# Patient Record
Sex: MALE | Race: WHITE | Employment: FULL TIME | URBAN - METROPOLITAN AREA
[De-identification: names, ages, dates, MRNs, and addresses within clinical notes are randomized per-mention and may not be internally consistent; named-entity substitution may affect disease eponyms.]

---

## 2017-01-13 ENCOUNTER — OFFICE VISIT (OUTPATIENT)
Dept: NEUROLOGY | Age: 57
End: 2017-01-13

## 2017-01-13 VITALS
WEIGHT: 261 LBS | SYSTOLIC BLOOD PRESSURE: 170 MMHG | RESPIRATION RATE: 14 BRPM | OXYGEN SATURATION: 97 % | HEART RATE: 71 BPM | BODY MASS INDEX: 37.37 KG/M2 | HEIGHT: 70 IN | DIASTOLIC BLOOD PRESSURE: 94 MMHG

## 2017-01-13 DIAGNOSIS — G56.03 BILATERAL CARPAL TUNNEL SYNDROME: ICD-10-CM

## 2017-01-13 DIAGNOSIS — R20.0 NUMBNESS: Primary | ICD-10-CM

## 2017-01-13 DIAGNOSIS — M54.2 NECK PAIN: ICD-10-CM

## 2017-01-13 RX ORDER — ESOMEPRAZOLE MAGNESIUM 40 MG/1
40 CAPSULE, DELAYED RELEASE ORAL EVERY OTHER DAY
COMMUNITY

## 2017-01-13 NOTE — PROGRESS NOTES
Chief Complaint   Patient presents with    Neuropathy         HISTORY OF PRESENT ILLNESS  Sulma Miner is a 60 y.o. male who was referred by Dr. Steffanie Nur. 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 Within a matter of a few hours he noticed the numbness in his feet as well. This has persisted ever since and he has been noticing some difficulty with balance, twinging electrical type sensation along his spine and achy feeling in his muscles. Prior to the symptoms he had an upper respiratory infection that lasted for about 2 weeks. Denies any visual symptoms, dysphagia, dysarthria or facial numbness. No chest pain, shortness of breath or palpitations. He did experience some spasms in his chest the first day that resolved with resting. Symptoms are worse when he is laying down and gets better after he has been walking around for some time. No headache. He did experience some nausea in the beginning. Past Medical History   Diagnosis Date    enlarged prostate     GERD (gastroesophageal reflux disease)     High cholesterol      Current Outpatient Prescriptions   Medication Sig    esomeprazole (NEXIUM) 40 mg capsule Take  by mouth daily.  terazosin (HYTRIN) 2 mg capsule     guaiFENesin-codeine (GUAIATUSSIN AC) 100-10 mg/5 mL solution Take 5 mL by mouth three (3) times daily as needed for Cough. Max Daily Amount: 15 mL.  omeprazole (PRILOSEC) 40 mg capsule     DOCOSAHEXANOIC ACID/EPA (FISH OIL PO) Take  by mouth.  Omega-3-DHA-EPA-Fish Oil 1,000 mg (120 mg-180 mg) cap Take  by mouth.  albuterol (PROVENTIL HFA, VENTOLIN HFA, PROAIR HFA) 90 mcg/actuation inhaler Take 1 Puff by inhalation every six (6) hours as needed for Wheezing. No current facility-administered medications for this visit.       Allergies   Allergen Reactions    Bee Venom Protein (Honey Bee) Unknown (comments)    Penicillins Other (comments)     Pt states was told as a child he was allergic      Family History   Problem Relation Age of Onset    No Known Problems Father     No Known Problems Sister     No Known Problems Brother     No Known Problems Maternal Aunt     No Known Problems Maternal Uncle     No Known Problems Paternal Aunt     No Known Problems Paternal Uncle     No Known Problems Maternal Grandmother     No Known Problems Maternal Grandfather     No Known Problems Paternal Grandmother     No Known Problems Paternal Grandfather      Social History   Substance Use Topics    Smoking status: Never Smoker    Smokeless tobacco: Never Used    Alcohol use No     History reviewed. No pertinent past surgical history. REVIEW OF SYSTEMS  Review of Systems - History obtained from the patient  Psychological ROS: negative  ENT ROS: negative  Hematological and Lymphatic ROS: negative  Endocrine ROS: negative  Respiratory ROS: no cough, shortness of breath, or wheezing  Cardiovascular ROS: no chest pain or dyspnea on exertion  Gastrointestinal ROS: no abdominal pain, change in bowel habits, or black or bloody stools  Genito-Urinary ROS: no dysuria, trouble voiding, or hematuria  Musculoskeletal ROS: negative  Dermatological ROS: negative      PHYSICAL EXAMINATION:    Visit Vitals    BP (!) 170/94    Pulse 71    Resp 14    Ht 5' 10\" (1.778 m)    Wt 118.4 kg (261 lb)    SpO2 97%    BMI 37.45 kg/m2     General:  Well defined, nourished, and groomed individual in no acute distress. Neck: Supple, nontender, thyroid within normal limits, no JVD, no bruits, no pain with resistance to active range of motion. Heart: Regular rate and rhythm, no murmurs, rub, or gallop. Normal S1S2. Lungs:  Clear to auscultation bilaterally with equal chest expansion, no cough, no wheeze  Musculoskeletal:  Extremities revealed no edema and had full range of motion of joints.     Psych:  Good mood and bright affect    NEUROLOGICAL EXAMINATION:     Mental Status:   Alert and oriented to person, place, and time with recent and remote memory intact. Attention span and concentration are normal. Speech is fluent with a full fund of knowledge. Cranial Nerves:    II, III, IV, VI:  Visual acuity grossly intact. Visual fields are normal.    Pupils are equal, round, and reactive to light and accommodation. Extra-ocular movements are full and fluid. Fundoscopic exam was benign, no ptosis or nystagmus. V-XII: Hearing is grossly intact. Facial features are symmetric, with normal sensation and strength. The palate rises symmetrically and the tongue protrudes midline. Sternocleidomastoids 5/5. Motor Examination: Normal tone, bulk, and strength. 5/5 muscle strength throughout. No cogwheel rigidity or clonus present. Sensory exam:  Normal throughout to pinprick, temperature, and vibration sense. Normal proprioception. Coordination:  Heel-to-shin was smooth and symmetrical bilaterally. Finger to nose and rapid arm movement testing was normal.   No resting or intention tremor    Gait and Station:  Steady while walking on toes, heels, and with tandem walking. Normal arm swing. No Rhomberg or pronator drift. No muscle wasting or fasiculations noted. Reflexes:  DTRs 1+ in the biceps and brachioradialis, 2+ at the knees, ankle jerk difficult to elicit . Toes downgoing. ASSESSMENT    ICD-10-CM ICD-9-CM    1. Numbness R20.0 782. 0 EMG NCV MOTOR WO F/WAVE PER NERVE      XR SPINAL PUNC LUMB DX      CELL COUNT, CSF      GLUCOSE, CSF      PROTEIN, CSF      MRI CERV SPINE W WO CONT   2. Neck pain M54.2 723.1 XR SPINAL PUNC LUMB DX      CELL COUNT, CSF      GLUCOSE, CSF      PROTEIN, CSF      MRI CERV SPINE W WO CONT   3.  Bilateral carpal tunnel syndrome G56.03 354.0        DISCUSSION  Mr. Celeste Heredia has history and symptoms suggestive of acute inflammatory demyelinating polyneuropathy i.e Guillain-Barré syndrome. However he still has preserved reflexes at the knees and reports some neck pain which is atypical.  His EMG/NCV does show prolonged F-wave latency of the lower extremity motor nerves which could represent early AIDP i.e Guillain [place] syndrome. There was also evidence of mild carpal tunnel syndrome on both sides, an incidental finding  We will proceed with lumbar puncture and MRI of the cervical spine. Thank you for allowing me to participate in the care of Mr. Richar Stanton. Please feel free to contact me if you have any questions. I will be happy to follow to follow him along with you.       Malia Mendez MD  Diplomate, American Board of Psychiatry & Neurology (Neurology)  Emily Driscoll Board of Psychiatry & Neurology (Clinical Neurophysiology)  Diplomate, American Board of Electrodiagnostic Medicine

## 2017-01-13 NOTE — LETTER
1/13/2017 4:43 PM 
 
Patient:  Vance Corbin YOB: 1960 Date of Visit: 1/13/2017 Dear Chad Ortiz MD 
[address] [facility] 
[place] [address] VIA Facsimile: [phone] 
 : Thank you for referring Mr. Bobo Plata to me for evaluation/treatment. Below are the relevant portions of my assessment and plan of care. If you have questions, please do not hesitate to call me. I look forward to following Mr. Carpenter along with you. Sincerely, Paris Giles MD

## 2017-01-13 NOTE — PROGRESS NOTES
Patient here for numbness in both hands and all toes  Started last week  Shooting pains from shoulder to mid-thigh

## 2017-01-13 NOTE — PROCEDURES
[facility] Neurology [address] [place] [address], [facility].  Phone [phone]   Fax: [phone]  Test Date:  2017    Patient: Michaela Guillaume : 1940 Physician: Lara Mitchell MD   Sex: Male Height: ' 0\" Ref Phys: Lara Mitchell md   ID#: [id number] Weight:  lbs. Technician: Venus Pelayo     Patient Complaints:  b/l arms     Patient History / Exam:   49-year-old male who is being evaluated for symptoms of numbness and glove and stocking distribution. This has been going on for the past 9-10 days. He had an upper respiratory infection that lasted 2 weeks prior to the symptoms. On exam: No focal motor or sensory deficits. DTRs 2/2 and symmetric except ankle jerks that were absent. NCV & EMG Findings:  Evaluation of the left median sensory nerve showed prolonged distal peak latency (4.6 ms) and decreased conduction velocity (Wrist-2nd Digit, 30 m/s). The right median sensory nerve showed prolonged distal peak latency (4.1 ms), reduced amplitude (9.6 µV), and decreased conduction velocity (Wrist-2nd Digit, 34 m/s). The left sural sensory nerve showed no response (Calf). The right ulnar sensory nerve showed reduced amplitude (12.4 µV). The left median/ulnar (palm) comparison nerve showed prolonged distal peak latency (Ulnar Palm, 3.2 ms) and abnormal peak latency difference (Median Palm-Ulnar Palm, 0.8 ms). The right median/ulnar (palm) comparison nerve showed abnormal peak latency difference (Median Palm-Ulnar Palm, 0.4 ms). All remaining nerves  were within normal limits. F Wave studies indicate that the right peroneal F wave has prolonged latency (61.59 ms). The right tibial F wave has prolonged latency (65.42 ms) and prolonged latency (62.26 ms). All remaining F Wave latencies were within normal limits. Left vs. Right comparison data for the median F wave indicates abnormal L-R latency difference (10.30 ms). All remaining F Wave left vs. right side latency differences were within normal limits. All examined muscles (as indicated in the following table) showed no evidence of electrical instability. Impression:  The electrodiagnostic testing is suggestive of bilateral entrapment median mononeuropathy i.e. carpal tunnel syndrome. This is mild on the right and mild to moderate on the left the nerve conductions were otherwise unremarkable except for prolonged F waves of peroneal, tibial nerves. In the appropriate clinical setting this may represent acute inflammatory demyelinating polyneuropathy i.e Guillain-Barré syndrome. Recommendations:  Further work up as indicated including spinal tap.   Consider repeat study in 2-3 weeks as needed  __________________________  Shamir Velazco MD        Nerve Conduction Studies  Anti Sensory Summary Table     Site NR Peak (ms) Norm Peak (ms) P-T Amp (µV) Norm P-T Amp Site1 Site2 Delta-P (ms) Dist (cm) Frankie (m/s) Norm Frankie (m/s)   Left Median Anti Sensory (2nd Digit)  32.7°C   Wrist    4.6 <3.6 21.2 >10 Wrist 2nd Digit 4.6 14.0 30 >39   Right Median Anti Sensory (2nd Digit)  26.3°C   Wrist    4.1 <3.6 9.6 >10 Wrist 2nd Digit 4.1 14.0 34 >39   Left Radial Anti Sensory (Base 1st Digit)  32.7°C   Wrist    2.3 <3.1 18.8  Wrist Base 1st Digit 2.3 0.0     Left Sup Peroneal Anti Sensory (Ant Lat Mall)  30°C   14 cm    2.9 <4.4 27.1 >5.0 14 cm Ant Lat Mall 2.9 14.0 48 >32   Site 2    2.9  24.6          Left Sural Anti Sensory (Lat Mall)  30.1°C   Calf NR  <4.0  >5.0 Calf Lat Mall  14.0  >35   Left Ulnar Anti Sensory (5th Digit)  32.7°C   Wrist    3.3 <3.7 18.4 >15.0 Wrist 5th Digit 3.3 14.0 42 >38   Right Ulnar Anti Sensory (5th Digit)  26.2°C   Wrist    3.3 <3.7 12.4 >15.0 Wrist 5th Digit 3.3 14.0 42 >38     Motor Summary Table     Site NR Onset (ms) Norm Onset (ms) O-P Amp (mV) Norm O-P Amp Site1 Site2 Delta-0 (ms) Dist (cm) Frankie (m/s) Norm Frankie (m/s)   Left Median Motor (Abd [employer] [place] Brev)  31.3°C   Wrist    4.1 <4.2 6.1 >5 Elbow Wrist 4.3 26.0 60 >50   Elbow    8.4  5.0          Right Median Motor (Abd Poll Brev)  25.5°C   Wrist    4.0 <4.2 8.8 >5 Elbow Wrist 4.2 26.0 62 >50   Elbow    8.2  7.6          Left Peroneal Motor (Ext Dig Brev)  29.9°C   Ankle    4.6 <6.1 3.0 >2.5 B Fib Ankle 7.8 36.0 46 >38   B Fib    12.4  2.8  Poplt B Fib 2.3 13.0 57 >40   Poplt    14.7  2.0          Left Tibial Motor (Abd House Brev)  29.9°C   Ankle    4.6 <6.1 5.4 >3.0 Knee Ankle 10.0 36.0 36 >35   Knee    14.6  3.3          Left Ulnar Motor (Abd Dig Minimi)  31.4°C   Wrist    3.1 <4.2 6.4 >3 B Elbow Wrist 3.5 23.0 66 >53   B Elbow    6.6  6.4  A Elbow B Elbow 2.0 11.0 55 >53   A Elbow    8.6  6.2          Right Ulnar Motor (Abd Dig Minimi)  25.7°C   Wrist    2.7 <4.2 9.6 >3 B Elbow Wrist 3.6 23.0 64 >53   B Elbow    6.3  7.8  A Elbow B Elbow 1.7 11.0 65 >53   A Elbow    8.0  6.7            Comparison Summary Table     Site NR Peak (ms) Norm Peak (ms) P-T Amp (µV) Site1 Site2 Delta-P (ms) Norm Delta (ms)   Left Median/Ulnar Palm Comparison (Wrist - 8cm)  27.7°C   Median Palm    2.4 <2.5 13.8 Median Palm Ulnar Palm 0.8 <0.3   Ulnar Palm    3.2 <2.5 8.4       Right Median/Ulnar Palm Comparison (Wrist - 8cm)  26.8°C   Median Palm    2.1 <2.5 18.1 Median Palm Ulnar Palm 0.4 <0.3   Ulnar Palm    1.7 <2.5 6.2         F Wave Studies     NR F-Lat (ms) Lat Norm (ms) L-R F-Lat (ms) L-R Lat Norm   Left Median (Mrkrs) (Abd Poll Brev)  30.1°C      30.30 <33 10.30 <2.2   Right Median (Mrkrs) (Abd Poll Brev)  30.4°C      20.00 <33 10.30 <2.2   Right Peroneal (Mrkrs) (EDB)  30.2°C      61.59 <60  <5.1   Right Tibial (Mrkrs) (Abd Hallucis)  29.9°C      65.42 <61  <5.7   Right Tibial (Mrkrs) Run #2 (Abd Hallucis)  30.4°C      62.26 <61  <5.7   Left Ulnar (Mrkrs) (Abd Dig Min)  30.2°C      30.96 <36 1.91 <2.5   Right Ulnar (Mrkrs) (Abd Dig Min)  30°C      29.05 <36 1.91 <2.5     EMG     Side Muscle Nerve Root Ins Act Fibs Psw Amp Dur Poly Recrt Int Della Miguel Comment   Left 1stDorInt Ulnar C8-T1 Nml Nml Nml Nml Nml 0 Nml Nml    Left BrachioRad Radial C5-6 Nml Nml Nml Nml Nml 0 Nml Nml    Left PronatorTeres Median C6-7 Nml Nml Nml Nml Nml 0 Nml Nml    Left Biceps Musculocut C5-6 Nml Nml Nml Nml Nml 0 Nml Nml    Left Triceps Radial C6-7-8 Nml Nml Nml Nml Nml 0 Nml Nml    Left Deltoid Axillary C5-6 Nml Nml Nml Nml Nml 0 Nml Nml    Left Ext Dig Brev Dp Br Peronel L5, S1 Nml Nml Nml Nml Nml 0 Nml Nml    Left AntTibialis Dp Br Peronel L4-5 Nml Nml Nml Nml Nml 0 Nml Nml    Left Peroneus Long Sup Br Peronel L5-S1 Nml Nml Nml Nml Nml 0 Nml Nml    Left Gastroc Tibial S1-2 Nml Nml Nml Nml Nml 0 Nml Nml    Left Flex Dig Long Tibial L5-S2 Nml Nml Nml Nml Nml 0 Nml Nml    Left VastusLat Femoral L2-4 Nml Nml Nml Nml Nml 0 Nml Nml          Waveforms:

## 2017-01-13 NOTE — MR AVS SNAPSHOT
Visit Information Date & Time Provider Department Dept. Phone Encounter #  
 1/13/2017 10:00 AM Trice Montemayor MD [facility] at [address] [id number] Your Appointments 1/17/2017  8:00 AM  
PROCEDURE with Trice Montemayor MD  
[facility] at [address] [facility]) Appt Note: EMG b/l arms per Dr. Nba Markham [address] [place] [id number]  
[phone]  
  
   
 [address] [address] Upcoming Health Maintenance Date Due Hepatitis C Screening 1960 DTaP/Tdap/Td series (1 - Tdap) 7/7/1979 FOBT Q 1 YEAR AGE 50-75 7/7/2008 INFLUENZA AGE 9 TO ADULT 8/1/2016 Allergies as of 1/13/2017  Review Complete On: 1/13/2017 By: Trice Montemayor MD  
  
 Severity Noted Reaction Type Reactions Bee Venom Protein (Honey Bee)  01/13/2017    Unknown (comments) Penicillins  04/14/2016    Other (comments) Pt states was told as a child he was allergic Current Immunizations  Never Reviewed No immunizations on file. Not reviewed this visit You Were Diagnosed With   
  
 Codes Comments Numbness    -  Primary ICD-10-CM: R20.0 ICD-9-CM: 782.0 Neck pain     ICD-10-CM: M54.2 ICD-9-CM: 723.1 Vitals BP Pulse Resp Height(growth percentile) Weight(growth percentile) SpO2  
 (!) 170/94 71 14 5' 10\" (1.778 m) 261 lb (118.4 kg) 97% BMI Smoking Status 37.45 kg/m2 Never Smoker Vitals History BMI and BSA Data Body Mass Index Body Surface Area  
 37.45 kg/m 2 2.42 m 2 Preferred Pharmacy Pharmacy Name Phone [facility] [address] [facility], [address] AT [address] [place] [address] Renita [facility] [phone] Your Updated Medication List  
  
   
This list is accurate as of: 1/13/17  3:04 PM.  Always use your most recent med list.  
  
  
  
  
 albuterol 90 mcg/actuation inhaler Commonly known as:  PROVENTIL HFA, VENTOLIN HFA, PROAIR HFA Take 1 Puff by inhalation every six (6) hours as needed for Wheezing. FISH OIL PO Take  by mouth.  
  
 guaiFENesin-codeine 100-10 mg/5 mL solution Commonly known as:  Bonnee Pinna Take 5 mL by mouth three (3) times daily as needed for Cough. Max Daily Amount: 15 mL. NexIUM 40 mg capsule Generic drug:  esomeprazole Take  by mouth daily. Omega-3-DHA-EPA-Fish Oil 1,000 mg (120 mg-180 mg) Cap Take  by mouth. omeprazole 40 mg capsule Commonly known as:  PRILOSEC  
  
 terazosin 2 mg capsule Commonly known as:  HYTRIN We Performed the Following GLUCOSE, CSF [id number] CPT(R)] To-Do List   
 01/16/2017 Lab:  CELL COUNT, CSF   
  
 01/16/2017 Neurology:  EMG NCV MOTOR WO F/WAVE PER NERVE   
  
 01/16/2017 Imaging:  MRI CERV SPINE W WO CONT   
  
 01/16/2017 Lab:  PROTEIN, CSF   
  
 01/16/2017 Imaging:  XR SPINAL PUNC LUMB DX Introducing [facility]! Vicente Rosenbaum introduces [employer] patient portal. Now you can access parts of your medical record, email your doctor's office, and request medication refills online. 1. In your internet browser, go to [email or web address] 2. Click on the First Time User? Click Here link in the Sign In box. You will see the New Member Sign Up page. 3. Enter your [employer] Access Code exactly as it appears below. You will not need to use this code after youve completed the sign-up process. If you do not sign up before the expiration date, you must request a new code. · [employer] Access Code: [email or web address] Expires: 3/9/2017  9:31 AM 
 
4. Enter the last four digits of your Social Security Number (xxxx) and Date of Birth (mm/dd/yyyy) as indicated and click Submit. You will be taken to the next sign-up page. 5. Create a [employer] ID.  This will be your [employer] login ID and cannot be changed, so think of one that is secure and easy to remember. 6. Create a [employer] password. You can change your password at any time. 7. Enter your Password Reset Question and Answer. This can be used at a later time if you forget your password. 8. Enter your e-mail address. You will receive e-mail notification when new information is available in [address] 9. Click Sign Up. You can now view and download portions of your medical record. 10. Click the Download Summary menu link to download a portable copy of your medical information. If you have questions, please visit the Frequently Asked Questions section of the [employer] website. Remember, [employer] is NOT to be used for urgent needs. For medical emergencies, dial 911. Now available from your iPhone and Android! Please provide this summary of care documentation to your next provider. Your primary care clinician is listed as Mary Walsh. If you have any questions after today's visit, please call [phone].

## 2017-01-16 DIAGNOSIS — M54.2 NECK PAIN: ICD-10-CM

## 2017-01-16 DIAGNOSIS — R20.0 NUMBNESS: ICD-10-CM

## 2017-01-16 RX ORDER — BISMUTH SUBSALICYLATE 262 MG
1 TABLET,CHEWABLE ORAL DAILY
COMMUNITY

## 2017-01-17 ENCOUNTER — HOSPITAL ENCOUNTER (OUTPATIENT)
Dept: GENERAL RADIOLOGY | Age: 57
Discharge: HOME | End: 2017-01-17
Attending: PSYCHIATRY & NEUROLOGY
Payer: COMMERCIAL

## 2017-01-17 VITALS
OXYGEN SATURATION: 95 % | HEIGHT: 70 IN | SYSTOLIC BLOOD PRESSURE: 142 MMHG | BODY MASS INDEX: 37.22 KG/M2 | WEIGHT: 260 LBS | RESPIRATION RATE: 16 BRPM | TEMPERATURE: 98.1 F | HEART RATE: 64 BPM | DIASTOLIC BLOOD PRESSURE: 79 MMHG

## 2017-01-17 DIAGNOSIS — R20.0 NUMBNESS: ICD-10-CM

## 2017-01-17 DIAGNOSIS — M54.2 NECK PAIN: ICD-10-CM

## 2017-01-17 LAB
CHARACTER SMN: CLEAR
COLOR SPUN CSF: COLORLESS
GLUCOSE CSF-MCNC: 87 MG/DL (ref 40–70)
PROT CSF-MCNC: 96 MG/DL (ref 15–45)
RBC # CSF: 1 /CU MM
TUBE # CSF: 1
WBC # CSF: 3 /CU MM (ref 0–5)

## 2017-01-17 PROCEDURE — 84157 ASSAY OF PROTEIN OTHER: CPT | Performed by: PSYCHIATRY & NEUROLOGY

## 2017-01-17 PROCEDURE — 89050 BODY FLUID CELL COUNT: CPT | Performed by: PSYCHIATRY & NEUROLOGY

## 2017-01-17 PROCEDURE — 82945 GLUCOSE OTHER FLUID: CPT | Performed by: PSYCHIATRY & NEUROLOGY

## 2017-01-17 PROCEDURE — 62270 DX LMBR SPI PNXR: CPT

## 2017-01-17 NOTE — DISCHARGE INSTRUCTIONS
[address] [facility]  Department of Interventional Radiology  [phone]    POST LUMBAR PUNCTURE DISCHARGE INSTRUCTIONS  General Information:  Lumbar Puncture: A LP is done to help diagnose several disorders, like pseudo tumor, migraines, meningitis, and multiple sclerosis. It involves a puncture (usually in the lower spine) into the sac that protects the spinal column. A sample of the fluid in that space is removed and tested in the lab. After any of procedures, you will be asked to lie flat on your back for 4-6 hours to prevent complications. You should also rest for 24 hours after you go home, and force fluids. If you have a headache, you should take Tylenol or acetaminophen. Call If:     You should call your Physician and/or the Radiology Nurse if you develop a headache that is not relieved by Tylenol, and worsens when you stand and eases when you lie down, you need to call. You may have developed what is referred to as a spinal headache. Our physicians will probably advise you to be on strict bed rest for 24 hours, to drink lots of fluids and caffeine. If this does not help the head pain, call again the next day. You should call if you have bleeding other than a small spot on your bandage. You should call if you have any numbness, tingling, weakness, fever, chills, urinary retention, severe itching, rash, welts, swelling, or confusion. Follow-Up Instructions: See the doctor who ordered your procedure as he/she has instructed. If you had a Lumbar Puncture or Myelogram, your results should be available to your ordering doctor in 3-5 business days. You can remove your dressing in 24 hours and shower regularly. Do not bathe or swim for 72 hours. To Reach Us:   Side effects of sedation medications and other medications used today have been reviewed. Notify us of nausea, itching, hives, dizziness, or anything else out of the ordinary.        Should you experience any of these significant changes, please call [phone] between the hours of 7:30 am and 10 pm or [phone] after hours.  After hours, ask the  to page the [address] Technologist, and describe the problem to the technologist.        Patient Signature:  Date: 1/17/2017  Discharging Nurse: Fani Johnson RN

## 2017-01-17 NOTE — IP AVS SNAPSHOT
Current Discharge Medication List  
  
ASK your doctor about these medications Dose & Instructions Dispensing Information Comments Morning Noon Evening Bedtime FISH OIL PO Your next dose is: Today, Tomorrow Other:  ____________ Take  by mouth. Refills:  0 GRAPE SEED PO Your next dose is: Today, Tomorrow Other:  ____________ Dose:  100 mg Take 100 mg by mouth daily. Refills:  0  
     
   
   
   
  
 multivitamin tablet Commonly known as:  ONE A DAY Your next dose is: Today, Tomorrow Other:  ____________ Dose:  1 Tab Take 1 Tab by mouth daily. Refills:  0 NexIUM 40 mg capsule Generic drug:  esomeprazole Your next dose is: Today, Tomorrow Other:  ____________ Take  by mouth daily. Refills:  0 Omega-3-DHA-EPA-Fish Oil 1,000 mg (120 mg-180 mg) Cap Your next dose is: Today, Tomorrow Other:  ____________ Take  by mouth. Refills:  0  
     
   
   
   
  
 omeprazole 40 mg capsule Commonly known as:  PRILOSEC Your next dose is: Today, Tomorrow Other:  ____________ Refills:  4  
     
   
   
   
  
 terazosin 2 mg capsule Commonly known as:  HYTRIN Your next dose is: Today, Tomorrow Other:  ____________ Refills:  5

## 2017-01-17 NOTE — IP AVS SNAPSHOT
[address] [address] 
[phone] Patient: Elizabeth Hanna MRN: [id number] IWV:[birth year] You are allergic to the following Allergen Reactions Bee Venom Protein (Honey Bee) Unknown (comments) Penicillins Other (comments) Pt states was told as a child he was allergic Recent Documentation Height Weight BMI Smoking Status 1.778 m 117.9 kg 37.31 kg/m2 Former Smoker Unresulted Labs Order Current Status CELL COUNT, CSF In process GLUCOSE, CSF In process PROTEIN, CSF In process Emergency Contacts Name Discharge Info Relation Home Work Mobile [address] CAREGIVER [3] Spouse [3] [phone] About your hospitalization You were admitted on:  January 17, 2017 You last received care in the:  [address] You were discharged on:  January 17, 2017 Unit phone number:  [phone] Why you were hospitalized Your primary diagnosis was:  Not on File Providers Seen During Your Hospitalizations Provider Role Specialty Primary office phone Israel Michelle MD Attending Provider Neurology [phone] Your Primary Care Physician (PCP) Primary Care Physician Office Phone Office Fax Sol Cole, [address] [phone] Follow-up Information None Your Appointments Thursday January 19, 2017  4:30 PM EST  
[facility] MRI [address]) [address] [facility]  
[phone] 1. Please bring a list or a bag of your current medications to your appointment 2. Please be sure to remove ALL hair clips, pins, extensions, etc., prior to arriving for your MRI procedure.  3. Bring any non [facility] films or CDs pertaining to the area being imaged with you on the day of appointment. 4. A written order with a valid diagnosis and Physicians  signature is required for all scheduled tests. 5. Check in at registration 30min before your appointment time unless you were instructed to do otherwise. Patient should report to outpatient registration ([facility]) 30 minutes prior to the appointment time unless instructed otherwise. Current Discharge Medication List  
  
ASK your doctor about these medications Dose & Instructions Dispensing Information Comments Morning Noon Evening Bedtime FISH OIL PO Your next dose is: Today, Tomorrow Other:  _________ Take  by mouth. Refills:  0 GRAPE SEED PO Your next dose is: Today, Tomorrow Other:  _________ Dose:  100 mg Take 100 mg by mouth daily. Refills:  0  
     
   
   
   
  
 multivitamin tablet Commonly known as:  ONE A DAY Your next dose is: Today, Tomorrow Other:  _________ Dose:  1 Tab Take 1 Tab by mouth daily. Refills:  0 NexIUM 40 mg capsule Generic drug:  esomeprazole Your next dose is: Today, Tomorrow Other:  _________ Take  by mouth daily. Refills:  0 Omega-3-DHA-EPA-Fish Oil 1,000 mg (120 mg-180 mg) Cap Your next dose is: Today, Tomorrow Other:  _________ Take  by mouth. Refills:  0  
     
   
   
   
  
 omeprazole 40 mg capsule Commonly known as:  PRILOSEC Your next dose is: Today, Tomorrow Other:  _________ Refills:  4  
     
   
   
   
  
 terazosin 2 mg capsule Commonly known as:  HYTRIN Your next dose is: Today, Tomorrow Other:  _________ Refills:  5 Discharge Instructions [address]  
[address] 
 Department of Interventional Radiology 
[phone] POST LUMBAR PUNCTURE DISCHARGE INSTRUCTIONS General Information: 
Lumbar Puncture: A LP is done to help diagnose several disorders, like pseudo tumor, migraines, meningitis, and multiple sclerosis. It involves a puncture (usually in the lower spine) into the sac that protects the spinal column. A sample of the fluid in that space is removed and tested in the lab. After any of procedures, you will be asked to lie flat on your back for 4-6 hours to prevent complications. You should also rest for 24 hours after you go home, and force fluids. If you have a headache, you should take Tylenol or acetaminophen. Call If: 
   You should call your Physician and/or the Radiology Nurse if you develop a headache that is not relieved by Tylenol, and worsens when you stand and eases when you lie down, you need to call. You may have developed what is referred to as a spinal headache. Our physicians will probably advise you to be on strict bed rest for 24 hours, to drink lots of fluids and caffeine. If this does not help the head pain, call again the next day. You should call if you have bleeding other than a small spot on your bandage. You should call if you have any numbness, tingling, weakness, fever, chills, urinary retention, severe itching, rash, welts, swelling, or confusion. Follow-Up Instructions: See the doctor who ordered your procedure as he/she has instructed. If you had a Lumbar Puncture or Myelogram, your results should be available to your ordering doctor in 3-5 business days. You can remove your dressing in 24 hours and shower regularly. Do not bathe or swim for 72 hours. To Reach Us:  
Side effects of sedation medications and other medications used today have been reviewed. Notify us of nausea, itching, hives, dizziness, or anything else out of the ordinary.    
 
 Should you experience any of these significant changes, please call [phone] between the hours of 7:30 am and 10 pm or [phone] after hours. After hours, ask the  to page the [address] Technologist, and describe the problem to the technologist.  
 
  
Patient Signature: 
Date: 1/17/2017 Discharging Nurse: Gladys Sabillon RN Discharge Orders None Introducing [facility]! Dear Marky Alan: Thank you for requesting a [employer] account. Our records indicate that you already have an active [employer] account. You can access your account anytime at [email or web address] Did you know that you can access your hospital and ER discharge instructions at any time in [employer]? You can also review all of your test results from your hospital stay or ER visit. Additional Information If you have questions, please visit the Frequently Asked Questions section of the [employer] website at [email or web address] Remember, [employer] is NOT to be used for urgent needs. For medical emergencies, dial 911. Now available from your iPhone and Android! General Information Please provide this summary of care documentation to your next provider. Patient Signature:  ____________________________________________________________ Date:  ____________________________________________________________  
  
[facility] Provider Signature:  ____________________________________________________________ Date:  ____________________________________________________________

## 2017-01-18 ENCOUNTER — TELEPHONE (OUTPATIENT)
Dept: NEUROLOGY | Age: 57
End: 2017-01-18

## 2017-01-19 ENCOUNTER — HOSPITAL ENCOUNTER (OUTPATIENT)
Dept: MRI IMAGING | Age: 57
Discharge: HOME | End: 2017-01-19
Attending: PSYCHIATRY & NEUROLOGY
Payer: COMMERCIAL

## 2017-01-19 DIAGNOSIS — M54.2 NECK PAIN: ICD-10-CM

## 2017-01-19 DIAGNOSIS — R20.0 NUMBNESS: ICD-10-CM

## 2017-01-19 PROCEDURE — 72141 MRI NECK SPINE W/O DYE: CPT

## 2017-01-19 NOTE — TELEPHONE ENCOUNTER
Pt calling to see if dr has results from spinal tap and what the next course of action is. If wife answers you may speak with her.

## 2017-01-20 DIAGNOSIS — M47.12 OSTEOARTHRITIS OF CERVICAL SPINE WITH MYELOPATHY: Primary | ICD-10-CM

## 2017-01-20 NOTE — TELEPHONE ENCOUNTER
Pt just spoke with Dr. Kelly Phelan, said he forgot to ask Dr. Kelly Phelan a few questions and would like a returned phone call back from  if possible.

## 2017-02-10 ENCOUNTER — HOSPITAL ENCOUNTER (OUTPATIENT)
Dept: PREADMISSION TESTING | Age: 57
Discharge: HOME | End: 2017-02-10
Payer: COMMERCIAL

## 2017-02-10 VITALS
HEIGHT: 70 IN | WEIGHT: 252 LBS | BODY MASS INDEX: 36.08 KG/M2 | HEART RATE: 54 BPM | TEMPERATURE: 98.1 F | DIASTOLIC BLOOD PRESSURE: 88 MMHG | SYSTOLIC BLOOD PRESSURE: 146 MMHG

## 2017-02-10 LAB
ANION GAP BLD CALC-SCNC: 7 MMOL/L (ref 5–15)
BASOPHILS # BLD AUTO: 0.1 K/UL (ref 0–0.1)
BASOPHILS # BLD: 1 % (ref 0–1)
BUN SERPL-MCNC: 12 MG/DL (ref 6–20)
BUN/CREAT SERPL: 14 (ref 12–20)
CALCIUM SERPL-MCNC: 9.1 MG/DL (ref 8.5–10.1)
CHLORIDE SERPL-SCNC: 101 MMOL/L (ref 97–108)
CO2 SERPL-SCNC: 30 MMOL/L (ref 21–32)
CREAT SERPL-MCNC: 0.86 MG/DL (ref 0.7–1.3)
EOSINOPHIL # BLD: 0.2 K/UL (ref 0–0.4)
EOSINOPHIL NFR BLD: 3 % (ref 0–7)
ERYTHROCYTE [DISTWIDTH] IN BLOOD BY AUTOMATED COUNT: 11.7 % (ref 11.5–14.5)
EST. AVERAGE GLUCOSE BLD GHB EST-MCNC: 160 MG/DL
GLUCOSE SERPL-MCNC: 127 MG/DL (ref 65–100)
HBA1C MFR BLD: 7.2 % (ref 4.2–6.3)
HCT VFR BLD AUTO: 45.8 % (ref 36.6–50.3)
HGB BLD-MCNC: 16.4 G/DL (ref 12.1–17)
LYMPHOCYTES # BLD AUTO: 40 % (ref 12–49)
LYMPHOCYTES # BLD: 2.8 K/UL (ref 0.8–3.5)
MCH RBC QN AUTO: 31.5 PG (ref 26–34)
MCHC RBC AUTO-ENTMCNC: 35.8 G/DL (ref 30–36.5)
MCV RBC AUTO: 87.9 FL (ref 80–99)
MONOCYTES # BLD: 0.6 K/UL (ref 0–1)
MONOCYTES NFR BLD AUTO: 9 % (ref 5–13)
NEUTS SEG # BLD: 3.2 K/UL (ref 1.8–8)
NEUTS SEG NFR BLD AUTO: 47 % (ref 32–75)
PLATELET # BLD AUTO: 193 K/UL (ref 150–400)
POTASSIUM SERPL-SCNC: 4.2 MMOL/L (ref 3.5–5.1)
RBC # BLD AUTO: 5.21 M/UL (ref 4.1–5.7)
SODIUM SERPL-SCNC: 138 MMOL/L (ref 136–145)
WBC # BLD AUTO: 6.9 K/UL (ref 4.1–11.1)

## 2017-02-10 PROCEDURE — 93005 ELECTROCARDIOGRAM TRACING: CPT

## 2017-02-10 PROCEDURE — 80048 BASIC METABOLIC PNL TOTAL CA: CPT | Performed by: NEUROLOGICAL SURGERY

## 2017-02-10 PROCEDURE — 36415 COLL VENOUS BLD VENIPUNCTURE: CPT | Performed by: NEUROLOGICAL SURGERY

## 2017-02-10 PROCEDURE — 85025 COMPLETE CBC W/AUTO DIFF WBC: CPT | Performed by: NEUROLOGICAL SURGERY

## 2017-02-10 PROCEDURE — 83036 HEMOGLOBIN GLYCOSYLATED A1C: CPT | Performed by: NEUROLOGICAL SURGERY

## 2017-02-11 ENCOUNTER — ANESTHESIA EVENT (OUTPATIENT)
Dept: SURGERY | Age: 57
End: 2017-02-11
Payer: COMMERCIAL

## 2017-02-11 LAB
ATRIAL RATE: 54 BPM
BACTERIA SPEC CULT: ABNORMAL
BACTERIA SPEC CULT: ABNORMAL
CALCULATED P AXIS, ECG09: 20 DEGREES
CALCULATED R AXIS, ECG10: 25 DEGREES
CALCULATED T AXIS, ECG11: 30 DEGREES
DIAGNOSIS, 93000: NORMAL
P-R INTERVAL, ECG05: 192 MS
Q-T INTERVAL, ECG07: 420 MS
QRS DURATION, ECG06: 90 MS
QTC CALCULATION (BEZET), ECG08: 398 MS
SERVICE CMNT-IMP: ABNORMAL
VENTRICULAR RATE, ECG03: 54 BPM

## 2017-02-13 ENCOUNTER — HOSPITAL ENCOUNTER (OUTPATIENT)
Age: 57
Setting detail: OBSERVATION
Discharge: HOME | End: 2017-02-14
Attending: NEUROLOGICAL SURGERY | Admitting: NEUROLOGICAL SURGERY
Payer: COMMERCIAL

## 2017-02-13 ENCOUNTER — ANESTHESIA (OUTPATIENT)
Dept: SURGERY | Age: 57
End: 2017-02-13
Payer: COMMERCIAL

## 2017-02-13 ENCOUNTER — APPOINTMENT (OUTPATIENT)
Dept: GENERAL RADIOLOGY | Age: 57
End: 2017-02-13
Attending: NEUROLOGICAL SURGERY
Payer: COMMERCIAL

## 2017-02-13 LAB
GLUCOSE BLD STRIP.AUTO-MCNC: 216 MG/DL (ref 65–100)
SERVICE CMNT-IMP: ABNORMAL

## 2017-02-13 PROCEDURE — 76010000171 HC OR TIME 2 TO 2.5 HR INTENSV-TIER 1: Performed by: NEUROLOGICAL SURGERY

## 2017-02-13 PROCEDURE — 99218 HC RM OBSERVATION: CPT

## 2017-02-13 PROCEDURE — 82962 GLUCOSE BLOOD TEST: CPT

## 2017-02-13 PROCEDURE — 74011250636 HC RX REV CODE- 250/636

## 2017-02-13 PROCEDURE — 74011250636 HC RX REV CODE- 250/636: Performed by: ANESTHESIOLOGY

## 2017-02-13 PROCEDURE — C1713 ANCHOR/SCREW BN/BN,TIS/BN: HCPCS | Performed by: NEUROLOGICAL SURGERY

## 2017-02-13 PROCEDURE — 77030030028 HC BIT DRL SPN FLAT CHK DSP J&J -B: Performed by: NEUROLOGICAL SURGERY

## 2017-02-13 PROCEDURE — 77030003029 HC SUT VCRL J&J -B: Performed by: NEUROLOGICAL SURGERY

## 2017-02-13 PROCEDURE — 77030032490 HC SLV COMPR SCD KNE COVD -B: Performed by: NEUROLOGICAL SURGERY

## 2017-02-13 PROCEDURE — 77030011640 HC PAD GRND REM COVD -A: Performed by: NEUROLOGICAL SURGERY

## 2017-02-13 PROCEDURE — 77030014647 HC SEAL FBRN TISSL BAXT -D: Performed by: NEUROLOGICAL SURGERY

## 2017-02-13 PROCEDURE — 77030011267 HC ELECTRD BLD COVD -A: Performed by: NEUROLOGICAL SURGERY

## 2017-02-13 PROCEDURE — 77030012464: Performed by: NEUROLOGICAL SURGERY

## 2017-02-13 PROCEDURE — 74011000250 HC RX REV CODE- 250: Performed by: NEUROLOGICAL SURGERY

## 2017-02-13 PROCEDURE — 77030019908 HC STETH ESOPH SIMS -A: Performed by: ANESTHESIOLOGY

## 2017-02-13 PROCEDURE — 74011000272 HC RX REV CODE- 272: Performed by: NEUROLOGICAL SURGERY

## 2017-02-13 PROCEDURE — 77030008684 HC TU ET CUF COVD -B: Performed by: ANESTHESIOLOGY

## 2017-02-13 PROCEDURE — 77030010507 HC ADH SKN DERMBND J&J -B: Performed by: NEUROLOGICAL SURGERY

## 2017-02-13 PROCEDURE — 76060000035 HC ANESTHESIA 2 TO 2.5 HR: Performed by: NEUROLOGICAL SURGERY

## 2017-02-13 PROCEDURE — 76210000000 HC OR PH I REC 2 TO 2.5 HR: Performed by: NEUROLOGICAL SURGERY

## 2017-02-13 PROCEDURE — 74011250636 HC RX REV CODE- 250/636: Performed by: NEUROLOGICAL SURGERY

## 2017-02-13 PROCEDURE — 77030004391 HC BUR FLUT MEDT -C: Performed by: NEUROLOGICAL SURGERY

## 2017-02-13 PROCEDURE — 77030013079 HC BLNKT BAIR HGGR 3M -A: Performed by: ANESTHESIOLOGY

## 2017-02-13 PROCEDURE — L0120 CERV FLEX N/ADJ FOAM PRE OTS: HCPCS | Performed by: NEUROLOGICAL SURGERY

## 2017-02-13 PROCEDURE — 76000 FLUOROSCOPY <1 HR PHYS/QHP: CPT

## 2017-02-13 PROCEDURE — 77030003666 HC NDL SPINAL BD -A: Performed by: NEUROLOGICAL SURGERY

## 2017-02-13 PROCEDURE — 74011000250 HC RX REV CODE- 250

## 2017-02-13 PROCEDURE — 74011250637 HC RX REV CODE- 250/637: Performed by: NEUROLOGICAL SURGERY

## 2017-02-13 PROCEDURE — 77030002551 HC PLT ANT CERV TI J&J -G: Performed by: NEUROLOGICAL SURGERY

## 2017-02-13 PROCEDURE — 77030012406 HC DRN WND PENRS BARD -A: Performed by: NEUROLOGICAL SURGERY

## 2017-02-13 PROCEDURE — 77030029099 HC BN WAX SSPC -A: Performed by: NEUROLOGICAL SURGERY

## 2017-02-13 PROCEDURE — 77030018859 HC TBNG IRR BPLR MALS J&J -B: Performed by: NEUROLOGICAL SURGERY

## 2017-02-13 PROCEDURE — 77030002933 HC SUT MCRYL J&J -A: Performed by: NEUROLOGICAL SURGERY

## 2017-02-13 PROCEDURE — 77030018836 HC SOL IRR NACL ICUM -A: Performed by: NEUROLOGICAL SURGERY

## 2017-02-13 DEVICE — PLATE SPNL L14MM ANT CERV TI 1 LEV SKYLINE: Type: IMPLANTABLE DEVICE | Site: SPINE CERVICAL | Status: FUNCTIONAL

## 2017-02-13 DEVICE — SCREW SPNL L16MM DIA4MM ANT CERV TI SELF DRL VAR ANG FULL: Type: IMPLANTABLE DEVICE | Site: SPINE CERVICAL | Status: FUNCTIONAL

## 2017-02-13 DEVICE — SCREW SPNL L16MM DIA4MM ANT CERV TI SELF DRL FULL THRD: Type: IMPLANTABLE DEVICE | Site: SPINE CERVICAL | Status: FUNCTIONAL

## 2017-02-13 DEVICE — BONE SPCR CERV LORDC 7X9MM --: Type: IMPLANTABLE DEVICE | Site: SPINE CERVICAL | Status: FUNCTIONAL

## 2017-02-13 RX ORDER — SODIUM CHLORIDE 0.9 % (FLUSH) 0.9 %
5-10 SYRINGE (ML) INJECTION EVERY 8 HOURS
Status: DISCONTINUED | OUTPATIENT
Start: 2017-02-13 | End: 2017-02-13 | Stop reason: HOSPADM

## 2017-02-13 RX ORDER — SODIUM CHLORIDE 0.9 % (FLUSH) 0.9 %
5-10 SYRINGE (ML) INJECTION AS NEEDED
Status: DISCONTINUED | OUTPATIENT
Start: 2017-02-13 | End: 2017-02-13 | Stop reason: HOSPADM

## 2017-02-13 RX ORDER — SODIUM CHLORIDE, SODIUM LACTATE, POTASSIUM CHLORIDE, CALCIUM CHLORIDE 600; 310; 30; 20 MG/100ML; MG/100ML; MG/100ML; MG/100ML
75 INJECTION, SOLUTION INTRAVENOUS CONTINUOUS
Status: DISCONTINUED | OUTPATIENT
Start: 2017-02-13 | End: 2017-02-13 | Stop reason: HOSPADM

## 2017-02-13 RX ORDER — HYDROMORPHONE HYDROCHLORIDE 2 MG/ML
INJECTION, SOLUTION INTRAMUSCULAR; INTRAVENOUS; SUBCUTANEOUS AS NEEDED
Status: DISCONTINUED | OUTPATIENT
Start: 2017-02-13 | End: 2017-02-13 | Stop reason: HOSPADM

## 2017-02-13 RX ORDER — DEXAMETHASONE SODIUM PHOSPHATE 4 MG/ML
INJECTION, SOLUTION INTRA-ARTICULAR; INTRALESIONAL; INTRAMUSCULAR; INTRAVENOUS; SOFT TISSUE AS NEEDED
Status: DISCONTINUED | OUTPATIENT
Start: 2017-02-13 | End: 2017-02-13 | Stop reason: HOSPADM

## 2017-02-13 RX ORDER — ONDANSETRON 2 MG/ML
4 INJECTION INTRAMUSCULAR; INTRAVENOUS AS NEEDED
Status: DISCONTINUED | OUTPATIENT
Start: 2017-02-13 | End: 2017-02-13 | Stop reason: HOSPADM

## 2017-02-13 RX ORDER — HYDROMORPHONE HYDROCHLORIDE 1 MG/ML
0.2 INJECTION, SOLUTION INTRAMUSCULAR; INTRAVENOUS; SUBCUTANEOUS
Status: DISCONTINUED | OUTPATIENT
Start: 2017-02-13 | End: 2017-02-13 | Stop reason: HOSPADM

## 2017-02-13 RX ORDER — FENTANYL CITRATE 50 UG/ML
INJECTION, SOLUTION INTRAMUSCULAR; INTRAVENOUS AS NEEDED
Status: DISCONTINUED | OUTPATIENT
Start: 2017-02-13 | End: 2017-02-13 | Stop reason: HOSPADM

## 2017-02-13 RX ORDER — MIDAZOLAM HYDROCHLORIDE 1 MG/ML
INJECTION, SOLUTION INTRAMUSCULAR; INTRAVENOUS AS NEEDED
Status: DISCONTINUED | OUTPATIENT
Start: 2017-02-13 | End: 2017-02-13 | Stop reason: HOSPADM

## 2017-02-13 RX ORDER — ROCURONIUM BROMIDE 10 MG/ML
INJECTION, SOLUTION INTRAVENOUS AS NEEDED
Status: DISCONTINUED | OUTPATIENT
Start: 2017-02-13 | End: 2017-02-13 | Stop reason: HOSPADM

## 2017-02-13 RX ORDER — FENTANYL CITRATE 50 UG/ML
25 INJECTION, SOLUTION INTRAMUSCULAR; INTRAVENOUS
Status: DISCONTINUED | OUTPATIENT
Start: 2017-02-13 | End: 2017-02-13 | Stop reason: HOSPADM

## 2017-02-13 RX ORDER — DIAZEPAM 5 MG/1
5 TABLET ORAL
Status: DISCONTINUED | OUTPATIENT
Start: 2017-02-13 | End: 2017-02-14 | Stop reason: HOSPADM

## 2017-02-13 RX ORDER — LIDOCAINE HYDROCHLORIDE 20 MG/ML
INJECTION, SOLUTION EPIDURAL; INFILTRATION; INTRACAUDAL; PERINEURAL AS NEEDED
Status: DISCONTINUED | OUTPATIENT
Start: 2017-02-13 | End: 2017-02-13 | Stop reason: HOSPADM

## 2017-02-13 RX ORDER — ACETAMINOPHEN 325 MG/1
650 TABLET ORAL
Status: DISCONTINUED | OUTPATIENT
Start: 2017-02-13 | End: 2017-02-14 | Stop reason: HOSPADM

## 2017-02-13 RX ORDER — SODIUM CHLORIDE 0.9 % (FLUSH) 0.9 %
5-10 SYRINGE (ML) INJECTION EVERY 8 HOURS
Status: DISCONTINUED | OUTPATIENT
Start: 2017-02-13 | End: 2017-02-14 | Stop reason: HOSPADM

## 2017-02-13 RX ORDER — ONDANSETRON 2 MG/ML
4 INJECTION INTRAMUSCULAR; INTRAVENOUS
Status: DISCONTINUED | OUTPATIENT
Start: 2017-02-13 | End: 2017-02-14 | Stop reason: HOSPADM

## 2017-02-13 RX ORDER — SUCCINYLCHOLINE CHLORIDE 20 MG/ML
INJECTION INTRAMUSCULAR; INTRAVENOUS AS NEEDED
Status: DISCONTINUED | OUTPATIENT
Start: 2017-02-13 | End: 2017-02-13 | Stop reason: HOSPADM

## 2017-02-13 RX ORDER — DOXAZOSIN 2 MG/1
4 TABLET ORAL
Status: DISCONTINUED | OUTPATIENT
Start: 2017-02-13 | End: 2017-02-14 | Stop reason: HOSPADM

## 2017-02-13 RX ORDER — CEFAZOLIN SODIUM IN 0.9 % NACL 2 G/50 ML
2 INTRAVENOUS SOLUTION, PIGGYBACK (ML) INTRAVENOUS EVERY 8 HOURS
Status: COMPLETED | OUTPATIENT
Start: 2017-02-13 | End: 2017-02-14

## 2017-02-13 RX ORDER — CEFAZOLIN SODIUM IN 0.9 % NACL 2 G/50 ML
2 INTRAVENOUS SOLUTION, PIGGYBACK (ML) INTRAVENOUS ONCE
Status: COMPLETED | OUTPATIENT
Start: 2017-02-13 | End: 2017-02-13

## 2017-02-13 RX ORDER — SODIUM CHLORIDE AND POTASSIUM CHLORIDE .9; .15 G/100ML; G/100ML
SOLUTION INTRAVENOUS CONTINUOUS
Status: DISCONTINUED | OUTPATIENT
Start: 2017-02-13 | End: 2017-02-13 | Stop reason: HOSPADM

## 2017-02-13 RX ORDER — SODIUM CHLORIDE 0.9 % (FLUSH) 0.9 %
5-10 SYRINGE (ML) INJECTION AS NEEDED
Status: DISCONTINUED | OUTPATIENT
Start: 2017-02-13 | End: 2017-02-14 | Stop reason: HOSPADM

## 2017-02-13 RX ORDER — NEOSTIGMINE METHYLSULFATE 1 MG/ML
INJECTION INTRAVENOUS AS NEEDED
Status: DISCONTINUED | OUTPATIENT
Start: 2017-02-13 | End: 2017-02-13 | Stop reason: HOSPADM

## 2017-02-13 RX ORDER — DIPHENHYDRAMINE HCL 25 MG
25 CAPSULE ORAL
Status: DISCONTINUED | OUTPATIENT
Start: 2017-02-13 | End: 2017-02-14 | Stop reason: HOSPADM

## 2017-02-13 RX ORDER — DOCUSATE SODIUM 100 MG/1
100 CAPSULE, LIQUID FILLED ORAL 2 TIMES DAILY
Status: DISCONTINUED | OUTPATIENT
Start: 2017-02-13 | End: 2017-02-14 | Stop reason: HOSPADM

## 2017-02-13 RX ORDER — GLYCOPYRROLATE 0.2 MG/ML
INJECTION INTRAMUSCULAR; INTRAVENOUS AS NEEDED
Status: DISCONTINUED | OUTPATIENT
Start: 2017-02-13 | End: 2017-02-13 | Stop reason: HOSPADM

## 2017-02-13 RX ORDER — PROPOFOL 10 MG/ML
INJECTION, EMULSION INTRAVENOUS AS NEEDED
Status: DISCONTINUED | OUTPATIENT
Start: 2017-02-13 | End: 2017-02-13 | Stop reason: HOSPADM

## 2017-02-13 RX ORDER — OXYCODONE AND ACETAMINOPHEN 5; 325 MG/1; MG/1
1-2 TABLET ORAL
Status: DISCONTINUED | OUTPATIENT
Start: 2017-02-13 | End: 2017-02-14 | Stop reason: HOSPADM

## 2017-02-13 RX ORDER — SODIUM CHLORIDE, SODIUM LACTATE, POTASSIUM CHLORIDE, CALCIUM CHLORIDE 600; 310; 30; 20 MG/100ML; MG/100ML; MG/100ML; MG/100ML
INJECTION, SOLUTION INTRAVENOUS
Status: DISCONTINUED | OUTPATIENT
Start: 2017-02-13 | End: 2017-02-13 | Stop reason: HOSPADM

## 2017-02-13 RX ORDER — ONDANSETRON 2 MG/ML
INJECTION INTRAMUSCULAR; INTRAVENOUS AS NEEDED
Status: DISCONTINUED | OUTPATIENT
Start: 2017-02-13 | End: 2017-02-13 | Stop reason: HOSPADM

## 2017-02-13 RX ORDER — HYDROMORPHONE HYDROCHLORIDE 1 MG/ML
1 INJECTION, SOLUTION INTRAMUSCULAR; INTRAVENOUS; SUBCUTANEOUS
Status: DISCONTINUED | OUTPATIENT
Start: 2017-02-13 | End: 2017-02-14 | Stop reason: HOSPADM

## 2017-02-13 RX ORDER — PANTOPRAZOLE SODIUM 40 MG/1
40 TABLET, DELAYED RELEASE ORAL EVERY OTHER DAY
Status: DISCONTINUED | OUTPATIENT
Start: 2017-02-14 | End: 2017-02-14 | Stop reason: HOSPADM

## 2017-02-13 RX ADMIN — ROCURONIUM BROMIDE 25 MG: 10 INJECTION, SOLUTION INTRAVENOUS at 09:44

## 2017-02-13 RX ADMIN — FENTANYL CITRATE 50 MCG: 50 INJECTION, SOLUTION INTRAMUSCULAR; INTRAVENOUS at 09:37

## 2017-02-13 RX ADMIN — MIDAZOLAM HYDROCHLORIDE 2 MG: 1 INJECTION, SOLUTION INTRAMUSCULAR; INTRAVENOUS at 09:31

## 2017-02-13 RX ADMIN — SUCCINYLCHOLINE CHLORIDE 120 MG: 20 INJECTION INTRAMUSCULAR; INTRAVENOUS at 09:37

## 2017-02-13 RX ADMIN — LIDOCAINE HYDROCHLORIDE 100 MG: 20 INJECTION, SOLUTION EPIDURAL; INFILTRATION; INTRACAUDAL; PERINEURAL at 09:37

## 2017-02-13 RX ADMIN — ROCURONIUM BROMIDE 20 MG: 10 INJECTION, SOLUTION INTRAVENOUS at 10:21

## 2017-02-13 RX ADMIN — ONDANSETRON 4 MG: 2 INJECTION INTRAMUSCULAR; INTRAVENOUS at 12:29

## 2017-02-13 RX ADMIN — HYDROMORPHONE HYDROCHLORIDE 0.5 MG: 2 INJECTION, SOLUTION INTRAMUSCULAR; INTRAVENOUS; SUBCUTANEOUS at 11:07

## 2017-02-13 RX ADMIN — FENTANYL CITRATE 50 MCG: 50 INJECTION, SOLUTION INTRAMUSCULAR; INTRAVENOUS at 09:44

## 2017-02-13 RX ADMIN — SODIUM CHLORIDE AND POTASSIUM CHLORIDE: 9; 1.49 INJECTION, SOLUTION INTRAVENOUS at 12:29

## 2017-02-13 RX ADMIN — SODIUM CHLORIDE, SODIUM LACTATE, POTASSIUM CHLORIDE, CALCIUM CHLORIDE: 600; 310; 30; 20 INJECTION, SOLUTION INTRAVENOUS at 09:31

## 2017-02-13 RX ADMIN — ROCURONIUM BROMIDE 5 MG: 10 INJECTION, SOLUTION INTRAVENOUS at 09:37

## 2017-02-13 RX ADMIN — CEFAZOLIN 2 G: 1 INJECTION, POWDER, FOR SOLUTION INTRAMUSCULAR; INTRAVENOUS; PARENTERAL at 09:39

## 2017-02-13 RX ADMIN — HYDROMORPHONE HYDROCHLORIDE 0.5 MG: 2 INJECTION, SOLUTION INTRAMUSCULAR; INTRAVENOUS; SUBCUTANEOUS at 11:16

## 2017-02-13 RX ADMIN — HYDROMORPHONE HYDROCHLORIDE 0.5 MG: 2 INJECTION, SOLUTION INTRAMUSCULAR; INTRAVENOUS; SUBCUTANEOUS at 11:30

## 2017-02-13 RX ADMIN — PROPOFOL 50 MG: 10 INJECTION, EMULSION INTRAVENOUS at 09:51

## 2017-02-13 RX ADMIN — FENTANYL CITRATE 50 MCG: 50 INJECTION, SOLUTION INTRAMUSCULAR; INTRAVENOUS at 10:13

## 2017-02-13 RX ADMIN — PROPOFOL 200 MG: 10 INJECTION, EMULSION INTRAVENOUS at 09:37

## 2017-02-13 RX ADMIN — ACETAMINOPHEN 650 MG: 325 TABLET, FILM COATED ORAL at 21:58

## 2017-02-13 RX ADMIN — SODIUM CHLORIDE 5 MG: 9 INJECTION INTRAMUSCULAR; INTRAVENOUS; SUBCUTANEOUS at 14:34

## 2017-02-13 RX ADMIN — NEOSTIGMINE METHYLSULFATE 4 MG: 1 INJECTION INTRAVENOUS at 11:12

## 2017-02-13 RX ADMIN — FENTANYL CITRATE 50 MCG: 50 INJECTION, SOLUTION INTRAMUSCULAR; INTRAVENOUS at 10:05

## 2017-02-13 RX ADMIN — ONDANSETRON 4 MG: 2 INJECTION INTRAMUSCULAR; INTRAVENOUS at 10:22

## 2017-02-13 RX ADMIN — CEFAZOLIN 2 G: 1 INJECTION, POWDER, FOR SOLUTION INTRAMUSCULAR; INTRAVENOUS; PARENTERAL at 17:18

## 2017-02-13 RX ADMIN — ACETAMINOPHEN 650 MG: 325 TABLET, FILM COATED ORAL at 17:25

## 2017-02-13 RX ADMIN — DEXAMETHASONE SODIUM PHOSPHATE 10 MG: 4 INJECTION, SOLUTION INTRA-ARTICULAR; INTRALESIONAL; INTRAMUSCULAR; INTRAVENOUS; SOFT TISSUE at 10:22

## 2017-02-13 RX ADMIN — ROCURONIUM BROMIDE 20 MG: 10 INJECTION, SOLUTION INTRAVENOUS at 10:05

## 2017-02-13 RX ADMIN — FENTANYL CITRATE 50 MCG: 50 INJECTION, SOLUTION INTRAMUSCULAR; INTRAVENOUS at 09:59

## 2017-02-13 RX ADMIN — GLYCOPYRROLATE 0.4 MG: 0.2 INJECTION INTRAMUSCULAR; INTRAVENOUS at 11:12

## 2017-02-13 NOTE — BRIEF OP NOTE
BRIEF OPERATIVE NOTE    Date of Procedure: 2/13/2017   Preoperative Diagnosis: CERVICAL STENOSIS  Postoperative Diagnosis: CERVICAL STENOSIS    Procedure(s):  C3-4 ANTERIOR CERVICAL DISCECTOMY/FUSION WITH ALLOGRAFT AND PLATE  Surgeon(s) and Role:     * Tiffanie Posada MD - Primary            Surgical Staff:  Circ-1: Meli Ma RN  Circ-Relief: Dori De La O RN  Circ-Intern: Misa Vencesub RN-1: Adrian Simmons RN  Scrub RN-Relief: Dori De La O RN  Surg Asst-1: Ector Galindo  Event Time In   Incision Start 1001   Incision Close      Anesthesia: General   Estimated Blood Loss: 25  Specimens: * No specimens in log *   Findings: stenosis   Complications: no  Implants:   Implant Name Type Inv.  Item Serial No.  Lot No. LRB No. Used Action   BONE SPCR CERV LORDC [id number] --  - [id number]   BONE SPCR CERV LORDC [id number] --  [id number] [facility] TISSUE BANK   N/A 1 Implanted

## 2017-02-13 NOTE — PERIOP NOTES
CALLED / [facility] AND LEFT A VOICEMAIL FOR [facility] ABOUT ABNORMAL LABS (MRSA NASAL SWAB: APPARENT STAPHYLOCOCCUS AUREUS NOTED (NOT MRSA) HGBA1C 7.2). ALL LABS HAVE BEEN FAXED OVER TO OFFICE.

## 2017-02-13 NOTE — PROGRESS NOTES
TRANSFER - IN REPORT:    Verbal report received from [place] Christine, RN(name) on Elizabeth Hanna  being received from [facility]) for routine post - op      Report consisted of patients Situation, Background, Assessment and   Recommendations(SBAR). Information from the following report(s) SBAR was reviewed with the receiving nurse. Opportunity for questions and clarification was provided. Assessment completed upon patients arrival to unit and care assumed.

## 2017-02-13 NOTE — IP AVS SNAPSHOT
[address] [facility] [address] [place] [address] 
[phone] Patient: Aashish Hanson MRN: [id number] BFD:[birth year] You are allergic to the following Allergen Reactions Bee Venom Protein (Honey Bee) Unknown (comments) Penicillins Other (comments) Pt states was told as a child he was allergic Recent Documentation Height Weight BMI Smoking Status 1.778 m 114.3 kg 36.16 kg/m2 Former Smoker Emergency Contacts Name Discharge Info Relation Home Work Mobile [address] [facility] CAREGIVER [3] Spouse [3] [phone] About your hospitalization You were admitted on:  February 13, 2017 You last received care in the:  [address] [place] [address] You were discharged on:  February 14, 2017 Unit phone number:  [phone] Why you were hospitalized Your primary diagnosis was:  Not on File Your diagnoses also included:  Cervical Myelopathy Providers Seen During Your Hospitalizations Provider Role Specialty Primary office phone Barry Berry MD Attending Provider Neurosurgery [phone] Your Primary Care Physician (PCP) Primary Care Physician Office Phone Office Fax Romayne Dresser, [address] [phone] Follow-up Information Follow up With Details Comments Contact Info Lionel Joyner MD   [address] [place] [address] [address] [phone] Your Appointments Tuesday February 21, 2017  5:30 PM EST DIABETES CLASS 3HR with DIABETES CLASS #1 [facility]  
[facility] DI[facility] ([address]) [facility]. [facility]  
[phone] Current Discharge Medication List  
  
START taking these medications Dose & Instructions Dispensing Information Comments Morning Noon Evening Bedtime  
 oxyCODONE-acetaminophen 5-325 mg per tablet Commonly known as:  PERCOCET Your next dose is: Today, Tomorrow Other:  _________ Dose:  1-2 Tab Take 1-2 Tabs by mouth every four (4) hours as needed. Max Daily Amount: 12 Tabs. Quantity:  60 Tab Refills:  0 CONTINUE these medications which have NOT CHANGED Dose & Instructions Dispensing Information Comments Morning Noon Evening Bedtime GRAPE SEED PO Your next dose is: Today, Tomorrow Other:  _________ Dose:  100 mg Take 100 mg by mouth daily. Refills:  0  
     
   
   
   
  
 multivitamin tablet Commonly known as:  ONE A DAY Your next dose is: Today, Tomorrow Other:  _________ Dose:  1 Tab Take 1 Tab by mouth daily. Refills:  0 NexIUM 40 mg capsule Generic drug:  esomeprazole Your next dose is: Today, Tomorrow Other:  _________ Dose:  40 mg Take 40 mg by mouth every other day. Refills:  0 Omega-3-DHA-EPA-Fish Oil 1,000 mg (120 mg-180 mg) Cap Your next dose is: Today, Tomorrow Other:  _________ Take  by mouth. Refills:  0  
     
   
   
   
  
 terazosin 2 mg capsule Commonly known as:  HYTRIN Your next dose is: Today, Tomorrow Other:  _________ Dose:  2 mg Take 2 mg by mouth nightly. Indications: SYMPTOMATIC BENIGN PROSTATIC HYPERPLASIA Refills:  5 Where to Get Your Medications Information on where to get these meds will be given to you by the nurse or doctor. ! Ask your nurse or doctor about these medications  
  oxyCODONE-acetaminophen 5-325 mg per tablet Discharge Instructions After Hospital Care Plan:  Discharge Instructions Cervical (Neck) Spine Surgery Dr. Natacha Bell Patient Name: Maricel Orantes Date of procedure: 2/13/2017 Date of discharge: 2/14/2017 Procedure: Procedure(s): 
C3-4 ANTERIOR CERVICAL DISCECTOMY/FUSION WITH ALLOGRAFT AND PLATE   
 
PCP: Tracie Torres MD 
 
 
Follow up appointments 
-follow up with Dr. Azeb Mederos in 2 weeks. [phone] to make an appointment as soon as you get home from the hospital. 
 
When to call your Spine Surgeon: 
-Difficulty swallowing that is worse than when you left the hospital. 
-Signs of infection-if your incision is red; continues to have drainage; drainage has a foul odor or if you have a persistent fever over 101 degrees for 24 hours 
-nausea or vomiting, severe headache 
-changes in sensation in your arms or legs (numbness, tingling, loss of color) 
-increased weakness-greater than before your surgery 
-severe pain or pain not relieved by medications 
-Signs of a blood clot in your leg-calf pain, tenderness, redness, swelling of lower leg When to call your Primary Care Physician: 
-Concerns about medical conditions such as diabetes, high blood pressure, asthma, congestive heart failure 
-Call if blood sugars are elevated, persistent headache or dizziness, coughing or congestion, constipation or diarrhea, burning with urination, abnormal heart rate When to call 911 and go to the nearest emergency room: 
-acute onset of chest pain, shortness of breath, difficulty breathing Activity - You are going home a well person, be as active as possible. Your only exercise should be walking. Start with short frequent walks and increase your walking distance each day. 
-Limit the amount of time you sit to 20-30 minute intervals. Sitting for prolonged periods of time will be uncomfortable for you following surgery. - Do NOT lift anything over 5 pounds 
-From now on, even when lifting light weight, bend with your knees and not your back. 
-Do NOT do any neck exercises until you have been instructed by your doctor 
-When you are in bed, you may lay on your back or on either side. Do NOT lie on your stomach Cervical Collar (Aspen Collar) -You are required to wear your cervical collar at all times; except when showering. You may remove the collar long enough to change the pads when needed and to change your dressing each day. -Do not bend or twist when your collar is off. It is best to have someone assist you when changing the pads and your dressing to prevent you from bending your neck. - Clean the pads on your neck collar every day by hand washing with a mild soap and water. Pat them dry with a towel and lay out to air dry. Do not use heat to dry the pads. Diet 
-resume usual diet; drink plenty of fluids; eat foods high in fiber 
-It is important to have regular bowel movements. Pain medications may cause constipation. You may want to take a stool softener (such as Senokot-S or Colace) to prevent constipation. 
-If constipation occurs, take a laxative (such as Dulcolax tablets, Milk of Magnesia, or a suppository). Laxatives should only be used if the above preventable measures have failed and you still have not had a bowel movement after three days Driving 
-You may not drive or return to work until instructed by your physician. However, you may ride in the car for short periods of time. Incision Care 
-You may take brief showers but do not run the water run directly onto the wound. After showering or bathing gently blot the wound dry with a soft towel. 
-Do not rub or apply any lotions or ointments to your incision site.  
-Do not soak or scrub your wound; do not swim until the adhesive film has fallen off naturally 
-Do not scratch, rub, or pick at the wound or skin glue, doing so may loosen the film before the wound is fully healed 
-Stay out of direct sunlight and do not use tanning lamps Showering 
-You may shower in approximately 4 days after your surgery.   
-Reminder- Make sure you put clean pads on your collar after your shower.   
-Do not take a tub bath. Preventing blood clots -You have been given T.E.D. stockings to wear. Continue to wear these for 7 days after your discharge. Put them on in the morning and take them off at night.   
-They are used to increase your circulation and prevent blood clots from forming in your legs 
-T. E.D. stockings can be machine washed, temperature not to exceed 160° F (71°C) and machine dried for 15 to 20 minutes, temperature not to exceed 250° F (121°C). Pain management 
-Take pain medication as prescribed; decrease the amount you use as your pain lessens. 
-Do not wait until you are in extreme pain to take your medication. 
-Avoid alcoholic beverages while taking pain medication. Pain Medication Safety DO: 
-Read the Medication Guide  
-Take your medicine exactly as prescribed  
-Store your medicine away from children and in a safe place  
-Flush unused medicine down the toilet  
-Call your healthcare provider for medical advice about side effects. You may report side effects to FDA at 6-213-FDA-0276.  
-Please be aware that many medications contain Tylenol. We do not want you to over medicate so please read the information below as a guide. Do not take more than 4 Grams of Tylenol in a 24 hour period. (There are 1000 milligrams in one Gram) Percocet contains 325 mg of Tylenol per tablet (do not take more than 12 tablets in 24 hours) Lortab contains 500 mg of Tylenol per tablet (do not take more than 8 tablets in 24 hours) Norco contains 325 mg of Tylenol per tablet (do not take more than 12 tablets in 24 hours). DO NOT: 
-Do not give your medicine to others -Do not take medicine unless it was prescribed for you  
-Do not stop taking your medicine without talking to your healthcare provider  
-Do not break, chew, crush, dissolve, or inject your medicine. If you cannot  swallow your medicine whole, talk to your healthcare provider. 
-Do not drink alcohol while taking this medicine 
-Do not take anti-inflammatory medications or aspirin unless instructed by your   physician. Discharge Orders None Introducing [address]! Dear Aziza Gandara: Thank you for requesting a [employer] account. Our records indicate that you already have an active [employer] account. You can access your account anytime at [email or web address] Did you know that you can access your hospital and ER discharge instructions at any time in [employer]? You can also review all of your test results from your hospital stay or ER visit. Additional Information If you have questions, please visit the Frequently Asked Questions section of the [employer] website at [email or web address] Remember, [employer] is NOT to be used for urgent needs. For medical emergencies, dial 911. Now available from your iPhone and Android! General Information Please provide this summary of care documentation to your next provider. Patient Signature:  ____________________________________________________________ Date:  ____________________________________________________________  
  
Robin Loza Provider Signature:  ____________________________________________________________ Date:  ____________________________________________________________

## 2017-02-13 NOTE — ANESTHESIA POSTPROCEDURE EVALUATION
Post-Anesthesia Evaluation and Assessment    Patient: Hermelinda Leon MRN: [id number]  SSN: [id number]    YOB: 1960  Age: 60 y.o. Sex: male       Cardiovascular Function/Vital Signs  Visit Vitals    /83    Pulse (!) 59    Temp 37.1 °C (98.7 °F)    Resp 13    Ht 5' 10\" (1.778 m)    Wt 114.3 kg (252 lb)    SpO2 92%    BMI 36.16 kg/m2       Patient is status post general anesthesia for Procedure(s):  C3-4 ANTERIOR CERVICAL DISCECTOMY/FUSION WITH ALLOGRAFT AND PLATE. Nausea/Vomiting: None    Postoperative hydration reviewed and adequate. Pain:  Pain Intensity 1: 8 (02/13/17 0757)   Managed    Neurological Status:   Neuro (WDL): Within Defined Limits (02/13/17 0807)   At baseline    Mental Status and Level of Consciousness: Arousable    Pulmonary Status:   O2 Device: Nasal cannula (02/13/17 1138)   Adequate oxygenation and airway patent    Complications related to anesthesia: None    Post-anesthesia assessment completed.  No concerns    Signed By: Mary Nelson MD     February 13, 2017

## 2017-02-13 NOTE — PERIOP NOTES
Patient: Darin Patrick MRN: [id number]  SSN: [id number]   YOB: 1960  Age: 60 y.o. Sex: male     Patient is status post Procedure(s):  C3-4 ANTERIOR CERVICAL DISCECTOMY/FUSION WITH ALLOGRAFT AND PLATE. Surgeon(s) and Role:     * En Sierra MD - Primary    Local/Dose/Irrigation:  No local given. Peripheral IV 02/13/17 Right Hand (Active)   Site Assessment Clean, dry, & intact 2/13/2017  8:00 AM            Airway - Endotracheal Tube 02/13/17 Oral (Active)               Dressing/Packing:  Wound Neck Anterior-DRESSING TYPE: Topical skin adhesive/glue; Other (Comment) (Island dressing) (02/13/17 1100)  Splint/Cast: Wound Neck Anterior-SPLINT TYPE/MATERIAL: Cervical Collar (Soft)]    Other: O2 at 4L via nasal cannula for transport to PACU.

## 2017-02-13 NOTE — ANESTHESIA PREPROCEDURE EVALUATION
Anesthetic History        Pertinent negatives: No increased risk of difficult airway, PONV, pseudocholinesterase deficiency, malignant hyperthermia and history of awareness of surgery under anesthesia       Review of Systems / Medical History  Patient summary reviewed, nursing notes reviewed and pertinent labs reviewed    Pulmonary        Sleep apnea: CPAP      Pertinent negatives: No pneumonia, COPD, asthma, shortness of breath, recent URI and smoker     Neuro/Psych           Pertinent negatives: No seizures, neuromuscular disease, TIA, CVA, headaches, psychiatric history and dementia   Cardiovascular                Pertinent negatives: No pacemaker, hypertension, valvular problems/murmurs, complex congenital heart defect, past MI, CAD, PAD, dysrhythmias, angina, CHF, CABG, cardiac stents and hyperlipidemia  Exercise tolerance: >4 METS     GI/Hepatic/Renal                Endo/Other             Other Findings            Physical Exam    Airway  Mallampati: III  TM Distance: 4 - 6 cm  Neck ROM: decreased range of motion   Mouth opening: Normal     Cardiovascular    Rhythm: regular  Rate: normal      Pertinent negatives: No murmur, friction rub, systolic click, carotid bruit, JVD, peripheral edema and weak pulses   Dental         Pulmonary              Pertinent negatives: No rhonchi, decreased breath sounds, wheezes, rales, prolonged expiration and stridor   Abdominal      Pertinent negatives:No ascites, distention, hepatomegaly, splenomegaly and ostomy present   Other Findings            Anesthetic Plan    ASA: 3  Anesthesia type: general          Induction: Intravenous  Anesthetic plan and risks discussed with: Patient

## 2017-02-13 NOTE — PERIOP NOTES
TRANSFER - OUT REPORT:    Verbal report given to NURSE CHANDRIKA(name) on Usha Garcia  being transferred to [facility](unit) for routine post - op       Report consisted of patients Situation, Background, Assessment and   Recommendations(SBAR). Time Pre op antibiotic given:ANCEF 2 [email or web address]  Anesthesia Stop time: 2435    Information from the following report(s) SBAR, OR Summary, Intake/Output and MAR was reviewed with the receiving nurse. Opportunity for questions and clarification was provided. Is the patient on 02? YES       L/Min 2      Is the patient on a monitor? NO    Is the nurse transporting with the patient? NO    Surgical Waiting Area notified of patient's transfer from PACU?  YES      The following personal items collected during your admission accompanied patient upon transfer:   Dental Appliance: Dental Appliances: None  Vision:    Hearing Aid:    Jewelry:    Clothing:    Other Valuables:    Valuables sent to safe:

## 2017-02-13 NOTE — OP NOTES
[address] [facility], [address]   OP NOTE       Name:  Elsy Ornelas   MR#:  [id number]   :  1960   Account #:  [de-identified]    Surgery Date:  2017   Date of Adm:  2017       PREOPERATIVE DIAGNOSIS: C3-C4 stenosis with spinal cord   compression and myelopathy. POSTOPERATIVE DIAGNOSIS: C3-C4 stenosis with spinal cord   compression and myelopathy. PROCEDURES PERFORMED: C3-C4 anterior cervical diskectomy   with instrumented fusion C3-C4 using DePuy VG2 and [place] plate   with the use of operating microscope. SURGEON: Higinio Valenzuela. Morenita Sahu MD.    ASSISTANTAllrosa m Rivera    ANESTHESIA: General endotracheal anesthesia. ESTIMATED BLOOD LOSS: 25 mL. COMPLICATIONS: None. SPECIMENS REMOVED: None. OPERATIVE INDICATIONS: This is a 61-year-old gentleman with a   cord contusion and high-grade stenosis at C3-C4 and myelopathy,   confirmed by MRI and clinical findings. After discussing treatment   options and risks of surgery with the patient and his family, informed   consent was obtained. DESCRIPTION OF PROCEDURE: The patient was taken to the   operating room, placed under general endotracheal anesthesia. All   necessary lines and monitors were placed. He was given an   appropriate dose of IV antibiotics. SCDs were placed. He was   placed supine on the operating table. Arms tucked by the side. All   pressure points were padded. The anterior cervical region was   prepped and draped in standard sterile fashion. Incision was made   from the midline to the left sternocleidomastoid with a skin knife,   carried down with Bovie electrocautery through the platysma and   subcutaneous tissue. Platysma was undermined rostrally and caudally    sternocleidomastoid was carried down to the prevertebral space. Trachea and esophagus retracted medially, carotid sheath laterally. A   localizing x-ray was obtained with fluoroscopy.  The anterior vertebral bodies of C3 and C4 were cleaned off of soft tissue and the longus colli   undermined bilaterally. Self-retaining retractors were placed. Distraction pins were placed in C3-C4, distraction placed on the disk   space. A 15 blade was used to perform an annulotomy. Pituitaries,   curettes, and Kerrisons were used to remove the disk. The operating   microscope was brought into the field. High-speed drill was used to drill   down the cartilaginous endplates. The underlying problem was some   inferior disk protrusion but most of it was retrolisthesis at 3 on 4 with   posterior osteophyte. This was drilled off. Then, using careful   dissection with Kerrison punches, the PLL was widely opened. I   aggressively removed the posterior inferior aspect of C3 and disk   underneath rostral C4. The spinal sac was widely decompressed. The   PLL was opened and the foramen were opened bilaterally, and plates   were decorticated. Hemostasis was achieved. A 9 mm cortical   cancellous spacer was then packed in the interspace and countersunk   scope was taken down to the field. Distraction pins were removed. A   DePuy 23 mm plate was then placed with four 16 mm screws, 2 fixed   screws in C4, and 2 variable angle screws in C3. There was good   purchase of all the screws. Locking mechanisms were engaged. Local   x-ray showed good position of the graft and the screws. The wound   was copiously irrigated with antibiotic solution. Hemostasis was   achieved. The retractors were removed. The wound was then closed   using 2-0 Vicryl to close the platysma, running 4-0 Monocryl in a   subcuticular fashion. Wounds were cleaned, dried, dressed with sterile   dressing. The patient was then placed in a soft collar, extubated, and   taken to recovery in stable condition. MD GLENIS Sullivan / Jaylen Estrada   D:  02/13/2017   11:50   T:  02/13/2017   12:26   Job #:  [id number]

## 2017-02-14 VITALS
OXYGEN SATURATION: 97 % | RESPIRATION RATE: 16 BRPM | WEIGHT: 252 LBS | SYSTOLIC BLOOD PRESSURE: 173 MMHG | DIASTOLIC BLOOD PRESSURE: 90 MMHG | TEMPERATURE: 97.8 F | BODY MASS INDEX: 36.08 KG/M2 | HEART RATE: 75 BPM | HEIGHT: 70 IN

## 2017-02-14 PROCEDURE — 74011250637 HC RX REV CODE- 250/637: Performed by: NURSE PRACTITIONER

## 2017-02-14 PROCEDURE — 74011250637 HC RX REV CODE- 250/637: Performed by: NEUROLOGICAL SURGERY

## 2017-02-14 PROCEDURE — 77010033678 HC OXYGEN DAILY

## 2017-02-14 PROCEDURE — 74011250636 HC RX REV CODE- 250/636: Performed by: NEUROLOGICAL SURGERY

## 2017-02-14 PROCEDURE — 99218 HC RM OBSERVATION: CPT

## 2017-02-14 RX ORDER — METHYLPREDNISOLONE 4 MG/1
TABLET ORAL
Qty: 1 DOSE PACK | Refills: 0 | Status: SHIPPED | OUTPATIENT
Start: 2017-02-14 | End: 2018-03-18

## 2017-02-14 RX ORDER — OXYCODONE AND ACETAMINOPHEN 5; 325 MG/1; MG/1
1-2 TABLET ORAL
Qty: 60 TAB | Refills: 0 | Status: SHIPPED | OUTPATIENT
Start: 2017-02-14 | End: 2018-03-18

## 2017-02-14 RX ORDER — AMLODIPINE BESYLATE 5 MG/1
5 TABLET ORAL DAILY
Status: DISCONTINUED | OUTPATIENT
Start: 2017-02-14 | End: 2017-02-14 | Stop reason: HOSPADM

## 2017-02-14 RX ADMIN — ACETAMINOPHEN 650 MG: 325 TABLET, FILM COATED ORAL at 11:16

## 2017-02-14 RX ADMIN — AMLODIPINE BESYLATE 5 MG: 5 TABLET ORAL at 10:58

## 2017-02-14 RX ADMIN — ACETAMINOPHEN 650 MG: 325 TABLET, FILM COATED ORAL at 02:14

## 2017-02-14 RX ADMIN — DOCUSATE SODIUM 100 MG: 100 CAPSULE, LIQUID FILLED ORAL at 09:23

## 2017-02-14 RX ADMIN — Medication 10 ML: at 07:39

## 2017-02-14 RX ADMIN — CEFAZOLIN 2 G: 1 INJECTION, POWDER, FOR SOLUTION INTRAMUSCULAR; INTRAVENOUS; PARENTERAL at 02:13

## 2017-02-14 RX ADMIN — ACETAMINOPHEN 650 MG: 325 TABLET, FILM COATED ORAL at 07:39

## 2017-02-14 RX ADMIN — Medication 10 ML: at 02:15

## 2017-02-14 NOTE — DISCHARGE SUMMARY
[address] [place], [place] [address] [address]  [phone]     Discharge Summary       PATIENT ID: Hemalatha Clayton  MRN: [id number]   YOB: 1960    DATE OF ADMISSION: 2/13/2017  6:40 AM    DATE OF DISCHARGE: 2/14/2017   PRIMARY CARE PROVIDER: Nacho Castillo MD     CONSULTATIONS: None    PROCEDURES/SURGERIES: Procedure(s):  C3-4 ANTERIOR CERVICAL DISCECTOMY/FUSION WITH ALLOGRAFT AND PLATE    History of Present Illness:  Hemalatha Clayton is a 60 y.o. male with a prior medical history significant for hyperlipidemia, GERD, BPH, sleep apnea, and cervical myelopathy. 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 He reports his symptoms first appeared January 4th when he was performing shooting exercises while lying down. His right arm went numb followed by his left arm. He then developed numbness in his toes. He reports having intermittent shock-like sensations from his neck down his back. His work-up revealed cord contusion, retrolisthesis, and high-grade stenosis at C3-C4. After failing conservative therapy and a discussion of the risks, benefits, alternatives, perioperative course, and potential complications of surgery, he consented to undergo a Procedure(s):  C3-4 ANTERIOR CERVICAL DISCECTOMY/FUSION WITH ALLOGRAFT AND PLATE. Hospital Course:  Hemalatha Clayton tolerated the procedure well. He was transferred  to the recovery room in stable condition. After a brief stay the patient was then transferred to the Spinal Surgery Unit at [address] [place] [address].  On postoperative day #1, the dressing was clean and dry, he was neurovascularly intact. The patient was afebrile and vital signs were stable. Calves were soft and non-tender bilaterally. He reported mild difficulty swallowing on postoperative day #1 and was prescribed a steroid dose pack for discharge in case his swallowing worsened.  He had residual numbness and tingling in his fingertips but overall reported significant improvement in his paresthesias from pre-operatively. His pain was well controlled with tylenol. Marguerite Ortiz was discharged to Home in stable condition on postoperative day 1. He was provided with routine postoperative instructions and advised to follow up with  Zelalem Raines MD in 2 weeks following discharge from the hospital.        DISCHARGE ASSESSMENT / PLAN:      1.  1 Day Post-Op Procedure(s):  C3-4 ANTERIOR CERVICAL DISCECTOMY/FUSION WITH ALLOGRAFT AND PLATE   - Tolerating soft foods with some dysphagia. No vocal hoarseness or stridor noted. Steroid dose pack rx on discharge. - VTE prophylaxis: TEDs & SCDs. Instructed patient to continue TEDs for 1 week following discharge from hospital. Encouraged mobility   -Soft cervical collar in place and fitted properly   -Encouraged Incentive spirometer    2. BPH   -Continue home Cardura   - Voiding without troubles    3. Hypertension   - Patient reports he has borderline htn. SBP in 170's here with adequate pain control. He was given 1 dose Norvasc. Instructed patient to check his BP at home and if it remains elevated to make an appointment with his PCP to discuss starting antihypertensive therapy.             FOLLOW UP APPOINTMENTS:    Follow-up Information     Follow up With Details Comments Contact Info    Josue Church MD   [address] [place] [address] [address] [phone]             ADDITIONAL CARE RECOMMENDATIONS:     When to call your Primary Care Physician:  -Concerns about medical conditions such as diabetes, high blood pressure, asthma, congestive heart failure  -Call if blood sugars are elevated, persistent headache or dizziness, coughing or congestion, constipation or diarrhea, burning with urination, abnormal heart rate    When to call 911 and go to the nearest emergency room:  -acute onset of chest pain, shortness of breath, difficulty breathing    Activity  - You are going home a well person, be as active as possible. Your only exercise should be walking. Start with short frequent walks and increase your walking distance each day.  -Limit the amount of time you sit to 20-30 minute intervals. Sitting for prolonged periods of time will be uncomfortable for you following surgery. - Do NOT lift anything over 5 pounds  -From now on, even when lifting light weight, bend with your knees and not your back.  -Do NOT do any neck exercises until you have been instructed by your doctor  -When you are in bed, you may lay on your back or on either side. Do NOT lie on your stomach    Cervical Collar (Aspen Collar)  -You are required to wear your cervical collar at all times; except when showering. You may remove the collar long enough to change the pads when needed and to change your dressing each day. -Do not bend or twist when your collar is off. It is best to have someone assist you when changing the pads and your dressing to prevent you from bending your neck. - Clean the pads on your neck collar every day by hand washing with a mild soap and water. Pat them dry with a towel and lay out to air dry. Do not use heat to dry the pads. Diet  -resume usual diet; drink plenty of fluids; eat foods high in fiber  -It is important to have regular bowel movements. Pain medications may cause constipation. You may want to take a stool softener (such as Senokot-S or Colace) to prevent constipation.  -If constipation occurs, take a laxative (such as Dulcolax tablets, Milk of Magnesia, or a suppository). Laxatives should only be used if the above preventable measures have failed and you still have not had a bowel movement after three days    Driving  -You may not drive or return to work until instructed by your physician. However, you may ride in the car for short periods of time. Incision Care  -You may take brief showers but do not run the water run directly onto the wound.  After showering or bathing gently blot the wound dry with a soft towel.  -Do not rub or apply any lotions or ointments to your incision site.   -Do not soak or scrub your wound; do not swim until the adhesive film has fallen off naturally  -Do not scratch, rub, or pick at the wound or skin glue, doing so may loosen the film before the wound is fully healed  -Stay out of direct sunlight and do not use tanning lamps     Showering  -You may shower in approximately 4 days after your surgery.    -Reminder- Make sure you put clean pads on your collar after your shower.    -Do not take a tub bath. Preventing blood clots  -You have been given T.E.D. stockings to wear. Continue to wear these for 7 days after your discharge. Put them on in the morning and take them off at night.    -They are used to increase your circulation and prevent blood clots from forming in your legs  -T. E.D. stockings can be machine washed, temperature not to exceed 160° F (71°C) and machine dried for 15 to 20 minutes, temperature not to exceed 250° F (121°C). Pain management  -Take pain medication as prescribed; decrease the amount you use as your pain lessens.  -Do not wait until you are in extreme pain to take your medication.  -Avoid alcoholic beverages while taking pain medication. Pain Medication Safety  DO:  -Read the Medication Guide   -Take your medicine exactly as prescribed   -Store your medicine away from children and in a safe place   -Flush unused medicine down the toilet   -Call your healthcare provider for medical advice about side effects. You may report side effects to FDA at 9-827-FDA-4030.   -Please be aware that many medications contain Tylenol. We do not want you to over medicate so please read the information below as a guide. Do not take more than 4 Grams of Tylenol in a 24 hour period.   (There are 1000 milligrams in one Gram) Percocet contains 325 mg of Tylenol per tablet (do not take more than 12 tablets in 24 hours)  Lortab contains 500 mg of Tylenol per tablet (do not take more than 8 tablets in 24 hours)  Norco contains 325 mg of Tylenol per tablet (do not take more than 12 tablets in 24 hours). DO NOT:  -Do not give your medicine to others   -Do not take medicine unless it was prescribed for you   -Do not stop taking your medicine without talking to your healthcare provider   -Do not break, chew, crush, dissolve, or inject your medicine. If you cannot  swallow your medicine whole, talk to your healthcare provider.  -Do not drink alcohol while taking this medicine  -Do not take anti-inflammatory medications or aspirin unless instructed by your   physician. DISCHARGE MEDICATIONS:  Discharge Medication List as of 2/14/2017 10:08 AM      START taking these medications    Details   oxyCODONE-acetaminophen (PERCOCET) 5-325 mg per tablet Take 1-2 Tabs by mouth every four (4) hours as needed. Max Daily Amount: 12 Tabs., Print, Disp-60 Tab, R-0         CONTINUE these medications which have NOT CHANGED    Details   multivitamin (ONE A DAY) tablet Take 1 Tab by mouth daily. , Historical Med      GRAPE SEED EXTRACT (GRAPE SEED PO) Take 100 mg by mouth daily. , Historical Med      esomeprazole (NEXIUM) 40 mg capsule Take 40 mg by mouth every other day., Historical Med      terazosin (HYTRIN) 2 mg capsule Take 2 mg by mouth nightly. Indications: SYMPTOMATIC BENIGN PROSTATIC HYPERPLASIA, Historical Med, R-5      Omega-3-DHA-EPA-Fish Oil 1,000 mg (120 mg-180 mg) cap Take  by mouth., Historical Med             PHYSICAL EXAMINATION AT DISCHARGE:  General: Pleasant, alert, cooperative, no distress.    EENT: EOMI. Anicteric sclerae. Oral mucous moist, oropharynx benign. Resp: CTA bilaterally. No wheezing/rhonchi/rales. No accessory muscle use. CV: Regular rhythm, normal rate, no murmurs, gallops, rubs. No cyanosis or clubbing. No edema appreciated in the extremities. Gastrointestinal:  Soft, non-tender, non-distended. normoactive bowel sounds, no hepatosplenomegaly  Neurological: Follows commands. CALDERÓN. Speech clear. Sensation intact to light touch. Motor: unchanged C5-T1 and L2-S1. Musculoskeletal:  Calves soft, supple, non-tender upon palpation or with passive stretch. Psych: Good insight. Not anxious nor agitated. Skin: Good turgor. No rashes or lesions. Incision - clean, dry and intact. No significant erythema or swelling.       CHRONIC MEDICAL DIAGNOSES:  Problem List as of 2/14/2017  Date Reviewed: 2/14/2017          Codes Class Noted - Resolved    Cervical myelopathy ICD-10-CM: G95.9  ICD-9-CM: 721.1  Unknown - Present              Signed:   Ross Almanza NP  2/14/2017  1:48 PM

## 2017-02-14 NOTE — ROUTINE PROCESS
Bedside and Verbal shift change report given to Yumiko (oncoming nurse) by Ania Enamorado (offgoing nurse). Report included the following information SBAR, Kardex, Intake/Output, MAR and Accordion.

## 2017-02-14 NOTE — DISCHARGE INSTRUCTIONS
After Hospital Care Plan:  Discharge Instructions Cervical (Neck) Spine Surgery Dr. Morgan Herr    Patient Name: Bhavani Hdz    Date of procedure: 2/13/2017      Date of discharge: 2/14/2017    Procedure: Procedure(s):  C3-4 ANTERIOR CERVICAL DISCECTOMY/FUSION WITH ALLOGRAFT AND PLATE      PCP: Cira Bernard MD      Follow up appointments  -follow up with Dr. Morgan Herr in 2 weeks. [phone] to make an appointment as soon as you get home from the hospital.    When to call your Spine Surgeon:  -Difficulty swallowing that is worse than when you left the hospital.  -Signs of infection-if your incision is red; continues to have drainage; drainage has a foul odor or if you have a persistent fever over 101 degrees for 24 hours  -nausea or vomiting, severe headache  -changes in sensation in your arms or legs (numbness, tingling, loss of color)  -increased weakness-greater than before your surgery  -severe pain or pain not relieved by medications  -Signs of a blood clot in your leg-calf pain, tenderness, redness, swelling of lower leg    When to call your Primary Care Physician:  -Concerns about medical conditions such as diabetes, high blood pressure, asthma, congestive heart failure  -Call if blood sugars are elevated, persistent headache or dizziness, coughing or congestion, constipation or diarrhea, burning with urination, abnormal heart rate    When to call 911 and go to the nearest emergency room:  -acute onset of chest pain, shortness of breath, difficulty breathing    Activity  - You are going home a well person, be as active as possible. Your only exercise should be walking. Start with short frequent walks and increase your walking distance each day.  -Limit the amount of time you sit to 20-30 minute intervals. Sitting for prolonged periods of time will be uncomfortable for you following surgery.   - Do NOT lift anything over 5 pounds  -From now on, even when lifting light weight, bend with your knees and not your back.  -Do NOT do any neck exercises until you have been instructed by your doctor  -When you are in bed, you may lay on your back or on either side. Do NOT lie on your stomach    Cervical Collar (Aspen Collar)  -You are required to wear your cervical collar at all times; except when showering. You may remove the collar long enough to change the pads when needed and to change your dressing each day. -Do not bend or twist when your collar is off. It is best to have someone assist you when changing the pads and your dressing to prevent you from bending your neck. - Clean the pads on your neck collar every day by hand washing with a mild soap and water. Pat them dry with a towel and lay out to air dry. Do not use heat to dry the pads. Diet  -resume usual diet; drink plenty of fluids; eat foods high in fiber  -It is important to have regular bowel movements. Pain medications may cause constipation. You may want to take a stool softener (such as Senokot-S or Colace) to prevent constipation.  -If constipation occurs, take a laxative (such as Dulcolax tablets, Milk of Magnesia, or a suppository). Laxatives should only be used if the above preventable measures have failed and you still have not had a bowel movement after three days    Driving  -You may not drive or return to work until instructed by your physician. However, you may ride in the car for short periods of time. Incision Care  -You may take brief showers but do not run the water run directly onto the wound.  After showering or bathing gently blot the wound dry with a soft towel.  -Do not rub or apply any lotions or ointments to your incision site.   -Do not soak or scrub your wound; do not swim until the adhesive film has fallen off naturally  -Do not scratch, rub, or pick at the wound or skin glue, doing so may loosen the film before the wound is fully healed  -Stay out of direct sunlight and do not use tanning lamps Showering  -You may shower in approximately 4 days after your surgery.    -Reminder- Make sure you put clean pads on your collar after your shower.    -Do not take a tub bath. Preventing blood clots  -You have been given T.E.D. stockings to wear. Continue to wear these for 7 days after your discharge. Put them on in the morning and take them off at night.    -They are used to increase your circulation and prevent blood clots from forming in your legs  -T. E.D. stockings can be machine washed, temperature not to exceed 160° F (71°C) and machine dried for 15 to 20 minutes, temperature not to exceed 250° F (121°C). Pain management  -Take pain medication as prescribed; decrease the amount you use as your pain lessens.  -Do not wait until you are in extreme pain to take your medication.  -Avoid alcoholic beverages while taking pain medication. Pain Medication Safety  DO:  -Read the Medication Guide   -Take your medicine exactly as prescribed   -Store your medicine away from children and in a safe place   -Flush unused medicine down the toilet   -Call your healthcare provider for medical advice about side effects. You may report side effects to FDA at 8-269-FDA-7013.   -Please be aware that many medications contain Tylenol. We do not want you to over medicate so please read the information below as a guide. Do not take more than 4 Grams of Tylenol in a 24 hour period.   (There are 1000 milligrams in one Gram)                                                                                                                                                                                                                                                                                                                                                                  Percocet contains 325 mg of Tylenol per tablet (do not take more than 12 tablets in 24 hours)  Lortab contains 500 mg of Tylenol per tablet (do not take more than 8 tablets in 24 hours)  Norco contains 325 mg of Tylenol per tablet (do not take more than 12 tablets in 24 hours). DO NOT:  -Do not give your medicine to others   -Do not take medicine unless it was prescribed for you   -Do not stop taking your medicine without talking to your healthcare provider   -Do not break, chew, crush, dissolve, or inject your medicine. If you cannot  swallow your medicine whole, talk to your healthcare provider.  -Do not drink alcohol while taking this medicine  -Do not take anti-inflammatory medications or aspirin unless instructed by your   physician.

## 2017-02-14 NOTE — PROGRESS NOTES
D/c instructions reviewed with patient and wife. A time for questions allowed. IV d/c. Dressings changed and supplies sent with wife.

## 2017-03-09 ENCOUNTER — HOSPITAL ENCOUNTER (OUTPATIENT)
Dept: DIABETES SERVICES | Age: 57
Discharge: HOME | End: 2017-03-09
Payer: COMMERCIAL

## 2017-03-09 ENCOUNTER — HOSPITAL ENCOUNTER (OUTPATIENT)
Dept: ULTRASOUND IMAGING | Age: 57
Discharge: HOME | End: 2017-03-09
Attending: FAMILY MEDICINE
Payer: COMMERCIAL

## 2017-03-09 DIAGNOSIS — E11.9 TYPE 2 DIABETES MELLITUS WITHOUT COMPLICATION, UNSPECIFIED LONG TERM INSULIN USE STATUS: ICD-10-CM

## 2017-03-09 DIAGNOSIS — R10.31 RLQ ABDOMINAL PAIN: ICD-10-CM

## 2017-03-09 PROCEDURE — G0109 DIAB MANAGE TRN IND/GROUP: HCPCS | Performed by: DIETITIAN, REGISTERED

## 2017-03-28 ENCOUNTER — HOSPITAL ENCOUNTER (OUTPATIENT)
Dept: DIABETES SERVICES | Age: 57
Discharge: HOME | End: 2017-03-28
Payer: COMMERCIAL

## 2017-03-28 DIAGNOSIS — E11.9 TYPE 2 DIABETES MELLITUS WITHOUT COMPLICATION, UNSPECIFIED LONG TERM INSULIN USE STATUS: ICD-10-CM

## 2017-03-28 PROCEDURE — G0109 DIAB MANAGE TRN IND/GROUP: HCPCS

## 2017-05-04 ENCOUNTER — HOSPITAL ENCOUNTER (OUTPATIENT)
Dept: DIABETES SERVICES | Age: 57
Discharge: HOME | End: 2017-05-04
Payer: COMMERCIAL

## 2017-05-04 DIAGNOSIS — E11.9 TYPE 2 DIABETES MELLITUS WITHOUT COMPLICATION, UNSPECIFIED LONG TERM INSULIN USE STATUS: ICD-10-CM

## 2017-05-04 PROCEDURE — G0109 DIAB MANAGE TRN IND/GROUP: HCPCS | Performed by: DIETITIAN, REGISTERED

## 2018-03-18 ENCOUNTER — OFFICE VISIT (OUTPATIENT)
Dept: URGENT CARE | Age: 58
End: 2018-03-18

## 2018-03-18 VITALS
HEART RATE: 66 BPM | WEIGHT: 261 LBS | TEMPERATURE: 98.5 F | DIASTOLIC BLOOD PRESSURE: 81 MMHG | BODY MASS INDEX: 37.37 KG/M2 | OXYGEN SATURATION: 96 % | SYSTOLIC BLOOD PRESSURE: 154 MMHG | HEIGHT: 70 IN | RESPIRATION RATE: 18 BRPM

## 2018-03-18 DIAGNOSIS — J06.9 ACUTE URI: Primary | ICD-10-CM

## 2018-03-18 NOTE — MR AVS SNAPSHOT
[place] [facility] [address] 
[phone] Patient: Brannon Millan MRN: [id number] FNR:[id number] Visit Information Date & Time Provider Department Dept. Phone Encounter #  
 3/18/2018  1:30 PM [facility] [address] [phone] [id number] Upcoming Health Maintenance Date Due Hepatitis C Screening 1960 DTaP/Tdap/Td series (1 - Tdap) 7/7/1979 FOBT Q 1 YEAR AGE 50-75 7/7/2008 Influenza Age 5 to Adult 8/1/2017 Allergies as of 3/18/2018  Review Complete On: 3/18/2018 By: Brenna Sharpe RN Severity Noted Reaction Type Reactions Bee Venom Protein (Honey Bee)  01/13/2017    Unknown (comments) Penicillins  04/14/2016    Other (comments) Pt states was told as a child he was allergic Current Immunizations  Never Reviewed No immunizations on file. Not reviewed this visit You Were Diagnosed With   
  
 Codes Comments Acute URI    -  Primary ICD-10-CM: J06.9 ICD-9-CM: 465.9 Vitals BP Pulse Temp Resp Height(growth percentile) Weight(growth percentile) 154/81 66 98.5 °F (36.9 °C) 18 5' 10\" (1.778 m) 261 lb (118.4 kg) SpO2 BMI Smoking Status 96% 37.45 kg/m2 Former Smoker Vitals History BMI and BSA Data Body Mass Index Body Surface Area  
 37.45 kg/m 2 2.42 m 2 Preferred Pharmacy Pharmacy Name Phone [facility] [address] [facility], [address] [place] [address] AT [address] [place] [address] [phone] Your Updated Medication List  
  
   
This list is accurate as of 3/18/18  2:17 PM.  Always use your most recent med list.  
  
  
  
  
 GRAPE SEED PO Take 100 mg by mouth daily. multivitamin tablet Commonly known as:  ONE A DAY Take 1 Tab by mouth daily. NexIUM 40 mg capsule Generic drug:  esomeprazole Take 40 mg by mouth every other day. omega 3-DHA-EPA-fish oil 1,000 mg (120 mg-180 mg) capsule Take  by mouth. terazosin 2 mg capsule Commonly known as:  HYTRIN Take 2 mg by mouth nightly. Indications: SYMPTOMATIC BENIGN PROSTATIC HYPERPLASIA Patient Instructions Upper Respiratory Infection (Cold): Care Instructions Your Care Instructions An upper respiratory infection, or URI, is an infection of the nose, sinuses, or throat. URIs are spread by coughs, sneezes, and direct contact. The common cold is the most frequent kind of URI. The flu and sinus infections are other kinds of URIs. Almost all URIs are caused by viruses. Antibiotics won't cure them. But you can treat most infections with home care. This may include drinking lots of fluids and taking over-the-counter pain medicine. You will probably feel better in 4 to 10 days. The doctor has checked you carefully, but problems can develop later. If you notice any problems or new symptoms, get medical treatment right away. Follow-up care is a key part of your treatment and safety. Be sure to make and go to all appointments, and call your doctor if you are having problems. It's also a good idea to know your test results and keep a list of the medicines you take. How can you care for yourself at home? · To prevent dehydration, drink plenty of fluids, enough so that your urine is light yellow or clear like water. Choose water and other caffeine-free clear liquids until you feel better. If you have kidney, heart, or liver disease and have to limit fluids, talk with your doctor before you increase the amount of fluids you drink. · Take an over-the-counter pain medicine, such as acetaminophen (Tylenol), ibuprofen (Advil, Motrin), or naproxen (Aleve). Read and follow all instructions on the label. · Before you use cough and cold medicines, check the label. These medicines may not be safe for young children or for people with certain health problems. · Be careful when taking over-the-counter cold or flu medicines and Tylenol at the same time. Many of these medicines have acetaminophen, which is Tylenol. Read the labels to make sure that you are not taking more than the recommended dose. Too much acetaminophen (Tylenol) can be harmful. · Get plenty of rest. 
· Do not smoke or allow others to smoke around you. If you need help quitting, talk to your doctor about stop-smoking programs and medicines. These can increase your chances of quitting for good. When should you call for help? Call 911 anytime you think you may need emergency care. For example, call if: 
? · You have severe trouble breathing. ?Call your doctor now or seek immediate medical care if: 
? · You seem to be getting much sicker. ? · You have new or worse trouble breathing. ? · You have a new or higher fever. ? · You have a new rash. ? Watch closely for changes in your health, and be sure to contact your doctor if: 
? · You have a new symptom, such as a sore throat, an earache, or sinus pain. ? · You cough more deeply or more often, especially if you notice more mucus or a change in the color of your mucus. ? · You do not get better as expected. Where can you learn more? Go to http://kaylyn-eda.info/. Enter B880 in the search box to learn more about \"Upper Respiratory Infection (Cold): Care Instructions. \" Current as of: May 12, 2017 Content Version: 11.4 © 5239-5599 [employer]. Care instructions adapted under license by [employer] (which disclaims liability or warranty for this information). If you have questions about a medical condition or this instruction, always ask your healthcare professional. [address] any warranty or liability for your use of this information. Introducing [facility]! Dear Frances Conrad: Thank you for requesting a [employer] account.   Our records indicate that you already have an active [facility] account. You can access your account anytime at [email or web address] Did you know that you can access your hospital and ER discharge instructions at any time in [facility] You can also review all of your test results from your hospital stay or ER visit. Additional Information If you have questions, please visit the Frequently Asked Questions section of the [facility] website at [email or web address] Remember, [facility] is NOT to be used for urgent needs. For medical emergencies, dial 911. Now available from your iPhone and Android! Please provide this summary of care documentation to your next provider. Your primary care clinician is listed as Sherrell Jones. If you have any questions after today's visit, please call [phone].

## 2018-03-18 NOTE — PROGRESS NOTES
HPI Comments: Over a week a productive cough with congestion. Reports his SBP runs in the 130s and he has been told he does not need a BP pill. Patient is a 59 y.o. male presenting with cold symptoms. The history is provided by the patient. Cold Symptoms   The cough is productive of sputum. There has been no fever. Associated symptoms include ear congestion and sore throat. Treatments tried: tylenom cold and brea seltzer cold.         Past Medical History:   Diagnosis Date    Arthritis     Cancer ([address]     SCCA ON ARM    Cervical myelopathy (HCC)     GERD (gastroesophageal reflux disease)     High cholesterol     PUD (peptic ulcer disease)     Sleep apnea     C-PAP    Viral meningitis 1976        Past Surgical History:   Procedure Laterality Date    HX ADENOIDECTOMY  1964    HX APPENDECTOMY  1974    HX GI  1958    PYLORIC STENOSIS    HX GI      COLONOSCOPY    HX HEENT  1989    deviated septum repair    HX HEENT  2006    Lasik surgery both eyes    HX HEENT  2013    PATCHES IN EAR DRUMS    HX KNEE ARTHROSCOPY Left 1994    meniscus repair; ACL repair    HX ORTHOPAEDIC      stenosis    HX ORTHOPAEDIC Left     CUT ON LAWN     HX OTHER SURGICAL  2008    REMOVAL SCCA ON LT. ARM    HX TONSILLECTOMY  1964         Family History   Problem Relation Age of Onset    Other Father      MVA    Diabetes Father     Hypertension Father     High Cholesterol Father     MS Sister     Diabetes Sister     Hypertension Brother     High Cholesterol Brother     No Known Problems Maternal Aunt     No Known Problems Maternal Uncle     No Known Problems Paternal Aunt     No Known Problems Paternal Uncle     No Known Problems Maternal Grandmother     No Known Problems Maternal Grandfather     No Known Problems Paternal Grandmother     No Known Problems Paternal Grandfather     Diabetes Mother     Hypertension Mother     High Cholesterol Mother     Anesth Problems Neg Hx         Social History Social History    Marital status:      Spouse name: N/A    Number of children: N/A    Years of education: N/A     Occupational History    Not on file. Social History Main Topics    Smoking status: Former Smoker     Packs/day: 0.50     Years: 6.00     Quit date: 2/10/2001    Smokeless tobacco: Never Used    Alcohol use 0.0 oz/week     0 Standard drinks or equivalent per week      Comment: RARELY    Drug use: No    Sexual activity: No     Other Topics Concern    Not on file     Social History Narrative                ALLERGIES: Bee venom protein (honey bee) and Penicillins    Review of Systems   Constitutional: Negative for fever. HENT: Positive for sore throat. Vitals:    03/18/18 1346   BP: 154/81   Pulse: 66   Resp: 18   Temp: 98.5 °F (36.9 °C)   SpO2: 96%   Weight: 261 lb (118.4 kg)   Height: 5' 10\" (1.778 m)       Physical Exam   Constitutional: He is oriented to person, place, and time. He appears well-developed and well-nourished. HENT:   Right Ear: External ear normal.   Left Ear: External ear normal.   Mouth/Throat: Oropharynx is clear and moist.   Eyes: Conjunctivae are normal.   Neck: Normal range of motion. Cardiovascular: Normal rate and regular rhythm. Pulmonary/Chest: Effort normal and breath sounds normal. No respiratory distress. He has no wheezes. He has no rales. Neurological: He is alert and oriented to person, place, and time. Skin: Skin is warm and dry. Psychiatric: He has a normal mood and affect. Nursing note and vitals reviewed. MDM     Differential Diagnosis; Clinical Impression; Plan:     Viral URI. Will take sudafed. Though BP elevated here likely white coat HTN.    Progress:   Patient progress:  Stable      Procedures

## 2018-03-18 NOTE — PATIENT INSTRUCTIONS
Upper Respiratory Infection (Cold): Care Instructions  Your Care Instructions    An upper respiratory infection, or URI, is an infection of the nose, sinuses, or throat. URIs are spread by coughs, sneezes, and direct contact. The common cold is the most frequent kind of URI. The flu and sinus infections are other kinds of URIs. Almost all URIs are caused by viruses. Antibiotics won't cure them. But you can treat most infections with home care. This may include drinking lots of fluids and taking over-the-counter pain medicine. You will probably feel better in 4 to 10 days. The doctor has checked you carefully, but problems can develop later. If you notice any problems or new symptoms, get medical treatment right away. Follow-up care is a key part of your treatment and safety. Be sure to make and go to all appointments, and call your doctor if you are having problems. It's also a good idea to know your test results and keep a list of the medicines you take. How can you care for yourself at home? · To prevent dehydration, drink plenty of fluids, enough so that your urine is light yellow or clear like water. Choose water and other caffeine-free clear liquids until you feel better. If you have kidney, heart, or liver disease and have to limit fluids, talk with your doctor before you increase the amount of fluids you drink. · Take an over-the-counter pain medicine, such as acetaminophen (Tylenol), ibuprofen (Advil, Motrin), or naproxen (Aleve). Read and follow all instructions on the label. · Before you use cough and cold medicines, check the label. These medicines may not be safe for young children or for people with certain health problems. · Be careful when taking over-the-counter cold or flu medicines and Tylenol at the same time. Many of these medicines have acetaminophen, which is Tylenol. Read the labels to make sure that you are not taking more than the recommended dose.  Too much acetaminophen (Tylenol) can be harmful. · Get plenty of rest.  · Do not smoke or allow others to smoke around you. If you need help quitting, talk to your doctor about stop-smoking programs and medicines. These can increase your chances of quitting for good. When should you call for help? Call 911 anytime you think you may need emergency care. For example, call if:  ? · You have severe trouble breathing. ?Call your doctor now or seek immediate medical care if:  ? · You seem to be getting much sicker. ? · You have new or worse trouble breathing. ? · You have a new or higher fever. ? · You have a new rash. ? Watch closely for changes in your health, and be sure to contact your doctor if:  ? · You have a new symptom, such as a sore throat, an earache, or sinus pain. ? · You cough more deeply or more often, especially if you notice more mucus or a change in the color of your mucus. ? · You do not get better as expected. Where can you learn more? Go to http://kaylyn-eda.info/. Enter T138 in the search box to learn more about \"Upper Respiratory Infection (Cold): Care Instructions. \"  Current as of: May 12, 2017  Content Version: 11.4  © 8163-1259 Healthwise[employer]. Care instructions adapted under license by [employer] (which disclaims liability or warranty for this information). If you have questions about a medical condition or this instruction, always ask your healthcare professional. [place] [address] any warranty or liability for your use of this information.

## 2018-12-16 ENCOUNTER — HOSPITAL ENCOUNTER (EMERGENCY)
Age: 58
Discharge: STILL ADMITTED OR ADMINISTRATIVE | End: 2018-12-16
Attending: EMERGENCY MEDICINE
Payer: COMMERCIAL

## 2018-12-16 PROCEDURE — 75810000275 HC EMERGENCY DEPT VISIT NO LEVEL OF CARE

## 2020-05-29 ENCOUNTER — HOSPITAL ENCOUNTER (OUTPATIENT)
Dept: RADIATION THERAPY | Age: 60
Discharge: HOME | End: 2020-05-29

## 2020-06-03 ENCOUNTER — HOSPITAL ENCOUNTER (OUTPATIENT)
Dept: NUCLEAR MEDICINE | Age: 60
Discharge: HOME | End: 2020-06-03
Attending: UROLOGY | Admitting: UROLOGY
Payer: COMMERCIAL

## 2020-06-03 ENCOUNTER — HOSPITAL ENCOUNTER (OUTPATIENT)
Dept: NUCLEAR MEDICINE | Age: 60
Discharge: HOME | End: 2020-06-03
Attending: UROLOGY
Payer: COMMERCIAL

## 2020-06-03 DIAGNOSIS — C61 PROSTATE CANCER (HCC): ICD-10-CM

## 2020-06-03 PROCEDURE — 78306 BONE IMAGING WHOLE BODY: CPT

## 2022-01-05 ENCOUNTER — OFFICE VISIT (OUTPATIENT)
Dept: PRIMARY CARE CLINIC | Age: 62
End: 2022-01-05

## 2022-01-05 VITALS
DIASTOLIC BLOOD PRESSURE: 95 MMHG | HEART RATE: 92 BPM | OXYGEN SATURATION: 96 % | BODY MASS INDEX: 36.59 KG/M2 | WEIGHT: 255 LBS | SYSTOLIC BLOOD PRESSURE: 184 MMHG | TEMPERATURE: 98 F

## 2022-01-05 DIAGNOSIS — I10 ESSENTIAL HYPERTENSION: ICD-10-CM

## 2022-01-05 DIAGNOSIS — J01.90 ACUTE NON-RECURRENT SINUSITIS, UNSPECIFIED LOCATION: Primary | ICD-10-CM

## 2022-01-05 DIAGNOSIS — R21 RASH: ICD-10-CM

## 2022-01-05 PROBLEM — C61 PROSTATE CANCER (HCC): Status: ACTIVE | Noted: 2022-01-05

## 2022-01-05 LAB
FLUAV+FLUBV AG NOSE QL IA.RAPID: NEGATIVE
FLUAV+FLUBV AG NOSE QL IA.RAPID: NEGATIVE
SARS-COV-2 POC: NEGATIVE
VALID INTERNAL CONTROL?: YES

## 2022-01-05 PROCEDURE — 99203 OFFICE O/P NEW LOW 30 MIN: CPT | Performed by: NURSE PRACTITIONER

## 2022-01-05 PROCEDURE — 87804 INFLUENZA ASSAY W/OPTIC: CPT | Performed by: NURSE PRACTITIONER

## 2022-01-05 PROCEDURE — 87426 SARSCOV CORONAVIRUS AG IA: CPT | Performed by: NURSE PRACTITIONER

## 2022-01-05 RX ORDER — DOXYCYCLINE 100 MG/1
100 CAPSULE ORAL 2 TIMES DAILY
Qty: 14 CAPSULE | Refills: 0 | Status: SHIPPED | OUTPATIENT
Start: 2022-01-05 | End: 2022-01-12

## 2022-01-05 RX ORDER — LOSARTAN POTASSIUM 50 MG/1
50 TABLET ORAL DAILY
COMMUNITY

## 2022-01-05 RX ORDER — GLIPIZIDE 5 MG/1
5 TABLET ORAL
COMMUNITY

## 2022-01-05 NOTE — PROGRESS NOTES
Subjective:   Naomy Rodriguez presents for evaluation of No chief complaint on file. Pt presents with c/o fever (Tmax 102.1), fatigue, myalgias, headache, sore throat, congestion, drainage and sneezing for 7 days. He also notes rash to left hand which started around same time. Reports tiny blisters which he popped and pus came out. Has had rash in past and believes PCP prescribed antibiotic. Had covid 1/2021. Has had covid vaccine and booster. He has also had flu vaccine. Pt took 2 home covid tests on 1/1 and 1/4, both of which were negative. Rapid PCR testing here today is negative. Family members are also sick, also have tested negative for covid and are feeling better. BP (!) 184/95 (BP 1 Location: Right upper arm, BP Patient Position: Sitting, BP Cuff Size: Large adult)   Pulse 92   Temp 98 °F (36.7 °C) (Temporal)   Wt 255 lb (115.7 kg)   SpO2 96%   BMI 36.59 kg/m²     Physical Exam  General: alert, cooperative, no distress, appears stated age  Eye exam: negative  Ear exam: normal TM's and external ear canals AU  Sinus exam: Normal paranasal sinuses without tenderness  Oropharynx exam: Lips, mucosa, and tongue normal. Teeth and gums normal and normal findings: oropharynx pink & moist without lesions or evidence of thrush  Neck exam: supple, symmetrical, trachea midline and no adenopathy  Heart exam: normal rate, regular rhythm, normal S1, S2, no murmurs, rubs, clicks or gallops  Lung exam: clear to auscultation bilaterally  Skin exam: 2 tiny vesicular lesions with erythematous base to dorsum of left hand overlying knuckle of index finger      Results for orders placed or performed in visit on 01/05/22   AMB POC SARS-COV-2   Result Value Ref Range    SARS-COV-2 POC Negative Negative   AMB POC MOSES INFLUENZA A/B TEST   Result Value Ref Range    VALID INTERNAL CONTROL POC Yes     Influenza A Ag POC Negative Negative    Influenza B Ag POC Negative Negative       Assessment/Plan:   1.  Acute non-recurrent sinusitis, unspecified location  2. Rash  -     AMB POC SARS-COV-2  -     AMB POC MOSES INFLUENZA A/B TEST  3. Essential hypertension    Orders Placed This Encounter    AMB POC SARS-COV-2 ANTIGEN    AMB POC MOSES INFLUENZA A/B TEST    losartan (COZAAR) 50 mg tablet    glipiZIDE (GLUCOTROL) 5 mg tablet    doxycycline (MONODOX) 100 mg capsule     The above diagnosis is a new problem. We discussed expected course, resolution, and complications of diagnosis in detail. No follow-ups on file. An electronic signature was used to authenticate this note.   -- Hannah Early, BÁRBARA

## 2022-01-05 NOTE — PATIENT INSTRUCTIONS
Sinusitis: Care Instructions  Your Care Instructions     Sinusitis is an infection of the lining of the sinus cavities in your head. Sinusitis often follows a cold. It causes pain and pressure in your head and face. In most cases, sinusitis gets better on its own in 1 to 2 weeks. But some mild symptoms may last for several weeks. Sometimes antibiotics are needed. Follow-up care is a key part of your treatment and safety. Be sure to make and go to all appointments, and call your doctor if you are having problems. It's also a good idea to know your test results and keep a list of the medicines you take. How can you care for yourself at home? · Take an over-the-counter pain medicine, such as acetaminophen (Tylenol), ibuprofen (Advil, Motrin), or naproxen (Aleve). Read and follow all instructions on the label. · If the doctor prescribed antibiotics, take them as directed. Do not stop taking them just because you feel better. You need to take the full course of antibiotics. · Be careful when taking over-the-counter cold or flu medicines and Tylenol at the same time. Many of these medicines have acetaminophen, which is Tylenol. Read the labels to make sure that you are not taking more than the recommended dose. Too much acetaminophen (Tylenol) can be harmful. · Breathe warm, moist air from a steamy shower, a hot bath, or a sink filled with hot water. Avoid cold, dry air. Using a humidifier in your home may help. Follow the directions for cleaning the machine. · Use saline (saltwater) nasal washes. This can help keep your nasal passages open and wash out mucus and bacteria. You can buy saline nose drops at a grocery store or drugstore. Or you can make your own at home by adding 1 teaspoon of salt and 1 teaspoon of baking soda to 2 cups of distilled water. If you make your own, fill a bulb syringe with the solution, insert the tip into your nostril, and squeeze gently. Rajat Fam your nose.   · Put a hot, wet towel or a warm gel pack on your face 3 or 4 times a day for 5 to 10 minutes each time. · Try a decongestant nasal spray like oxymetazoline (Afrin). Do not use it for more than 3 days in a row. Using it for more than 3 days can make your congestion worse. When should you call for help? Call your doctor now or seek immediate medical care if:    · You have new or worse swelling or redness in your face or around your eyes.     · You have a new or higher fever. Watch closely for changes in your health, and be sure to contact your doctor if:    · You have new or worse facial pain.     · The mucus from your nose becomes thicker (like pus) or has new blood in it.     · You are not getting better as expected. Where can you learn more? Go to http://www.gray.com/  Enter S418 in the search box to learn more about \"Sinusitis: Care Instructions. \"  Current as of: December 2, 2020               Content Version: 13.0  © 2006-2021 Healthwise, Incorporated. Care instructions adapted under license by [employer] (which disclaims liability or warranty for this information). If you have questions about a medical condition or this instruction, always ask your healthcare professional. [address] any warranty or liability for your use of this information.

## 2022-03-18 PROBLEM — C61 PROSTATE CANCER (HCC): Status: ACTIVE | Noted: 2022-01-05

## 2022-03-18 PROBLEM — I10 PRIMARY HYPERTENSION: Status: ACTIVE | Noted: 2022-01-05

## 2022-10-03 ENCOUNTER — TELEPHONE (OUTPATIENT)
Dept: NEUROLOGY | Age: 62
End: 2022-10-03

## 2022-12-05 ENCOUNTER — TRANSCRIBE ORDER (OUTPATIENT)
Dept: SCHEDULING | Age: 62
End: 2022-12-05

## 2022-12-05 DIAGNOSIS — R22.2 LUMP OF SKIN OF BACK: Primary | ICD-10-CM

## 2022-12-13 ENCOUNTER — HOSPITAL ENCOUNTER (OUTPATIENT)
Dept: ULTRASOUND IMAGING | Age: 62
Discharge: HOME | End: 2022-12-13
Payer: COMMERCIAL

## 2022-12-13 DIAGNOSIS — R22.2 LUMP OF SKIN OF BACK: ICD-10-CM

## 2022-12-13 PROCEDURE — 76882 US LMTD JT/FCL EVL NVASC XTR: CPT

## 2023-05-03 ENCOUNTER — TRANSCRIBE ORDER (OUTPATIENT)
Dept: SCHEDULING | Age: 63
End: 2023-05-03

## 2023-05-03 DIAGNOSIS — R13.10 DYSPHAGIA: Primary | ICD-10-CM

## 2023-05-12 ENCOUNTER — HOSPITAL ENCOUNTER (OUTPATIENT)
Facility: HOSPITAL | Age: 63
Discharge: HOME | End: 2023-05-12
Payer: COMMERCIAL

## 2023-05-12 DIAGNOSIS — R13.10 PROBLEMS WITH SWALLOWING AND MASTICATION: ICD-10-CM

## 2023-05-12 PROCEDURE — 74220 X-RAY XM ESOPHAGUS 1CNTRST: CPT

## 2023-05-16 ENCOUNTER — HOSPITAL ENCOUNTER (OUTPATIENT)
Facility: HOSPITAL | Age: 63
Discharge: HOME | End: 2023-05-19
Payer: COMMERCIAL

## 2023-05-16 DIAGNOSIS — R13.10 DYSPHAGIA, UNSPECIFIED TYPE: ICD-10-CM

## 2023-05-16 PROCEDURE — 92611 MOTION FLUOROSCOPY/SWALLOW: CPT

## 2023-05-16 PROCEDURE — 74230 X-RAY XM SWLNG FUNCJ C+: CPT

## 2023-05-16 NOTE — PROGRESS NOTES
functional oropharyngeal swallow. Oral phase assessed to be [facility]. Pharyngeal phase assessed to be [facility]. Swallow initiation observed consistently at patient's base of tongue. No penetration or aspiration observed with any consistency trialed. No vallecular or pyriform sinus residue observed with thin liquids, puree, solid, or barium tablet. PLAN/RECOMMENDATIONS :  -- Regular/Thin Liquid diet  -- Consider eating meat with sauce/gravy for softer consistency  -- Larger pills one at a time with thin liquid or puree (applesauce/yogurt/pudding/etc.)  -- Upright during and after meals for at least 30 minutes  -- Consider small, more frequent meals throughout the day  -- No further SLP needs  -- Would consider GI consult given patient's complaints, results of esophagram, and intact oral/pharyngeal swallow visualized on MBS     COMMUNICATION/EDUCATION:   The above findings and recommendations were discussed with:  Patient  who verbalized understanding and will be faxed to patient's referring provider.     Thank you for this referral.  MAYRA Franklin  Minutes: 30

## 2023-05-26 RX ORDER — LOSARTAN POTASSIUM 50 MG/1
50 TABLET ORAL DAILY
COMMUNITY

## 2023-05-26 RX ORDER — GLIPIZIDE 5 MG/1
5 TABLET ORAL
COMMUNITY

## 2023-05-26 RX ORDER — ESOMEPRAZOLE MAGNESIUM 40 MG/1
40 CAPSULE, DELAYED RELEASE ORAL EVERY OTHER DAY
COMMUNITY
End: 2023-07-21

## 2023-07-05 ENCOUNTER — HOSPITAL ENCOUNTER (OUTPATIENT)
Age: 63
Discharge: HOME | End: 2023-07-08
Payer: COMMERCIAL

## 2023-07-05 DIAGNOSIS — B37.81 CANDIDA ESOPHAGITIS (HCC): ICD-10-CM

## 2023-07-05 DIAGNOSIS — C15.9 ESOPHAGEAL ADENOCARCINOMA (HCC): ICD-10-CM

## 2023-07-05 PROCEDURE — 71260 CT THORAX DX C+: CPT

## 2023-07-05 PROCEDURE — 6360000004 HC RX CONTRAST MEDICATION: Performed by: RADIOLOGY

## 2023-07-05 PROCEDURE — 2500000003 HC RX 250 WO HCPCS: Performed by: RADIOLOGY

## 2023-07-05 PROCEDURE — 74177 CT ABD & PELVIS W/CONTRAST: CPT

## 2023-07-05 RX ADMIN — BARIUM SULFATE 450 ML: 20 SUSPENSION ORAL at 09:03

## 2023-07-05 RX ADMIN — IOPAMIDOL 100 ML: 755 INJECTION, SOLUTION INTRAVENOUS at 09:03

## 2023-07-06 ENCOUNTER — TRANSCRIBE ORDERS (OUTPATIENT)
Facility: HOSPITAL | Age: 63
End: 2023-07-06

## 2023-07-06 DIAGNOSIS — C16.0 MALIGNANT NEOPLASM OF CARDIA (HCC): Primary | ICD-10-CM

## 2023-07-10 ENCOUNTER — TELEPHONE (OUTPATIENT)
Age: 63
End: 2023-07-10

## 2023-07-10 NOTE — TELEPHONE ENCOUNTER
----- Message from Adonis Starr sent at 7/10/2023  3:17 PM EDT -----  Regarding: RE: Mat  He is on vacation In [facility] and wanted the pet scan to be done to so he said he can do Monday?  So I did Monday the 17th at 2pm  ----- Message -----  From: Joy Wilde RN  Sent: 7/10/2023   2:49 PM EDT  To: GAVINO Mosqueda NP, Adonis Starr  Subject: RE: New Onc Consult                              We can see him tomorrow (7/11/23) at 2:30pm  ----- Message -----  From: Adonis Starr  Sent: 7/10/2023   2:07 PM EDT  To: GAVINO Mosqueda NP, Joy Wilde, RN  Subject: New Onc Consult                                  Pt wants a second opinion/ Dr Rosa Elena Louis pt records scanned in system
None

## 2023-07-12 ENCOUNTER — TRANSCRIBE ORDERS (OUTPATIENT)
Facility: HOSPITAL | Age: 63
End: 2023-07-12

## 2023-07-12 DIAGNOSIS — C16.0 MALIGNANT NEOPLASM OF CARDIA (HCC): Primary | ICD-10-CM

## 2023-07-14 ENCOUNTER — HOSPITAL ENCOUNTER (OUTPATIENT)
Facility: HOSPITAL | Age: 63
End: 2023-07-14
Attending: INTERNAL MEDICINE
Payer: COMMERCIAL

## 2023-07-14 ENCOUNTER — HOSPITAL ENCOUNTER (OUTPATIENT)
Facility: HOSPITAL | Age: 63
Discharge: HOME | End: 2023-07-14
Attending: INTERNAL MEDICINE
Payer: COMMERCIAL

## 2023-07-14 VITALS — WEIGHT: 215 LBS

## 2023-07-14 DIAGNOSIS — C16.0 MALIGNANT NEOPLASM OF CARDIA (HCC): ICD-10-CM

## 2023-07-14 LAB
GLUCOSE BLD STRIP.AUTO-MCNC: 117 MG/DL (ref 65–117)
SERVICE CMNT-IMP: NORMAL

## 2023-07-14 PROCEDURE — 3430000000 HC RX DIAGNOSTIC RADIOPHARMACEUTICAL: Performed by: INTERNAL MEDICINE

## 2023-07-14 PROCEDURE — 78815 PET IMAGE W/CT SKULL-THIGH: CPT

## 2023-07-14 PROCEDURE — 82962 GLUCOSE BLOOD TEST: CPT

## 2023-07-14 PROCEDURE — A9552 F18 FDG: HCPCS | Performed by: INTERNAL MEDICINE

## 2023-07-14 RX ORDER — FLUDEOXYGLUCOSE F-18 500 MCI/ML
10 INJECTION INTRAVENOUS
Status: COMPLETED | OUTPATIENT
Start: 2023-07-14 | End: 2023-07-14

## 2023-07-14 RX ADMIN — FLUDEOXYGLUCOSE F-18 10 MILLICURIE: 500 INJECTION INTRAVENOUS at 09:17

## 2023-07-18 NOTE — PROGRESS NOTES
Khris Agudelo is a 70 y.o. male here for 2nd opinion. Has seen [facility]. Pt here with wife. Pt has lost about 40 lbs recently. Does have some trouble swallowing. Mostly liquids. Can eat some Eggs, pudding, applesauce. He has sleep apnea and uses CPAP every night. Port and biopsy to be done tomorrow. 1. Have you been to the ER, urgent care clinic since your last visit? Hospitalized since your last visit? New Pt    2. Have you seen or consulted any other health care providers outside of the [address] [place] [address] Avenue since your last visit? Include any pap smears or colon screening.   New Pt

## 2023-07-19 ENCOUNTER — CLINICAL DOCUMENTATION (OUTPATIENT)
Age: 63
End: 2023-07-19

## 2023-07-19 ENCOUNTER — OFFICE VISIT (OUTPATIENT)
Age: 63
End: 2023-07-19
Payer: COMMERCIAL

## 2023-07-19 VITALS
OXYGEN SATURATION: 97 % | DIASTOLIC BLOOD PRESSURE: 77 MMHG | TEMPERATURE: 97.7 F | WEIGHT: 219 LBS | BODY MASS INDEX: 30.66 KG/M2 | HEART RATE: 71 BPM | SYSTOLIC BLOOD PRESSURE: 118 MMHG | HEIGHT: 71 IN

## 2023-07-19 DIAGNOSIS — C77.2 MALIGNANT NEOPLASM METASTATIC TO INTRA-ABDOMINAL LYMPH NODE (HCC): ICD-10-CM

## 2023-07-19 DIAGNOSIS — C16.0 GE JUNCTION CARCINOMA (HCC): Primary | ICD-10-CM

## 2023-07-19 PROCEDURE — 99205 OFFICE O/P NEW HI 60 MIN: CPT | Performed by: INTERNAL MEDICINE

## 2023-07-19 PROCEDURE — 3074F SYST BP LT 130 MM HG: CPT | Performed by: INTERNAL MEDICINE

## 2023-07-19 PROCEDURE — 3078F DIAST BP <80 MM HG: CPT | Performed by: INTERNAL MEDICINE

## 2023-07-19 PROCEDURE — 1123F ACP DISCUSS/DSCN MKR DOCD: CPT | Performed by: INTERNAL MEDICINE

## 2023-07-19 RX ORDER — LIDOCAINE AND PRILOCAINE 25; 25 MG/G; MG/G
CREAM TOPICAL
Qty: 30 G | Refills: 2 | Status: CANCELLED | OUTPATIENT
Start: 2023-07-19

## 2023-07-19 RX ORDER — ONDANSETRON 4 MG/1
4 TABLET, ORALLY DISINTEGRATING ORAL 3 TIMES DAILY PRN
Qty: 40 TABLET | Refills: 2 | Status: CANCELLED | OUTPATIENT
Start: 2023-07-19

## 2023-07-19 RX ORDER — OMEPRAZOLE 40 MG/1
40 CAPSULE, DELAYED RELEASE ORAL DAILY
COMMUNITY
Start: 2016-04-04

## 2023-07-19 RX ORDER — PROCHLORPERAZINE MALEATE 10 MG
10 TABLET ORAL EVERY 6 HOURS PRN
Qty: 40 TABLET | Refills: 2 | Status: CANCELLED | OUTPATIENT
Start: 2023-07-19

## 2023-07-20 ENCOUNTER — HOSPITAL ENCOUNTER (OUTPATIENT)
Facility: HOSPITAL | Age: 63
Discharge: HOME | End: 2023-07-20
Attending: INTERNAL MEDICINE
Payer: COMMERCIAL

## 2023-07-20 ENCOUNTER — HOSPITAL ENCOUNTER (OUTPATIENT)
Facility: HOSPITAL | Age: 63
End: 2023-07-20
Attending: INTERNAL MEDICINE
Payer: COMMERCIAL

## 2023-07-20 VITALS
BODY MASS INDEX: 30.06 KG/M2 | DIASTOLIC BLOOD PRESSURE: 75 MMHG | TEMPERATURE: 98.9 F | WEIGHT: 210 LBS | HEART RATE: 76 BPM | HEIGHT: 70 IN | SYSTOLIC BLOOD PRESSURE: 130 MMHG | OXYGEN SATURATION: 97 % | RESPIRATION RATE: 18 BRPM

## 2023-07-20 VITALS
SYSTOLIC BLOOD PRESSURE: 128 MMHG | DIASTOLIC BLOOD PRESSURE: 64 MMHG | HEART RATE: 71 BPM | OXYGEN SATURATION: 98 % | RESPIRATION RATE: 14 BRPM

## 2023-07-20 DIAGNOSIS — C16.0 MALIGNANT NEOPLASM OF CARDIA (HCC): ICD-10-CM

## 2023-07-20 PROCEDURE — 76942 ECHO GUIDE FOR BIOPSY: CPT

## 2023-07-20 PROCEDURE — 88360 TUMOR IMMUNOHISTOCHEM/MANUAL: CPT

## 2023-07-20 PROCEDURE — 88333 PATH CONSLTJ SURG CYTO XM 1: CPT

## 2023-07-20 PROCEDURE — 6360000002 HC RX W HCPCS: Performed by: STUDENT IN AN ORGANIZED HEALTH CARE EDUCATION/TRAINING PROGRAM

## 2023-07-20 PROCEDURE — 38505 NEEDLE BIOPSY LYMPH NODES: CPT

## 2023-07-20 PROCEDURE — 88341 IMHCHEM/IMCYTCHM EA ADD ANTB: CPT

## 2023-07-20 PROCEDURE — 2500000003 HC RX 250 WO HCPCS: Performed by: STUDENT IN AN ORGANIZED HEALTH CARE EDUCATION/TRAINING PROGRAM

## 2023-07-20 PROCEDURE — 2580000003 HC RX 258: Performed by: STUDENT IN AN ORGANIZED HEALTH CARE EDUCATION/TRAINING PROGRAM

## 2023-07-20 PROCEDURE — 88305 TISSUE EXAM BY PATHOLOGIST: CPT

## 2023-07-20 PROCEDURE — 88342 IMHCHEM/IMCYTCHM 1ST ANTB: CPT

## 2023-07-20 RX ORDER — FENTANYL CITRATE 50 UG/ML
100 INJECTION, SOLUTION INTRAMUSCULAR; INTRAVENOUS PRN
Status: DISCONTINUED | OUTPATIENT
Start: 2023-07-20 | End: 2023-07-24 | Stop reason: HOSPADM

## 2023-07-20 RX ORDER — DIPHENHYDRAMINE HYDROCHLORIDE 50 MG/ML
25 INJECTION INTRAMUSCULAR; INTRAVENOUS ONCE
Status: DISCONTINUED | OUTPATIENT
Start: 2023-07-20 | End: 2023-07-24 | Stop reason: HOSPADM

## 2023-07-20 RX ORDER — HEPARIN SODIUM 200 [USP'U]/100ML
200 INJECTION, SOLUTION INTRAVENOUS ONCE
Status: COMPLETED | OUTPATIENT
Start: 2023-07-20 | End: 2023-07-20

## 2023-07-20 RX ORDER — HEPARIN SODIUM (PORCINE) LOCK FLUSH IV SOLN 100 UNIT/ML 100 UNIT/ML
100 SOLUTION INTRAVENOUS ONCE
Status: COMPLETED | OUTPATIENT
Start: 2023-07-20 | End: 2023-07-20

## 2023-07-20 RX ORDER — MIDAZOLAM HYDROCHLORIDE 1 MG/ML
5 INJECTION, SOLUTION INTRAMUSCULAR; INTRAVENOUS PRN
Status: DISCONTINUED | OUTPATIENT
Start: 2023-07-20 | End: 2023-07-24 | Stop reason: HOSPADM

## 2023-07-20 RX ORDER — LIDOCAINE HYDROCHLORIDE AND EPINEPHRINE 10; 10 MG/ML; UG/ML
20 INJECTION, SOLUTION INFILTRATION; PERINEURAL ONCE
Status: COMPLETED | OUTPATIENT
Start: 2023-07-20 | End: 2023-07-20

## 2023-07-20 RX ORDER — SODIUM CHLORIDE 9 MG/ML
INJECTION, SOLUTION INTRAVENOUS CONTINUOUS
Status: DISCONTINUED | OUTPATIENT
Start: 2023-07-20 | End: 2023-07-24 | Stop reason: HOSPADM

## 2023-07-20 RX ORDER — LIDOCAINE HYDROCHLORIDE 20 MG/ML
20 INJECTION, SOLUTION INFILTRATION; PERINEURAL ONCE
Status: COMPLETED | OUTPATIENT
Start: 2023-07-20 | End: 2023-07-20

## 2023-07-20 RX ADMIN — SODIUM CHLORIDE, PRESERVATIVE FREE 500 UNITS: 5 INJECTION INTRAVENOUS at 11:24

## 2023-07-20 RX ADMIN — MIDAZOLAM HYDROCHLORIDE 1 MG: 1 INJECTION, SOLUTION INTRAMUSCULAR; INTRAVENOUS at 10:44

## 2023-07-20 RX ADMIN — HEPARIN SODIUM IN SODIUM CHLORIDE 60 ML: 200 INJECTION INTRAVENOUS at 11:23

## 2023-07-20 RX ADMIN — FENTANYL CITRATE 25 MCG: 50 INJECTION, SOLUTION INTRAMUSCULAR; INTRAVENOUS at 10:42

## 2023-07-20 RX ADMIN — WATER 2000 MG: 1 INJECTION INTRAMUSCULAR; INTRAVENOUS; SUBCUTANEOUS at 09:47

## 2023-07-20 RX ADMIN — MIDAZOLAM HYDROCHLORIDE 1 MG: 1 INJECTION, SOLUTION INTRAMUSCULAR; INTRAVENOUS at 10:35

## 2023-07-20 RX ADMIN — FENTANYL CITRATE 25 MCG: 50 INJECTION, SOLUTION INTRAMUSCULAR; INTRAVENOUS at 10:50

## 2023-07-20 RX ADMIN — FENTANYL CITRATE 25 MCG: 50 INJECTION, SOLUTION INTRAMUSCULAR; INTRAVENOUS at 10:37

## 2023-07-20 RX ADMIN — MIDAZOLAM HYDROCHLORIDE 1 MG: 1 INJECTION, SOLUTION INTRAMUSCULAR; INTRAVENOUS at 10:48

## 2023-07-20 RX ADMIN — MIDAZOLAM HYDROCHLORIDE 2 MG: 1 INJECTION, SOLUTION INTRAMUSCULAR; INTRAVENOUS at 10:30

## 2023-07-20 RX ADMIN — LIDOCAINE HYDROCHLORIDE 10 ML: 20 INJECTION, SOLUTION INFILTRATION; PERINEURAL at 11:23

## 2023-07-20 RX ADMIN — MIDAZOLAM HYDROCHLORIDE 1 MG: 1 INJECTION, SOLUTION INTRAMUSCULAR; INTRAVENOUS at 10:40

## 2023-07-20 RX ADMIN — FENTANYL CITRATE 50 MCG: 50 INJECTION, SOLUTION INTRAMUSCULAR; INTRAVENOUS at 10:32

## 2023-07-20 RX ADMIN — LIDOCAINE HYDROCHLORIDE AND EPINEPHRINE 10 ML: 10; 10 INJECTION, SOLUTION INFILTRATION; PERINEURAL at 11:24

## 2023-07-20 NOTE — PROGRESS NOTES
Pharmacy Note- Chemotherapy / Immunotherapy Education    Goevany Gonzales is a  70 y. o.male  diagnosed with GEJ cancer here today for chemotherapy counseling and consent. Mr. Abhay Zhang is being treated with FOLFOX + Opdivo. Provided education on oxaliplatin, leucovorin, fluorouracil, nivolumab and premedications - palonosetron and dexamethasone. Side effects of chemotherapy reviewed included s/s infection, anemia, appetite changes, thrombocytopenia, fatigue, hair loss/alopecia, bone pain, skin and nail changes, diarrhea/constipation, peripheral neuropathy, and cold sensitivities. Mr. Abhay Zhang was also provided information regarding the risks of immune-mediated adverse reactions secondary to nivolumab that may require interruption/delay of treatment and possible use of corticosteroids. These reactions include, but are not limited to: colitis (diarrhea or severe abdominal pain); hepatitis (jaundice, severe nausea, or vomiting, easy bruising, and/or bleeding); hypophysitis (persistent or unusual headache, extreme weakness, dizziness/fainting, and/or vision changes); dermatitis (skin rash, mouth sores, skin blisters, and/or skin peeling); ocular toxicity (uveitis, iritis, and/or episcleritis); neuropathy (motor or sensory); hyper/hypothyroidism. Patient given ways to manage these side effects and when to contact office. This is a second opinion and will take home the information and decide. Port placement and biopsy planned for 7/20/23. [facility] Handout of medications provided to patient. Mr. Abhay Zhang verbalized understanding of the information presented, consent signed, and all of the patient's questions were answered.     Joe Vora, PharmD, [address] [place] [address]    Program: Medical Group  CPA in place:  Yes  Time Spent (min): 30

## 2023-07-20 NOTE — PROGRESS NOTES
Oncology Social Work  Psychosocial Assessment    Reason for Assessment:      [] Social Work Referral [x] Initial Assessment [x] New Diagnosis [] Other    Advance Care Planning:  Demographics 7/20/2023   Marital Status        Sources of Information:    [x]Patient  [x]Family  [x]Staff  [x]Medical Record    Mental Status:    [x]Alert  []Lethargic  []Unresponsive   [] Unable to assess   Oriented to:  [x]Person  [x]Place  [x]Time  [x]Situation      Relationship Status:  []Single  [x]  []Significant Other/Life Partner  []  []  []    Living Circumstances:  []Lives Alone  [x]Family/Significant Other in Household  []Roommates  []Children in the Home  []Paid Caregivers  []Assisted Living Facility/Group [address] [place] [address]  []Homeless  []Incarcerated  []Environmental/Care Concerns  []Other:    Employment Status:  [x]Employed Full-time []Employed Part-time []Homemaker  [] Retired [] Short-Term Disability [] Long-Term Disability  [] Unemployed   [] [id number] [facility]   [] SSDI  [] Self-Employment    Barriers to Learning:    []Language  []Developmental  []Cognitive  []Altered Mental Status  []Visual/Hearing Impairment  []Unable to Read/Write  []Motivational  [] Challenges Understanding Medical Jargon [x]No Barriers Identified      Financial/Legal Concerns:    []Uninsured  []Limited Income/Resources  []Non-Citizen  []Food Insecurity [x]No Concerns Identified   []Other:    Church/Spiritual/Existential:  Does patient have any spiritual or Moravian beliefs? [x] Yes [] No  Is the patient involved in a spiritual, jose francisco or Moravian community?  [] Yes [] No  Patient expressing spiritual/existential angst? [] Yes [x] No  Notes:    Support System:    Identified Support Person/Group:  []Strong  [x]Fair  []Limited    Coping with Illness:   [x]  Coping Well  [] Challenges Coping with Serious Illness [] Situational Depression [x] Situational Anxiety [] Anticipatory Grief  [] Recent Loss [] Caregiver

## 2023-07-20 NOTE — PROGRESS NOTES
Chuck  at [facility], [address], [facility], [address] [place] [address]  [phone]       Hematology Oncology Consultation      Patient: Saleem Ferrer MRN: [id number]  SSN: [id number]    YOB: 1960  Age: 70 y.o. Sex: male        Subjective:      Saleem Ferrer is a 70 y.o. male who I am seeing in consultation for a new diagnosis of metastatic GEJ carcinoma. He has been experiencing some difficulty in swallowing for a few months. He has lost over 40 lbs and he is fatigued. An EGD revealed GEJ mass which was biopsied and came back as GEJ carcinoma. CT and a subsequent PET shows enlarged left supraclavicular and RP efrem disease. He has seen Estuardo Brown at [facility]. He comes in to seek for a second opinion.           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    Cervical myelopathy (HCC)     GERD (gastroesophageal reflux disease)     High cholesterol     PUD (peptic ulcer disease)     Sleep apnea     C-PAP    Viral meningitis 1976     Past Surgical History:   Procedure Laterality Date    [place] STENOSIS    GI      COLONOSCOPY    HEENT  2013    PATCHES IN EAR DRUMS    HEENT  2006    Lasik surgery both eyes    HEENT  1989    deviated septum repair    IR BIOPSY LYMPH NODE SUPERVICIAL  7/20/2023    IR BIOPSY LYMPH NODE SUPERVICIAL 7/20/2023 Phyllis Nyhan, APRN - NP [facility]GIO IR    IR PORT PLACEMENT EQUAL OR GREATER THAN 5 YEARS  7/20/2023    IR PORT PLACEMENT EQUAL OR GREATER THAN 5 YEARS 7/20/2023

## 2023-07-20 NOTE — PROGRESS NOTES
Back in the xray recovery area post procedure, A/O x 3 w/o complaint. Both sites are clean and dry. Sitting up in bed drinking a diet pepsi.

## 2023-07-20 NOTE — DISCHARGE INSTRUCTIONS
[facility] (DP/SNF)  Special Procedures/Angiography Department    Radiologist:  Dr. Gerhardt Ing / Julianna Carrion NP     Date:   07/20/2023     Portacath Removal Discharge Instructions      Watch for signs of infection:  redness, fever, chills, increased pain, and/or drainage from the site. If this occurs, call your physician at once. Keep your dressing clean and dry. Leave the dressing in place for the next  three days    Resume your previous diet and follow medication reconciliation form. Do not lift anything heavier than 5 pounds with the affected arm for the next week. You may take Tylenol, as directed on the label, for pain. Avoid ibuprofen (Advil, Motrin) and aspirin as they may cause you to bleed. Because you received sedation, you are not to drive or sign any legal documents for the next 24 hours.       If you have any questions or concerns, please call [phone] and ask for the nurse on-call

## 2023-07-21 ENCOUNTER — TELEPHONE (OUTPATIENT)
Age: 63
End: 2023-07-21

## 2023-07-21 DIAGNOSIS — C16.0 GE JUNCTION CARCINOMA (HCC): ICD-10-CM

## 2023-07-21 RX ORDER — ONDANSETRON 4 MG/1
4 TABLET, ORALLY DISINTEGRATING ORAL EVERY 6 HOURS PRN
Qty: 40 TABLET | Refills: 3 | Status: SHIPPED | OUTPATIENT
Start: 2023-07-21

## 2023-07-21 RX ORDER — PROCHLORPERAZINE MALEATE 10 MG
10 TABLET ORAL EVERY 6 HOURS PRN
Qty: 40 TABLET | Refills: 3 | Status: SHIPPED | OUTPATIENT
Start: 2023-07-21

## 2023-07-21 RX ORDER — LIDOCAINE AND PRILOCAINE 25; 25 MG/G; MG/G
CREAM TOPICAL
Qty: 30 G | Refills: 1 | Status: SHIPPED | OUTPATIENT
Start: 2023-07-21

## 2023-07-21 NOTE — TELEPHONE ENCOUNTER
Spoke with . Charmaine Moose. Has decided to keep his care with our office. Does already have antinausea meds and emla from the other office at the pharmacy and asked if these were the same. They are however will send over scripts in our name so if refills are needed they can contact our office. Port placement went well yesterday and will plan tos tart the first two weeks of August. Sent chemo plans to .      For Pharmacy Admin Tracking Only    Program: Medical Group  CPA in place:  Yes  Time Spent (min): 20

## 2023-07-21 NOTE — TELEPHONE ENCOUNTER
Patient called and would like to keep his care with this office. Also asking for a call regarding some of the medications.

## 2023-07-25 ENCOUNTER — TELEPHONE (OUTPATIENT)
Age: 63
End: 2023-07-25

## 2023-07-26 NOTE — TELEPHONE ENCOUNTER
Chuck at [address] [place], [facility] Connor Herrera   W: [phone]  F: [phone]    Medical Nutrition Therapy  Nutrition Referral:    Referral received. Called and spoke with patient,  explained that RD is available to address nutrition throughout the spectrum of care. He reports he is unable to consume any solid foods, even has a hard time with foods such as chicken/tuna salad. He can handle soft scrambled eggs. He is eating things like: pudding, applesauce, chicken noodle soup, straining soups. Drinking fairlife protein drinks and using gnc protein powder to make shakes. He is diabetic and his BG levels have been good recently. Diagnosis: Metastatic GEJ carcinoma   Plan for mFOLFOX + nivolumab as palliative therapy   Ht Readings from Last 1 Encounters:   07/20/23 5' 10\" (1.778 m)       Wt Readings from Last 5 Encounters:   07/20/23 210 lb (95.3 kg)   07/19/23 219 lb (99.3 kg)   07/14/23 215 lb (97.5 kg)   01/05/22 255 lb (115.7 kg)       Estimated Nutrition Needs:   Calorie Range: 2400-2875kcal/day      Protein Range: 95-114g/day     Fluid Needs: 2000ml    Plan:  - Discussed increased calories   - Will send mychart chart to open communication and provide additional strategies for increasing calories/soft foods.          Signed By: Fan Jasso, [address] [facility]

## 2023-07-28 DIAGNOSIS — C16.0 GE JUNCTION CARCINOMA (HCC): Primary | ICD-10-CM

## 2023-07-28 RX ORDER — SODIUM CHLORIDE 0.9 % (FLUSH) 0.9 %
5-40 SYRINGE (ML) INJECTION PRN
OUTPATIENT
Start: 2023-08-02

## 2023-07-28 RX ORDER — SODIUM CHLORIDE 9 MG/ML
5-250 INJECTION, SOLUTION INTRAVENOUS PRN
OUTPATIENT
Start: 2023-08-04

## 2023-07-28 RX ORDER — FLUOROURACIL 50 MG/ML
400 INJECTION, SOLUTION INTRAVENOUS ONCE
OUTPATIENT
Start: 2023-08-02 | End: 2023-08-02

## 2023-07-28 RX ORDER — ALBUTEROL SULFATE 90 UG/1
4 AEROSOL, METERED RESPIRATORY (INHALATION) PRN
OUTPATIENT
Start: 2023-08-02

## 2023-07-28 RX ORDER — ACETAMINOPHEN 325 MG/1
650 TABLET ORAL
OUTPATIENT
Start: 2023-08-02

## 2023-07-28 RX ORDER — SODIUM CHLORIDE 9 MG/ML
INJECTION, SOLUTION INTRAVENOUS CONTINUOUS
OUTPATIENT
Start: 2023-08-02

## 2023-07-28 RX ORDER — DEXTROSE MONOHYDRATE 50 MG/ML
5-250 INJECTION, SOLUTION INTRAVENOUS PRN
OUTPATIENT
Start: 2023-08-02

## 2023-07-28 RX ORDER — SODIUM CHLORIDE 9 MG/ML
5-250 INJECTION, SOLUTION INTRAVENOUS PRN
OUTPATIENT
Start: 2023-08-02

## 2023-07-28 RX ORDER — SODIUM CHLORIDE 0.9 % (FLUSH) 0.9 %
5-40 SYRINGE (ML) INJECTION PRN
OUTPATIENT
Start: 2023-08-04

## 2023-07-28 RX ORDER — HEPARIN SODIUM (PORCINE) LOCK FLUSH IV SOLN 100 UNIT/ML 100 UNIT/ML
500 SOLUTION INTRAVENOUS PRN
OUTPATIENT
Start: 2023-08-04

## 2023-07-28 RX ORDER — FAMOTIDINE 10 MG/ML
20 INJECTION, SOLUTION INTRAVENOUS
OUTPATIENT
Start: 2023-08-02

## 2023-07-28 RX ORDER — DIPHENHYDRAMINE HYDROCHLORIDE 50 MG/ML
50 INJECTION INTRAMUSCULAR; INTRAVENOUS
OUTPATIENT
Start: 2023-08-02

## 2023-07-28 RX ORDER — EPINEPHRINE 1 MG/ML
0.3 INJECTION, SOLUTION, CONCENTRATE INTRAVENOUS PRN
OUTPATIENT
Start: 2023-08-02

## 2023-07-28 RX ORDER — PALONOSETRON 0.05 MG/ML
0.25 INJECTION, SOLUTION INTRAVENOUS ONCE
OUTPATIENT
Start: 2023-08-02 | End: 2023-08-02

## 2023-07-28 RX ORDER — MEPERIDINE HYDROCHLORIDE 50 MG/ML
12.5 INJECTION INTRAMUSCULAR; INTRAVENOUS; SUBCUTANEOUS PRN
OUTPATIENT
Start: 2023-08-02

## 2023-07-28 RX ORDER — HEPARIN SODIUM (PORCINE) LOCK FLUSH IV SOLN 100 UNIT/ML 100 UNIT/ML
500 SOLUTION INTRAVENOUS PRN
OUTPATIENT
Start: 2023-08-02

## 2023-07-28 RX ORDER — ONDANSETRON 2 MG/ML
8 INJECTION INTRAMUSCULAR; INTRAVENOUS
OUTPATIENT
Start: 2023-08-02

## 2023-07-31 ENCOUNTER — TELEPHONE (OUTPATIENT)
Age: 63
End: 2023-07-31

## 2023-07-31 NOTE — TELEPHONE ENCOUNTER
Pt called because he has a question about cleaning is port. Wanted to know if he can use the lidocaine, and then cover it. Stated he was told by someone who has been through the process that it was okay.

## 2023-07-31 NOTE — TELEPHONE ENCOUNTER
Called and spoke with Gregg. Answered all questions about emla cream and port site. Also reviewed PRN meds. Thanked me for the call and will see us on Wednesday.      For Pharmacy Admin Tracking Only    Program: Medical Group  CPA in place:  Yes  Time Spent (min): 10

## 2023-08-02 ENCOUNTER — OFFICE VISIT (OUTPATIENT)
Age: 63
End: 2023-08-02
Payer: COMMERCIAL

## 2023-08-02 ENCOUNTER — HOSPITAL ENCOUNTER (OUTPATIENT)
Facility: HOSPITAL | Age: 63
Setting detail: INFUSION SERIES
End: 2023-08-02
Payer: COMMERCIAL

## 2023-08-02 VITALS
BODY MASS INDEX: 30.11 KG/M2 | WEIGHT: 210.32 LBS | SYSTOLIC BLOOD PRESSURE: 126 MMHG | TEMPERATURE: 98 F | DIASTOLIC BLOOD PRESSURE: 74 MMHG | HEIGHT: 70 IN | OXYGEN SATURATION: 96 % | RESPIRATION RATE: 16 BRPM | HEART RATE: 70 BPM

## 2023-08-02 VITALS
DIASTOLIC BLOOD PRESSURE: 75 MMHG | HEART RATE: 64 BPM | TEMPERATURE: 98 F | HEIGHT: 70 IN | BODY MASS INDEX: 30.12 KG/M2 | SYSTOLIC BLOOD PRESSURE: 142 MMHG | WEIGHT: 210.4 LBS | RESPIRATION RATE: 16 BRPM | OXYGEN SATURATION: 96 %

## 2023-08-02 DIAGNOSIS — C16.0 GE JUNCTION CARCINOMA (HCC): Primary | ICD-10-CM

## 2023-08-02 DIAGNOSIS — Z51.11 ENCOUNTER FOR ANTINEOPLASTIC CHEMOTHERAPY AND IMMUNOTHERAPY: ICD-10-CM

## 2023-08-02 DIAGNOSIS — C77.2 MALIGNANT NEOPLASM METASTATIC TO INTRA-ABDOMINAL LYMPH NODE (HCC): ICD-10-CM

## 2023-08-02 DIAGNOSIS — Z51.12 ENCOUNTER FOR ANTINEOPLASTIC CHEMOTHERAPY AND IMMUNOTHERAPY: ICD-10-CM

## 2023-08-02 LAB
ALBUMIN SERPL-MCNC: 3.5 G/DL (ref 3.5–5)
ALBUMIN/GLOB SERPL: 0.9 (ref 1.1–2.2)
ALP SERPL-CCNC: 152 U/L (ref 45–117)
ALT SERPL-CCNC: 17 U/L (ref 12–78)
ANION GAP SERPL CALC-SCNC: 3 MMOL/L (ref 5–15)
AST SERPL-CCNC: 11 U/L (ref 15–37)
BASOPHILS # BLD: 0.1 K/UL (ref 0–0.1)
BASOPHILS NFR BLD: 1 % (ref 0–1)
BILIRUB SERPL-MCNC: 0.8 MG/DL (ref 0.2–1)
BUN SERPL-MCNC: 14 MG/DL (ref 6–20)
BUN/CREAT SERPL: 19 (ref 12–20)
CALCIUM SERPL-MCNC: 9.4 MG/DL (ref 8.5–10.1)
CHLORIDE SERPL-SCNC: 106 MMOL/L (ref 97–108)
CO2 SERPL-SCNC: 27 MMOL/L (ref 21–32)
CORTIS SERPL-MCNC: 20.9 UG/DL
CREAT SERPL-MCNC: 0.72 MG/DL (ref 0.7–1.3)
DIFFERENTIAL METHOD BLD: NORMAL
EOSINOPHIL # BLD: 0.2 K/UL (ref 0–0.4)
EOSINOPHIL NFR BLD: 3 % (ref 0–7)
ERYTHROCYTE [DISTWIDTH] IN BLOOD BY AUTOMATED COUNT: 12.6 % (ref 11.5–14.5)
GLOBULIN SER CALC-MCNC: 3.9 G/DL (ref 2–4)
GLUCOSE SERPL-MCNC: 131 MG/DL (ref 65–100)
HBV SURFACE AB SER QL: REACTIVE
HBV SURFACE AB SER-ACNC: 96.84 MIU/ML
HBV SURFACE AG SER QL: <0.1 INDEX
HBV SURFACE AG SER QL: NEGATIVE
HCT VFR BLD AUTO: 43.9 % (ref 36.6–50.3)
HGB BLD-MCNC: 15 G/DL (ref 12.1–17)
IMM GRANULOCYTES # BLD AUTO: 0 K/UL (ref 0–0.04)
IMM GRANULOCYTES NFR BLD AUTO: 0 % (ref 0–0.5)
LYMPHOCYTES # BLD: 1.3 K/UL (ref 0.8–3.5)
LYMPHOCYTES NFR BLD: 25 % (ref 12–49)
MCH RBC QN AUTO: 30.1 PG (ref 26–34)
MCHC RBC AUTO-ENTMCNC: 34.2 G/DL (ref 30–36.5)
MCV RBC AUTO: 88 FL (ref 80–99)
MONOCYTES # BLD: 0.6 K/UL (ref 0–1)
MONOCYTES NFR BLD: 12 % (ref 5–13)
NEUTS SEG # BLD: 2.9 K/UL (ref 1.8–8)
NEUTS SEG NFR BLD: 59 % (ref 32–75)
NRBC # BLD: 0 K/UL (ref 0–0.01)
NRBC BLD-RTO: 0 PER 100 WBC
PLATELET # BLD AUTO: 260 K/UL (ref 150–400)
PMV BLD AUTO: 9.1 FL (ref 8.9–12.9)
POTASSIUM SERPL-SCNC: 4 MMOL/L (ref 3.5–5.1)
PROT SERPL-MCNC: 7.4 G/DL (ref 6.4–8.2)
RBC # BLD AUTO: 4.99 M/UL (ref 4.1–5.7)
SODIUM SERPL-SCNC: 136 MMOL/L (ref 136–145)
TSH SERPL DL<=0.05 MIU/L-ACNC: 1.29 UIU/ML (ref 0.36–3.74)
WBC # BLD AUTO: 5 K/UL (ref 4.1–11.1)

## 2023-08-02 PROCEDURE — 96413 CHEMO IV INFUSION 1 HR: CPT

## 2023-08-02 PROCEDURE — 99215 OFFICE O/P EST HI 40 MIN: CPT | Performed by: INTERNAL MEDICINE

## 2023-08-02 PROCEDURE — 86706 HEP B SURFACE ANTIBODY: CPT

## 2023-08-02 PROCEDURE — 96375 TX/PRO/DX INJ NEW DRUG ADDON: CPT

## 2023-08-02 PROCEDURE — 1123F ACP DISCUSS/DSCN MKR DOCD: CPT | Performed by: INTERNAL MEDICINE

## 2023-08-02 PROCEDURE — 6360000002 HC RX W HCPCS: Performed by: INTERNAL MEDICINE

## 2023-08-02 PROCEDURE — 82533 TOTAL CORTISOL: CPT

## 2023-08-02 PROCEDURE — 86704 HEP B CORE ANTIBODY TOTAL: CPT

## 2023-08-02 PROCEDURE — 84443 ASSAY THYROID STIM HORMONE: CPT

## 2023-08-02 PROCEDURE — 85025 COMPLETE CBC W/AUTO DIFF WBC: CPT

## 2023-08-02 PROCEDURE — 87340 HEPATITIS B SURFACE AG IA: CPT

## 2023-08-02 PROCEDURE — 96415 CHEMO IV INFUSION ADDL HR: CPT

## 2023-08-02 PROCEDURE — 36415 COLL VENOUS BLD VENIPUNCTURE: CPT

## 2023-08-02 PROCEDURE — 3078F DIAST BP <80 MM HG: CPT | Performed by: INTERNAL MEDICINE

## 2023-08-02 PROCEDURE — 80053 COMPREHEN METABOLIC PANEL: CPT

## 2023-08-02 PROCEDURE — 96417 CHEMO IV INFUS EACH ADDL SEQ: CPT

## 2023-08-02 PROCEDURE — 96411 CHEMO IV PUSH ADDL DRUG: CPT

## 2023-08-02 PROCEDURE — 96416 CHEMO PROLONG INFUSE W/PUMP: CPT

## 2023-08-02 PROCEDURE — 3074F SYST BP LT 130 MM HG: CPT | Performed by: INTERNAL MEDICINE

## 2023-08-02 PROCEDURE — 2580000003 HC RX 258: Performed by: INTERNAL MEDICINE

## 2023-08-02 RX ORDER — DEXTROSE MONOHYDRATE 50 MG/ML
5-250 INJECTION, SOLUTION INTRAVENOUS PRN
Status: DISCONTINUED | OUTPATIENT
Start: 2023-08-02 | End: 2023-08-03 | Stop reason: HOSPADM

## 2023-08-02 RX ORDER — HEPARIN 100 UNIT/ML
500 SYRINGE INTRAVENOUS PRN
Status: DISCONTINUED | OUTPATIENT
Start: 2023-08-02 | End: 2023-08-03 | Stop reason: HOSPADM

## 2023-08-02 RX ORDER — SODIUM CHLORIDE 0.9 % (FLUSH) 0.9 %
5-40 SYRINGE (ML) INJECTION PRN
Status: DISCONTINUED | OUTPATIENT
Start: 2023-08-02 | End: 2023-08-03 | Stop reason: HOSPADM

## 2023-08-02 RX ORDER — PALONOSETRON 0.05 MG/ML
0.25 INJECTION, SOLUTION INTRAVENOUS ONCE
Status: COMPLETED | OUTPATIENT
Start: 2023-08-02 | End: 2023-08-02

## 2023-08-02 RX ORDER — SODIUM CHLORIDE 9 MG/ML
5-250 INJECTION, SOLUTION INTRAVENOUS PRN
Status: DISCONTINUED | OUTPATIENT
Start: 2023-08-02 | End: 2023-08-03 | Stop reason: HOSPADM

## 2023-08-02 RX ORDER — 0.9 % SODIUM CHLORIDE 0.9 %
1000 INTRAVENOUS SOLUTION INTRAVENOUS ONCE
Status: CANCELLED
Start: 2023-08-04

## 2023-08-02 RX ORDER — FLUOROURACIL 50 MG/ML
400 INJECTION, SOLUTION INTRAVENOUS ONCE
Status: COMPLETED | OUTPATIENT
Start: 2023-08-02 | End: 2023-08-02

## 2023-08-02 RX ADMIN — DEXTROSE MONOHYDRATE 25 ML/HR: 50 INJECTION, SOLUTION INTRAVENOUS at 13:10

## 2023-08-02 RX ADMIN — LEUCOVORIN CALCIUM 200 MG: 100 INJECTION, POWDER, LYOPHILIZED, FOR SOLUTION INTRAMUSCULAR; INTRAVENOUS at 13:13

## 2023-08-02 RX ADMIN — DEXAMETHASONE SODIUM PHOSPHATE 12 MG: 4 INJECTION, SOLUTION INTRAMUSCULAR; INTRAVENOUS at 11:00

## 2023-08-02 RX ADMIN — FLUOROURACIL 875 MG: 50 INJECTION, SOLUTION INTRAVENOUS at 15:28

## 2023-08-02 RX ADMIN — FLUOROURACIL 5000 MG: 50 INJECTION, SOLUTION INTRAVENOUS at 15:28

## 2023-08-02 RX ADMIN — OXALIPLATIN 185 MG: 5 INJECTION, SOLUTION INTRAVENOUS at 13:13

## 2023-08-02 RX ADMIN — SODIUM CHLORIDE 25 ML/HR: 9 INJECTION, SOLUTION INTRAVENOUS at 10:57

## 2023-08-02 RX ADMIN — SODIUM CHLORIDE 240 MG: 9 INJECTION, SOLUTION INTRAVENOUS at 12:25

## 2023-08-02 RX ADMIN — PALONOSETRON 0.25 MG: 0.05 INJECTION, SOLUTION INTRAVENOUS at 10:57

## 2023-08-03 ENCOUNTER — CLINICAL DOCUMENTATION (OUTPATIENT)
Age: 63
End: 2023-08-03

## 2023-08-03 NOTE — PROGRESS NOTES
[employer]  Oncology Social Work  Encounter     Patient Name:  Lon Quiroz     Medical History:     Advance Directives:    Narrative: SW was provided FMLA for pt and pt wife states Nori will use his FMLA for her also. SW complete, faxed and provided copy of FMLA and fax confirmation to pt and spouse. SW placed a copy in RN folder for her records. 3 day every 2 weeks. Pt has laptop and worked during treatment and he states they are working with him so he can work remotely especially for treatment days. Barriers to Care:     Plan:    Referral/Handouts:      Insurance/Entitlements referral

## 2023-08-04 ENCOUNTER — HOSPITAL ENCOUNTER (OUTPATIENT)
Facility: HOSPITAL | Age: 63
Setting detail: INFUSION SERIES
End: 2023-08-04
Payer: COMMERCIAL

## 2023-08-04 VITALS
BODY MASS INDEX: 30.15 KG/M2 | WEIGHT: 210.1 LBS | SYSTOLIC BLOOD PRESSURE: 135 MMHG | TEMPERATURE: 98.8 F | RESPIRATION RATE: 18 BRPM | HEART RATE: 75 BPM | OXYGEN SATURATION: 96 % | DIASTOLIC BLOOD PRESSURE: 71 MMHG

## 2023-08-04 DIAGNOSIS — C16.0 GE JUNCTION CARCINOMA (HCC): Primary | ICD-10-CM

## 2023-08-04 LAB — HBV CORE AB SERPL QL IA: NEGATIVE

## 2023-08-04 PROCEDURE — 2580000003 HC RX 258: Performed by: NURSE PRACTITIONER

## 2023-08-04 PROCEDURE — 2580000003 HC RX 258: Performed by: INTERNAL MEDICINE

## 2023-08-04 PROCEDURE — 96360 HYDRATION IV INFUSION INIT: CPT

## 2023-08-04 RX ORDER — GUAIFENESIN AND CODEINE PHOSPHATE 100; 10 MG/5ML; MG/5ML
SOLUTION ORAL
COMMUNITY
Start: 2016-04-15 | End: 2023-08-30

## 2023-08-04 RX ORDER — SILDENAFIL CITRATE 20 MG/1
TABLET ORAL
COMMUNITY
Start: 2023-05-15 | End: 2023-08-30

## 2023-08-04 RX ORDER — GLIPIZIDE 5 MG/1
5 TABLET ORAL
COMMUNITY
End: 2023-08-30

## 2023-08-04 RX ORDER — ALBUTEROL SULFATE 90 UG/1
AEROSOL, METERED RESPIRATORY (INHALATION)
COMMUNITY
Start: 2016-04-14 | End: 2023-08-30

## 2023-08-04 RX ORDER — 0.9 % SODIUM CHLORIDE 0.9 %
1000 INTRAVENOUS SOLUTION INTRAVENOUS ONCE
Status: COMPLETED | OUTPATIENT
Start: 2023-08-04 | End: 2023-08-04

## 2023-08-04 RX ORDER — CLINDAMYCIN HYDROCHLORIDE 300 MG/1
900 CAPSULE ORAL ONCE
COMMUNITY
Start: 2023-01-25

## 2023-08-04 RX ORDER — TERAZOSIN 2 MG/1
CAPSULE ORAL
COMMUNITY
Start: 2016-04-21

## 2023-08-04 RX ORDER — ONDANSETRON HYDROCHLORIDE 8 MG/1
TABLET, FILM COATED ORAL
COMMUNITY
Start: 2023-07-20

## 2023-08-04 RX ORDER — SODIUM CHLORIDE 0.9 % (FLUSH) 0.9 %
5-40 SYRINGE (ML) INJECTION PRN
Status: DISCONTINUED | OUTPATIENT
Start: 2023-08-04 | End: 2023-08-05 | Stop reason: HOSPADM

## 2023-08-04 RX ORDER — CHLORAL HYDRATE 500 MG
CAPSULE ORAL
COMMUNITY

## 2023-08-04 RX ORDER — LOSARTAN POTASSIUM 50 MG/1
50 TABLET ORAL DAILY
COMMUNITY
End: 2023-08-30

## 2023-08-04 RX ADMIN — SODIUM CHLORIDE, PRESERVATIVE FREE 20 ML: 5 INJECTION INTRAVENOUS at 17:10

## 2023-08-04 RX ADMIN — SODIUM CHLORIDE 1000 ML: 9 INJECTION, SOLUTION INTRAVENOUS at 16:00

## 2023-08-08 RX ORDER — ALBUTEROL SULFATE 90 UG/1
4 AEROSOL, METERED RESPIRATORY (INHALATION) PRN
Status: CANCELLED | OUTPATIENT
Start: 2023-08-16

## 2023-08-08 RX ORDER — DIPHENHYDRAMINE HYDROCHLORIDE 50 MG/ML
50 INJECTION INTRAMUSCULAR; INTRAVENOUS
Status: CANCELLED | OUTPATIENT
Start: 2023-08-16

## 2023-08-08 RX ORDER — SODIUM CHLORIDE 9 MG/ML
5-250 INJECTION, SOLUTION INTRAVENOUS PRN
Status: CANCELLED | OUTPATIENT
Start: 2023-08-16

## 2023-08-08 RX ORDER — ONDANSETRON 2 MG/ML
8 INJECTION INTRAMUSCULAR; INTRAVENOUS
Status: CANCELLED | OUTPATIENT
Start: 2023-08-16

## 2023-08-08 RX ORDER — ACETAMINOPHEN 325 MG/1
650 TABLET ORAL
Status: CANCELLED | OUTPATIENT
Start: 2023-08-16

## 2023-08-08 RX ORDER — 0.9 % SODIUM CHLORIDE 0.9 %
1000 INTRAVENOUS SOLUTION INTRAVENOUS ONCE
Status: CANCELLED
Start: 2023-08-18 | End: 2023-08-18

## 2023-08-08 RX ORDER — SODIUM CHLORIDE 9 MG/ML
5-250 INJECTION, SOLUTION INTRAVENOUS PRN
Status: CANCELLED | OUTPATIENT
Start: 2023-08-18

## 2023-08-08 RX ORDER — HEPARIN 100 UNIT/ML
500 SYRINGE INTRAVENOUS PRN
Status: CANCELLED | OUTPATIENT
Start: 2023-08-18

## 2023-08-08 RX ORDER — EPINEPHRINE 1 MG/ML
0.3 INJECTION, SOLUTION, CONCENTRATE INTRAVENOUS PRN
Status: CANCELLED | OUTPATIENT
Start: 2023-08-16

## 2023-08-08 RX ORDER — MEPERIDINE HYDROCHLORIDE 25 MG/ML
12.5 INJECTION INTRAMUSCULAR; INTRAVENOUS; SUBCUTANEOUS PRN
Status: CANCELLED | OUTPATIENT
Start: 2023-08-16

## 2023-08-08 RX ORDER — SODIUM CHLORIDE 9 MG/ML
INJECTION, SOLUTION INTRAVENOUS CONTINUOUS
Status: CANCELLED | OUTPATIENT
Start: 2023-08-16

## 2023-08-08 RX ORDER — SODIUM CHLORIDE 0.9 % (FLUSH) 0.9 %
5-40 SYRINGE (ML) INJECTION PRN
Status: CANCELLED | OUTPATIENT
Start: 2023-08-18

## 2023-08-08 RX ORDER — HEPARIN 100 UNIT/ML
500 SYRINGE INTRAVENOUS PRN
Status: CANCELLED | OUTPATIENT
Start: 2023-08-16

## 2023-08-09 NOTE — PROGRESS NOTES
Spoke with Iris Alex Og over the phone. Explained that due to the continued weight loss we are at a point of potentially needing a feeding tube. He was hoping that we would be able to shrink the tumor for him to not need a tube, but he understands the overall importance of nutrition and stopping weight loss. So far since diagnosis he has lost about 50 pounds. Today he is back at work and had some fatigue and nausea 4/5 days post chemo but was overall manageable. Will speak to his wife tonight and make a decision. Would prefer if GI would insert the tube, either Dr. Eliana Marques or someone from his group. Will await his decision then move forward with orders if that is the plan.      For Pharmacy Admin Tracking Only    Program: Medical Group  CPA in place:  Yes  Time Spent (min): 30

## 2023-08-14 ENCOUNTER — TELEPHONE (OUTPATIENT)
Age: 63
End: 2023-08-14

## 2023-08-14 DIAGNOSIS — C16.0 GE JUNCTION CARCINOMA (HCC): Primary | ICD-10-CM

## 2023-08-14 DIAGNOSIS — R63.4 WEIGHT LOSS: ICD-10-CM

## 2023-08-14 RX ORDER — NUTRITIONAL SUPPLEMENT 0.04G-1/ML
5 LIQUID (ML) ORAL DAILY
Qty: 150 EACH | Refills: 3
Start: 2023-08-14

## 2023-08-14 NOTE — PROGRESS NOTES
Pt has received one cycle of chemotherapy. Weight continues to drop. Pt agrees to feeding tube placement. GI can not place it. Will need to see gen surgery. Will need following by Shayan Jerez RD, referral already in. VORB FROM DR Marianna Medrano REFERRAL TO GASTROENTEROLOGY-DR BRAXTON.

## 2023-08-14 NOTE — TELEPHONE ENCOUNTER
Chuck at [address] [place], [facility] Connor Herrera   W: [phone]  F: [phone]    Medical Nutrition Therapy  Nutrition Encounter:     Called and spoke with patient. Increased difficulty swallowing. Has lost an additional 10# in a week. He did get some relief Sunday morning and was able to eat a little bit of scrambled eggs, although it took him 2 hours to eat 1 egg. Attempting broth and protein shakes but unable to meet nutrition needs. Given location of tumor, will plan for feeding tube. Patient will need Jtube placement. Surgery to place. Patient is diabetic. Diagnosis: Metastatic GEJ carcinoma     Barium swallow esophogram on 5/12/23 showed: There is narrowing of the distal esophagus just above the hernia which demonstrates relatively smooth margins and may represent a benign stricture. 12 mm barium tablet was retained in this region. PET scan showed GE junction mass. Treatment course: mFOLFOX + nivolumab as palliative therapy     Ht Readings from Last 1 Encounters:   08/02/23 5' 10\" (1.778 m)       Wt Readings from Last 5 Encounters:   08/04/23 210 lb 1.6 oz (95.3 kg)   08/02/23 210 lb 5.1 oz (95.4 kg)   08/02/23 210 lb 6.4 oz (95.4 kg)   07/20/23 210 lb (95.3 kg)   07/19/23 219 lb (99.3 kg)       Estimated Nutrition Needs:   Calorie Range: 2400-2875kcal/day      Protein Range: 95-114g/day     Fluid Needs: 2000ml    Plan:  - Schedule Jtube placement with Surgery.   - Once Jtube placed, begin tube feeds.     - Goal: Nutren 2.0 @ 105ml/hr x 12 hours. Flush 150ml q3 hours while feeds are running. Flush additional 100ml water 4 times during the day. - This will provide 2500kcal, 105g protein and 865ml free water. Water flushes to provide additional 1L.          Signed By: Sue Rose, [facility] [facility]      Jordan Abernathy  1960  [address] [address]      Extended Emergency Contact

## 2023-08-15 ENCOUNTER — HOSPITAL ENCOUNTER (OUTPATIENT)
Facility: HOSPITAL | Age: 63
Setting detail: INFUSION SERIES
End: 2023-08-15
Payer: COMMERCIAL

## 2023-08-15 ENCOUNTER — OFFICE VISIT (OUTPATIENT)
Age: 63
End: 2023-08-15
Payer: COMMERCIAL

## 2023-08-15 VITALS
TEMPERATURE: 97.3 F | OXYGEN SATURATION: 95 % | HEIGHT: 70 IN | SYSTOLIC BLOOD PRESSURE: 134 MMHG | WEIGHT: 222.4 LBS | RESPIRATION RATE: 20 BRPM | DIASTOLIC BLOOD PRESSURE: 72 MMHG | HEART RATE: 60 BPM | BODY MASS INDEX: 31.84 KG/M2

## 2023-08-15 DIAGNOSIS — C16.0 GE JUNCTION CARCINOMA (HCC): Primary | ICD-10-CM

## 2023-08-15 PROCEDURE — 3075F SYST BP GE 130 - 139MM HG: CPT | Performed by: SURGERY

## 2023-08-15 PROCEDURE — 99243 OFF/OP CNSLTJ NEW/EST LOW 30: CPT | Performed by: SURGERY

## 2023-08-15 PROCEDURE — 3078F DIAST BP <80 MM HG: CPT | Performed by: SURGERY

## 2023-08-15 NOTE — PROGRESS NOTES
Identified pt with two pt identifiers(name and ). Reviewed record in preparation for visit and have obtained necessary documentation. All patient medications has been reviewed. Chief Complaint   Patient presents with    New Patient     Seen at the request of Dr. Emily salvador for feed tube jejunostomy       Health Maintenance Due   Topic    COVID-19 Vaccine (1)    Pneumococcal 65+ years Vaccine (1 - PCV)    Depression Screen     HIV screen     Hepatitis C screen     DTaP/Tdap/Td vaccine (1 - Tdap)    Shingles vaccine (1 of 2)    Diabetes screen     Lipids     Prostate Specific Antigen (PSA) Screening or Monitoring     Colorectal Cancer Screen     Flu vaccine (1)       [unfilled]    4. Have you been to the ER, urgent care clinic since your last visit? Hospitalized since your last visit?no    5. Have you seen or consulted any other health care providers outside of the [address] [place] [address] since your last visit? Include any pap smears or colon screening. no      Patient is accompanied by wife I have received verbal consent from Chris Castaneda to discuss any/all medical information while they are present in the room.

## 2023-08-16 ENCOUNTER — OFFICE VISIT (OUTPATIENT)
Age: 63
End: 2023-08-16
Payer: COMMERCIAL

## 2023-08-16 ENCOUNTER — HOSPITAL ENCOUNTER (OUTPATIENT)
Facility: HOSPITAL | Age: 63
Setting detail: INFUSION SERIES
End: 2023-08-16
Payer: COMMERCIAL

## 2023-08-16 ENCOUNTER — TELEPHONE (OUTPATIENT)
Age: 63
End: 2023-08-16

## 2023-08-16 VITALS
WEIGHT: 209.88 LBS | BODY MASS INDEX: 30.05 KG/M2 | TEMPERATURE: 98 F | HEIGHT: 70 IN | HEART RATE: 77 BPM | RESPIRATION RATE: 18 BRPM | DIASTOLIC BLOOD PRESSURE: 76 MMHG | OXYGEN SATURATION: 99 % | SYSTOLIC BLOOD PRESSURE: 129 MMHG

## 2023-08-16 VITALS
HEART RATE: 57 BPM | OXYGEN SATURATION: 99 % | DIASTOLIC BLOOD PRESSURE: 80 MMHG | SYSTOLIC BLOOD PRESSURE: 150 MMHG | TEMPERATURE: 98 F | HEIGHT: 70 IN | WEIGHT: 209.8 LBS | RESPIRATION RATE: 18 BRPM | BODY MASS INDEX: 30.03 KG/M2

## 2023-08-16 DIAGNOSIS — C16.0 GE JUNCTION CARCINOMA (HCC): Primary | ICD-10-CM

## 2023-08-16 DIAGNOSIS — Z51.11 ENCOUNTER FOR ANTINEOPLASTIC CHEMOTHERAPY AND IMMUNOTHERAPY: ICD-10-CM

## 2023-08-16 DIAGNOSIS — Z51.12 ENCOUNTER FOR ANTINEOPLASTIC CHEMOTHERAPY AND IMMUNOTHERAPY: ICD-10-CM

## 2023-08-16 DIAGNOSIS — C77.2 MALIGNANT NEOPLASM METASTATIC TO INTRA-ABDOMINAL LYMPH NODE (HCC): ICD-10-CM

## 2023-08-16 DIAGNOSIS — E44.0 PROTEIN-CALORIE MALNUTRITION, MODERATE (HCC): ICD-10-CM

## 2023-08-16 LAB
ALBUMIN SERPL-MCNC: 3.5 G/DL (ref 3.5–5)
ALBUMIN/GLOB SERPL: 1 (ref 1.1–2.2)
ALP SERPL-CCNC: 165 U/L (ref 45–117)
ALT SERPL-CCNC: 21 U/L (ref 12–78)
ANION GAP SERPL CALC-SCNC: 4 MMOL/L (ref 5–15)
AST SERPL-CCNC: 16 U/L (ref 15–37)
BASOPHILS # BLD: 0 K/UL (ref 0–0.1)
BASOPHILS NFR BLD: 1 % (ref 0–1)
BILIRUB SERPL-MCNC: 0.4 MG/DL (ref 0.2–1)
BUN SERPL-MCNC: 7 MG/DL (ref 6–20)
BUN/CREAT SERPL: 9 (ref 12–20)
CALCIUM SERPL-MCNC: 9 MG/DL (ref 8.5–10.1)
CHLORIDE SERPL-SCNC: 108 MMOL/L (ref 97–108)
CO2 SERPL-SCNC: 27 MMOL/L (ref 21–32)
CREAT SERPL-MCNC: 0.79 MG/DL (ref 0.7–1.3)
DIFFERENTIAL METHOD BLD: ABNORMAL
EOSINOPHIL # BLD: 0.1 K/UL (ref 0–0.4)
EOSINOPHIL NFR BLD: 2 % (ref 0–7)
ERYTHROCYTE [DISTWIDTH] IN BLOOD BY AUTOMATED COUNT: 13.2 % (ref 11.5–14.5)
GLOBULIN SER CALC-MCNC: 3.5 G/DL (ref 2–4)
GLUCOSE SERPL-MCNC: 208 MG/DL (ref 65–100)
HCT VFR BLD AUTO: 40.9 % (ref 36.6–50.3)
HGB BLD-MCNC: 14.1 G/DL (ref 12.1–17)
IMM GRANULOCYTES # BLD AUTO: 0 K/UL (ref 0–0.04)
IMM GRANULOCYTES NFR BLD AUTO: 1 % (ref 0–0.5)
LYMPHOCYTES # BLD: 0.9 K/UL (ref 0.8–3.5)
LYMPHOCYTES NFR BLD: 20 % (ref 12–49)
MCH RBC QN AUTO: 29.9 PG (ref 26–34)
MCHC RBC AUTO-ENTMCNC: 34.5 G/DL (ref 30–36.5)
MCV RBC AUTO: 86.7 FL (ref 80–99)
MONOCYTES # BLD: 0.6 K/UL (ref 0–1)
MONOCYTES NFR BLD: 13 % (ref 5–13)
NEUTS SEG # BLD: 2.7 K/UL (ref 1.8–8)
NEUTS SEG NFR BLD: 63 % (ref 32–75)
NRBC # BLD: 0 K/UL (ref 0–0.01)
NRBC BLD-RTO: 0 PER 100 WBC
PLATELET # BLD AUTO: 190 K/UL (ref 150–400)
PMV BLD AUTO: 8.5 FL (ref 8.9–12.9)
POTASSIUM SERPL-SCNC: 3.8 MMOL/L (ref 3.5–5.1)
PROT SERPL-MCNC: 7 G/DL (ref 6.4–8.2)
RBC # BLD AUTO: 4.72 M/UL (ref 4.1–5.7)
SODIUM SERPL-SCNC: 139 MMOL/L (ref 136–145)
WBC # BLD AUTO: 4.3 K/UL (ref 4.1–11.1)

## 2023-08-16 PROCEDURE — 96368 THER/DIAG CONCURRENT INF: CPT

## 2023-08-16 PROCEDURE — 1123F ACP DISCUSS/DSCN MKR DOCD: CPT | Performed by: INTERNAL MEDICINE

## 2023-08-16 PROCEDURE — 2580000003 HC RX 258: Performed by: INTERNAL MEDICINE

## 2023-08-16 PROCEDURE — 96416 CHEMO PROLONG INFUSE W/PUMP: CPT

## 2023-08-16 PROCEDURE — 80053 COMPREHEN METABOLIC PANEL: CPT

## 2023-08-16 PROCEDURE — 96413 CHEMO IV INFUSION 1 HR: CPT

## 2023-08-16 PROCEDURE — 96417 CHEMO IV INFUS EACH ADDL SEQ: CPT

## 2023-08-16 PROCEDURE — 3074F SYST BP LT 130 MM HG: CPT | Performed by: INTERNAL MEDICINE

## 2023-08-16 PROCEDURE — 36415 COLL VENOUS BLD VENIPUNCTURE: CPT

## 2023-08-16 PROCEDURE — 96411 CHEMO IV PUSH ADDL DRUG: CPT

## 2023-08-16 PROCEDURE — 85025 COMPLETE CBC W/AUTO DIFF WBC: CPT

## 2023-08-16 PROCEDURE — 96375 TX/PRO/DX INJ NEW DRUG ADDON: CPT

## 2023-08-16 PROCEDURE — 96415 CHEMO IV INFUSION ADDL HR: CPT

## 2023-08-16 PROCEDURE — 6360000002 HC RX W HCPCS: Performed by: INTERNAL MEDICINE

## 2023-08-16 PROCEDURE — 99215 OFFICE O/P EST HI 40 MIN: CPT | Performed by: INTERNAL MEDICINE

## 2023-08-16 PROCEDURE — 3078F DIAST BP <80 MM HG: CPT | Performed by: INTERNAL MEDICINE

## 2023-08-16 PROCEDURE — 96366 THER/PROPH/DIAG IV INF ADDON: CPT

## 2023-08-16 RX ORDER — SODIUM CHLORIDE 0.9 % (FLUSH) 0.9 %
5-40 SYRINGE (ML) INJECTION PRN
Status: DISCONTINUED | OUTPATIENT
Start: 2023-08-16 | End: 2023-08-17 | Stop reason: HOSPADM

## 2023-08-16 RX ORDER — DEXTROSE MONOHYDRATE 50 MG/ML
5-250 INJECTION, SOLUTION INTRAVENOUS PRN
Status: DISCONTINUED | OUTPATIENT
Start: 2023-08-16 | End: 2023-08-17 | Stop reason: HOSPADM

## 2023-08-16 RX ORDER — FLUOROURACIL 50 MG/ML
400 INJECTION, SOLUTION INTRAVENOUS ONCE
Status: COMPLETED | OUTPATIENT
Start: 2023-08-16 | End: 2023-08-16

## 2023-08-16 RX ORDER — PALONOSETRON 0.05 MG/ML
0.25 INJECTION, SOLUTION INTRAVENOUS ONCE
Status: COMPLETED | OUTPATIENT
Start: 2023-08-16 | End: 2023-08-16

## 2023-08-16 RX ORDER — SODIUM CHLORIDE 9 MG/ML
5-250 INJECTION, SOLUTION INTRAVENOUS PRN
Status: DISCONTINUED | OUTPATIENT
Start: 2023-08-16 | End: 2023-08-17 | Stop reason: HOSPADM

## 2023-08-16 RX ADMIN — DEXAMETHASONE SODIUM PHOSPHATE 12 MG: 4 INJECTION, SOLUTION INTRAMUSCULAR; INTRAVENOUS at 11:25

## 2023-08-16 RX ADMIN — SODIUM CHLORIDE 240 MG: 9 INJECTION, SOLUTION INTRAVENOUS at 12:25

## 2023-08-16 RX ADMIN — FLUOROURACIL 5000 MG: 50 INJECTION, SOLUTION INTRAVENOUS at 16:00

## 2023-08-16 RX ADMIN — FLUOROURACIL 875 MG: 50 INJECTION, SOLUTION INTRAVENOUS at 15:49

## 2023-08-16 RX ADMIN — SODIUM CHLORIDE 25 ML/HR: 9 INJECTION, SOLUTION INTRAVENOUS at 11:15

## 2023-08-16 RX ADMIN — OXALIPLATIN 185 MG: 5 INJECTION, SOLUTION INTRAVENOUS at 13:30

## 2023-08-16 RX ADMIN — DEXTROSE MONOHYDRATE 25 ML/HR: 50 INJECTION, SOLUTION INTRAVENOUS at 13:20

## 2023-08-16 RX ADMIN — PALONOSETRON 0.25 MG: 0.05 INJECTION, SOLUTION INTRAVENOUS at 11:20

## 2023-08-16 RX ADMIN — LEUCOVORIN CALCIUM 200 MG: 100 INJECTION, POWDER, LYOPHILIZED, FOR SUSPENSION INTRAMUSCULAR; INTRAVENOUS at 13:30

## 2023-08-16 NOTE — PROGRESS NOTES
Anais Hunter is a 70 y.o. male  Pt had nausea over the weekend. He did loose about 8 lbs but has since gained it back. Pt has sporadic left jaw pain when trying to eat but did not stop him from eating. He has upcoming dentist appt. Fax #[phone] Dr [facility] - needs ok for cleaning. 1. Have you been to the ER, urgent care clinic since your last visit? Hospitalized since your last visit?  no    2. Have you seen or consulted any other health care providers outside of the [address] [place] [address] Avenue since your last visit? Include any pap smears or colon screening.  no

## 2023-08-16 NOTE — PROGRESS NOTES
Chuck  at [facility], [address], [facility], [address] [place] [address]  [phone]       Hematology Oncology Progress Note      Patient: Geovany Gonzales MRN: [id number]  SSN: [id number]    YOB: 1960  Age: 70 y.o. 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 Treatment:     Palliative therapy  1mFOLFOX + Nivolumab cycle 2 day 1     Subjective:      Geovany Gonzales is a 70 y.o. male who I am seeing in consultation for a new diagnosis of metastatic GEJ carcinoma. He has been experiencing some difficulty in swallowing for a few months. He has lost over 40 lbs and he is fatigued. An EGD revealed GEJ mass which was biopsied and came back as GEJ carcinoma. CT and a subsequent PET shows enlarged left supraclavicular and RP efrem disease. He has seen Ehsan Rowland at [facility]. He comes in to seek for a second opinion. Interval History:     Mr. Abhay Zhang is here today for his second cycle of  mFOLFOX + Nivolumab. He is here with his wife. Overall he did well except for a few days of nausea and taste changes. He reports losing 8 lbs but has gained it back. He would like to hold off on the feeding tube as long as he is able to swallow. Review of Systems:  Constitutional: negative  Eyes: negative  Ears, Nose, Mouth, Throat, and Face: negative  Respiratory: negative  Cardiovascular: negative  Gastrointestinal: negative  Genitourinary:negative  Integument/Breast: negative  Hematologic/Lymphatic: negative  Musculoskeletal:negative  Neurological: negative    Review of systems was reviewed and updated as needed on 08/16/23.       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

## 2023-08-16 NOTE — TELEPHONE ENCOUNTER
Chuck at [address] [place], [facility] Connor Herrera   W: [phone]  F: [phone]    Medical Nutrition Therapy  Nutrition Encounter:     Called patient. He met with Dr. Dillon Sandhu yesterday and would like to hold off on Jtube placement. Asked that he come by the office and  a case of Boost VHC and prosource XtraCal in effort to meet nutrition needs. The prosource Sonja Bora can be added to puddings/yogurts/soups, etc.  Patient verbalized understanding.       Signed By: [employer], [address] [place] [address]

## 2023-08-18 ENCOUNTER — APPOINTMENT (OUTPATIENT)
Facility: HOSPITAL | Age: 63
End: 2023-08-18
Payer: COMMERCIAL

## 2023-08-18 ENCOUNTER — HOSPITAL ENCOUNTER (OUTPATIENT)
Facility: HOSPITAL | Age: 63
Setting detail: INFUSION SERIES
End: 2023-08-18
Payer: COMMERCIAL

## 2023-08-18 VITALS
SYSTOLIC BLOOD PRESSURE: 141 MMHG | TEMPERATURE: 98.9 F | OXYGEN SATURATION: 96 % | DIASTOLIC BLOOD PRESSURE: 82 MMHG | RESPIRATION RATE: 18 BRPM | HEART RATE: 57 BPM

## 2023-08-18 DIAGNOSIS — C16.0 GE JUNCTION CARCINOMA (HCC): Primary | ICD-10-CM

## 2023-08-18 PROCEDURE — 96360 HYDRATION IV INFUSION INIT: CPT

## 2023-08-18 PROCEDURE — 2580000003 HC RX 258: Performed by: INTERNAL MEDICINE

## 2023-08-18 RX ORDER — SODIUM CHLORIDE 0.9 % (FLUSH) 0.9 %
5-40 SYRINGE (ML) INJECTION PRN
Status: DISCONTINUED | OUTPATIENT
Start: 2023-08-18 | End: 2023-08-19 | Stop reason: HOSPADM

## 2023-08-18 RX ORDER — 0.9 % SODIUM CHLORIDE 0.9 %
1000 INTRAVENOUS SOLUTION INTRAVENOUS ONCE
Status: COMPLETED | OUTPATIENT
Start: 2023-08-18 | End: 2023-08-18

## 2023-08-18 RX ADMIN — SODIUM CHLORIDE, PRESERVATIVE FREE 10 ML: 5 INJECTION INTRAVENOUS at 16:01

## 2023-08-18 RX ADMIN — SODIUM CHLORIDE 1000 ML: 9 INJECTION, SOLUTION INTRAVENOUS at 16:03

## 2023-08-18 ASSESSMENT — PAIN DESCRIPTION - ORIENTATION: ORIENTATION: RIGHT

## 2023-08-18 ASSESSMENT — PAIN DESCRIPTION - LOCATION: LOCATION: RIB CAGE

## 2023-08-18 ASSESSMENT — PAIN SCALES - GENERAL: PAINLEVEL_OUTOF10: 2

## 2023-08-22 ENCOUNTER — TELEPHONE (OUTPATIENT)
Age: 63
End: 2023-08-22

## 2023-08-22 RX ORDER — DIPHENHYDRAMINE HYDROCHLORIDE 50 MG/ML
50 INJECTION INTRAMUSCULAR; INTRAVENOUS
Status: CANCELLED | OUTPATIENT
Start: 2023-08-30

## 2023-08-22 RX ORDER — HEPARIN 100 UNIT/ML
500 SYRINGE INTRAVENOUS PRN
Status: CANCELLED | OUTPATIENT
Start: 2023-09-01

## 2023-08-22 RX ORDER — EPINEPHRINE 1 MG/ML
0.3 INJECTION, SOLUTION, CONCENTRATE INTRAVENOUS PRN
Status: CANCELLED | OUTPATIENT
Start: 2023-08-30

## 2023-08-22 RX ORDER — ALBUTEROL SULFATE 90 UG/1
4 AEROSOL, METERED RESPIRATORY (INHALATION) PRN
Status: CANCELLED | OUTPATIENT
Start: 2023-08-30

## 2023-08-22 RX ORDER — SODIUM CHLORIDE 9 MG/ML
INJECTION, SOLUTION INTRAVENOUS CONTINUOUS
Status: CANCELLED | OUTPATIENT
Start: 2023-08-30

## 2023-08-22 RX ORDER — ACETAMINOPHEN 325 MG/1
650 TABLET ORAL
Status: CANCELLED | OUTPATIENT
Start: 2023-08-30

## 2023-08-22 RX ORDER — 0.9 % SODIUM CHLORIDE 0.9 %
1000 INTRAVENOUS SOLUTION INTRAVENOUS ONCE
Status: CANCELLED
Start: 2023-09-01 | End: 2023-09-01

## 2023-08-22 RX ORDER — ONDANSETRON 2 MG/ML
8 INJECTION INTRAMUSCULAR; INTRAVENOUS
Status: CANCELLED | OUTPATIENT
Start: 2023-08-30

## 2023-08-22 RX ORDER — SODIUM CHLORIDE 0.9 % (FLUSH) 0.9 %
5-40 SYRINGE (ML) INJECTION PRN
Status: CANCELLED | OUTPATIENT
Start: 2023-09-01

## 2023-08-22 RX ORDER — MEPERIDINE HYDROCHLORIDE 25 MG/ML
12.5 INJECTION INTRAMUSCULAR; INTRAVENOUS; SUBCUTANEOUS PRN
Status: CANCELLED | OUTPATIENT
Start: 2023-08-30

## 2023-08-22 RX ORDER — SODIUM CHLORIDE 9 MG/ML
5-250 INJECTION, SOLUTION INTRAVENOUS PRN
Status: CANCELLED | OUTPATIENT
Start: 2023-09-01

## 2023-08-22 NOTE — TELEPHONE ENCOUNTER
Chuck  at [facility], [address], [facility], [facility] [address]  [address]  1960    Returned call regarding patient message. He can reproduce the pain with movement on the right. When he is sitting still, he is pain free. He feels he may be constipated. he is passing gas. No SOB. Asked that he use a heating pad and take tylenol or advil and see if he has any improvement. If not, we will start with a rib xray.     Yessica Lemus NP  08/22/23

## 2023-08-23 ENCOUNTER — PATIENT MESSAGE (OUTPATIENT)
Age: 63
End: 2023-08-23

## 2023-08-28 NOTE — PROGRESS NOTES
Chris Castaneda is a 70 y.o. male here for treatment for prostate cancer.  -4 lbs since last visit. Says he can eat more like chicken cut up and the chicken and noodles in soup. Broccoli cheddar soup also. Pt states he did develop rash on chest area and was extremely dry and has been using Aloe and is ok now. Also has some random spots pop up. Vision is worse for reading. Pt having pain in right rib since last treatment. Does not feel it when just sitting . Some nausea Saturday and Sunday. He was able to work everyday last week. Did use Senokot when he was constipated. 1. Have you been to the ER, urgent care clinic since your last visit? Hospitalized since your last visit?   no    2. Have you seen or consulted any other health care providers outside of the [address] [place] [address] Avenue since your last visit? Include any pap smears or colon screening.   Dentist for cleaning - took antibiotic

## 2023-08-29 NOTE — PROGRESS NOTES
Chuck  at [facility], [address], [facility], [address] [place] [address]  [phone]       Hematology Oncology Progress Note      Patient: Noel Santa MRN: [id number]  SSN: [id number]    YOB: 1960  Age: 70 y.o. 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 Treatment:     Palliative therapy  mFOLFOX + Nivolumab cycle 3 day 1     Subjective:      Noel Santa is a 70 y.o. male who I am seeing in consultation for a new diagnosis of metastatic GEJ carcinoma. He has been experiencing some difficulty in swallowing for a few months. He has lost over 40 lbs and he is fatigued. An EGD revealed GEJ mass which was biopsied and came back as GEJ carcinoma. CT and a subsequent PET shows enlarged left supraclavicular and RP efrem disease. He has seen Nestor Carrera at [facility]. He decided to receive systemic therapy with us. Interval History:     Mr. Sandhya Bae is here today for his third cycle of  mFOLFOX + Nivolumab. He reports pain in his right rib which is reproduced with movement. He is able to eat more meat since his last treatment. Review of Systems:  Constitutional: negative  Eyes: negative  Ears, Nose, Mouth, Throat, and Face: negative  Respiratory: negative  Cardiovascular: negative  Gastrointestinal: negative  Genitourinary:negative  Integument/Breast: negative  Hematologic/Lymphatic: negative  Musculoskeletal: right rib pain  Neurological: negative    Review of systems was reviewed and updated as needed on 08/31/23.       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

## 2023-08-30 ENCOUNTER — HOSPITAL ENCOUNTER (OUTPATIENT)
Facility: HOSPITAL | Age: 63
Setting detail: INFUSION SERIES
End: 2023-08-30
Payer: COMMERCIAL

## 2023-08-30 ENCOUNTER — OFFICE VISIT (OUTPATIENT)
Age: 63
End: 2023-08-30
Payer: COMMERCIAL

## 2023-08-30 VITALS
SYSTOLIC BLOOD PRESSURE: 143 MMHG | OXYGEN SATURATION: 97 % | DIASTOLIC BLOOD PRESSURE: 84 MMHG | HEART RATE: 57 BPM | BODY MASS INDEX: 29.48 KG/M2 | HEIGHT: 70 IN | RESPIRATION RATE: 16 BRPM | WEIGHT: 205.9 LBS | TEMPERATURE: 98.1 F

## 2023-08-30 VITALS
DIASTOLIC BLOOD PRESSURE: 73 MMHG | RESPIRATION RATE: 16 BRPM | HEIGHT: 70 IN | TEMPERATURE: 98.1 F | OXYGEN SATURATION: 97 % | WEIGHT: 205.91 LBS | HEART RATE: 72 BPM | SYSTOLIC BLOOD PRESSURE: 124 MMHG | BODY MASS INDEX: 29.48 KG/M2

## 2023-08-30 DIAGNOSIS — C77.2 MALIGNANT NEOPLASM METASTATIC TO INTRA-ABDOMINAL LYMPH NODE (HCC): ICD-10-CM

## 2023-08-30 DIAGNOSIS — C16.0 GE JUNCTION CARCINOMA (HCC): Primary | ICD-10-CM

## 2023-08-30 DIAGNOSIS — Z51.11 ENCOUNTER FOR ANTINEOPLASTIC CHEMOTHERAPY AND IMMUNOTHERAPY: ICD-10-CM

## 2023-08-30 DIAGNOSIS — Z51.12 ENCOUNTER FOR ANTINEOPLASTIC CHEMOTHERAPY AND IMMUNOTHERAPY: ICD-10-CM

## 2023-08-30 LAB
ALBUMIN SERPL-MCNC: 3.5 G/DL (ref 3.5–5)
ALBUMIN/GLOB SERPL: 0.9 (ref 1.1–2.2)
ALP SERPL-CCNC: 144 U/L (ref 45–117)
ALT SERPL-CCNC: 28 U/L (ref 12–78)
ANION GAP SERPL CALC-SCNC: 4 MMOL/L (ref 5–15)
AST SERPL-CCNC: 11 U/L (ref 15–37)
BASOPHILS # BLD: 0.1 K/UL (ref 0–0.1)
BASOPHILS NFR BLD: 1 % (ref 0–1)
BILIRUB SERPL-MCNC: 0.5 MG/DL (ref 0.2–1)
BUN SERPL-MCNC: 13 MG/DL (ref 6–20)
BUN/CREAT SERPL: 18 (ref 12–20)
CALCIUM SERPL-MCNC: 9.2 MG/DL (ref 8.5–10.1)
CHLORIDE SERPL-SCNC: 110 MMOL/L (ref 97–108)
CO2 SERPL-SCNC: 26 MMOL/L (ref 21–32)
CREAT SERPL-MCNC: 0.74 MG/DL (ref 0.7–1.3)
DIFFERENTIAL METHOD BLD: ABNORMAL
EOSINOPHIL # BLD: 0.2 K/UL (ref 0–0.4)
EOSINOPHIL NFR BLD: 3 % (ref 0–7)
ERYTHROCYTE [DISTWIDTH] IN BLOOD BY AUTOMATED COUNT: 14.6 % (ref 11.5–14.5)
GLOBULIN SER CALC-MCNC: 3.9 G/DL (ref 2–4)
GLUCOSE SERPL-MCNC: 201 MG/DL (ref 65–100)
HCT VFR BLD AUTO: 43.6 % (ref 36.6–50.3)
HGB BLD-MCNC: 14.7 G/DL (ref 12.1–17)
IMM GRANULOCYTES # BLD AUTO: 0 K/UL (ref 0–0.04)
IMM GRANULOCYTES NFR BLD AUTO: 0 % (ref 0–0.5)
LYMPHOCYTES # BLD: 1.1 K/UL (ref 0.8–3.5)
LYMPHOCYTES NFR BLD: 22 % (ref 12–49)
MCH RBC QN AUTO: 30.1 PG (ref 26–34)
MCHC RBC AUTO-ENTMCNC: 33.7 G/DL (ref 30–36.5)
MCV RBC AUTO: 89.2 FL (ref 80–99)
MONOCYTES # BLD: 0.7 K/UL (ref 0–1)
MONOCYTES NFR BLD: 15 % (ref 5–13)
NEUTS SEG # BLD: 2.7 K/UL (ref 1.8–8)
NEUTS SEG NFR BLD: 59 % (ref 32–75)
NRBC # BLD: 0.03 K/UL (ref 0–0.01)
NRBC BLD-RTO: 0.6 PER 100 WBC
PLATELET # BLD AUTO: 160 K/UL (ref 150–400)
PMV BLD AUTO: 8.9 FL (ref 8.9–12.9)
POTASSIUM SERPL-SCNC: 3.8 MMOL/L (ref 3.5–5.1)
PROT SERPL-MCNC: 7.4 G/DL (ref 6.4–8.2)
RBC # BLD AUTO: 4.89 M/UL (ref 4.1–5.7)
SODIUM SERPL-SCNC: 140 MMOL/L (ref 136–145)
WBC # BLD AUTO: 4.7 K/UL (ref 4.1–11.1)

## 2023-08-30 PROCEDURE — 96366 THER/PROPH/DIAG IV INF ADDON: CPT

## 2023-08-30 PROCEDURE — 2580000003 HC RX 258: Performed by: INTERNAL MEDICINE

## 2023-08-30 PROCEDURE — G0498 CHEMO EXTEND IV INFUS W/PUMP: HCPCS

## 2023-08-30 PROCEDURE — 80053 COMPREHEN METABOLIC PANEL: CPT

## 2023-08-30 PROCEDURE — 85025 COMPLETE CBC W/AUTO DIFF WBC: CPT

## 2023-08-30 PROCEDURE — 96415 CHEMO IV INFUSION ADDL HR: CPT

## 2023-08-30 PROCEDURE — 96413 CHEMO IV INFUSION 1 HR: CPT

## 2023-08-30 PROCEDURE — 99215 OFFICE O/P EST HI 40 MIN: CPT | Performed by: INTERNAL MEDICINE

## 2023-08-30 PROCEDURE — 96416 CHEMO PROLONG INFUSE W/PUMP: CPT

## 2023-08-30 PROCEDURE — 96411 CHEMO IV PUSH ADDL DRUG: CPT

## 2023-08-30 PROCEDURE — 36415 COLL VENOUS BLD VENIPUNCTURE: CPT

## 2023-08-30 PROCEDURE — 1123F ACP DISCUSS/DSCN MKR DOCD: CPT | Performed by: INTERNAL MEDICINE

## 2023-08-30 PROCEDURE — 3074F SYST BP LT 130 MM HG: CPT | Performed by: INTERNAL MEDICINE

## 2023-08-30 PROCEDURE — 96375 TX/PRO/DX INJ NEW DRUG ADDON: CPT

## 2023-08-30 PROCEDURE — 96368 THER/DIAG CONCURRENT INF: CPT

## 2023-08-30 PROCEDURE — 96417 CHEMO IV INFUS EACH ADDL SEQ: CPT

## 2023-08-30 PROCEDURE — 6360000002 HC RX W HCPCS: Performed by: INTERNAL MEDICINE

## 2023-08-30 PROCEDURE — 3078F DIAST BP <80 MM HG: CPT | Performed by: INTERNAL MEDICINE

## 2023-08-30 RX ORDER — PALONOSETRON 0.05 MG/ML
0.25 INJECTION, SOLUTION INTRAVENOUS ONCE
Status: COMPLETED | OUTPATIENT
Start: 2023-08-30 | End: 2023-08-30

## 2023-08-30 RX ORDER — HEPARIN 100 UNIT/ML
500 SYRINGE INTRAVENOUS PRN
Status: DISCONTINUED | OUTPATIENT
Start: 2023-08-30 | End: 2023-08-31 | Stop reason: HOSPADM

## 2023-08-30 RX ORDER — SODIUM CHLORIDE 0.9 % (FLUSH) 0.9 %
5-40 SYRINGE (ML) INJECTION PRN
Status: DISCONTINUED | OUTPATIENT
Start: 2023-08-30 | End: 2023-08-31 | Stop reason: HOSPADM

## 2023-08-30 RX ORDER — SODIUM CHLORIDE 9 MG/ML
5-250 INJECTION, SOLUTION INTRAVENOUS PRN
Status: DISCONTINUED | OUTPATIENT
Start: 2023-08-30 | End: 2023-08-31 | Stop reason: HOSPADM

## 2023-08-30 RX ORDER — DEXTROSE MONOHYDRATE 50 MG/ML
5-250 INJECTION, SOLUTION INTRAVENOUS PRN
Status: DISCONTINUED | OUTPATIENT
Start: 2023-08-30 | End: 2023-08-31 | Stop reason: HOSPADM

## 2023-08-30 RX ORDER — FLUOROURACIL 50 MG/ML
400 INJECTION, SOLUTION INTRAVENOUS ONCE
Status: COMPLETED | OUTPATIENT
Start: 2023-08-30 | End: 2023-08-30

## 2023-08-30 RX ADMIN — LEUCOVORIN CALCIUM 200 MG: 100 INJECTION, POWDER, LYOPHILIZED, FOR SUSPENSION INTRAMUSCULAR; INTRAVENOUS at 12:00

## 2023-08-30 RX ADMIN — DEXAMETHASONE SODIUM PHOSPHATE 12 MG: 4 INJECTION, SOLUTION INTRAMUSCULAR; INTRAVENOUS at 10:45

## 2023-08-30 RX ADMIN — DEXTROSE MONOHYDRATE 25 ML/HR: 50 INJECTION, SOLUTION INTRAVENOUS at 11:57

## 2023-08-30 RX ADMIN — FLUOROURACIL 875 MG: 50 INJECTION, SOLUTION INTRAVENOUS at 14:20

## 2023-08-30 RX ADMIN — PALONOSETRON 0.25 MG: 0.05 INJECTION, SOLUTION INTRAVENOUS at 10:43

## 2023-08-30 RX ADMIN — SODIUM CHLORIDE 25 ML/HR: 9 INJECTION, SOLUTION INTRAVENOUS at 10:42

## 2023-08-30 RX ADMIN — OXALIPLATIN 185 MG: 5 INJECTION, SOLUTION INTRAVENOUS at 12:00

## 2023-08-30 RX ADMIN — SODIUM CHLORIDE 240 MG: 9 INJECTION, SOLUTION INTRAVENOUS at 11:10

## 2023-08-30 RX ADMIN — SODIUM CHLORIDE, PRESERVATIVE FREE 10 ML: 5 INJECTION INTRAVENOUS at 09:05

## 2023-08-30 RX ADMIN — FLUOROURACIL 5000 MG: 50 INJECTION, SOLUTION INTRAVENOUS at 14:25

## 2023-08-30 NOTE — PROGRESS NOTES
[facility] Visit Note    Pt arrived to [facility] ambulatory in no acute distress for C3D1 Folfox/Opdivo. Assessment unremarkable except constipation, difficulty swallowing, and fatigue. R chest port accessed without issue and positive blood return noted. Patient to MD office for appt. Labs obtained - CBC w/ diff, CMP  Recent Results (from the past 12 hour(s))   Comprehensive metabolic panel    Collection Time: 08/30/23  9:02 AM   Result Value Ref Range    Sodium 140 136 - 145 mmol/L    Potassium 3.8 3.5 - 5.1 mmol/L    Chloride 110 (H) 97 - 108 mmol/L    CO2 26 21 - 32 mmol/L    Anion Gap 4 (L) 5 - 15 mmol/L    Glucose 201 (H) 65 - 100 mg/dL    BUN 13 6 - 20 MG/DL    Creatinine 0.74 0.70 - 1.30 MG/DL    Bun/Cre Ratio 18 12 - 20      Est, Glom Filt Rate >60 >60 ml/min/1.73m2    Calcium 9.2 8.5 - 10.1 MG/DL    Total Bilirubin 0.5 0.2 - 1.0 MG/DL    ALT 28 12 - 78 U/L    AST 11 (L) 15 - 37 U/L    Alk Phosphatase 144 (H) 45 - 117 U/L    Total Protein 7.4 6.4 - 8.2 g/dL    Albumin 3.5 3.5 - 5.0 g/dL    Globulin 3.9 2.0 - 4.0 g/dL    Albumin/Globulin Ratio 0.9 (L) 1.1 - 2.2     CBC With Auto Differential    Collection Time: 08/30/23  9:02 AM   Result Value Ref Range    WBC 4.7 4.1 - 11.1 K/uL    RBC 4.89 4. 10 - 5.70 M/uL    Hemoglobin 14.7 12.1 - 17.0 g/dL    Hematocrit 43.6 36.6 - 50.3 %    MCV 89.2 80.0 - 99.0 FL    MCH 30.1 26.0 - 34.0 PG    MCHC 33.7 30.0 - 36.5 g/dL    RDW 14.6 (H) 11.5 - 14.5 %    Platelets 686 583 - 873 K/uL    MPV 8.9 8.9 - 12.9 FL    Nucleated RBCs 0.6 (H) 0  WBC    nRBC 0.03 (H) 0.00 - 0.01 K/uL    Neutrophils % 59 32 - 75 %    Lymphocytes % 22 12 - 49 %    Monocytes % 15 (H) 5 - 13 %    Eosinophils % 3 0 - 7 %    Basophils % 1 0 - 1 %    Immature Granulocytes 0 0.0 - 0.5 %    Neutrophils Absolute 2.7 1.8 - 8.0 K/UL    Lymphocytes Absolute 1.1 0.8 - 3.5 K/UL    Monocytes Absolute 0.7 0.0 - 1.0 K/UL    Eosinophils Absolute 0.2 0.0 - 0.4 K/UL    Basophils Absolute 0.1 0.0 - 0.1 K/UL    Absolute Immature Granulocyte 0.0 0.00 - 0.04 K/UL    Differential Type AUTOMATED       Two nurses verified prior to administering:  Drug name  Drug dose  Infusion volume or drug volume when prepared in a syringe  Rate of administration  Route of administration  Expiration dates and/or times  Appearance and physical integrity of the drugs  Rate set on infusion pump, when used  Sequencing of drug administration     The following medications administered:  Medications Administered         0.9 % sodium chloride infusion Admin Date  08/30/2023 Action  New Bag Dose  25 mL/hr Rate  25 mL/hr Route  IntraVENous Administered By  Martine Harry RN        dexamethasone (DECADRON) 12 mg in sodium chloride 0.9% 50 mL IVPB Admin Date  08/30/2023 Action  New Bag Dose  12 mg Rate  200 mL/hr Route  IntraVENous Administered By  Martine Harry RN        dextrose 5 % solution Admin Date  08/30/2023 Action  New Bag Dose  25 mL/hr Rate  25 mL/hr Route  IntraVENous Administered By  Allison Garner RN        fluorouracil (ADRUCIL) 5,000 mg in 100 mL chemo infusion Admin Date  08/30/2023 Action  Given Dose  5,000 mg Rate  2.2 mL/hr Route  IntraVENous Administered By  Martine Harry RN        fluorouracil (ADRUCIL) chemo syringe 875 mg Admin Date  08/30/2023 Action  Given Dose  875 mg Rate   Route  IntraVENous Administered By  Martine Harry RN        leucovorin calcium (WELLCOVORIN) 200 mg in dextrose 5 % 250 mL IVPB Admin Date  08/30/2023 Action  New Bag Dose  200 mg Rate  142.5 mL/hr Route  IntraVENous Administered By  Allison Garner RN        nivolumab (OPDIVO) 240 mg in sodium chloride 0.9 % 100 mL IVPB Admin Date  08/30/2023 Action  New Bag Dose  240 mg Rate  268 mL/hr Route  IntraVENous Administered By  Martine Harry RN        oxaliplatin (ELOXATIN) 185 mg in dextrose 5 % 250 mL chemo IVPB Admin Date  08/30/2023 Action  New Bag Dose  185 mg Rate  156 mL/hr Route  IntraVENous

## 2023-09-01 ENCOUNTER — APPOINTMENT (OUTPATIENT)
Facility: HOSPITAL | Age: 63
End: 2023-09-01
Payer: COMMERCIAL

## 2023-09-01 ENCOUNTER — HOSPITAL ENCOUNTER (OUTPATIENT)
Facility: HOSPITAL | Age: 63
Setting detail: INFUSION SERIES
End: 2023-09-01
Payer: COMMERCIAL

## 2023-09-01 VITALS
TEMPERATURE: 97.8 F | DIASTOLIC BLOOD PRESSURE: 89 MMHG | HEART RATE: 74 BPM | OXYGEN SATURATION: 98 % | SYSTOLIC BLOOD PRESSURE: 156 MMHG | RESPIRATION RATE: 16 BRPM

## 2023-09-01 DIAGNOSIS — C16.0 GE JUNCTION CARCINOMA (HCC): Primary | ICD-10-CM

## 2023-09-01 PROCEDURE — 96360 HYDRATION IV INFUSION INIT: CPT

## 2023-09-01 PROCEDURE — 2580000003 HC RX 258: Performed by: INTERNAL MEDICINE

## 2023-09-01 RX ORDER — SODIUM CHLORIDE 0.9 % (FLUSH) 0.9 %
5-40 SYRINGE (ML) INJECTION PRN
Status: DISCONTINUED | OUTPATIENT
Start: 2023-09-01 | End: 2023-09-02 | Stop reason: HOSPADM

## 2023-09-01 RX ORDER — 0.9 % SODIUM CHLORIDE 0.9 %
1000 INTRAVENOUS SOLUTION INTRAVENOUS ONCE
Status: COMPLETED | OUTPATIENT
Start: 2023-09-01 | End: 2023-09-01

## 2023-09-01 RX ADMIN — SODIUM CHLORIDE 1000 ML: 9 INJECTION, SOLUTION INTRAVENOUS at 16:14

## 2023-09-05 RX ORDER — HEPARIN 100 UNIT/ML
500 SYRINGE INTRAVENOUS PRN
Status: CANCELLED | OUTPATIENT
Start: 2023-09-15

## 2023-09-05 RX ORDER — 0.9 % SODIUM CHLORIDE 0.9 %
1000 INTRAVENOUS SOLUTION INTRAVENOUS ONCE
Status: CANCELLED
Start: 2023-09-15 | End: 2023-09-15

## 2023-09-05 RX ORDER — HEPARIN 100 UNIT/ML
500 SYRINGE INTRAVENOUS PRN
Status: CANCELLED | OUTPATIENT
Start: 2023-09-13

## 2023-09-05 RX ORDER — DIPHENHYDRAMINE HYDROCHLORIDE 50 MG/ML
50 INJECTION INTRAMUSCULAR; INTRAVENOUS
Status: CANCELLED | OUTPATIENT
Start: 2023-09-13

## 2023-09-05 RX ORDER — ONDANSETRON 2 MG/ML
8 INJECTION INTRAMUSCULAR; INTRAVENOUS
Status: CANCELLED | OUTPATIENT
Start: 2023-09-13

## 2023-09-05 RX ORDER — SODIUM CHLORIDE 9 MG/ML
5-250 INJECTION, SOLUTION INTRAVENOUS PRN
Status: CANCELLED | OUTPATIENT
Start: 2023-09-13

## 2023-09-05 RX ORDER — ACETAMINOPHEN 325 MG/1
650 TABLET ORAL
Status: CANCELLED | OUTPATIENT
Start: 2023-09-13

## 2023-09-05 RX ORDER — EPINEPHRINE 1 MG/ML
0.3 INJECTION, SOLUTION, CONCENTRATE INTRAVENOUS PRN
Status: CANCELLED | OUTPATIENT
Start: 2023-09-13

## 2023-09-05 RX ORDER — ALBUTEROL SULFATE 90 UG/1
4 AEROSOL, METERED RESPIRATORY (INHALATION) PRN
Status: CANCELLED | OUTPATIENT
Start: 2023-09-13

## 2023-09-05 RX ORDER — SODIUM CHLORIDE 9 MG/ML
5-250 INJECTION, SOLUTION INTRAVENOUS PRN
Status: CANCELLED | OUTPATIENT
Start: 2023-09-15

## 2023-09-05 RX ORDER — MEPERIDINE HYDROCHLORIDE 25 MG/ML
12.5 INJECTION INTRAMUSCULAR; INTRAVENOUS; SUBCUTANEOUS PRN
Status: CANCELLED | OUTPATIENT
Start: 2023-09-13

## 2023-09-05 RX ORDER — SODIUM CHLORIDE 9 MG/ML
INJECTION, SOLUTION INTRAVENOUS CONTINUOUS
Status: CANCELLED | OUTPATIENT
Start: 2023-09-13

## 2023-09-05 RX ORDER — SODIUM CHLORIDE 0.9 % (FLUSH) 0.9 %
5-40 SYRINGE (ML) INJECTION PRN
Status: CANCELLED | OUTPATIENT
Start: 2023-09-15

## 2023-09-07 ENCOUNTER — TELEPHONE (OUTPATIENT)
Age: 63
End: 2023-09-07

## 2023-09-07 NOTE — TELEPHONE ENCOUNTER
Chuck at [address] [place], [facility] Connor Herrera   W: [phone]  F: [phone]    Medical Nutrition Therapy  Nutrition Encounter:     Called and spoke with patient. He seems to be doing well with food, as long as he cuts it into very small pieces and chews well. He was picking up a hamburger from [employer] during our call. After treatment for 2-3 days, he feels more nauseated and does eat much. He tends to lose weight during that time. But then symptoms subsides and he can start to eat more. Diagnosis: Metastatic GEJ carcinoma   Diabetic   Barium swallow esophogram on 5/12/23 showed: There is narrowing of the distal esophagus just above the hernia which demonstrates relatively smooth margins and may represent a benign stricture. 12 mm barium tablet was retained in this region. PET scan showed GE junction mass. Treatment course: mFOLFOX + nivolumab as palliative therapy     Ht Readings from Last 1 Encounters:   08/30/23 5' 10\" (1.778 m)       Wt Readings from Last 5 Encounters:   08/30/23 205 lb 14.6 oz (93.4 kg)   08/30/23 205 lb 14.4 oz (93.4 kg)   08/16/23 209 lb 14.1 oz (95.2 kg)   08/16/23 209 lb 12.8 oz (95.2 kg)   08/15/23 222 lb 6.4 oz (100.9 kg)       Estimated Nutrition Needs:   Calorie Range: 2400-2875kcal/day      Protein Range: 95-114g/day     Fluid Needs: 2000ml    Plan:  - Can to push calories. Emphasized importance of shakes as they are calorically dense. - If Jtube placed, begin tube feeds.     - Goal: Nutren 2.0 @ 105ml/hr x 12 hours. Flush 150ml q3 hours while feeds are running. Flush additional 100ml water 4 times during the day. - This will provide 2500kcal, 105g protein and 865ml free water. Water flushes to provide additional 1L.          Signed By: Yun Camacho, [facility]

## 2023-09-07 NOTE — PROGRESS NOTES
Jose Manuel Conner is a 70 y.o. male here for treatment for prostate cancer. Pt has been doing well. Able to eat more. Level 4 rib pain. Has been dealing with mouth sores and using Biotene. 1. Have you been to the ER, urgent care clinic since your last visit? Hospitalized since your last visit?    no    2. Have you seen or consulted any other health care providers outside of the [address] [place] [address] since your last visit? Include any pap smears or colon screening.   no

## 2023-09-08 ENCOUNTER — HOSPITAL ENCOUNTER (OUTPATIENT)
Age: 63
Discharge: HOME | End: 2023-09-08
Payer: COMMERCIAL

## 2023-09-08 DIAGNOSIS — C77.2 MALIGNANT NEOPLASM METASTATIC TO INTRA-ABDOMINAL LYMPH NODE (HCC): ICD-10-CM

## 2023-09-08 DIAGNOSIS — C16.0 GE JUNCTION CARCINOMA (HCC): ICD-10-CM

## 2023-09-08 PROCEDURE — 6360000004 HC RX CONTRAST MEDICATION: Performed by: RADIOLOGY

## 2023-09-08 PROCEDURE — 71260 CT THORAX DX C+: CPT

## 2023-09-08 PROCEDURE — 2500000003 HC RX 250 WO HCPCS: Performed by: RADIOLOGY

## 2023-09-08 RX ADMIN — BARIUM SULFATE 450 ML: 20 SUSPENSION ORAL at 09:31

## 2023-09-08 RX ADMIN — IOPAMIDOL 100 ML: 755 INJECTION, SOLUTION INTRAVENOUS at 09:31

## 2023-09-12 ENCOUNTER — APPOINTMENT (OUTPATIENT)
Facility: HOSPITAL | Age: 63
End: 2023-09-12
Payer: COMMERCIAL

## 2023-09-12 NOTE — PROGRESS NOTES
Chuck  at [facility], [address], [facility], [address] [place] [address]  [phone]       Hematology Oncology Progress Note      Patient: Coleman Shahid MRN: [id number]  SSN: [id number]    YOB: 1960  Age: 70 y.o. 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 Treatment:     Palliative therapy  mFOLFOX + Nivolumab cycle 4 day 1     Subjective:      Coleman Shahid is a 70 y.o. male who I am seeing in consultation for a new diagnosis of metastatic GEJ carcinoma. He has been experiencing some difficulty in swallowing for a few months. He has lost over 40 lbs and he is fatigued. An EGD revealed GEJ mass which was biopsied and came back as GEJ carcinoma. CT and a subsequent PET shows enlarged left supraclavicular and RP efrem disease. He has seen [facility] at [facility]. He decided to receive systemic therapy with us. Interval History:     Mr. Dejan Victoria is here today for his 4th cycle of  mFOLFOX + Nivolumab. He is able to eat small meals. Review of Systems:  Constitutional: negative  Eyes: negative  Ears, Nose, Mouth, Throat, and Face: negative  Respiratory: negative  Cardiovascular: negative  Gastrointestinal: negative  Genitourinary:negative  Integument/Breast: negative  Hematologic/Lymphatic: negative  Musculoskeletal: right rib pain  Neurological: negative    Review of systems was reviewed and updated as needed on 09/13/23.       Past Medical History:   Diagnosis Date    Arthritis     Cancer ([address])     SCCA ON ARM    Cervical myelopathy (HCC)     GERD (gastroesophageal reflux disease)     High cholesterol     PUD (peptic ulcer disease)     Sleep apnea     C-PAP    Viral meningitis 1976     Past Surgical History:   Procedure Laterality Date    [place] STENOSIS    GI      COLONOSCOPY

## 2023-09-13 ENCOUNTER — OFFICE VISIT (OUTPATIENT)
Age: 63
End: 2023-09-13
Payer: COMMERCIAL

## 2023-09-13 ENCOUNTER — HOSPITAL ENCOUNTER (OUTPATIENT)
Facility: HOSPITAL | Age: 63
Setting detail: INFUSION SERIES
End: 2023-09-13
Payer: COMMERCIAL

## 2023-09-13 VITALS
HEART RATE: 60 BPM | BODY MASS INDEX: 30.38 KG/M2 | DIASTOLIC BLOOD PRESSURE: 72 MMHG | TEMPERATURE: 98.3 F | HEIGHT: 70 IN | SYSTOLIC BLOOD PRESSURE: 146 MMHG | WEIGHT: 212.2 LBS | OXYGEN SATURATION: 96 %

## 2023-09-13 VITALS
HEART RATE: 56 BPM | OXYGEN SATURATION: 97 % | TEMPERATURE: 97.9 F | WEIGHT: 212.2 LBS | DIASTOLIC BLOOD PRESSURE: 97 MMHG | RESPIRATION RATE: 17 BRPM | SYSTOLIC BLOOD PRESSURE: 156 MMHG | BODY MASS INDEX: 30.45 KG/M2

## 2023-09-13 DIAGNOSIS — Z51.11 ENCOUNTER FOR ANTINEOPLASTIC CHEMOTHERAPY AND IMMUNOTHERAPY: ICD-10-CM

## 2023-09-13 DIAGNOSIS — C16.0 GE JUNCTION CARCINOMA (HCC): Primary | ICD-10-CM

## 2023-09-13 DIAGNOSIS — Z51.12 ENCOUNTER FOR ANTINEOPLASTIC CHEMOTHERAPY AND IMMUNOTHERAPY: ICD-10-CM

## 2023-09-13 LAB
ALBUMIN SERPL-MCNC: 3.3 G/DL (ref 3.5–5)
ALBUMIN/GLOB SERPL: 0.9 (ref 1.1–2.2)
ALP SERPL-CCNC: 123 U/L (ref 45–117)
ALT SERPL-CCNC: 61 U/L (ref 12–78)
ANION GAP SERPL CALC-SCNC: 5 MMOL/L (ref 5–15)
AST SERPL-CCNC: 28 U/L (ref 15–37)
BASOPHILS # BLD: 0.1 K/UL (ref 0–0.1)
BASOPHILS NFR BLD: 1 % (ref 0–1)
BILIRUB SERPL-MCNC: 0.8 MG/DL (ref 0.2–1)
BUN SERPL-MCNC: 14 MG/DL (ref 6–20)
BUN/CREAT SERPL: 21 (ref 12–20)
CALCIUM SERPL-MCNC: 9.2 MG/DL (ref 8.5–10.1)
CHLORIDE SERPL-SCNC: 110 MMOL/L (ref 97–108)
CO2 SERPL-SCNC: 26 MMOL/L (ref 21–32)
CREAT SERPL-MCNC: 0.66 MG/DL (ref 0.7–1.3)
DIFFERENTIAL METHOD BLD: ABNORMAL
EOSINOPHIL # BLD: 0.1 K/UL (ref 0–0.4)
EOSINOPHIL NFR BLD: 3 % (ref 0–7)
ERYTHROCYTE [DISTWIDTH] IN BLOOD BY AUTOMATED COUNT: 15.3 % (ref 11.5–14.5)
GLOBULIN SER CALC-MCNC: 3.6 G/DL (ref 2–4)
GLUCOSE SERPL-MCNC: 145 MG/DL (ref 65–100)
HCT VFR BLD AUTO: 40.3 % (ref 36.6–50.3)
HGB BLD-MCNC: 13.9 G/DL (ref 12.1–17)
IMM GRANULOCYTES # BLD AUTO: 0 K/UL (ref 0–0.04)
IMM GRANULOCYTES NFR BLD AUTO: 0 % (ref 0–0.5)
LYMPHOCYTES # BLD: 1.3 K/UL (ref 0.8–3.5)
LYMPHOCYTES NFR BLD: 32 % (ref 12–49)
MCH RBC QN AUTO: 30.6 PG (ref 26–34)
MCHC RBC AUTO-ENTMCNC: 34.5 G/DL (ref 30–36.5)
MCV RBC AUTO: 88.8 FL (ref 80–99)
MONOCYTES # BLD: 0.7 K/UL (ref 0–1)
MONOCYTES NFR BLD: 18 % (ref 5–13)
NEUTS SEG # BLD: 1.8 K/UL (ref 1.8–8)
NEUTS SEG NFR BLD: 46 % (ref 32–75)
NRBC # BLD: 0 K/UL (ref 0–0.01)
NRBC BLD-RTO: 0 PER 100 WBC
PLATELET # BLD AUTO: 131 K/UL (ref 150–400)
PMV BLD AUTO: 8.8 FL (ref 8.9–12.9)
POTASSIUM SERPL-SCNC: 3.4 MMOL/L (ref 3.5–5.1)
PROT SERPL-MCNC: 6.9 G/DL (ref 6.4–8.2)
RBC # BLD AUTO: 4.54 M/UL (ref 4.1–5.7)
SODIUM SERPL-SCNC: 141 MMOL/L (ref 136–145)
TSH SERPL DL<=0.05 MIU/L-ACNC: 2.23 UIU/ML (ref 0.36–3.74)
WBC # BLD AUTO: 4 K/UL (ref 4.1–11.1)

## 2023-09-13 PROCEDURE — 36415 COLL VENOUS BLD VENIPUNCTURE: CPT

## 2023-09-13 PROCEDURE — 96366 THER/PROPH/DIAG IV INF ADDON: CPT

## 2023-09-13 PROCEDURE — 6360000002 HC RX W HCPCS: Performed by: INTERNAL MEDICINE

## 2023-09-13 PROCEDURE — 6370000000 HC RX 637 (ALT 250 FOR IP): Performed by: NURSE PRACTITIONER

## 2023-09-13 PROCEDURE — 99215 OFFICE O/P EST HI 40 MIN: CPT | Performed by: INTERNAL MEDICINE

## 2023-09-13 PROCEDURE — 96411 CHEMO IV PUSH ADDL DRUG: CPT

## 2023-09-13 PROCEDURE — 2580000003 HC RX 258: Performed by: INTERNAL MEDICINE

## 2023-09-13 PROCEDURE — 1123F ACP DISCUSS/DSCN MKR DOCD: CPT | Performed by: INTERNAL MEDICINE

## 2023-09-13 PROCEDURE — 96417 CHEMO IV INFUS EACH ADDL SEQ: CPT

## 2023-09-13 PROCEDURE — 3077F SYST BP >= 140 MM HG: CPT | Performed by: INTERNAL MEDICINE

## 2023-09-13 PROCEDURE — 96368 THER/DIAG CONCURRENT INF: CPT

## 2023-09-13 PROCEDURE — 96416 CHEMO PROLONG INFUSE W/PUMP: CPT

## 2023-09-13 PROCEDURE — 3078F DIAST BP <80 MM HG: CPT | Performed by: INTERNAL MEDICINE

## 2023-09-13 PROCEDURE — 96415 CHEMO IV INFUSION ADDL HR: CPT

## 2023-09-13 PROCEDURE — 80053 COMPREHEN METABOLIC PANEL: CPT

## 2023-09-13 PROCEDURE — 96413 CHEMO IV INFUSION 1 HR: CPT

## 2023-09-13 PROCEDURE — 85025 COMPLETE CBC W/AUTO DIFF WBC: CPT

## 2023-09-13 PROCEDURE — 84443 ASSAY THYROID STIM HORMONE: CPT

## 2023-09-13 PROCEDURE — 96375 TX/PRO/DX INJ NEW DRUG ADDON: CPT

## 2023-09-13 RX ORDER — PALONOSETRON 0.05 MG/ML
0.25 INJECTION, SOLUTION INTRAVENOUS ONCE
Status: COMPLETED | OUTPATIENT
Start: 2023-09-13 | End: 2023-09-13

## 2023-09-13 RX ORDER — SODIUM CHLORIDE 0.9 % (FLUSH) 0.9 %
5-40 SYRINGE (ML) INJECTION PRN
Status: DISCONTINUED | OUTPATIENT
Start: 2023-09-13 | End: 2023-09-14 | Stop reason: HOSPADM

## 2023-09-13 RX ORDER — DEXTROSE MONOHYDRATE 50 MG/ML
5-250 INJECTION, SOLUTION INTRAVENOUS PRN
Status: DISCONTINUED | OUTPATIENT
Start: 2023-09-13 | End: 2023-09-14 | Stop reason: HOSPADM

## 2023-09-13 RX ORDER — SODIUM CHLORIDE 9 MG/ML
5-250 INJECTION, SOLUTION INTRAVENOUS PRN
Status: DISCONTINUED | OUTPATIENT
Start: 2023-09-13 | End: 2023-09-14 | Stop reason: HOSPADM

## 2023-09-13 RX ORDER — FLUOROURACIL 50 MG/ML
400 INJECTION, SOLUTION INTRAVENOUS ONCE
Status: COMPLETED | OUTPATIENT
Start: 2023-09-13 | End: 2023-09-13

## 2023-09-13 RX ORDER — POTASSIUM CHLORIDE 1.5 G/1.58G
40 POWDER, FOR SOLUTION ORAL ONCE
Status: COMPLETED | OUTPATIENT
Start: 2023-09-13 | End: 2023-09-13

## 2023-09-13 RX ADMIN — LEUCOVORIN CALCIUM 200 MG: 100 INJECTION, POWDER, LYOPHILIZED, FOR SUSPENSION INTRAMUSCULAR; INTRAVENOUS at 11:24

## 2023-09-13 RX ADMIN — SODIUM CHLORIDE 25 ML/HR: 9 INJECTION, SOLUTION INTRAVENOUS at 09:53

## 2023-09-13 RX ADMIN — OXALIPLATIN 185 MG: 5 INJECTION, SOLUTION INTRAVENOUS at 11:24

## 2023-09-13 RX ADMIN — DEXTROSE MONOHYDRATE 25 ML/HR: 50 INJECTION, SOLUTION INTRAVENOUS at 11:15

## 2023-09-13 RX ADMIN — PALONOSETRON 0.25 MG: 0.05 INJECTION, SOLUTION INTRAVENOUS at 09:56

## 2023-09-13 RX ADMIN — FLUOROURACIL 5000 MG: 50 INJECTION, SOLUTION INTRAVENOUS at 13:35

## 2023-09-13 RX ADMIN — FLUOROURACIL 875 MG: 50 INJECTION, SOLUTION INTRAVENOUS at 13:30

## 2023-09-13 RX ADMIN — DEXAMETHASONE SODIUM PHOSPHATE 12 MG: 4 INJECTION, SOLUTION INTRAMUSCULAR; INTRAVENOUS at 09:59

## 2023-09-13 RX ADMIN — POTASSIUM CHLORIDE 40 MEQ: 1.5 POWDER, FOR SOLUTION ORAL at 09:51

## 2023-09-13 RX ADMIN — SODIUM CHLORIDE 240 MG: 9 INJECTION, SOLUTION INTRAVENOUS at 10:43

## 2023-09-15 ENCOUNTER — HOSPITAL ENCOUNTER (OUTPATIENT)
Facility: HOSPITAL | Age: 63
Setting detail: INFUSION SERIES
End: 2023-09-15
Payer: COMMERCIAL

## 2023-09-15 ENCOUNTER — APPOINTMENT (OUTPATIENT)
Facility: HOSPITAL | Age: 63
End: 2023-09-15
Payer: COMMERCIAL

## 2023-09-15 VITALS
RESPIRATION RATE: 18 BRPM | HEART RATE: 66 BPM | DIASTOLIC BLOOD PRESSURE: 79 MMHG | SYSTOLIC BLOOD PRESSURE: 157 MMHG | OXYGEN SATURATION: 97 %

## 2023-09-15 DIAGNOSIS — C16.0 GE JUNCTION CARCINOMA (HCC): Primary | ICD-10-CM

## 2023-09-15 PROCEDURE — 96360 HYDRATION IV INFUSION INIT: CPT

## 2023-09-15 PROCEDURE — 96523 IRRIG DRUG DELIVERY DEVICE: CPT

## 2023-09-15 PROCEDURE — 2580000003 HC RX 258: Performed by: INTERNAL MEDICINE

## 2023-09-15 RX ORDER — 0.9 % SODIUM CHLORIDE 0.9 %
1000 INTRAVENOUS SOLUTION INTRAVENOUS ONCE
Status: COMPLETED | OUTPATIENT
Start: 2023-09-15 | End: 2023-09-15

## 2023-09-15 RX ORDER — SODIUM CHLORIDE 0.9 % (FLUSH) 0.9 %
5-40 SYRINGE (ML) INJECTION PRN
Status: DISCONTINUED | OUTPATIENT
Start: 2023-09-15 | End: 2023-09-16 | Stop reason: HOSPADM

## 2023-09-15 RX ADMIN — SODIUM CHLORIDE 1000 ML: 9 INJECTION, SOLUTION INTRAVENOUS at 16:05

## 2023-09-15 NOTE — PROGRESS NOTES
Name: Hetal Hylton    MRN: [id number]         : 1960      1555 Pt arrived at [facility] ambulatory and in no distress for pump disconnect and Hydration. No new complaints voiced. BP (!) 157/79   Pulse 66   Resp 18   SpO2 97%     All chemo contents infused, pump completed at 1400. Medications Administered         sodium chloride 0.9 % bolus 1,000 mL Admin Date  09/15/2023 Action  New Bag Dose  1,000 mL Rate  983.6 mL/hr Route  IntraVENous Administered By  Malissa Morales RN             Mr. Maia Ortega tolerated treatment well and was discharged from [address] [place] [address] in stable condition at 1710. Port de-accessed, flushed per protocol. He is to return on 2023 for his next appointment.     Malissa Morales RN  September 15, 2023    Future Appointments:  Future Appointments   Date Time Provider [address]   2023  8:30 AM [facility] MED6 [place] [address]   2023  8:45 AM Faisal Harrell MD [facility] AMB   2023  4:00 PM [facility] MED5 [place] [address]   10/11/2023  9:00 AM [facility] MED2 C/Casia 10   10/13/2023  4:00 PM [facility] FASTRACK 4 [address]   10/25/2023  9:00 AM [facility] MED2 C/[facility]asia 10   10/27/2023  4:00 PM [facility] FASTRACK 6 [facility]   2023  8:00 AM [facility] LONG1 [place] [facility]   11/10/2023  4:00 PM [facility] FASTRACK 6 [facility]   2023  9:00 AM [facility] LONG4 [place] [address]   2023  4:00 PM [facility] FASTRACK 6 [facility]   2023  9:00 AM [facility] MED3 C/Casia 10   2023  4:00 PM [facility] FASTRACK 6 [address]   2023  8:30 AM [facility] LONG4 [place] [facility]   2023  4:00 PM [facility] FASTRACK 6 [address]   1/3/2024  9:00 AM [facility] MED7 C/Casia 10   2024  4:00 PM [facility] FA[facility] 6 [facility]   2024  9:00 AM [address] [place] [address]   2024  4:00 PM [facility] FA[facility] 6 [address]   2024  9:00 AM [address] [place] [address]   2024  4:00 PM [facility] FASTRACK 6 [address]   2024  9:00 AM [facility] LONG5 [place] [address]   2024  4:00 PM [facility] FASTRACK 6 [address]

## 2023-09-19 RX ORDER — SODIUM CHLORIDE 9 MG/ML
5-250 INJECTION, SOLUTION INTRAVENOUS PRN
Status: CANCELLED | OUTPATIENT
Start: 2023-09-29

## 2023-09-19 RX ORDER — SODIUM CHLORIDE 9 MG/ML
5-250 INJECTION, SOLUTION INTRAVENOUS PRN
Status: CANCELLED | OUTPATIENT
Start: 2023-09-27

## 2023-09-19 RX ORDER — SODIUM CHLORIDE 0.9 % (FLUSH) 0.9 %
5-40 SYRINGE (ML) INJECTION PRN
Status: CANCELLED | OUTPATIENT
Start: 2023-09-29

## 2023-09-19 RX ORDER — HEPARIN 100 UNIT/ML
500 SYRINGE INTRAVENOUS PRN
Status: CANCELLED | OUTPATIENT
Start: 2023-09-29

## 2023-09-19 RX ORDER — ACETAMINOPHEN 325 MG/1
650 TABLET ORAL
Status: CANCELLED | OUTPATIENT
Start: 2023-09-27

## 2023-09-19 RX ORDER — DIPHENHYDRAMINE HYDROCHLORIDE 50 MG/ML
50 INJECTION INTRAMUSCULAR; INTRAVENOUS
Status: CANCELLED | OUTPATIENT
Start: 2023-09-27

## 2023-09-19 RX ORDER — 0.9 % SODIUM CHLORIDE 0.9 %
1000 INTRAVENOUS SOLUTION INTRAVENOUS ONCE
Status: CANCELLED
Start: 2023-09-29 | End: 2023-09-29

## 2023-09-19 RX ORDER — HEPARIN 100 UNIT/ML
500 SYRINGE INTRAVENOUS PRN
Status: CANCELLED | OUTPATIENT
Start: 2023-09-27

## 2023-09-19 RX ORDER — SODIUM CHLORIDE 9 MG/ML
INJECTION, SOLUTION INTRAVENOUS CONTINUOUS
Status: CANCELLED | OUTPATIENT
Start: 2023-09-27

## 2023-09-19 RX ORDER — EPINEPHRINE 1 MG/ML
0.3 INJECTION, SOLUTION, CONCENTRATE INTRAVENOUS PRN
Status: CANCELLED | OUTPATIENT
Start: 2023-09-27

## 2023-09-19 RX ORDER — ONDANSETRON 2 MG/ML
8 INJECTION INTRAMUSCULAR; INTRAVENOUS
Status: CANCELLED | OUTPATIENT
Start: 2023-09-27

## 2023-09-19 RX ORDER — MEPERIDINE HYDROCHLORIDE 25 MG/ML
12.5 INJECTION INTRAMUSCULAR; INTRAVENOUS; SUBCUTANEOUS PRN
Status: CANCELLED | OUTPATIENT
Start: 2023-09-27

## 2023-09-19 RX ORDER — ALBUTEROL SULFATE 90 UG/1
4 AEROSOL, METERED RESPIRATORY (INHALATION) PRN
Status: CANCELLED | OUTPATIENT
Start: 2023-09-27

## 2023-09-26 NOTE — PROGRESS NOTES
Joie Rai is a 70 y.o. male here for treatment for prostate cancer. Pt weight has been going up. He is eating more and able to swallow better. States he is doing well. 1. Have you been to the ER, urgent care clinic since your last visit? Hospitalized since your last visit?     no    2. Have you seen or consulted any other health care providers outside of the [address] [place] [address] since your last visit? Include any pap smears or colon screening.   Dr Karlene Morris for CPAP supplies
normally on today's examination there  is increased density and thickness of the cortex. There are some small cortical  lucencies. This is likely metastatic focus and the increased sclerosis is  probably a healing response. This report was abnormal on the PET CT scan. ADDITIONAL COMMENTS: There is a small right-sided inguinal hernia that now  demonstrates a very minimal amount of fluid within the inguinal hernia canal.    Impression  1. Persistent circumferential wall thickening of the distal esophagus extending  to the GE junction has not changed significantly. 2. The supraclavicular adenopathy on the left has decreased. The retroperitoneal  adenopathy has decreased. 3. The right seventh rib anteriorly now has an abnormal appearance there is  increased thickness and sclerosis of the cortex with mild lucencies in the  cortex. Most likely this is a healing response to the metastatic focus  identified on the PET CT scan. 4. Small pulmonary nodules previously described are stable and likely not  related to metastatic disease. 5. There is a small fat-containing right inguinal hernia. There is now a very  small amount of fluid within the right inguinal canal. There is no free fluid in  the pelvis. Lab Results   Component Value Date    WBC 4.0 (L) 09/27/2023    HGB 14.0 09/27/2023    HCT 40.2 09/27/2023    MCV 89.7 09/27/2023     (L) 09/27/2023         Lab Results   Component Value Date     09/27/2023    K 3.6 09/27/2023     (H) 09/27/2023    CO2 26 09/27/2023    BUN 9 09/27/2023    CREATININE 0.75 09/27/2023    GLUCOSE 180 (H) 09/27/2023    CALCIUM 8.7 09/27/2023    PROT 6.9 09/27/2023    LABALBU 3.3 (L) 09/27/2023    BILITOT 0.7 09/27/2023    ALKPHOS 134 (H) 09/27/2023    AST 38 (H) 09/27/2023    ALT 70 09/27/2023    LABGLOM >60 09/27/2023    GLOB 3.6 09/27/2023           Assessment:     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      Her 2 -ve  PD-L1

## 2023-09-27 ENCOUNTER — HOSPITAL ENCOUNTER (OUTPATIENT)
Facility: HOSPITAL | Age: 63
Setting detail: INFUSION SERIES
End: 2023-09-27
Payer: COMMERCIAL

## 2023-09-27 ENCOUNTER — OFFICE VISIT (OUTPATIENT)
Age: 63
End: 2023-09-27
Payer: COMMERCIAL

## 2023-09-27 VITALS
WEIGHT: 219.14 LBS | HEIGHT: 70 IN | OXYGEN SATURATION: 99 % | DIASTOLIC BLOOD PRESSURE: 79 MMHG | HEART RATE: 68 BPM | BODY MASS INDEX: 31.37 KG/M2 | SYSTOLIC BLOOD PRESSURE: 149 MMHG | TEMPERATURE: 98.7 F | RESPIRATION RATE: 16 BRPM

## 2023-09-27 VITALS
TEMPERATURE: 98.7 F | DIASTOLIC BLOOD PRESSURE: 79 MMHG | HEIGHT: 70 IN | SYSTOLIC BLOOD PRESSURE: 149 MMHG | WEIGHT: 219.2 LBS | RESPIRATION RATE: 16 BRPM | OXYGEN SATURATION: 99 % | BODY MASS INDEX: 31.38 KG/M2 | HEART RATE: 68 BPM

## 2023-09-27 DIAGNOSIS — Z51.12 ENCOUNTER FOR ANTINEOPLASTIC CHEMOTHERAPY AND IMMUNOTHERAPY: ICD-10-CM

## 2023-09-27 DIAGNOSIS — Z51.11 ENCOUNTER FOR ANTINEOPLASTIC CHEMOTHERAPY AND IMMUNOTHERAPY: ICD-10-CM

## 2023-09-27 DIAGNOSIS — C16.0 GE JUNCTION CARCINOMA (HCC): Primary | ICD-10-CM

## 2023-09-27 LAB
ALBUMIN SERPL-MCNC: 3.3 G/DL (ref 3.5–5)
ALBUMIN/GLOB SERPL: 0.9 (ref 1.1–2.2)
ALP SERPL-CCNC: 134 U/L (ref 45–117)
ALT SERPL-CCNC: 70 U/L (ref 12–78)
ANION GAP SERPL CALC-SCNC: 4 MMOL/L (ref 5–15)
AST SERPL-CCNC: 38 U/L (ref 15–37)
BASOPHILS # BLD: 0 K/UL (ref 0–0.1)
BASOPHILS NFR BLD: 1 % (ref 0–1)
BILIRUB SERPL-MCNC: 0.7 MG/DL (ref 0.2–1)
BUN SERPL-MCNC: 9 MG/DL (ref 6–20)
BUN/CREAT SERPL: 12 (ref 12–20)
CALCIUM SERPL-MCNC: 8.7 MG/DL (ref 8.5–10.1)
CHLORIDE SERPL-SCNC: 110 MMOL/L (ref 97–108)
CO2 SERPL-SCNC: 26 MMOL/L (ref 21–32)
CREAT SERPL-MCNC: 0.75 MG/DL (ref 0.7–1.3)
DIFFERENTIAL METHOD BLD: ABNORMAL
EOSINOPHIL # BLD: 0.1 K/UL (ref 0–0.4)
EOSINOPHIL NFR BLD: 3 % (ref 0–7)
ERYTHROCYTE [DISTWIDTH] IN BLOOD BY AUTOMATED COUNT: 16 % (ref 11.5–14.5)
GLOBULIN SER CALC-MCNC: 3.6 G/DL (ref 2–4)
GLUCOSE SERPL-MCNC: 180 MG/DL (ref 65–100)
HCT VFR BLD AUTO: 40.2 % (ref 36.6–50.3)
HGB BLD-MCNC: 14 G/DL (ref 12.1–17)
IMM GRANULOCYTES # BLD AUTO: 0 K/UL (ref 0–0.04)
IMM GRANULOCYTES NFR BLD AUTO: 0 % (ref 0–0.5)
LYMPHOCYTES # BLD: 1 K/UL (ref 0.8–3.5)
LYMPHOCYTES NFR BLD: 24 % (ref 12–49)
MCH RBC QN AUTO: 31.3 PG (ref 26–34)
MCHC RBC AUTO-ENTMCNC: 34.8 G/DL (ref 30–36.5)
MCV RBC AUTO: 89.7 FL (ref 80–99)
MONOCYTES # BLD: 0.8 K/UL (ref 0–1)
MONOCYTES NFR BLD: 21 % (ref 5–13)
NEUTS SEG # BLD: 2.1 K/UL (ref 1.8–8)
NEUTS SEG NFR BLD: 51 % (ref 32–75)
NRBC # BLD: 0 K/UL (ref 0–0.01)
NRBC BLD-RTO: 0 PER 100 WBC
PLATELET # BLD AUTO: 133 K/UL (ref 150–400)
PMV BLD AUTO: 9.3 FL (ref 8.9–12.9)
POTASSIUM SERPL-SCNC: 3.6 MMOL/L (ref 3.5–5.1)
PROT SERPL-MCNC: 6.9 G/DL (ref 6.4–8.2)
RBC # BLD AUTO: 4.48 M/UL (ref 4.1–5.7)
RBC MORPH BLD: ABNORMAL
SODIUM SERPL-SCNC: 140 MMOL/L (ref 136–145)
WBC # BLD AUTO: 4 K/UL (ref 4.1–11.1)
WBC MORPH BLD: ABNORMAL

## 2023-09-27 PROCEDURE — 96375 TX/PRO/DX INJ NEW DRUG ADDON: CPT

## 2023-09-27 PROCEDURE — 96366 THER/PROPH/DIAG IV INF ADDON: CPT

## 2023-09-27 PROCEDURE — 3077F SYST BP >= 140 MM HG: CPT | Performed by: INTERNAL MEDICINE

## 2023-09-27 PROCEDURE — 1123F ACP DISCUSS/DSCN MKR DOCD: CPT | Performed by: INTERNAL MEDICINE

## 2023-09-27 PROCEDURE — 96416 CHEMO PROLONG INFUSE W/PUMP: CPT

## 2023-09-27 PROCEDURE — 6360000002 HC RX W HCPCS: Performed by: INTERNAL MEDICINE

## 2023-09-27 PROCEDURE — 2580000003 HC RX 258: Performed by: INTERNAL MEDICINE

## 2023-09-27 PROCEDURE — 99215 OFFICE O/P EST HI 40 MIN: CPT | Performed by: INTERNAL MEDICINE

## 2023-09-27 PROCEDURE — 96417 CHEMO IV INFUS EACH ADDL SEQ: CPT

## 2023-09-27 PROCEDURE — 96368 THER/DIAG CONCURRENT INF: CPT

## 2023-09-27 PROCEDURE — 80053 COMPREHEN METABOLIC PANEL: CPT

## 2023-09-27 PROCEDURE — 96415 CHEMO IV INFUSION ADDL HR: CPT

## 2023-09-27 PROCEDURE — 36415 COLL VENOUS BLD VENIPUNCTURE: CPT

## 2023-09-27 PROCEDURE — 3078F DIAST BP <80 MM HG: CPT | Performed by: INTERNAL MEDICINE

## 2023-09-27 PROCEDURE — 85025 COMPLETE CBC W/AUTO DIFF WBC: CPT

## 2023-09-27 PROCEDURE — 96411 CHEMO IV PUSH ADDL DRUG: CPT

## 2023-09-27 PROCEDURE — 96413 CHEMO IV INFUSION 1 HR: CPT

## 2023-09-27 RX ORDER — SODIUM CHLORIDE 9 MG/ML
5-250 INJECTION, SOLUTION INTRAVENOUS PRN
Status: DISCONTINUED | OUTPATIENT
Start: 2023-09-27 | End: 2023-09-28 | Stop reason: HOSPADM

## 2023-09-27 RX ORDER — FLUOROURACIL 50 MG/ML
400 INJECTION, SOLUTION INTRAVENOUS ONCE
Status: COMPLETED | OUTPATIENT
Start: 2023-09-27 | End: 2023-09-27

## 2023-09-27 RX ORDER — SODIUM CHLORIDE 0.9 % (FLUSH) 0.9 %
5-40 SYRINGE (ML) INJECTION PRN
Status: DISCONTINUED | OUTPATIENT
Start: 2023-09-27 | End: 2023-09-28 | Stop reason: HOSPADM

## 2023-09-27 RX ORDER — PALONOSETRON 0.05 MG/ML
0.25 INJECTION, SOLUTION INTRAVENOUS ONCE
Status: COMPLETED | OUTPATIENT
Start: 2023-09-27 | End: 2023-09-27

## 2023-09-27 RX ORDER — DEXTROSE MONOHYDRATE 50 MG/ML
5-250 INJECTION, SOLUTION INTRAVENOUS PRN
Status: DISCONTINUED | OUTPATIENT
Start: 2023-09-27 | End: 2023-09-28 | Stop reason: HOSPADM

## 2023-09-27 RX ADMIN — DEXAMETHASONE SODIUM PHOSPHATE 12 MG: 4 INJECTION, SOLUTION INTRAMUSCULAR; INTRAVENOUS at 10:02

## 2023-09-27 RX ADMIN — FLUOROURACIL 5000 MG: 50 INJECTION, SOLUTION INTRAVENOUS at 13:49

## 2023-09-27 RX ADMIN — FLUOROURACIL 875 MG: 50 INJECTION, SOLUTION INTRAVENOUS at 13:46

## 2023-09-27 RX ADMIN — OXALIPLATIN 185 MG: 5 INJECTION, SOLUTION INTRAVENOUS at 11:40

## 2023-09-27 RX ADMIN — SODIUM CHLORIDE 25 ML/HR: 9 INJECTION, SOLUTION INTRAVENOUS at 09:56

## 2023-09-27 RX ADMIN — LEUCOVORIN CALCIUM 200 MG: 200 INJECTION, POWDER, LYOPHILIZED, FOR SOLUTION INTRAMUSCULAR; INTRAVENOUS at 11:40

## 2023-09-27 RX ADMIN — SODIUM CHLORIDE 240 MG: 9 INJECTION, SOLUTION INTRAVENOUS at 10:40

## 2023-09-27 RX ADMIN — DEXTROSE MONOHYDRATE 25 ML/HR: 50 INJECTION, SOLUTION INTRAVENOUS at 11:30

## 2023-09-27 RX ADMIN — PALONOSETRON 0.25 MG: 0.05 INJECTION, SOLUTION INTRAVENOUS at 10:00

## 2023-09-27 NOTE — PROGRESS NOTES
[facility] Visit Note    Pt arrived to [facility] ambulatory in no acute distress for Folfox/Opdivo C5. Assessment completed; pt states that his ability to eat is improving. R chest port accessed without issue and positive blood return noted. Labs obtained, CBC and CMP.     Recent Results (from the past 12 hour(s))   Comprehensive metabolic panel    Collection Time: 09/27/23  8:36 AM   Result Value Ref Range    Sodium 140 136 - 145 mmol/L    Potassium 3.6 3.5 - 5.1 mmol/L    Chloride 110 (H) 97 - 108 mmol/L    CO2 26 21 - 32 mmol/L    Anion Gap 4 (L) 5 - 15 mmol/L    Glucose 180 (H) 65 - 100 mg/dL    BUN 9 6 - 20 MG/DL    Creatinine 0.75 0.70 - 1.30 MG/DL    Bun/Cre Ratio 12 12 - 20      Est, Glom Filt Rate >60 >60 ml/min/1.73m2    Calcium 8.7 8.5 - 10.1 MG/DL    Total Bilirubin 0.7 0.2 - 1.0 MG/DL    ALT 70 12 - 78 U/L    AST 38 (H) 15 - 37 U/L    Alk Phosphatase 134 (H) 45 - 117 U/L    Total Protein 6.9 6.4 - 8.2 g/dL    Albumin 3.3 (L) 3.5 - 5.0 g/dL    Globulin 3.6 2.0 - 4.0 g/dL    Albumin/Globulin Ratio 0.9 (L) 1.1 - 2.2     CBC With Auto Differential    Collection Time: 09/27/23  8:36 AM   Result Value Ref Range    WBC 4.0 (L) 4.1 - 11.1 K/uL    RBC 4.48 4.10 - 5.70 M/uL    Hemoglobin 14.0 12.1 - 17.0 g/dL    Hematocrit 40.2 36.6 - 50.3 %    MCV 89.7 80.0 - 99.0 FL    MCH 31.3 26.0 - 34.0 PG    MCHC 34.8 30.0 - 36.5 g/dL    RDW 16.0 (H) 11.5 - 14.5 %    Platelets 746 (L) 186 - 400 K/uL    MPV 9.3 8.9 - 12.9 FL    Nucleated RBCs 0.0 0  WBC    nRBC 0.00 0.00 - 0.01 K/uL    Neutrophils % 51 32 - 75 %    Lymphocytes % 24 12 - 49 %    Monocytes % 21 (H) 5 - 13 %    Eosinophils % 3 0 - 7 %    Basophils % 1 0 - 1 %    Immature Granulocytes 0 0.0 - 0.5 %    Neutrophils Absolute 2.1 1.8 - 8.0 K/UL    Lymphocytes Absolute 1.0 0.8 - 3.5 K/UL    Monocytes Absolute 0.8 0.0 - 1.0 K/UL    Eosinophils Absolute 0.1 0.0 - 0.4 K/UL    Basophils Absolute 0.0 0.0 - 0.1 K/UL    Absolute Immature Granulocyte 0.0 0.00 - 0.04 K/UL    Differential Type SMEAR SCANNED      RBC Comment ANISOCYTOSIS  1+        WBC Comment SMUDGE CELLS SEEN          Two nurses verified prior to administering:  Drug name  Drug dose  Infusion volume or drug volume when prepared in a syringe  Rate of administration  Route of administration  Expiration dates and/or times  Appearance and physical integrity of the drugs  Rate set on infusion pump, when used  Sequencing of drug administration     The following medications administered:  Medications Administered         0.9 % sodium chloride infusion Admin Date  09/27/2023 Action  New Bag Dose  25 mL/hr Rate  25 mL/hr Route  IntraVENous Administered By  Joselyn Flannery RN        dexamethasone (DECADRON) 12 mg in sodium chloride 0.9% 50 mL IVPB Admin Date  09/27/2023 Action  New Bag Dose  12 mg Rate  200 mL/hr Route  IntraVENous Administered By  Joselyn Flannery RN        dextrose 5 % solution Admin Date  09/27/2023 Action  New Bag Dose  25 mL/hr Rate  25 mL/hr Route  IntraVENous Administered By  Joselyn Flannery RN        leucovorin calcium (WELLCOVORIN) 200 mg in dextrose 5 % 250 mL IVPB Admin Date  09/27/2023 Action  New Bag Dose  200 mg Rate  142.5 mL/hr Route  IntraVENous Administered By  Joselyn Flannery RN        nivolumab (OPDIVO) 240 mg in sodium chloride 0.9 % 100 mL IVPB Admin Date  09/27/2023 Action  New Bag Dose  240 mg Rate  268 mL/hr Route  IntraVENous Administered By  Joselyn Flannery RN        oxaliplatin (ELOXATIN) 185 mg in dextrose 5 % 250 mL chemo IVPB Admin Date  09/27/2023 Action  New Bag Dose  185 mg Rate  156 mL/hr Route  IntraVENous Administered By  Joselyn Flannery RN        palonosetron (ALOXI) injection 0.25 mg Admin Date  09/27/2023 Action  Given Dose  0.25 mg Rate   Route  IntraVENous Administered By  Joselyn Flannery RN          Patient Vitals for the past 24 hrs:   BP Temp Temp src Pulse Resp SpO2 Height Weight   09/27/23 0815 (!) 149/79 98.7

## 2023-09-29 ENCOUNTER — HOSPITAL ENCOUNTER (OUTPATIENT)
Facility: HOSPITAL | Age: 63
Setting detail: INFUSION SERIES
End: 2023-09-29
Payer: COMMERCIAL

## 2023-09-29 ENCOUNTER — APPOINTMENT (OUTPATIENT)
Facility: HOSPITAL | Age: 63
End: 2023-09-29
Payer: COMMERCIAL

## 2023-09-29 VITALS
HEIGHT: 70 IN | RESPIRATION RATE: 18 BRPM | SYSTOLIC BLOOD PRESSURE: 164 MMHG | BODY MASS INDEX: 31.01 KG/M2 | HEART RATE: 58 BPM | OXYGEN SATURATION: 98 % | WEIGHT: 216.6 LBS | DIASTOLIC BLOOD PRESSURE: 89 MMHG

## 2023-09-29 DIAGNOSIS — C16.0 GE JUNCTION CARCINOMA (HCC): Primary | ICD-10-CM

## 2023-09-29 PROCEDURE — 96523 IRRIG DRUG DELIVERY DEVICE: CPT

## 2023-09-29 PROCEDURE — 2580000003 HC RX 258: Performed by: INTERNAL MEDICINE

## 2023-09-29 PROCEDURE — 96360 HYDRATION IV INFUSION INIT: CPT

## 2023-09-29 RX ORDER — 0.9 % SODIUM CHLORIDE 0.9 %
1000 INTRAVENOUS SOLUTION INTRAVENOUS ONCE
Status: COMPLETED | OUTPATIENT
Start: 2023-09-29 | End: 2023-09-29

## 2023-09-29 RX ADMIN — SODIUM CHLORIDE 1000 ML: 9 INJECTION, SOLUTION INTRAVENOUS at 16:10

## 2023-09-29 NOTE — PROGRESS NOTES
Pt arrived at [facility] ambulatory and in no acute distress for Pump D/C and hydration therapy. Assessment completed. No new complaints or acute issues at this time. No labs drawn. Mr. Gregg's vitals were reviewed. Patient Vitals for the past 24 hrs:   BP Pulse Resp SpO2 Height Weight   09/29/23 1720 (!) 164/89 58 -- 98 % -- --   09/29/23 1600 (!) 160/84 71 18 97 % 1.778 m (5' 10\") 98.2 kg (216 lb 9.6 oz)     All chemo contents were infused. Pump completed at 1121. Medications Administered         sodium chloride 0.9 % bolus 1,000 mL Admin Date  09/29/2023 Action  New Bag Dose  1,000 mL Rate  983.6 mL/hr Route  IntraVENous Administered By  Thanh Sierra, RN        Pt tolerated hydration infusion well. Port flushed and de-accessed per protocol. Pt D/Cd from [facility] ambulatory and in no acute distress at 1722. Pt to return for next appointment on 10/11/23.     Future Appointments   Date Time Provider [address]   10/11/2023  9:00 AM [facility] MED2 [place] [address] [address]   10/11/2023  9:15 AM Gurjit Dunham MD ONC BS AMB   10/13/2023  4:00 PM [facility] FASTRACK 4 [facility]   10/25/2023  9:00 AM [facility] MED2 [facility]   10/25/2023  9:15 AM Gurjit Dunham MD ONCMR BS AMB   10/27/2023  4:00 PM [facility] FASTRACK 6 [facility]   11/8/2023  8:30 AM [facility] LONG1 [place] [address]   11/10/2023  4:00 PM [facility] FASTRACK 6 [address]   11/22/2023  9:00 AM [facility] LONG4 C/Casia 10   11/24/2023  4:00 PM [facility] FASTR[facility] 6 [address]   12/6/2023  9:00 AM [facility] MED3 C/Casia 10   12/8/2023  4:00 PM [facility] FASTRACK 6 [facility]   12/20/2023  8:30 AM [facility] LONG4 [place] [facility]   12/22/2023  4:00 PM [facility] FASTRACK 6 [address]   1/3/2024  8:30 AM [facility] MED7 [place] [address]   1/5/2024  4:00 PM [facility] FASTRACK 6 [facility]   1/17/2024  9:00 AM [address] [place] [address]   1/19/2024  4:00 PM [facility] FASTRACK 6 [address]   1/31/2024  8:30 AM [facility] MED8 C/Casia 10   2/2/2024  4:00 PM [facility] FA[facility] 6 [address]   2/14/2024  8:30 AM [facility] MED7 C/Casia 10   2/16/2024  4:00 PM Irvin Barker 6 [facility]   2/28/2024  8:30 AM [address]   3/1/2024  4:00 PM [facility] FASTRACK 6 [address]   3/13/2024  8:30 AM [facility] LONG4 [place] [address] [address]   3/15/2024  4:00 PM [facility] FASTRACK 6 [address]

## 2023-10-03 RX ORDER — ONDANSETRON 2 MG/ML
8 INJECTION INTRAMUSCULAR; INTRAVENOUS
Status: CANCELLED | OUTPATIENT
Start: 2023-10-11

## 2023-10-03 RX ORDER — HEPARIN 100 UNIT/ML
500 SYRINGE INTRAVENOUS PRN
Status: CANCELLED | OUTPATIENT
Start: 2023-10-11

## 2023-10-03 RX ORDER — ALBUTEROL SULFATE 90 UG/1
4 AEROSOL, METERED RESPIRATORY (INHALATION) PRN
Status: CANCELLED | OUTPATIENT
Start: 2023-10-11

## 2023-10-03 RX ORDER — ACETAMINOPHEN 325 MG/1
650 TABLET ORAL
Status: CANCELLED | OUTPATIENT
Start: 2023-10-11

## 2023-10-03 RX ORDER — SODIUM CHLORIDE 9 MG/ML
5-250 INJECTION, SOLUTION INTRAVENOUS PRN
Status: CANCELLED | OUTPATIENT
Start: 2023-10-13

## 2023-10-03 RX ORDER — MEPERIDINE HYDROCHLORIDE 25 MG/ML
12.5 INJECTION INTRAMUSCULAR; INTRAVENOUS; SUBCUTANEOUS PRN
Status: CANCELLED | OUTPATIENT
Start: 2023-10-11

## 2023-10-03 RX ORDER — DIPHENHYDRAMINE HYDROCHLORIDE 50 MG/ML
50 INJECTION INTRAMUSCULAR; INTRAVENOUS
Status: CANCELLED | OUTPATIENT
Start: 2023-10-11

## 2023-10-03 RX ORDER — SODIUM CHLORIDE 0.9 % (FLUSH) 0.9 %
5-40 SYRINGE (ML) INJECTION PRN
Status: CANCELLED | OUTPATIENT
Start: 2023-10-13

## 2023-10-03 RX ORDER — 0.9 % SODIUM CHLORIDE 0.9 %
1000 INTRAVENOUS SOLUTION INTRAVENOUS ONCE
Status: CANCELLED
Start: 2023-10-13 | End: 2023-10-13

## 2023-10-03 RX ORDER — SODIUM CHLORIDE 9 MG/ML
INJECTION, SOLUTION INTRAVENOUS CONTINUOUS
Status: CANCELLED | OUTPATIENT
Start: 2023-10-11

## 2023-10-03 RX ORDER — EPINEPHRINE 1 MG/ML
0.3 INJECTION, SOLUTION INTRAMUSCULAR; SUBCUTANEOUS PRN
Status: CANCELLED | OUTPATIENT
Start: 2023-10-11

## 2023-10-03 RX ORDER — HEPARIN 100 UNIT/ML
500 SYRINGE INTRAVENOUS PRN
Status: CANCELLED | OUTPATIENT
Start: 2023-10-13

## 2023-10-03 RX ORDER — SODIUM CHLORIDE 9 MG/ML
5-250 INJECTION, SOLUTION INTRAVENOUS PRN
Status: CANCELLED | OUTPATIENT
Start: 2023-10-11

## 2023-10-05 NOTE — PROGRESS NOTES
Prabhu Crystal is a 70 y.o. male here for treatment for prostate cancer. Pt doing well. No concerns brought up. Level 3 rib pain    1. Have you been to the ER, urgent care clinic since your last visit? Hospitalized since your last visit?     no    2. Have you seen or consulted any other health care providers outside of the [address] [place] [address] since your last visit? Include any pap smears or colon screening.   no

## 2023-10-11 ENCOUNTER — OFFICE VISIT (OUTPATIENT)
Age: 63
End: 2023-10-11
Payer: COMMERCIAL

## 2023-10-11 ENCOUNTER — HOSPITAL ENCOUNTER (OUTPATIENT)
Facility: HOSPITAL | Age: 63
Setting detail: INFUSION SERIES
End: 2023-10-11
Payer: COMMERCIAL

## 2023-10-11 VITALS
HEIGHT: 70 IN | OXYGEN SATURATION: 98 % | TEMPERATURE: 98.7 F | DIASTOLIC BLOOD PRESSURE: 77 MMHG | WEIGHT: 218 LBS | SYSTOLIC BLOOD PRESSURE: 151 MMHG | RESPIRATION RATE: 18 BRPM | HEART RATE: 70 BPM | BODY MASS INDEX: 31.21 KG/M2

## 2023-10-11 VITALS
OXYGEN SATURATION: 98 % | SYSTOLIC BLOOD PRESSURE: 151 MMHG | WEIGHT: 218.03 LBS | BODY MASS INDEX: 31.21 KG/M2 | TEMPERATURE: 98.7 F | HEART RATE: 70 BPM | RESPIRATION RATE: 18 BRPM | HEIGHT: 70 IN | DIASTOLIC BLOOD PRESSURE: 77 MMHG

## 2023-10-11 DIAGNOSIS — C16.0 GE JUNCTION CARCINOMA (HCC): ICD-10-CM

## 2023-10-11 DIAGNOSIS — Z51.11 ENCOUNTER FOR ANTINEOPLASTIC CHEMOTHERAPY AND IMMUNOTHERAPY: ICD-10-CM

## 2023-10-11 DIAGNOSIS — C77.2 MALIGNANT NEOPLASM METASTATIC TO INTRA-ABDOMINAL LYMPH NODE (HCC): Primary | ICD-10-CM

## 2023-10-11 DIAGNOSIS — Z51.12 ENCOUNTER FOR ANTINEOPLASTIC CHEMOTHERAPY AND IMMUNOTHERAPY: ICD-10-CM

## 2023-10-11 DIAGNOSIS — C16.0 GE JUNCTION CARCINOMA (HCC): Primary | ICD-10-CM

## 2023-10-11 LAB
ALBUMIN SERPL-MCNC: 3.4 G/DL (ref 3.5–5)
ALBUMIN/GLOB SERPL: 0.9 (ref 1.1–2.2)
ALP SERPL-CCNC: 134 U/L (ref 45–117)
ALT SERPL-CCNC: 45 U/L (ref 12–78)
ANION GAP SERPL CALC-SCNC: 7 MMOL/L (ref 5–15)
AST SERPL-CCNC: 30 U/L (ref 15–37)
BASOPHILS # BLD: 0 K/UL (ref 0–0.1)
BASOPHILS NFR BLD: 1 % (ref 0–1)
BILIRUB SERPL-MCNC: 0.6 MG/DL (ref 0.2–1)
BUN SERPL-MCNC: 16 MG/DL (ref 6–20)
BUN/CREAT SERPL: 19 (ref 12–20)
CALCIUM SERPL-MCNC: 8.8 MG/DL (ref 8.5–10.1)
CHLORIDE SERPL-SCNC: 107 MMOL/L (ref 97–108)
CO2 SERPL-SCNC: 26 MMOL/L (ref 21–32)
CREAT SERPL-MCNC: 0.85 MG/DL (ref 0.7–1.3)
DIFFERENTIAL METHOD BLD: ABNORMAL
EOSINOPHIL # BLD: 0.1 K/UL (ref 0–0.4)
EOSINOPHIL NFR BLD: 3 % (ref 0–7)
ERYTHROCYTE [DISTWIDTH] IN BLOOD BY AUTOMATED COUNT: 15.5 % (ref 11.5–14.5)
GLOBULIN SER CALC-MCNC: 4 G/DL (ref 2–4)
GLUCOSE SERPL-MCNC: 216 MG/DL (ref 65–100)
HCT VFR BLD AUTO: 42.1 % (ref 36.6–50.3)
HGB BLD-MCNC: 14.9 G/DL (ref 12.1–17)
IMM GRANULOCYTES # BLD AUTO: 0 K/UL (ref 0–0.04)
IMM GRANULOCYTES NFR BLD AUTO: 0 % (ref 0–0.5)
LYMPHOCYTES # BLD: 1 K/UL (ref 0.8–3.5)
LYMPHOCYTES NFR BLD: 25 % (ref 12–49)
MCH RBC QN AUTO: 32.1 PG (ref 26–34)
MCHC RBC AUTO-ENTMCNC: 35.4 G/DL (ref 30–36.5)
MCV RBC AUTO: 90.7 FL (ref 80–99)
MONOCYTES # BLD: 0.6 K/UL (ref 0–1)
MONOCYTES NFR BLD: 16 % (ref 5–13)
NEUTS BAND NFR BLD MANUAL: 1 %
NEUTS SEG # BLD: 2.3 K/UL (ref 1.8–8)
NEUTS SEG NFR BLD: 54 % (ref 32–75)
NRBC # BLD: 0 K/UL (ref 0–0.01)
NRBC BLD-RTO: 0 PER 100 WBC
PLATELET # BLD AUTO: 122 K/UL (ref 150–400)
PMV BLD AUTO: 9.8 FL (ref 8.9–12.9)
POTASSIUM SERPL-SCNC: 3.8 MMOL/L (ref 3.5–5.1)
PROT SERPL-MCNC: 7.4 G/DL (ref 6.4–8.2)
RBC # BLD AUTO: 4.64 M/UL (ref 4.1–5.7)
RBC MORPH BLD: ABNORMAL
SODIUM SERPL-SCNC: 140 MMOL/L (ref 136–145)
WBC # BLD AUTO: 4 K/UL (ref 4.1–11.1)
WBC MORPH BLD: ABNORMAL

## 2023-10-11 PROCEDURE — 96413 CHEMO IV INFUSION 1 HR: CPT

## 2023-10-11 PROCEDURE — 6360000002 HC RX W HCPCS: Performed by: INTERNAL MEDICINE

## 2023-10-11 PROCEDURE — 96368 THER/DIAG CONCURRENT INF: CPT

## 2023-10-11 PROCEDURE — 96415 CHEMO IV INFUSION ADDL HR: CPT

## 2023-10-11 PROCEDURE — 96375 TX/PRO/DX INJ NEW DRUG ADDON: CPT

## 2023-10-11 PROCEDURE — 1123F ACP DISCUSS/DSCN MKR DOCD: CPT | Performed by: INTERNAL MEDICINE

## 2023-10-11 PROCEDURE — 36415 COLL VENOUS BLD VENIPUNCTURE: CPT

## 2023-10-11 PROCEDURE — 96411 CHEMO IV PUSH ADDL DRUG: CPT

## 2023-10-11 PROCEDURE — 3077F SYST BP >= 140 MM HG: CPT | Performed by: INTERNAL MEDICINE

## 2023-10-11 PROCEDURE — 3078F DIAST BP <80 MM HG: CPT | Performed by: INTERNAL MEDICINE

## 2023-10-11 PROCEDURE — 80053 COMPREHEN METABOLIC PANEL: CPT

## 2023-10-11 PROCEDURE — 96416 CHEMO PROLONG INFUSE W/PUMP: CPT

## 2023-10-11 PROCEDURE — 85025 COMPLETE CBC W/AUTO DIFF WBC: CPT

## 2023-10-11 PROCEDURE — 99215 OFFICE O/P EST HI 40 MIN: CPT | Performed by: INTERNAL MEDICINE

## 2023-10-11 PROCEDURE — 2580000003 HC RX 258: Performed by: INTERNAL MEDICINE

## 2023-10-11 RX ORDER — BLOOD SUGAR DIAGNOSTIC
STRIP MISCELLANEOUS
COMMUNITY
Start: 2023-09-26

## 2023-10-11 RX ORDER — PALONOSETRON 0.05 MG/ML
0.25 INJECTION, SOLUTION INTRAVENOUS ONCE
Status: COMPLETED | OUTPATIENT
Start: 2023-10-11 | End: 2023-10-11

## 2023-10-11 RX ORDER — SODIUM CHLORIDE 9 MG/ML
5-250 INJECTION, SOLUTION INTRAVENOUS PRN
Status: DISCONTINUED | OUTPATIENT
Start: 2023-10-11 | End: 2023-10-12 | Stop reason: HOSPADM

## 2023-10-11 RX ORDER — FLUOROURACIL 50 MG/ML
400 INJECTION, SOLUTION INTRAVENOUS ONCE
Status: COMPLETED | OUTPATIENT
Start: 2023-10-11 | End: 2023-10-11

## 2023-10-11 RX ORDER — DEXTROSE MONOHYDRATE 50 MG/ML
5-250 INJECTION, SOLUTION INTRAVENOUS PRN
Status: DISCONTINUED | OUTPATIENT
Start: 2023-10-11 | End: 2023-10-12 | Stop reason: HOSPADM

## 2023-10-11 RX ORDER — SODIUM CHLORIDE 0.9 % (FLUSH) 0.9 %
5-40 SYRINGE (ML) INJECTION PRN
Status: DISCONTINUED | OUTPATIENT
Start: 2023-10-11 | End: 2023-10-12 | Stop reason: HOSPADM

## 2023-10-11 RX ADMIN — DEXAMETHASONE SODIUM PHOSPHATE 12 MG: 4 INJECTION, SOLUTION INTRAMUSCULAR; INTRAVENOUS at 11:20

## 2023-10-11 RX ADMIN — FLUOROURACIL 875 MG: 50 INJECTION, SOLUTION INTRAVENOUS at 14:57

## 2023-10-11 RX ADMIN — SODIUM CHLORIDE, PRESERVATIVE FREE 40 ML: 5 INJECTION INTRAVENOUS at 14:57

## 2023-10-11 RX ADMIN — OXALIPLATIN 185 MG: 5 INJECTION, SOLUTION INTRAVENOUS at 12:46

## 2023-10-11 RX ADMIN — PALONOSETRON 0.25 MG: 0.05 INJECTION, SOLUTION INTRAVENOUS at 11:15

## 2023-10-11 RX ADMIN — LEUCOVORIN CALCIUM 200 MG: 200 INJECTION, POWDER, LYOPHILIZED, FOR SOLUTION INTRAMUSCULAR; INTRAVENOUS at 12:46

## 2023-10-11 RX ADMIN — FLUOROURACIL 5000 MG: 50 INJECTION, SOLUTION INTRAVENOUS at 14:57

## 2023-10-11 RX ADMIN — DEXTROSE MONOHYDRATE 25 ML/HR: 50 INJECTION, SOLUTION INTRAVENOUS at 12:35

## 2023-10-11 RX ADMIN — SODIUM CHLORIDE 25 ML/HR: 9 INJECTION, SOLUTION INTRAVENOUS at 11:12

## 2023-10-11 RX ADMIN — SODIUM CHLORIDE 240 MG: 9 INJECTION, SOLUTION INTRAVENOUS at 11:48

## 2023-10-11 NOTE — PROGRESS NOTES
Chuck  at [facility], [address], [facility], [address] [place] [address]  [phone]       Hematology Oncology Progress Note      Patient: Anna Davis MRN: [id number]  SSN: [id number]    YOB: 1960  Age: 70 y.o. 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 Treatment:     Palliative therapy  mFOLFOX + Nivolumab cycle 6 day 1     Subjective:      Anna Davis is a 70 y.o. male who I am seeing in consultation for a new diagnosis of metastatic GEJ carcinoma. He has been experiencing some difficulty in swallowing for a few months. He has lost over 40 lbs and he is fatigued. An EGD revealed GEJ mass which was biopsied and came back as GEJ carcinoma. CT and a subsequent PET shows enlarged left supraclavicular and RP efrem disease. He has seen Ephraim Orellana at [facility]. He decided to receive systemic therapy with us. Interval History:     Mr. Naeem Schaffer is receiving mFOLFOX + Nivolumab. He is doing well and his eating has improved. Review of Systems:  Constitutional: negative  Eyes: negative  Ears, Nose, Mouth, Throat, and Face: negative  Respiratory: negative  Cardiovascular: negative  Gastrointestinal: negative  Genitourinary:negative  Integument/Breast: negative  Hematologic/Lymphatic: negative  Musculoskeletal: right rib pain  Neurological: negative    Review of systems was reviewed and updated as needed on 10/11/23.       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

## 2023-10-13 ENCOUNTER — HOSPITAL ENCOUNTER (OUTPATIENT)
Facility: HOSPITAL | Age: 63
Setting detail: INFUSION SERIES
End: 2023-10-13
Payer: COMMERCIAL

## 2023-10-13 ENCOUNTER — APPOINTMENT (OUTPATIENT)
Facility: HOSPITAL | Age: 63
End: 2023-10-13
Payer: COMMERCIAL

## 2023-10-13 VITALS
HEART RATE: 57 BPM | RESPIRATION RATE: 16 BRPM | DIASTOLIC BLOOD PRESSURE: 79 MMHG | TEMPERATURE: 98.8 F | OXYGEN SATURATION: 97 % | SYSTOLIC BLOOD PRESSURE: 147 MMHG

## 2023-10-13 DIAGNOSIS — C16.0 GE JUNCTION CARCINOMA (HCC): Primary | ICD-10-CM

## 2023-10-13 PROCEDURE — 96360 HYDRATION IV INFUSION INIT: CPT

## 2023-10-13 PROCEDURE — 2580000003 HC RX 258: Performed by: INTERNAL MEDICINE

## 2023-10-13 RX ORDER — 0.9 % SODIUM CHLORIDE 0.9 %
1000 INTRAVENOUS SOLUTION INTRAVENOUS ONCE
Status: COMPLETED | OUTPATIENT
Start: 2023-10-13 | End: 2023-10-13

## 2023-10-13 RX ORDER — SODIUM CHLORIDE 0.9 % (FLUSH) 0.9 %
5-40 SYRINGE (ML) INJECTION PRN
Status: DISCONTINUED | OUTPATIENT
Start: 2023-10-13 | End: 2023-10-14 | Stop reason: HOSPADM

## 2023-10-13 RX ADMIN — SODIUM CHLORIDE, PRESERVATIVE FREE 10 ML: 5 INJECTION INTRAVENOUS at 17:06

## 2023-10-13 RX ADMIN — SODIUM CHLORIDE, PRESERVATIVE FREE 10 ML: 5 INJECTION INTRAVENOUS at 16:05

## 2023-10-13 RX ADMIN — SODIUM CHLORIDE 1000 ML: 9 INJECTION, SOLUTION INTRAVENOUS at 16:05

## 2023-10-17 RX ORDER — SODIUM CHLORIDE 9 MG/ML
INJECTION, SOLUTION INTRAVENOUS CONTINUOUS
Status: CANCELLED | OUTPATIENT
Start: 2023-10-25

## 2023-10-17 RX ORDER — EPINEPHRINE 1 MG/ML
0.3 INJECTION, SOLUTION INTRAMUSCULAR; SUBCUTANEOUS PRN
Status: CANCELLED | OUTPATIENT
Start: 2023-10-25

## 2023-10-17 RX ORDER — ACETAMINOPHEN 325 MG/1
650 TABLET ORAL
Status: CANCELLED | OUTPATIENT
Start: 2023-10-25

## 2023-10-17 RX ORDER — ALBUTEROL SULFATE 90 UG/1
4 AEROSOL, METERED RESPIRATORY (INHALATION) PRN
Status: CANCELLED | OUTPATIENT
Start: 2023-10-25

## 2023-10-17 RX ORDER — ONDANSETRON 2 MG/ML
8 INJECTION INTRAMUSCULAR; INTRAVENOUS
Status: CANCELLED | OUTPATIENT
Start: 2023-10-25

## 2023-10-17 RX ORDER — SODIUM CHLORIDE 9 MG/ML
5-250 INJECTION, SOLUTION INTRAVENOUS PRN
Status: CANCELLED | OUTPATIENT
Start: 2023-10-25

## 2023-10-17 RX ORDER — HEPARIN 100 UNIT/ML
500 SYRINGE INTRAVENOUS PRN
Status: CANCELLED | OUTPATIENT
Start: 2023-10-27

## 2023-10-17 RX ORDER — DIPHENHYDRAMINE HYDROCHLORIDE 50 MG/ML
50 INJECTION INTRAMUSCULAR; INTRAVENOUS
Status: CANCELLED | OUTPATIENT
Start: 2023-10-25

## 2023-10-17 RX ORDER — SODIUM CHLORIDE 0.9 % (FLUSH) 0.9 %
5-40 SYRINGE (ML) INJECTION PRN
Status: CANCELLED | OUTPATIENT
Start: 2023-10-27

## 2023-10-17 RX ORDER — SODIUM CHLORIDE 9 MG/ML
5-250 INJECTION, SOLUTION INTRAVENOUS PRN
Status: CANCELLED | OUTPATIENT
Start: 2023-10-27

## 2023-10-17 RX ORDER — 0.9 % SODIUM CHLORIDE 0.9 %
1000 INTRAVENOUS SOLUTION INTRAVENOUS ONCE
Status: CANCELLED
Start: 2023-10-27 | End: 2023-10-27

## 2023-10-17 RX ORDER — MEPERIDINE HYDROCHLORIDE 25 MG/ML
12.5 INJECTION INTRAMUSCULAR; INTRAVENOUS; SUBCUTANEOUS PRN
Status: CANCELLED | OUTPATIENT
Start: 2023-10-25

## 2023-10-17 RX ORDER — HEPARIN 100 UNIT/ML
500 SYRINGE INTRAVENOUS PRN
Status: CANCELLED | OUTPATIENT
Start: 2023-10-25

## 2023-10-20 NOTE — PROGRESS NOTES
Sree Escalante is a 70 y.o. male here for treatment for prostate cancer. Pt states his rash has slightly progressed. Had bruising on his back along his pants line about 4 inches long. He got flu shot last Friday. 1. Have you been to the ER, urgent care clinic since your last visit? Hospitalized since your last visit?     no    2. Have you seen or consulted any other health care providers outside of the [address] [place] [address] since your last visit? Include any pap smears or colon screening.   no

## 2023-10-25 ENCOUNTER — OFFICE VISIT (OUTPATIENT)
Age: 63
End: 2023-10-25
Payer: COMMERCIAL

## 2023-10-25 ENCOUNTER — HOSPITAL ENCOUNTER (OUTPATIENT)
Facility: HOSPITAL | Age: 63
Setting detail: INFUSION SERIES
Discharge: HOME | End: 2023-10-25
Payer: COMMERCIAL

## 2023-10-25 VITALS
TEMPERATURE: 98.1 F | DIASTOLIC BLOOD PRESSURE: 75 MMHG | OXYGEN SATURATION: 96 % | WEIGHT: 220.68 LBS | HEIGHT: 70 IN | SYSTOLIC BLOOD PRESSURE: 149 MMHG | HEART RATE: 73 BPM | BODY MASS INDEX: 31.59 KG/M2

## 2023-10-25 VITALS
WEIGHT: 220.7 LBS | SYSTOLIC BLOOD PRESSURE: 172 MMHG | HEIGHT: 70 IN | TEMPERATURE: 98.1 F | DIASTOLIC BLOOD PRESSURE: 89 MMHG | OXYGEN SATURATION: 97 % | BODY MASS INDEX: 31.6 KG/M2 | HEART RATE: 74 BPM

## 2023-10-25 DIAGNOSIS — L27.0 DERMATITIS, DRUG-INDUCED: ICD-10-CM

## 2023-10-25 DIAGNOSIS — Z51.11 ENCOUNTER FOR ANTINEOPLASTIC CHEMOTHERAPY AND IMMUNOTHERAPY: ICD-10-CM

## 2023-10-25 DIAGNOSIS — Z51.12 ENCOUNTER FOR ANTINEOPLASTIC CHEMOTHERAPY AND IMMUNOTHERAPY: ICD-10-CM

## 2023-10-25 DIAGNOSIS — C16.0 GE JUNCTION CARCINOMA (HCC): Primary | ICD-10-CM

## 2023-10-25 DIAGNOSIS — C77.2 MALIGNANT NEOPLASM METASTATIC TO INTRA-ABDOMINAL LYMPH NODE (HCC): Primary | ICD-10-CM

## 2023-10-25 LAB
ALBUMIN SERPL-MCNC: 3.5 G/DL (ref 3.5–5)
ALBUMIN/GLOB SERPL: 0.9 (ref 1.1–2.2)
ALP SERPL-CCNC: 131 U/L (ref 45–117)
ALT SERPL-CCNC: 55 U/L (ref 12–78)
ANION GAP SERPL CALC-SCNC: 6 MMOL/L (ref 5–15)
AST SERPL-CCNC: 38 U/L (ref 15–37)
BASOPHILS # BLD: 0.1 K/UL (ref 0–0.1)
BASOPHILS NFR BLD: 2 % (ref 0–1)
BILIRUB SERPL-MCNC: 0.6 MG/DL (ref 0.2–1)
BUN SERPL-MCNC: 14 MG/DL (ref 6–20)
BUN/CREAT SERPL: 16 (ref 12–20)
CALCIUM SERPL-MCNC: 8.9 MG/DL (ref 8.5–10.1)
CHLORIDE SERPL-SCNC: 108 MMOL/L (ref 97–108)
CO2 SERPL-SCNC: 26 MMOL/L (ref 21–32)
CREAT SERPL-MCNC: 0.86 MG/DL (ref 0.7–1.3)
DIFFERENTIAL METHOD BLD: ABNORMAL
EOSINOPHIL # BLD: 0.1 K/UL (ref 0–0.4)
EOSINOPHIL NFR BLD: 3 % (ref 0–7)
ERYTHROCYTE [DISTWIDTH] IN BLOOD BY AUTOMATED COUNT: 15 % (ref 11.5–14.5)
GLOBULIN SER CALC-MCNC: 3.7 G/DL (ref 2–4)
GLUCOSE SERPL-MCNC: 218 MG/DL (ref 65–100)
HCT VFR BLD AUTO: 43 % (ref 36.6–50.3)
HGB BLD-MCNC: 15.2 G/DL (ref 12.1–17)
IMM GRANULOCYTES # BLD AUTO: 0 K/UL (ref 0–0.04)
IMM GRANULOCYTES NFR BLD AUTO: 0 % (ref 0–0.5)
LYMPHOCYTES # BLD: 1 K/UL (ref 0.8–3.5)
LYMPHOCYTES NFR BLD: 26 % (ref 12–49)
MCH RBC QN AUTO: 32.4 PG (ref 26–34)
MCHC RBC AUTO-ENTMCNC: 35.3 G/DL (ref 30–36.5)
MCV RBC AUTO: 91.7 FL (ref 80–99)
MONOCYTES # BLD: 0.7 K/UL (ref 0–1)
MONOCYTES NFR BLD: 18 % (ref 5–13)
NEUTS SEG # BLD: 2 K/UL (ref 1.8–8)
NEUTS SEG NFR BLD: 51 % (ref 32–75)
NRBC # BLD: 0 K/UL (ref 0–0.01)
NRBC BLD-RTO: 0 PER 100 WBC
PLATELET # BLD AUTO: 121 K/UL (ref 150–400)
PMV BLD AUTO: 9.7 FL (ref 8.9–12.9)
POTASSIUM SERPL-SCNC: 3.7 MMOL/L (ref 3.5–5.1)
PROT SERPL-MCNC: 7.2 G/DL (ref 6.4–8.2)
RBC # BLD AUTO: 4.69 M/UL (ref 4.1–5.7)
SODIUM SERPL-SCNC: 140 MMOL/L (ref 136–145)
TSH SERPL DL<=0.05 MIU/L-ACNC: 1.61 UIU/ML (ref 0.36–3.74)
WBC # BLD AUTO: 3.9 K/UL (ref 4.1–11.1)

## 2023-10-25 PROCEDURE — 6360000002 HC RX W HCPCS: Performed by: INTERNAL MEDICINE

## 2023-10-25 PROCEDURE — 3078F DIAST BP <80 MM HG: CPT | Performed by: INTERNAL MEDICINE

## 2023-10-25 PROCEDURE — 96375 TX/PRO/DX INJ NEW DRUG ADDON: CPT

## 2023-10-25 PROCEDURE — 96413 CHEMO IV INFUSION 1 HR: CPT

## 2023-10-25 PROCEDURE — 84443 ASSAY THYROID STIM HORMONE: CPT

## 2023-10-25 PROCEDURE — 36415 COLL VENOUS BLD VENIPUNCTURE: CPT

## 2023-10-25 PROCEDURE — 96368 THER/DIAG CONCURRENT INF: CPT

## 2023-10-25 PROCEDURE — G0498 CHEMO EXTEND IV INFUS W/PUMP: HCPCS

## 2023-10-25 PROCEDURE — 96416 CHEMO PROLONG INFUSE W/PUMP: CPT

## 2023-10-25 PROCEDURE — 99215 OFFICE O/P EST HI 40 MIN: CPT | Performed by: INTERNAL MEDICINE

## 2023-10-25 PROCEDURE — 96415 CHEMO IV INFUSION ADDL HR: CPT

## 2023-10-25 PROCEDURE — 2580000003 HC RX 258: Performed by: INTERNAL MEDICINE

## 2023-10-25 PROCEDURE — 3077F SYST BP >= 140 MM HG: CPT | Performed by: INTERNAL MEDICINE

## 2023-10-25 PROCEDURE — 85025 COMPLETE CBC W/AUTO DIFF WBC: CPT

## 2023-10-25 PROCEDURE — 96417 CHEMO IV INFUS EACH ADDL SEQ: CPT

## 2023-10-25 PROCEDURE — 1123F ACP DISCUSS/DSCN MKR DOCD: CPT | Performed by: INTERNAL MEDICINE

## 2023-10-25 PROCEDURE — 96411 CHEMO IV PUSH ADDL DRUG: CPT

## 2023-10-25 PROCEDURE — 80053 COMPREHEN METABOLIC PANEL: CPT

## 2023-10-25 RX ORDER — DEXTROSE MONOHYDRATE 50 MG/ML
5-250 INJECTION, SOLUTION INTRAVENOUS PRN
Status: DISCONTINUED | OUTPATIENT
Start: 2023-10-25 | End: 2023-10-26 | Stop reason: HOSPADM

## 2023-10-25 RX ORDER — PALONOSETRON 0.05 MG/ML
0.25 INJECTION, SOLUTION INTRAVENOUS ONCE
Status: COMPLETED | OUTPATIENT
Start: 2023-10-25 | End: 2023-10-25

## 2023-10-25 RX ORDER — DIAPER,BRIEF,INFANT-TODD,DISP
EACH MISCELLANEOUS
Qty: 453.6 G | Refills: 3 | Status: SHIPPED | OUTPATIENT
Start: 2023-10-25 | End: 2023-11-01

## 2023-10-25 RX ORDER — SODIUM CHLORIDE 9 MG/ML
5-250 INJECTION, SOLUTION INTRAVENOUS PRN
Status: DISCONTINUED | OUTPATIENT
Start: 2023-10-25 | End: 2023-10-26 | Stop reason: HOSPADM

## 2023-10-25 RX ORDER — SODIUM CHLORIDE 0.9 % (FLUSH) 0.9 %
5-40 SYRINGE (ML) INJECTION PRN
Status: DISCONTINUED | OUTPATIENT
Start: 2023-10-25 | End: 2023-10-26 | Stop reason: HOSPADM

## 2023-10-25 RX ORDER — FLUOROURACIL 50 MG/ML
400 INJECTION, SOLUTION INTRAVENOUS ONCE
Status: COMPLETED | OUTPATIENT
Start: 2023-10-25 | End: 2023-10-25

## 2023-10-25 RX ADMIN — OXALIPLATIN 185 MG: 5 INJECTION, SOLUTION INTRAVENOUS at 12:20

## 2023-10-25 RX ADMIN — SODIUM CHLORIDE, PRESERVATIVE FREE 40 ML: 5 INJECTION INTRAVENOUS at 14:30

## 2023-10-25 RX ADMIN — LEUCOVORIN CALCIUM 200 MG: 200 INJECTION, POWDER, LYOPHILIZED, FOR SOLUTION INTRAMUSCULAR; INTRAVENOUS at 12:20

## 2023-10-25 RX ADMIN — DEXTROSE MONOHYDRATE 25 ML/HR: 50 INJECTION, SOLUTION INTRAVENOUS at 12:20

## 2023-10-25 RX ADMIN — FLUOROURACIL 875 MG: 50 INJECTION, SOLUTION INTRAVENOUS at 14:30

## 2023-10-25 RX ADMIN — FLUOROURACIL 5000 MG: 50 INJECTION, SOLUTION INTRAVENOUS at 14:30

## 2023-10-25 RX ADMIN — SODIUM CHLORIDE 25 ML/HR: 9 INJECTION, SOLUTION INTRAVENOUS at 10:41

## 2023-10-25 RX ADMIN — PALONOSETRON 0.25 MG: 0.05 INJECTION, SOLUTION INTRAVENOUS at 11:14

## 2023-10-25 RX ADMIN — SODIUM CHLORIDE 240 MG: 9 INJECTION, SOLUTION INTRAVENOUS at 11:22

## 2023-10-25 RX ADMIN — DEXAMETHASONE SODIUM PHOSPHATE 12 MG: 4 INJECTION, SOLUTION INTRAMUSCULAR; INTRAVENOUS at 10:45

## 2023-10-25 NOTE — PROGRESS NOTES
Chuck  [facility], [address] [facility] [address] [place] [address]  [phone]       Hematology Oncology Progress Note      Patient: Khris Agudelo MRN: [id number]  SSN: [id number]    YOB: 1960  Age: 70 y.o. 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 Treatment:     Palliative therapy  mFOLFOX + Nivolumab cycle 7 day 1     Subjective:      Khris Agudelo is a 70 y.o. male who I am seeing in consultation for a new diagnosis of metastatic GEJ carcinoma. He has been experiencing some difficulty in swallowing for a few months. He has lost over 40 lbs and he is fatigued. An EGD revealed GEJ mass which was biopsied and came back as GEJ carcinoma. CT and a subsequent PET shows enlarged left supraclavicular and RP efrem disease. He has seen Bassam Rivera at [facility]. He decided to receive systemic therapy with us. Interval History:     Mr. Wesley Stephenson is receiving mFOLFOX + Nivolumab. He is doing well and his eating has improved. He has developed rash on the arms and trunk which is erythematous. Review of Systems:  Constitutional: negative  Eyes: negative  Ears, Nose, Mouth, Throat, and Face: negative  Respiratory: negative  Cardiovascular: negative  Gastrointestinal: negative  Genitourinary:negative  Integument/Breast: rash in arms and trunk  Hematologic/Lymphatic: negative  Musculoskeletal: right rib pain  Neurological: negative    Review of systems was reviewed and updated as needed on 10/25/23.       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

## 2023-10-25 NOTE — PROGRESS NOTES
[facility] - Chemotherapy Progress Note    Pt admit to [facility] for ambulatory in stable condition. Assessment completed. No new concerns voiced. chest port accessed with  William Arreola w/o issue, with positive blood return. Labs drawn and sent to lab.     vitals    labs    Medications:      Two nurses verified prior to administering:  Drug name  Drug dose  Infusion volume or drug volume when prepared in a syringe  Rate of administration  Route of administration  Expiration dates and/or times  Appearance and physical integrity of the drugs  Rate set on infusion pump, when used  Sequencing of drug administration         Pt tolerated treatment well. Port maintained positive blood return throughout treatment, flushed with positive blood return at conclusion and William Arreola removed. D/c home ambulatory in no distress.  Pt aware of next appointment scheduled for     appts

## 2023-10-27 ENCOUNTER — APPOINTMENT (OUTPATIENT)
Facility: HOSPITAL | Age: 63
End: 2023-10-27
Payer: COMMERCIAL

## 2023-10-27 ENCOUNTER — HOSPITAL ENCOUNTER (OUTPATIENT)
Facility: HOSPITAL | Age: 63
Setting detail: INFUSION SERIES
Discharge: HOME | End: 2023-10-27
Payer: COMMERCIAL

## 2023-10-27 VITALS
OXYGEN SATURATION: 98 % | HEART RATE: 65 BPM | DIASTOLIC BLOOD PRESSURE: 87 MMHG | SYSTOLIC BLOOD PRESSURE: 148 MMHG | RESPIRATION RATE: 16 BRPM | TEMPERATURE: 98.1 F

## 2023-10-27 DIAGNOSIS — C16.0 GE JUNCTION CARCINOMA (HCC): Primary | ICD-10-CM

## 2023-10-27 PROCEDURE — 2580000003 HC RX 258: Performed by: INTERNAL MEDICINE

## 2023-10-27 PROCEDURE — 96360 HYDRATION IV INFUSION INIT: CPT

## 2023-10-27 RX ORDER — 0.9 % SODIUM CHLORIDE 0.9 %
1000 INTRAVENOUS SOLUTION INTRAVENOUS ONCE
Status: COMPLETED | OUTPATIENT
Start: 2023-10-27 | End: 2023-10-27

## 2023-10-27 RX ORDER — SODIUM CHLORIDE 0.9 % (FLUSH) 0.9 %
5-40 SYRINGE (ML) INJECTION PRN
Status: DISCONTINUED | OUTPATIENT
Start: 2023-10-27 | End: 2023-10-28 | Stop reason: HOSPADM

## 2023-10-27 RX ADMIN — SODIUM CHLORIDE 1000 ML: 9 INJECTION, SOLUTION INTRAVENOUS at 16:04

## 2023-10-31 RX ORDER — ALBUTEROL SULFATE 90 UG/1
4 AEROSOL, METERED RESPIRATORY (INHALATION) PRN
Status: CANCELLED | OUTPATIENT
Start: 2023-11-08

## 2023-10-31 RX ORDER — DIPHENHYDRAMINE HYDROCHLORIDE 50 MG/ML
50 INJECTION INTRAMUSCULAR; INTRAVENOUS
Status: CANCELLED | OUTPATIENT
Start: 2023-11-08

## 2023-10-31 RX ORDER — EPINEPHRINE 1 MG/ML
0.3 INJECTION, SOLUTION INTRAMUSCULAR; SUBCUTANEOUS PRN
Status: CANCELLED | OUTPATIENT
Start: 2023-11-08

## 2023-10-31 RX ORDER — ACETAMINOPHEN 325 MG/1
650 TABLET ORAL
Status: CANCELLED | OUTPATIENT
Start: 2023-11-08

## 2023-10-31 RX ORDER — HEPARIN 100 UNIT/ML
500 SYRINGE INTRAVENOUS PRN
Status: CANCELLED | OUTPATIENT
Start: 2023-11-10

## 2023-10-31 RX ORDER — 0.9 % SODIUM CHLORIDE 0.9 %
1000 INTRAVENOUS SOLUTION INTRAVENOUS ONCE
Status: CANCELLED
Start: 2023-11-10 | End: 2023-11-10

## 2023-10-31 RX ORDER — SODIUM CHLORIDE 9 MG/ML
5-250 INJECTION, SOLUTION INTRAVENOUS PRN
Status: CANCELLED | OUTPATIENT
Start: 2023-11-10

## 2023-10-31 RX ORDER — MEPERIDINE HYDROCHLORIDE 25 MG/ML
12.5 INJECTION INTRAMUSCULAR; INTRAVENOUS; SUBCUTANEOUS PRN
Status: CANCELLED | OUTPATIENT
Start: 2023-11-08

## 2023-10-31 RX ORDER — ONDANSETRON 2 MG/ML
8 INJECTION INTRAMUSCULAR; INTRAVENOUS
Status: CANCELLED | OUTPATIENT
Start: 2023-11-08

## 2023-10-31 RX ORDER — SODIUM CHLORIDE 9 MG/ML
INJECTION, SOLUTION INTRAVENOUS CONTINUOUS
Status: CANCELLED | OUTPATIENT
Start: 2023-11-08

## 2023-10-31 RX ORDER — SODIUM CHLORIDE 0.9 % (FLUSH) 0.9 %
5-40 SYRINGE (ML) INJECTION PRN
Status: CANCELLED | OUTPATIENT
Start: 2023-11-10

## 2023-11-02 NOTE — PROGRESS NOTES
Shama Paz is a 70 y.o. male here for treatment for GE junction cancer. Pt states he feels the worst when he gets his pump off and hydration. Takes Zofran and takes care of it. His nose runs all the time. His rash is better. 1. Have you been to the ER, urgent care clinic since your last visit? Hospitalized since your last visit?    no    2. Have you seen or consulted any other health care providers outside of the [address] [place] [address] Avenue since your last visit? Include any pap smears or colon screening.   no

## 2023-11-08 ENCOUNTER — HOSPITAL ENCOUNTER (OUTPATIENT)
Facility: HOSPITAL | Age: 63
Setting detail: INFUSION SERIES
Discharge: HOME | End: 2023-11-08
Payer: COMMERCIAL

## 2023-11-08 ENCOUNTER — OFFICE VISIT (OUTPATIENT)
Age: 63
End: 2023-11-08
Payer: COMMERCIAL

## 2023-11-08 VITALS
SYSTOLIC BLOOD PRESSURE: 149 MMHG | BODY MASS INDEX: 32.26 KG/M2 | WEIGHT: 225.31 LBS | DIASTOLIC BLOOD PRESSURE: 83 MMHG | OXYGEN SATURATION: 99 % | TEMPERATURE: 98 F | HEART RATE: 73 BPM | HEIGHT: 70 IN | RESPIRATION RATE: 16 BRPM

## 2023-11-08 VITALS
BODY MASS INDEX: 32.25 KG/M2 | SYSTOLIC BLOOD PRESSURE: 170 MMHG | TEMPERATURE: 98 F | OXYGEN SATURATION: 99 % | HEIGHT: 70 IN | WEIGHT: 225.3 LBS | RESPIRATION RATE: 16 BRPM | DIASTOLIC BLOOD PRESSURE: 90 MMHG | HEART RATE: 67 BPM

## 2023-11-08 DIAGNOSIS — C16.0 GE JUNCTION CARCINOMA (HCC): Primary | ICD-10-CM

## 2023-11-08 DIAGNOSIS — Z51.11 ENCOUNTER FOR ANTINEOPLASTIC CHEMOTHERAPY AND IMMUNOTHERAPY: ICD-10-CM

## 2023-11-08 DIAGNOSIS — C77.2 MALIGNANT NEOPLASM METASTATIC TO INTRA-ABDOMINAL LYMPH NODE (HCC): Primary | ICD-10-CM

## 2023-11-08 DIAGNOSIS — Z51.12 ENCOUNTER FOR ANTINEOPLASTIC CHEMOTHERAPY AND IMMUNOTHERAPY: ICD-10-CM

## 2023-11-08 LAB
ALBUMIN SERPL-MCNC: 3.4 G/DL (ref 3.5–5)
ALBUMIN/GLOB SERPL: 0.9 (ref 1.1–2.2)
ALP SERPL-CCNC: 130 U/L (ref 45–117)
ALT SERPL-CCNC: 44 U/L (ref 12–78)
ANION GAP SERPL CALC-SCNC: 5 MMOL/L (ref 5–15)
AST SERPL-CCNC: 31 U/L (ref 15–37)
BASOPHILS # BLD: 0 K/UL (ref 0–0.1)
BASOPHILS NFR BLD: 1 % (ref 0–1)
BILIRUB SERPL-MCNC: 0.4 MG/DL (ref 0.2–1)
BUN SERPL-MCNC: 17 MG/DL (ref 6–20)
BUN/CREAT SERPL: 20 (ref 12–20)
CALCIUM SERPL-MCNC: 8.7 MG/DL (ref 8.5–10.1)
CHLORIDE SERPL-SCNC: 109 MMOL/L (ref 97–108)
CO2 SERPL-SCNC: 27 MMOL/L (ref 21–32)
CREAT SERPL-MCNC: 0.83 MG/DL (ref 0.7–1.3)
DIFFERENTIAL METHOD BLD: ABNORMAL
EOSINOPHIL # BLD: 0.1 K/UL (ref 0–0.4)
EOSINOPHIL NFR BLD: 3 % (ref 0–7)
ERYTHROCYTE [DISTWIDTH] IN BLOOD BY AUTOMATED COUNT: 14.1 % (ref 11.5–14.5)
GLOBULIN SER CALC-MCNC: 3.8 G/DL (ref 2–4)
GLUCOSE SERPL-MCNC: 198 MG/DL (ref 65–100)
HCT VFR BLD AUTO: 42.3 % (ref 36.6–50.3)
HGB BLD-MCNC: 15.1 G/DL (ref 12.1–17)
IMM GRANULOCYTES # BLD AUTO: 0 K/UL (ref 0–0.04)
IMM GRANULOCYTES NFR BLD AUTO: 0 % (ref 0–0.5)
LYMPHOCYTES # BLD: 1.2 K/UL (ref 0.8–3.5)
LYMPHOCYTES NFR BLD: 31 % (ref 12–49)
MCH RBC QN AUTO: 33.6 PG (ref 26–34)
MCHC RBC AUTO-ENTMCNC: 35.7 G/DL (ref 30–36.5)
MCV RBC AUTO: 94.2 FL (ref 80–99)
MONOCYTES # BLD: 0.7 K/UL (ref 0–1)
MONOCYTES NFR BLD: 19 % (ref 5–13)
NEUTS SEG # BLD: 1.7 K/UL (ref 1.8–8)
NEUTS SEG NFR BLD: 46 % (ref 32–75)
NRBC # BLD: 0 K/UL (ref 0–0.01)
NRBC BLD-RTO: 0 PER 100 WBC
PLATELET # BLD AUTO: 104 K/UL (ref 150–400)
PMV BLD AUTO: 9.6 FL (ref 8.9–12.9)
POTASSIUM SERPL-SCNC: 3.8 MMOL/L (ref 3.5–5.1)
PROT SERPL-MCNC: 7.2 G/DL (ref 6.4–8.2)
RBC # BLD AUTO: 4.49 M/UL (ref 4.1–5.7)
SODIUM SERPL-SCNC: 141 MMOL/L (ref 136–145)
WBC # BLD AUTO: 3.7 K/UL (ref 4.1–11.1)

## 2023-11-08 PROCEDURE — 3079F DIAST BP 80-89 MM HG: CPT | Performed by: INTERNAL MEDICINE

## 2023-11-08 PROCEDURE — 2580000003 HC RX 258: Performed by: INTERNAL MEDICINE

## 2023-11-08 PROCEDURE — 96416 CHEMO PROLONG INFUSE W/PUMP: CPT

## 2023-11-08 PROCEDURE — 3077F SYST BP >= 140 MM HG: CPT | Performed by: INTERNAL MEDICINE

## 2023-11-08 PROCEDURE — 96375 TX/PRO/DX INJ NEW DRUG ADDON: CPT

## 2023-11-08 PROCEDURE — 36415 COLL VENOUS BLD VENIPUNCTURE: CPT

## 2023-11-08 PROCEDURE — 96366 THER/PROPH/DIAG IV INF ADDON: CPT

## 2023-11-08 PROCEDURE — 6360000002 HC RX W HCPCS: Performed by: INTERNAL MEDICINE

## 2023-11-08 PROCEDURE — 96368 THER/DIAG CONCURRENT INF: CPT

## 2023-11-08 PROCEDURE — 96417 CHEMO IV INFUS EACH ADDL SEQ: CPT

## 2023-11-08 PROCEDURE — 80053 COMPREHEN METABOLIC PANEL: CPT

## 2023-11-08 PROCEDURE — 96413 CHEMO IV INFUSION 1 HR: CPT

## 2023-11-08 PROCEDURE — 1123F ACP DISCUSS/DSCN MKR DOCD: CPT | Performed by: INTERNAL MEDICINE

## 2023-11-08 PROCEDURE — 85025 COMPLETE CBC W/AUTO DIFF WBC: CPT

## 2023-11-08 PROCEDURE — 96411 CHEMO IV PUSH ADDL DRUG: CPT

## 2023-11-08 PROCEDURE — 99215 OFFICE O/P EST HI 40 MIN: CPT | Performed by: INTERNAL MEDICINE

## 2023-11-08 PROCEDURE — 96415 CHEMO IV INFUSION ADDL HR: CPT

## 2023-11-08 RX ORDER — DEXTROSE MONOHYDRATE 50 MG/ML
5-250 INJECTION, SOLUTION INTRAVENOUS PRN
Status: DISCONTINUED | OUTPATIENT
Start: 2023-11-08 | End: 2023-11-09 | Stop reason: HOSPADM

## 2023-11-08 RX ORDER — SODIUM CHLORIDE 9 MG/ML
5-250 INJECTION, SOLUTION INTRAVENOUS PRN
Status: DISCONTINUED | OUTPATIENT
Start: 2023-11-08 | End: 2023-11-09 | Stop reason: HOSPADM

## 2023-11-08 RX ORDER — HEPARIN 100 UNIT/ML
500 SYRINGE INTRAVENOUS PRN
Status: DISCONTINUED | OUTPATIENT
Start: 2023-11-08 | End: 2023-11-09 | Stop reason: HOSPADM

## 2023-11-08 RX ORDER — PALONOSETRON 0.05 MG/ML
0.25 INJECTION, SOLUTION INTRAVENOUS ONCE
Status: COMPLETED | OUTPATIENT
Start: 2023-11-08 | End: 2023-11-08

## 2023-11-08 RX ORDER — SODIUM CHLORIDE 0.9 % (FLUSH) 0.9 %
5-40 SYRINGE (ML) INJECTION PRN
Status: DISCONTINUED | OUTPATIENT
Start: 2023-11-08 | End: 2023-11-09 | Stop reason: HOSPADM

## 2023-11-08 RX ORDER — FLUOROURACIL 50 MG/ML
400 INJECTION, SOLUTION INTRAVENOUS ONCE
Status: COMPLETED | OUTPATIENT
Start: 2023-11-08 | End: 2023-11-08

## 2023-11-08 RX ADMIN — DEXTROSE MONOHYDRATE 25 ML/HR: 50 INJECTION, SOLUTION INTRAVENOUS at 12:16

## 2023-11-08 RX ADMIN — FLUOROURACIL 5000 MG: 50 INJECTION, SOLUTION INTRAVENOUS at 14:40

## 2023-11-08 RX ADMIN — OXALIPLATIN 185 MG: 5 INJECTION, SOLUTION INTRAVENOUS at 12:25

## 2023-11-08 RX ADMIN — SODIUM CHLORIDE 25 ML/HR: 9 INJECTION, SOLUTION INTRAVENOUS at 10:02

## 2023-11-08 RX ADMIN — FLUOROURACIL 875 MG: 50 INJECTION, SOLUTION INTRAVENOUS at 14:35

## 2023-11-08 RX ADMIN — SODIUM CHLORIDE 240 MG: 9 INJECTION, SOLUTION INTRAVENOUS at 11:20

## 2023-11-08 RX ADMIN — LEUCOVORIN CALCIUM 200 MG: 200 INJECTION, POWDER, LYOPHILIZED, FOR SOLUTION INTRAMUSCULAR; INTRAVENOUS at 12:25

## 2023-11-08 RX ADMIN — SODIUM CHLORIDE, PRESERVATIVE FREE 10 ML: 5 INJECTION INTRAVENOUS at 08:30

## 2023-11-08 RX ADMIN — PALONOSETRON 0.25 MG: 0.05 INJECTION, SOLUTION INTRAVENOUS at 10:03

## 2023-11-08 RX ADMIN — DEXAMETHASONE SODIUM PHOSPHATE 12 MG: 4 INJECTION, SOLUTION INTRAMUSCULAR; INTRAVENOUS at 10:05

## 2023-11-08 NOTE — PROGRESS NOTES
Chuck  at [facility], [address], [facility], [address] [place] [address]  [phone]       Hematology Oncology Progress Note      Patient: Anna Davis MRN: [id number]  SSN: [id number]    YOB: 1960  Age: 70 y.o. 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 Treatment:     Palliative therapy  mFOLFOX + Nivolumab cycle 8 day 1     Subjective:      Anna Davis is a 70 y.o. male who I am seeing in consultation for a new diagnosis of metastatic GEJ carcinoma. He has been experiencing some difficulty in swallowing for a few months. He has lost over 40 lbs and he is fatigued. An EGD revealed GEJ mass which was biopsied and came back as GEJ carcinoma. CT and a subsequent PET shows enlarged left supraclavicular and RP efrem disease. He has seen Ephraim Orellana at [facility]. He decided to receive systemic therapy with us. Interval History:     Mr. Naeem Schaffer is receiving mFOLFOX + Nivolumab. He is doing well and his eating has improved. He has developed rash on the arms and trunk which is erythematous. Review of Systems:  Constitutional: negative  Eyes: negative  Ears, Nose, Mouth, Throat, and Face: negative  Respiratory: negative  Cardiovascular: negative  Gastrointestinal: negative  Genitourinary:negative  Integument/Breast: rash in arms and trunk  Hematologic/Lymphatic: negative  Musculoskeletal: right rib pain  Neurological: negative    Review of systems was reviewed and updated as needed on 11/08/23.       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

## 2023-11-10 ENCOUNTER — HOSPITAL ENCOUNTER (OUTPATIENT)
Facility: HOSPITAL | Age: 63
Setting detail: INFUSION SERIES
Discharge: HOME | End: 2023-11-10
Payer: COMMERCIAL

## 2023-11-10 VITALS
SYSTOLIC BLOOD PRESSURE: 163 MMHG | RESPIRATION RATE: 16 BRPM | HEART RATE: 60 BPM | DIASTOLIC BLOOD PRESSURE: 83 MMHG | TEMPERATURE: 98 F | OXYGEN SATURATION: 97 %

## 2023-11-10 DIAGNOSIS — C16.0 GE JUNCTION CARCINOMA (HCC): Primary | ICD-10-CM

## 2023-11-10 PROCEDURE — 96360 HYDRATION IV INFUSION INIT: CPT

## 2023-11-10 PROCEDURE — 2580000003 HC RX 258: Performed by: INTERNAL MEDICINE

## 2023-11-10 RX ORDER — SODIUM CHLORIDE 0.9 % (FLUSH) 0.9 %
5-40 SYRINGE (ML) INJECTION PRN
Status: DISCONTINUED | OUTPATIENT
Start: 2023-11-10 | End: 2023-11-11 | Stop reason: HOSPADM

## 2023-11-10 RX ORDER — 0.9 % SODIUM CHLORIDE 0.9 %
1000 INTRAVENOUS SOLUTION INTRAVENOUS ONCE
Status: COMPLETED | OUTPATIENT
Start: 2023-11-10 | End: 2023-11-10

## 2023-11-10 RX ADMIN — SODIUM CHLORIDE 1000 ML: 9 INJECTION, SOLUTION INTRAVENOUS at 16:04

## 2023-11-14 DIAGNOSIS — C77.2 MALIGNANT NEOPLASM METASTATIC TO INTRA-ABDOMINAL LYMPH NODE (HCC): Primary | ICD-10-CM

## 2023-11-14 RX ORDER — ONDANSETRON 2 MG/ML
8 INJECTION INTRAMUSCULAR; INTRAVENOUS
Status: CANCELLED | OUTPATIENT
Start: 2023-11-22

## 2023-11-14 RX ORDER — MEPERIDINE HYDROCHLORIDE 25 MG/ML
12.5 INJECTION INTRAMUSCULAR; INTRAVENOUS; SUBCUTANEOUS PRN
Status: CANCELLED | OUTPATIENT
Start: 2023-11-22

## 2023-11-14 RX ORDER — ACETAMINOPHEN 325 MG/1
650 TABLET ORAL
Status: CANCELLED | OUTPATIENT
Start: 2023-11-22

## 2023-11-14 RX ORDER — HEPARIN 100 UNIT/ML
500 SYRINGE INTRAVENOUS PRN
Status: CANCELLED | OUTPATIENT
Start: 2023-11-24

## 2023-11-14 RX ORDER — SODIUM CHLORIDE 9 MG/ML
5-250 INJECTION, SOLUTION INTRAVENOUS PRN
Status: CANCELLED | OUTPATIENT
Start: 2023-11-22

## 2023-11-14 RX ORDER — DIPHENHYDRAMINE HYDROCHLORIDE 50 MG/ML
50 INJECTION INTRAMUSCULAR; INTRAVENOUS
Status: CANCELLED | OUTPATIENT
Start: 2023-11-22

## 2023-11-14 RX ORDER — SODIUM CHLORIDE 9 MG/ML
INJECTION, SOLUTION INTRAVENOUS CONTINUOUS
Status: CANCELLED | OUTPATIENT
Start: 2023-11-22

## 2023-11-14 RX ORDER — ALBUTEROL SULFATE 90 UG/1
4 AEROSOL, METERED RESPIRATORY (INHALATION) PRN
Status: CANCELLED | OUTPATIENT
Start: 2023-11-22

## 2023-11-14 RX ORDER — HEPARIN 100 UNIT/ML
500 SYRINGE INTRAVENOUS PRN
Status: CANCELLED | OUTPATIENT
Start: 2023-11-22

## 2023-11-14 RX ORDER — SODIUM CHLORIDE 0.9 % (FLUSH) 0.9 %
5-40 SYRINGE (ML) INJECTION PRN
Status: CANCELLED | OUTPATIENT
Start: 2023-11-24

## 2023-11-14 RX ORDER — 0.9 % SODIUM CHLORIDE 0.9 %
1000 INTRAVENOUS SOLUTION INTRAVENOUS ONCE
Status: CANCELLED
Start: 2023-11-24 | End: 2023-11-24

## 2023-11-14 RX ORDER — SODIUM CHLORIDE 9 MG/ML
5-250 INJECTION, SOLUTION INTRAVENOUS PRN
Status: CANCELLED | OUTPATIENT
Start: 2023-11-24

## 2023-11-14 RX ORDER — EPINEPHRINE 1 MG/ML
0.3 INJECTION, SOLUTION INTRAMUSCULAR; SUBCUTANEOUS PRN
Status: CANCELLED | OUTPATIENT
Start: 2023-11-22

## 2023-11-14 NOTE — PROGRESS NOTES
PET SCAN DENIED. PER DR. Gayle Stevens, CHANGE TO CT CHEST ABD PELV. CALLED PT TO LET HIM KNOW THE PLAN AND SCHEDULING SHOULD BE CALLING HIM.       MARIN FROM DR Katz Carry CT CHEST ABD PELV W CONTRAST ORAL

## 2023-11-17 ENCOUNTER — HOSPITAL ENCOUNTER (OUTPATIENT)
Age: 63
Discharge: HOME | End: 2023-11-17
Payer: COMMERCIAL

## 2023-11-17 DIAGNOSIS — C77.2 MALIGNANT NEOPLASM METASTATIC TO INTRA-ABDOMINAL LYMPH NODE (HCC): ICD-10-CM

## 2023-11-17 PROCEDURE — 2500000003 HC RX 250 WO HCPCS: Performed by: RADIOLOGY

## 2023-11-17 PROCEDURE — 6360000004 HC RX CONTRAST MEDICATION: Performed by: RADIOLOGY

## 2023-11-17 PROCEDURE — 74177 CT ABD & PELVIS W/CONTRAST: CPT

## 2023-11-17 RX ADMIN — IOPAMIDOL 100 ML: 755 INJECTION, SOLUTION INTRAVENOUS at 09:12

## 2023-11-17 RX ADMIN — BARIUM SULFATE 450 ML: 20 SUSPENSION ORAL at 09:12

## 2023-11-22 ENCOUNTER — HOSPITAL ENCOUNTER (OUTPATIENT)
Facility: HOSPITAL | Age: 63
Setting detail: INFUSION SERIES
Discharge: HOME | End: 2023-11-22
Payer: COMMERCIAL

## 2023-11-22 VITALS
WEIGHT: 229 LBS | BODY MASS INDEX: 32.78 KG/M2 | OXYGEN SATURATION: 96 % | HEART RATE: 62 BPM | RESPIRATION RATE: 16 BRPM | DIASTOLIC BLOOD PRESSURE: 93 MMHG | SYSTOLIC BLOOD PRESSURE: 157 MMHG | TEMPERATURE: 98 F | HEIGHT: 70 IN

## 2023-11-22 DIAGNOSIS — C16.0 GE JUNCTION CARCINOMA (HCC): Primary | ICD-10-CM

## 2023-11-22 LAB
ALBUMIN SERPL-MCNC: 3.3 G/DL (ref 3.5–5)
ALBUMIN/GLOB SERPL: 0.8 (ref 1.1–2.2)
ALP SERPL-CCNC: 130 U/L (ref 45–117)
ALT SERPL-CCNC: 47 U/L (ref 12–78)
ANION GAP SERPL CALC-SCNC: 7 MMOL/L (ref 5–15)
AST SERPL-CCNC: 34 U/L (ref 15–37)
BASOPHILS # BLD: 0 K/UL (ref 0–0.1)
BASOPHILS NFR BLD: 1 % (ref 0–1)
BILIRUB SERPL-MCNC: 0.6 MG/DL (ref 0.2–1)
BUN SERPL-MCNC: 18 MG/DL (ref 6–20)
BUN/CREAT SERPL: 22 (ref 12–20)
CALCIUM SERPL-MCNC: 9.2 MG/DL (ref 8.5–10.1)
CHLORIDE SERPL-SCNC: 107 MMOL/L (ref 97–108)
CO2 SERPL-SCNC: 27 MMOL/L (ref 21–32)
CREAT SERPL-MCNC: 0.83 MG/DL (ref 0.7–1.3)
DIFFERENTIAL METHOD BLD: ABNORMAL
EOSINOPHIL # BLD: 0.1 K/UL (ref 0–0.4)
EOSINOPHIL NFR BLD: 3 % (ref 0–7)
ERYTHROCYTE [DISTWIDTH] IN BLOOD BY AUTOMATED COUNT: 13.6 % (ref 11.5–14.5)
GLOBULIN SER CALC-MCNC: 3.9 G/DL (ref 2–4)
GLUCOSE SERPL-MCNC: 200 MG/DL (ref 65–100)
HCT VFR BLD AUTO: 40.5 % (ref 36.6–50.3)
HGB BLD-MCNC: 14.4 G/DL (ref 12.1–17)
IMM GRANULOCYTES # BLD AUTO: 0 K/UL (ref 0–0.04)
IMM GRANULOCYTES NFR BLD AUTO: 0 % (ref 0–0.5)
LYMPHOCYTES # BLD: 1.1 K/UL (ref 0.8–3.5)
LYMPHOCYTES NFR BLD: 28 % (ref 12–49)
MCH RBC QN AUTO: 33.6 PG (ref 26–34)
MCHC RBC AUTO-ENTMCNC: 35.6 G/DL (ref 30–36.5)
MCV RBC AUTO: 94.6 FL (ref 80–99)
MONOCYTES # BLD: 0.9 K/UL (ref 0–1)
MONOCYTES NFR BLD: 22 % (ref 5–13)
NEUTS SEG # BLD: 1.9 K/UL (ref 1.8–8)
NEUTS SEG NFR BLD: 46 % (ref 32–75)
NRBC # BLD: 0 K/UL (ref 0–0.01)
NRBC BLD-RTO: 0 PER 100 WBC
PLATELET # BLD AUTO: 104 K/UL (ref 150–400)
PMV BLD AUTO: 9.4 FL (ref 8.9–12.9)
POTASSIUM SERPL-SCNC: 3.9 MMOL/L (ref 3.5–5.1)
PROT SERPL-MCNC: 7.2 G/DL (ref 6.4–8.2)
RBC # BLD AUTO: 4.28 M/UL (ref 4.1–5.7)
RBC MORPH BLD: ABNORMAL
SODIUM SERPL-SCNC: 141 MMOL/L (ref 136–145)
WBC # BLD AUTO: 4 K/UL (ref 4.1–11.1)

## 2023-11-22 PROCEDURE — 36415 COLL VENOUS BLD VENIPUNCTURE: CPT

## 2023-11-22 PROCEDURE — 96375 TX/PRO/DX INJ NEW DRUG ADDON: CPT

## 2023-11-22 PROCEDURE — 6360000002 HC RX W HCPCS: Performed by: INTERNAL MEDICINE

## 2023-11-22 PROCEDURE — 96413 CHEMO IV INFUSION 1 HR: CPT

## 2023-11-22 PROCEDURE — 80053 COMPREHEN METABOLIC PANEL: CPT

## 2023-11-22 PROCEDURE — 85025 COMPLETE CBC W/AUTO DIFF WBC: CPT

## 2023-11-22 PROCEDURE — 96417 CHEMO IV INFUS EACH ADDL SEQ: CPT

## 2023-11-22 PROCEDURE — 2580000003 HC RX 258: Performed by: INTERNAL MEDICINE

## 2023-11-22 PROCEDURE — 96416 CHEMO PROLONG INFUSE W/PUMP: CPT

## 2023-11-22 PROCEDURE — 96411 CHEMO IV PUSH ADDL DRUG: CPT

## 2023-11-22 RX ORDER — SODIUM CHLORIDE 9 MG/ML
5-250 INJECTION, SOLUTION INTRAVENOUS PRN
Status: DISCONTINUED | OUTPATIENT
Start: 2023-11-22 | End: 2023-11-23 | Stop reason: HOSPADM

## 2023-11-22 RX ORDER — SODIUM CHLORIDE 0.9 % (FLUSH) 0.9 %
5-40 SYRINGE (ML) INJECTION PRN
Status: DISCONTINUED | OUTPATIENT
Start: 2023-11-22 | End: 2023-11-23 | Stop reason: HOSPADM

## 2023-11-22 RX ORDER — PALONOSETRON 0.05 MG/ML
0.25 INJECTION, SOLUTION INTRAVENOUS ONCE
Status: COMPLETED | OUTPATIENT
Start: 2023-11-22 | End: 2023-11-22

## 2023-11-22 RX ORDER — DEXTROSE MONOHYDRATE 50 MG/ML
5-250 INJECTION, SOLUTION INTRAVENOUS PRN
Status: DISCONTINUED | OUTPATIENT
Start: 2023-11-22 | End: 2023-11-23 | Stop reason: HOSPADM

## 2023-11-22 RX ORDER — FLUOROURACIL 50 MG/ML
400 INJECTION, SOLUTION INTRAVENOUS ONCE
Status: COMPLETED | OUTPATIENT
Start: 2023-11-22 | End: 2023-11-22

## 2023-11-22 RX ADMIN — LEUCOVORIN CALCIUM 200 MG: 200 INJECTION, POWDER, LYOPHILIZED, FOR SOLUTION INTRAMUSCULAR; INTRAVENOUS at 12:08

## 2023-11-22 RX ADMIN — PALONOSETRON 0.25 MG: 0.05 INJECTION, SOLUTION INTRAVENOUS at 10:34

## 2023-11-22 RX ADMIN — SODIUM CHLORIDE 240 MG: 9 INJECTION, SOLUTION INTRAVENOUS at 11:24

## 2023-11-22 RX ADMIN — FLUOROURACIL 5000 MG: 50 INJECTION, SOLUTION INTRAVENOUS at 12:46

## 2023-11-22 RX ADMIN — FLUOROURACIL 875 MG: 50 INJECTION, SOLUTION INTRAVENOUS at 12:46

## 2023-11-22 RX ADMIN — SODIUM CHLORIDE 25 ML/HR: 9 INJECTION, SOLUTION INTRAVENOUS at 10:32

## 2023-11-22 RX ADMIN — DEXAMETHASONE SODIUM PHOSPHATE 12 MG: 4 INJECTION, SOLUTION INTRAMUSCULAR; INTRAVENOUS at 10:42

## 2023-11-22 RX ADMIN — DEXTROSE MONOHYDRATE 25 ML/HR: 50 INJECTION, SOLUTION INTRAVENOUS at 12:03

## 2023-11-24 ENCOUNTER — HOSPITAL ENCOUNTER (OUTPATIENT)
Facility: HOSPITAL | Age: 63
Setting detail: INFUSION SERIES
Discharge: HOME | End: 2023-11-24
Payer: COMMERCIAL

## 2023-11-24 ENCOUNTER — HOSPITAL ENCOUNTER (OUTPATIENT)
Facility: HOSPITAL | Age: 63
Setting detail: INFUSION SERIES
End: 2023-11-24
Payer: COMMERCIAL

## 2023-11-24 VITALS
SYSTOLIC BLOOD PRESSURE: 147 MMHG | OXYGEN SATURATION: 97 % | RESPIRATION RATE: 16 BRPM | DIASTOLIC BLOOD PRESSURE: 75 MMHG | HEART RATE: 67 BPM | TEMPERATURE: 98 F

## 2023-11-24 DIAGNOSIS — C16.0 GE JUNCTION CARCINOMA (HCC): Primary | ICD-10-CM

## 2023-11-24 PROCEDURE — 2580000003 HC RX 258: Performed by: INTERNAL MEDICINE

## 2023-11-24 PROCEDURE — 96360 HYDRATION IV INFUSION INIT: CPT

## 2023-11-24 RX ORDER — SODIUM CHLORIDE 0.9 % (FLUSH) 0.9 %
5-40 SYRINGE (ML) INJECTION PRN
Status: DISCONTINUED | OUTPATIENT
Start: 2023-11-24 | End: 2023-11-25 | Stop reason: HOSPADM

## 2023-11-24 RX ORDER — 0.9 % SODIUM CHLORIDE 0.9 %
1000 INTRAVENOUS SOLUTION INTRAVENOUS ONCE
Status: COMPLETED | OUTPATIENT
Start: 2023-11-24 | End: 2023-11-24

## 2023-11-24 RX ADMIN — SODIUM CHLORIDE, PRESERVATIVE FREE 10 ML: 5 INJECTION INTRAVENOUS at 14:00

## 2023-11-24 RX ADMIN — SODIUM CHLORIDE 1000 ML: 9 INJECTION, SOLUTION INTRAVENOUS at 14:00

## 2023-11-28 RX ORDER — EPINEPHRINE 1 MG/ML
0.3 INJECTION, SOLUTION INTRAMUSCULAR; SUBCUTANEOUS PRN
Status: CANCELLED | OUTPATIENT
Start: 2023-12-06

## 2023-11-28 RX ORDER — SODIUM CHLORIDE 9 MG/ML
5-250 INJECTION, SOLUTION INTRAVENOUS PRN
Status: CANCELLED | OUTPATIENT
Start: 2023-12-06

## 2023-11-28 RX ORDER — SODIUM CHLORIDE 9 MG/ML
INJECTION, SOLUTION INTRAVENOUS CONTINUOUS
Status: CANCELLED | OUTPATIENT
Start: 2023-12-06

## 2023-11-28 RX ORDER — DIPHENHYDRAMINE HYDROCHLORIDE 50 MG/ML
50 INJECTION INTRAMUSCULAR; INTRAVENOUS
Status: CANCELLED | OUTPATIENT
Start: 2023-12-06

## 2023-11-28 RX ORDER — SODIUM CHLORIDE 0.9 % (FLUSH) 0.9 %
5-40 SYRINGE (ML) INJECTION PRN
Status: CANCELLED | OUTPATIENT
Start: 2023-12-08

## 2023-11-28 RX ORDER — 0.9 % SODIUM CHLORIDE 0.9 %
1000 INTRAVENOUS SOLUTION INTRAVENOUS ONCE
Status: CANCELLED
Start: 2023-12-08 | End: 2023-12-08

## 2023-11-28 RX ORDER — ALBUTEROL SULFATE 90 UG/1
4 AEROSOL, METERED RESPIRATORY (INHALATION) PRN
Status: CANCELLED | OUTPATIENT
Start: 2023-12-06

## 2023-11-28 RX ORDER — SODIUM CHLORIDE 9 MG/ML
5-250 INJECTION, SOLUTION INTRAVENOUS PRN
Status: CANCELLED | OUTPATIENT
Start: 2023-12-08

## 2023-11-28 RX ORDER — MEPERIDINE HYDROCHLORIDE 25 MG/ML
12.5 INJECTION INTRAMUSCULAR; INTRAVENOUS; SUBCUTANEOUS PRN
Status: CANCELLED | OUTPATIENT
Start: 2023-12-06

## 2023-11-28 RX ORDER — SODIUM CHLORIDE 0.9 % (FLUSH) 0.9 %
5-40 SYRINGE (ML) INJECTION PRN
Status: CANCELLED | OUTPATIENT
Start: 2023-12-06

## 2023-11-28 RX ORDER — ACETAMINOPHEN 325 MG/1
650 TABLET ORAL
Status: CANCELLED | OUTPATIENT
Start: 2023-12-06

## 2023-11-28 RX ORDER — HEPARIN 100 UNIT/ML
500 SYRINGE INTRAVENOUS PRN
Status: CANCELLED | OUTPATIENT
Start: 2023-12-06

## 2023-11-28 RX ORDER — HEPARIN 100 UNIT/ML
500 SYRINGE INTRAVENOUS PRN
Status: CANCELLED | OUTPATIENT
Start: 2023-12-08

## 2023-11-28 RX ORDER — ONDANSETRON 2 MG/ML
8 INJECTION INTRAMUSCULAR; INTRAVENOUS
Status: CANCELLED | OUTPATIENT
Start: 2023-12-06

## 2023-12-06 ENCOUNTER — HOSPITAL ENCOUNTER (OUTPATIENT)
Facility: HOSPITAL | Age: 63
Setting detail: INFUSION SERIES
Discharge: HOME | End: 2023-12-06
Payer: COMMERCIAL

## 2023-12-06 ENCOUNTER — OFFICE VISIT (OUTPATIENT)
Age: 63
End: 2023-12-06
Payer: COMMERCIAL

## 2023-12-06 VITALS
OXYGEN SATURATION: 95 % | SYSTOLIC BLOOD PRESSURE: 165 MMHG | HEART RATE: 75 BPM | DIASTOLIC BLOOD PRESSURE: 82 MMHG | RESPIRATION RATE: 16 BRPM | HEIGHT: 70 IN | BODY MASS INDEX: 32.93 KG/M2 | TEMPERATURE: 98 F | WEIGHT: 230 LBS

## 2023-12-06 VITALS
RESPIRATION RATE: 16 BRPM | TEMPERATURE: 98 F | HEART RATE: 67 BPM | WEIGHT: 229.94 LBS | SYSTOLIC BLOOD PRESSURE: 146 MMHG | HEIGHT: 70 IN | BODY MASS INDEX: 32.92 KG/M2 | OXYGEN SATURATION: 95 % | DIASTOLIC BLOOD PRESSURE: 82 MMHG

## 2023-12-06 DIAGNOSIS — Z51.12 ENCOUNTER FOR ANTINEOPLASTIC CHEMOTHERAPY AND IMMUNOTHERAPY: ICD-10-CM

## 2023-12-06 DIAGNOSIS — C16.0 GE JUNCTION CARCINOMA (HCC): Primary | ICD-10-CM

## 2023-12-06 DIAGNOSIS — C77.2 MALIGNANT NEOPLASM METASTATIC TO INTRA-ABDOMINAL LYMPH NODE (HCC): ICD-10-CM

## 2023-12-06 DIAGNOSIS — L27.0 DERMATITIS, DRUG-INDUCED: ICD-10-CM

## 2023-12-06 DIAGNOSIS — Z51.11 ENCOUNTER FOR ANTINEOPLASTIC CHEMOTHERAPY AND IMMUNOTHERAPY: ICD-10-CM

## 2023-12-06 LAB
ALBUMIN SERPL-MCNC: 3.5 G/DL (ref 3.5–5)
ALBUMIN/GLOB SERPL: 0.9 (ref 1.1–2.2)
ALP SERPL-CCNC: 147 U/L (ref 45–117)
ALT SERPL-CCNC: 44 U/L (ref 12–78)
ANION GAP SERPL CALC-SCNC: 6 MMOL/L (ref 5–15)
AST SERPL-CCNC: 21 U/L (ref 15–37)
BASOPHILS # BLD: 0.1 K/UL (ref 0–0.1)
BASOPHILS NFR BLD: 1 % (ref 0–1)
BILIRUB SERPL-MCNC: 0.5 MG/DL (ref 0.2–1)
BUN SERPL-MCNC: 20 MG/DL (ref 6–20)
BUN/CREAT SERPL: 22 (ref 12–20)
CALCIUM SERPL-MCNC: 8.8 MG/DL (ref 8.5–10.1)
CHLORIDE SERPL-SCNC: 107 MMOL/L (ref 97–108)
CO2 SERPL-SCNC: 26 MMOL/L (ref 21–32)
CREAT SERPL-MCNC: 0.91 MG/DL (ref 0.7–1.3)
DIFFERENTIAL METHOD BLD: ABNORMAL
EOSINOPHIL # BLD: 0.1 K/UL (ref 0–0.4)
EOSINOPHIL NFR BLD: 3 % (ref 0–7)
ERYTHROCYTE [DISTWIDTH] IN BLOOD BY AUTOMATED COUNT: 13 % (ref 11.5–14.5)
GLOBULIN SER CALC-MCNC: 3.8 G/DL (ref 2–4)
GLUCOSE SERPL-MCNC: 183 MG/DL (ref 65–100)
HCT VFR BLD AUTO: 42.3 % (ref 36.6–50.3)
HGB BLD-MCNC: 15.1 G/DL (ref 12.1–17)
IMM GRANULOCYTES # BLD AUTO: 0 K/UL (ref 0–0.04)
IMM GRANULOCYTES NFR BLD AUTO: 0 % (ref 0–0.5)
LYMPHOCYTES # BLD: 1.1 K/UL (ref 0.8–3.5)
LYMPHOCYTES NFR BLD: 30 % (ref 12–49)
MCH RBC QN AUTO: 33.9 PG (ref 26–34)
MCHC RBC AUTO-ENTMCNC: 35.7 G/DL (ref 30–36.5)
MCV RBC AUTO: 94.8 FL (ref 80–99)
MONOCYTES # BLD: 0.7 K/UL (ref 0–1)
MONOCYTES NFR BLD: 20 % (ref 5–13)
NEUTS SEG # BLD: 1.6 K/UL (ref 1.8–8)
NEUTS SEG NFR BLD: 46 % (ref 32–75)
NRBC # BLD: 0 K/UL (ref 0–0.01)
NRBC BLD-RTO: 0 PER 100 WBC
PLATELET # BLD AUTO: 141 K/UL (ref 150–400)
PMV BLD AUTO: 9.3 FL (ref 8.9–12.9)
POTASSIUM SERPL-SCNC: 3.9 MMOL/L (ref 3.5–5.1)
PROT SERPL-MCNC: 7.3 G/DL (ref 6.4–8.2)
RBC # BLD AUTO: 4.46 M/UL (ref 4.1–5.7)
SODIUM SERPL-SCNC: 139 MMOL/L (ref 136–145)
TSH SERPL DL<=0.05 MIU/L-ACNC: 1.26 UIU/ML (ref 0.36–3.74)
WBC # BLD AUTO: 3.5 K/UL (ref 4.1–11.1)

## 2023-12-06 PROCEDURE — 96413 CHEMO IV INFUSION 1 HR: CPT

## 2023-12-06 PROCEDURE — 96367 TX/PROPH/DG ADDL SEQ IV INF: CPT

## 2023-12-06 PROCEDURE — 85025 COMPLETE CBC W/AUTO DIFF WBC: CPT

## 2023-12-06 PROCEDURE — 3079F DIAST BP 80-89 MM HG: CPT | Performed by: INTERNAL MEDICINE

## 2023-12-06 PROCEDURE — 96375 TX/PRO/DX INJ NEW DRUG ADDON: CPT

## 2023-12-06 PROCEDURE — 84443 ASSAY THYROID STIM HORMONE: CPT

## 2023-12-06 PROCEDURE — 36415 COLL VENOUS BLD VENIPUNCTURE: CPT

## 2023-12-06 PROCEDURE — 2580000003 HC RX 258: Performed by: INTERNAL MEDICINE

## 2023-12-06 PROCEDURE — 96416 CHEMO PROLONG INFUSE W/PUMP: CPT

## 2023-12-06 PROCEDURE — 96411 CHEMO IV PUSH ADDL DRUG: CPT

## 2023-12-06 PROCEDURE — 1123F ACP DISCUSS/DSCN MKR DOCD: CPT | Performed by: INTERNAL MEDICINE

## 2023-12-06 PROCEDURE — 3077F SYST BP >= 140 MM HG: CPT | Performed by: INTERNAL MEDICINE

## 2023-12-06 PROCEDURE — 6360000002 HC RX W HCPCS: Performed by: INTERNAL MEDICINE

## 2023-12-06 PROCEDURE — 80053 COMPREHEN METABOLIC PANEL: CPT

## 2023-12-06 PROCEDURE — 99215 OFFICE O/P EST HI 40 MIN: CPT | Performed by: INTERNAL MEDICINE

## 2023-12-06 RX ORDER — FLUOROURACIL 50 MG/ML
400 INJECTION, SOLUTION INTRAVENOUS ONCE
Status: COMPLETED | OUTPATIENT
Start: 2023-12-06 | End: 2023-12-06

## 2023-12-06 RX ORDER — PREDNISONE 10 MG/1
10 TABLET ORAL DAILY
Qty: 10 TABLET | Refills: 0 | Status: SHIPPED | OUTPATIENT
Start: 2023-12-06 | End: 2023-12-16

## 2023-12-06 RX ORDER — DEXTROSE MONOHYDRATE 50 MG/ML
5-250 INJECTION, SOLUTION INTRAVENOUS PRN
Status: DISCONTINUED | OUTPATIENT
Start: 2023-12-06 | End: 2023-12-07 | Stop reason: HOSPADM

## 2023-12-06 RX ORDER — SODIUM CHLORIDE 9 MG/ML
5-250 INJECTION, SOLUTION INTRAVENOUS PRN
Status: DISCONTINUED | OUTPATIENT
Start: 2023-12-06 | End: 2023-12-07 | Stop reason: HOSPADM

## 2023-12-06 RX ORDER — TRIAMCINOLONE ACETONIDE 1 MG/G
OINTMENT TOPICAL 2 TIMES DAILY
Qty: 1 EACH | Refills: 3 | Status: SHIPPED | OUTPATIENT
Start: 2023-12-06 | End: 2024-01-05

## 2023-12-06 RX ORDER — PALONOSETRON 0.05 MG/ML
0.25 INJECTION, SOLUTION INTRAVENOUS ONCE
Status: COMPLETED | OUTPATIENT
Start: 2023-12-06 | End: 2023-12-06

## 2023-12-06 RX ADMIN — FLUOROURACIL 5000 MG: 50 INJECTION, SOLUTION INTRAVENOUS at 13:55

## 2023-12-06 RX ADMIN — DEXAMETHASONE SODIUM PHOSPHATE 12 MG: 4 INJECTION, SOLUTION INTRAMUSCULAR; INTRAVENOUS at 11:53

## 2023-12-06 RX ADMIN — PALONOSETRON 0.25 MG: 0.05 INJECTION, SOLUTION INTRAVENOUS at 11:49

## 2023-12-06 RX ADMIN — FLUOROURACIL 875 MG: 50 INJECTION, SOLUTION INTRAVENOUS at 13:53

## 2023-12-06 RX ADMIN — LEUCOVORIN CALCIUM 200 MG: 200 INJECTION, POWDER, LYOPHILIZED, FOR SOLUTION INTRAMUSCULAR; INTRAVENOUS at 13:01

## 2023-12-06 RX ADMIN — SODIUM CHLORIDE 240 MG: 9 INJECTION, SOLUTION INTRAVENOUS at 12:22

## 2023-12-06 RX ADMIN — SODIUM CHLORIDE 25 ML/HR: 9 INJECTION, SOLUTION INTRAVENOUS at 11:49

## 2023-12-06 NOTE — PROGRESS NOTES
Anais Hunter is a 70 y.o. male here for treatment for GE Junction cancer. Cannot see rash on patients back but is still pruritic. Using prescription and Cetaphil. Neuropathy is gone as well as the stabbing pains he use to have in mouth. 1. Have you been to the ER, urgent care clinic since your last visit? Hospitalized since your last visit?     no    2. Have you seen or consulted any other health care providers outside of the [address] [place] [address] since your last visit? Include any pap smears or colon screening.   no
hernia    Impression  1. No change in circumferential thickening of the distal esophagus. 2. Small pulmonary nodules are stable, these are likely not related to  metastases. 3. Left supraclavicular adenopathy has resolved. The previously seen adenopathy  in the abdomen and retroperitoneum has also resolved. Now present are normal  size lymph nodes in the areas of previous adenopathy. 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 Her 2 -ve  PD-L1 +ve  MSI stable    ECOG PS 1  Intent of Treatment palliative  Prognosis Guarded    I recommended a combination of mFOLFOX + Nivolumab as palliative therapy. Palliative therapy  mFOLFOX + Nivolumab cycle 10, Ox stopped after cycle 8  5-FU/Nivo - to continue    CT - improvement in disease. Tolerating treatment very well  He is swallowing a little better. I recommend switching his treatment to [facility] [employer] LogoneX [place] 1000 mg/m2 po bid every other week  Nivo q4wk    Gr 2 dermatitis - IRAE  A detailed system by system evaluation of side effect was performed to assess adverse events. Blood counts are acceptable. Results reviewed with the patient    Chemotherapy and immunotherapy administration is associated with myriad of side effects and would be considered high risk medication.         2. Protein calorie malnutrition    Protein

## 2023-12-08 ENCOUNTER — HOSPITAL ENCOUNTER (OUTPATIENT)
Facility: HOSPITAL | Age: 63
Setting detail: INFUSION SERIES
Discharge: HOME | End: 2023-12-08
Payer: COMMERCIAL

## 2023-12-08 ENCOUNTER — APPOINTMENT (OUTPATIENT)
Facility: HOSPITAL | Age: 63
End: 2023-12-08
Payer: COMMERCIAL

## 2023-12-08 VITALS
RESPIRATION RATE: 16 BRPM | TEMPERATURE: 97.7 F | HEART RATE: 58 BPM | OXYGEN SATURATION: 98 % | DIASTOLIC BLOOD PRESSURE: 85 MMHG | SYSTOLIC BLOOD PRESSURE: 143 MMHG

## 2023-12-08 DIAGNOSIS — C16.0 GE JUNCTION CARCINOMA (HCC): Primary | ICD-10-CM

## 2023-12-08 PROCEDURE — 96360 HYDRATION IV INFUSION INIT: CPT

## 2023-12-08 PROCEDURE — 2580000003 HC RX 258: Performed by: INTERNAL MEDICINE

## 2023-12-08 RX ORDER — 0.9 % SODIUM CHLORIDE 0.9 %
1000 INTRAVENOUS SOLUTION INTRAVENOUS ONCE
Status: COMPLETED | OUTPATIENT
Start: 2023-12-08 | End: 2023-12-08

## 2023-12-08 RX ORDER — SODIUM CHLORIDE 0.9 % (FLUSH) 0.9 %
5-40 SYRINGE (ML) INJECTION PRN
Status: DISCONTINUED | OUTPATIENT
Start: 2023-12-08 | End: 2023-12-09 | Stop reason: HOSPADM

## 2023-12-08 RX ADMIN — SODIUM CHLORIDE 1000 ML: 9 INJECTION, SOLUTION INTRAVENOUS at 15:39

## 2023-12-13 DIAGNOSIS — C16.0 GE JUNCTION CARCINOMA (HCC): Primary | ICD-10-CM

## 2023-12-13 RX ORDER — ALBUTEROL SULFATE 90 UG/1
4 AEROSOL, METERED RESPIRATORY (INHALATION) PRN
OUTPATIENT
Start: 2023-12-20

## 2023-12-13 RX ORDER — HEPARIN 100 UNIT/ML
500 SYRINGE INTRAVENOUS PRN
OUTPATIENT
Start: 2023-12-22

## 2023-12-13 RX ORDER — PALONOSETRON 0.05 MG/ML
0.25 INJECTION, SOLUTION INTRAVENOUS ONCE
OUTPATIENT
Start: 2023-12-20 | End: 2023-12-20

## 2023-12-13 RX ORDER — MEPERIDINE HYDROCHLORIDE 25 MG/ML
12.5 INJECTION INTRAMUSCULAR; INTRAVENOUS; SUBCUTANEOUS PRN
OUTPATIENT
Start: 2023-12-20

## 2023-12-13 RX ORDER — ACETAMINOPHEN 325 MG/1
650 TABLET ORAL
OUTPATIENT
Start: 2023-12-20

## 2023-12-13 RX ORDER — CAPECITABINE 150 MG/1
150 TABLET, FILM COATED ORAL 2 TIMES DAILY
Qty: 42 TABLET | Refills: 5 | Status: ACTIVE | OUTPATIENT
Start: 2023-12-13

## 2023-12-13 RX ORDER — SODIUM CHLORIDE 9 MG/ML
INJECTION, SOLUTION INTRAVENOUS CONTINUOUS
OUTPATIENT
Start: 2023-12-20

## 2023-12-13 RX ORDER — SODIUM CHLORIDE 0.9 % (FLUSH) 0.9 %
5-40 SYRINGE (ML) INJECTION PRN
OUTPATIENT
Start: 2023-12-22

## 2023-12-13 RX ORDER — SODIUM CHLORIDE 9 MG/ML
5-250 INJECTION, SOLUTION INTRAVENOUS PRN
OUTPATIENT
Start: 2023-12-22

## 2023-12-13 RX ORDER — EPINEPHRINE 1 MG/ML
0.3 INJECTION, SOLUTION, CONCENTRATE INTRAVENOUS PRN
OUTPATIENT
Start: 2023-12-20

## 2023-12-13 RX ORDER — 0.9 % SODIUM CHLORIDE 0.9 %
1000 INTRAVENOUS SOLUTION INTRAVENOUS ONCE
Start: 2023-12-22 | End: 2023-12-22

## 2023-12-13 RX ORDER — FLUOROURACIL 50 MG/ML
400 INJECTION, SOLUTION INTRAVENOUS ONCE
OUTPATIENT
Start: 2023-12-20 | End: 2023-12-20

## 2023-12-13 RX ORDER — HEPARIN 100 UNIT/ML
500 SYRINGE INTRAVENOUS PRN
OUTPATIENT
Start: 2023-12-20

## 2023-12-13 RX ORDER — ONDANSETRON 2 MG/ML
8 INJECTION INTRAMUSCULAR; INTRAVENOUS
OUTPATIENT
Start: 2023-12-20

## 2023-12-13 RX ORDER — SODIUM CHLORIDE 9 MG/ML
5-250 INJECTION, SOLUTION INTRAVENOUS PRN
OUTPATIENT
Start: 2023-12-20

## 2023-12-13 RX ORDER — DEXTROSE MONOHYDRATE 50 MG/ML
5-250 INJECTION, SOLUTION INTRAVENOUS PRN
OUTPATIENT
Start: 2023-12-20

## 2023-12-13 RX ORDER — DIPHENHYDRAMINE HYDROCHLORIDE 50 MG/ML
50 INJECTION INTRAMUSCULAR; INTRAVENOUS
OUTPATIENT
Start: 2023-12-20

## 2023-12-13 RX ORDER — CAPECITABINE 500 MG/1
2000 TABLET, FILM COATED ORAL 2 TIMES DAILY
Qty: 168 TABLET | Refills: 5 | Status: ACTIVE | OUTPATIENT
Start: 2023-12-13

## 2023-12-13 RX ORDER — SODIUM CHLORIDE 0.9 % (FLUSH) 0.9 %
5-40 SYRINGE (ML) INJECTION PRN
OUTPATIENT
Start: 2023-12-20

## 2023-12-20 ENCOUNTER — HOSPITAL ENCOUNTER (OUTPATIENT)
Facility: HOSPITAL | Age: 63
Setting detail: INFUSION SERIES
Discharge: HOME | End: 2023-12-20
Payer: COMMERCIAL

## 2023-12-20 VITALS
HEIGHT: 70 IN | RESPIRATION RATE: 16 BRPM | HEART RATE: 63 BPM | OXYGEN SATURATION: 98 % | WEIGHT: 231.1 LBS | TEMPERATURE: 97.8 F | SYSTOLIC BLOOD PRESSURE: 144 MMHG | BODY MASS INDEX: 33.09 KG/M2 | DIASTOLIC BLOOD PRESSURE: 85 MMHG

## 2023-12-20 DIAGNOSIS — C16.0 GE JUNCTION CARCINOMA (HCC): Primary | ICD-10-CM

## 2023-12-20 LAB
ALBUMIN SERPL-MCNC: 3.5 G/DL (ref 3.5–5)
ALBUMIN/GLOB SERPL: 0.9 (ref 1.1–2.2)
ALP SERPL-CCNC: 122 U/L (ref 45–117)
ALT SERPL-CCNC: 44 U/L (ref 12–78)
ANION GAP SERPL CALC-SCNC: 5 MMOL/L (ref 5–15)
AST SERPL-CCNC: 26 U/L (ref 15–37)
BASOPHILS # BLD: 0 K/UL (ref 0–0.1)
BASOPHILS NFR BLD: 1 % (ref 0–1)
BILIRUB SERPL-MCNC: 0.7 MG/DL (ref 0.2–1)
BUN SERPL-MCNC: 15 MG/DL (ref 6–20)
BUN/CREAT SERPL: 17 (ref 12–20)
CALCIUM SERPL-MCNC: 9.3 MG/DL (ref 8.5–10.1)
CHLORIDE SERPL-SCNC: 108 MMOL/L (ref 97–108)
CO2 SERPL-SCNC: 25 MMOL/L (ref 21–32)
CREAT SERPL-MCNC: 0.87 MG/DL (ref 0.7–1.3)
DIFFERENTIAL METHOD BLD: ABNORMAL
EOSINOPHIL # BLD: 0.1 K/UL (ref 0–0.4)
EOSINOPHIL NFR BLD: 2 % (ref 0–7)
ERYTHROCYTE [DISTWIDTH] IN BLOOD BY AUTOMATED COUNT: 13.2 % (ref 11.5–14.5)
GLOBULIN SER CALC-MCNC: 3.7 G/DL (ref 2–4)
GLUCOSE SERPL-MCNC: 210 MG/DL (ref 65–100)
HCT VFR BLD AUTO: 43.4 % (ref 36.6–50.3)
HGB BLD-MCNC: 15.2 G/DL (ref 12.1–17)
IMM GRANULOCYTES # BLD AUTO: 0 K/UL (ref 0–0.04)
IMM GRANULOCYTES NFR BLD AUTO: 0 % (ref 0–0.5)
LYMPHOCYTES # BLD: 1.1 K/UL (ref 0.8–3.5)
LYMPHOCYTES NFR BLD: 23 % (ref 12–49)
MCH RBC QN AUTO: 33.3 PG (ref 26–34)
MCHC RBC AUTO-ENTMCNC: 35 G/DL (ref 30–36.5)
MCV RBC AUTO: 95.2 FL (ref 80–99)
MONOCYTES # BLD: 0.8 K/UL (ref 0–1)
MONOCYTES NFR BLD: 17 % (ref 5–13)
NEUTS SEG # BLD: 2.7 K/UL (ref 1.8–8)
NEUTS SEG NFR BLD: 57 % (ref 32–75)
NRBC # BLD: 0 K/UL (ref 0–0.01)
NRBC BLD-RTO: 0 PER 100 WBC
PLATELET # BLD AUTO: 162 K/UL (ref 150–400)
PMV BLD AUTO: 8.7 FL (ref 8.9–12.9)
POTASSIUM SERPL-SCNC: 3.8 MMOL/L (ref 3.5–5.1)
PROT SERPL-MCNC: 7.2 G/DL (ref 6.4–8.2)
RBC # BLD AUTO: 4.56 M/UL (ref 4.1–5.7)
SODIUM SERPL-SCNC: 138 MMOL/L (ref 136–145)
WBC # BLD AUTO: 4.7 K/UL (ref 4.1–11.1)

## 2023-12-20 PROCEDURE — 85025 COMPLETE CBC W/AUTO DIFF WBC: CPT

## 2023-12-20 PROCEDURE — 6360000002 HC RX W HCPCS: Performed by: INTERNAL MEDICINE

## 2023-12-20 PROCEDURE — 96413 CHEMO IV INFUSION 1 HR: CPT

## 2023-12-20 PROCEDURE — 80053 COMPREHEN METABOLIC PANEL: CPT

## 2023-12-20 PROCEDURE — 2580000003 HC RX 258: Performed by: INTERNAL MEDICINE

## 2023-12-20 PROCEDURE — 36415 COLL VENOUS BLD VENIPUNCTURE: CPT

## 2023-12-20 PROCEDURE — 96417 CHEMO IV INFUS EACH ADDL SEQ: CPT

## 2023-12-20 RX ORDER — SODIUM CHLORIDE 9 MG/ML
5-250 INJECTION, SOLUTION INTRAVENOUS PRN
Status: DISCONTINUED | OUTPATIENT
Start: 2023-12-20 | End: 2023-12-21 | Stop reason: HOSPADM

## 2023-12-20 RX ORDER — SODIUM CHLORIDE 0.9 % (FLUSH) 0.9 %
5-40 SYRINGE (ML) INJECTION PRN
Status: DISCONTINUED | OUTPATIENT
Start: 2023-12-20 | End: 2023-12-21 | Stop reason: HOSPADM

## 2023-12-20 RX ADMIN — SODIUM CHLORIDE 25 ML/HR: 9 INJECTION, SOLUTION INTRAVENOUS at 10:50

## 2023-12-20 RX ADMIN — SODIUM CHLORIDE 480 MG: 9 INJECTION, SOLUTION INTRAVENOUS at 11:22

## 2023-12-22 ENCOUNTER — HOSPITAL ENCOUNTER (OUTPATIENT)
Facility: HOSPITAL | Age: 63
Setting detail: INFUSION SERIES
End: 2023-12-22

## 2024-01-01 ENCOUNTER — HOME CARE VISIT (OUTPATIENT)
Facility: HOME HEALTH | Age: 64
End: 2024-01-01
Payer: COMMERCIAL

## 2024-01-01 ENCOUNTER — HOME CARE VISIT (OUTPATIENT)
Age: 64
End: 2024-01-01
Payer: COMMERCIAL

## 2024-01-01 ENCOUNTER — TELEPHONE (OUTPATIENT)
Age: 64
End: 2024-01-01

## 2024-01-01 ENCOUNTER — HOSPICE ADMISSION (OUTPATIENT)
Age: 64
End: 2024-01-01
Payer: COMMERCIAL

## 2024-01-01 ENCOUNTER — HOSPITAL ENCOUNTER (OUTPATIENT)
Facility: HOSPITAL | Age: 64
Setting detail: INFUSION SERIES
End: 2024-01-01

## 2024-01-01 VITALS
OXYGEN SATURATION: 91 % | SYSTOLIC BLOOD PRESSURE: 108 MMHG | RESPIRATION RATE: 16 BRPM | DIASTOLIC BLOOD PRESSURE: 78 MMHG | HEART RATE: 110 BPM | TEMPERATURE: 97.8 F

## 2024-01-01 VITALS
OXYGEN SATURATION: 85 % | TEMPERATURE: 98.2 F | HEART RATE: 112 BPM | RESPIRATION RATE: 16 BRPM | DIASTOLIC BLOOD PRESSURE: 72 MMHG | SYSTOLIC BLOOD PRESSURE: 98 MMHG

## 2024-01-01 VITALS
RESPIRATION RATE: 18 BRPM | HEART RATE: 141 BPM | SYSTOLIC BLOOD PRESSURE: 114 MMHG | DIASTOLIC BLOOD PRESSURE: 74 MMHG | OXYGEN SATURATION: 50 %

## 2024-01-01 VITALS
SYSTOLIC BLOOD PRESSURE: 108 MMHG | DIASTOLIC BLOOD PRESSURE: 72 MMHG | TEMPERATURE: 98.9 F | OXYGEN SATURATION: 93 % | RESPIRATION RATE: 18 BRPM | HEART RATE: 116 BPM

## 2024-01-01 VITALS
DIASTOLIC BLOOD PRESSURE: 76 MMHG | OXYGEN SATURATION: 82 % | RESPIRATION RATE: 20 BRPM | HEART RATE: 116 BPM | TEMPERATURE: 97.9 F | SYSTOLIC BLOOD PRESSURE: 116 MMHG

## 2024-01-01 DIAGNOSIS — C16.0 GE JUNCTION CARCINOMA (HCC): Primary | ICD-10-CM

## 2024-01-01 PROCEDURE — 0651 HSPC ROUTINE HOME CARE

## 2024-01-01 PROCEDURE — G0299 HHS/HOSPICE OF RN EA 15 MIN: HCPCS

## 2024-01-01 PROCEDURE — 2500000001 HSPC NON INJECTABLE MED

## 2024-01-01 RX ADMIN — MORPHINE SULFATE 5 MG: 20 SOLUTION ORAL at 09:15

## 2024-01-01 ASSESSMENT — ENCOUNTER SYMPTOMS
PAIN LOCATION - PAIN QUALITY: ACHE,SHARP
CONSTIPATION: 1
COUGH: 1
COUGH: 1
COUGH CHARACTERISTICS: MOIST
PAIN LOCATION - PAIN QUALITY: UNK
COUGH CHARACTERISTICS: MOIST
PAIN LOCATION - PAIN QUALITY: ACHE
COUGH CHARACTERISTICS: PRODUCTIVE
COUGH: 1
COUGH: 1
BOWEL INCONTINENCE: 1
STOOL DESCRIPTION: LOOSE
SKIN LESIONS: 1
CONSTIPATION: 1
COUGH CHARACTERISTICS: MOIST

## 2024-01-05 ENCOUNTER — APPOINTMENT (OUTPATIENT)
Facility: HOSPITAL | Age: 64
End: 2024-01-05
Payer: COMMERCIAL

## 2024-01-06 ENCOUNTER — PATIENT MESSAGE (OUTPATIENT)
Age: 64
End: 2024-01-06

## 2024-01-08 ENCOUNTER — TELEPHONE (OUTPATIENT)
Age: 64
End: 2024-01-08

## 2024-01-08 NOTE — TELEPHONE ENCOUNTER
Pt sent a [employer] message regarding swallowing difficulties.  Case d/w MD. Need to clarify the severity.    Called pt.  HIPAA verified by two patient identifiers.     Pt said he was able to clear it yesterday at 11am. He is concerned however as to at the beginning of all of this he swallowed a 13mm pill and didn't feel like it was stuck but it was stuck and that was when he was diagnosed. He is concerned that these xeloda pills are getting stuck and he is not benefiting from it and doesn't know until it completely backs up and chokes him.   I advised I will ask  to reach out to  to see if an outpt EGD can be done and dilation and then return a call.    He is aware he is likely going to need to switch back to infusional 5fu.

## 2024-01-09 RX ORDER — DIPHENHYDRAMINE HYDROCHLORIDE 50 MG/ML
50 INJECTION INTRAMUSCULAR; INTRAVENOUS
Status: CANCELLED | OUTPATIENT
Start: 2024-01-17

## 2024-01-09 RX ORDER — HEPARIN 100 UNIT/ML
500 SYRINGE INTRAVENOUS PRN
Status: CANCELLED | OUTPATIENT
Start: 2024-01-17

## 2024-01-09 RX ORDER — MEPERIDINE HYDROCHLORIDE 25 MG/ML
12.5 INJECTION INTRAMUSCULAR; INTRAVENOUS; SUBCUTANEOUS PRN
Status: CANCELLED | OUTPATIENT
Start: 2024-01-17

## 2024-01-09 RX ORDER — ACETAMINOPHEN 325 MG/1
650 TABLET ORAL
Status: CANCELLED | OUTPATIENT
Start: 2024-01-17

## 2024-01-09 RX ORDER — EPINEPHRINE 1 MG/ML
0.3 INJECTION, SOLUTION INTRAMUSCULAR; SUBCUTANEOUS PRN
Status: CANCELLED | OUTPATIENT
Start: 2024-01-17

## 2024-01-09 RX ORDER — SODIUM CHLORIDE 9 MG/ML
5-250 INJECTION, SOLUTION INTRAVENOUS PRN
Status: CANCELLED | OUTPATIENT
Start: 2024-01-17

## 2024-01-09 RX ORDER — SODIUM CHLORIDE 9 MG/ML
INJECTION, SOLUTION INTRAVENOUS CONTINUOUS
Status: CANCELLED | OUTPATIENT
Start: 2024-01-17

## 2024-01-09 RX ORDER — ONDANSETRON 2 MG/ML
8 INJECTION INTRAMUSCULAR; INTRAVENOUS
Status: CANCELLED | OUTPATIENT
Start: 2024-01-17

## 2024-01-09 RX ORDER — ALBUTEROL SULFATE 90 UG/1
4 AEROSOL, METERED RESPIRATORY (INHALATION) PRN
Status: CANCELLED | OUTPATIENT
Start: 2024-01-17

## 2024-01-12 NOTE — TELEPHONE ENCOUNTER
Spoke with patient via telephone.  He reports he has been holding Capecitabine pills since Tuesday because they seem to be getting stuck in his throat.  He had a \"choking\" episode yesterday as well.  He states sometimes it seems that liquids won't pass either but sometimes he can drink without issue.  He verified insurance information with GI office yesterday but has not heard back with EGD appt information.  He does NOT have a feeding tube at this time and was advised previously that he would need a J-tube due to tumor location.      I advised patient to try a liquid diet with runny smoothies, protein shakes, Ensure/Boost, liquid soups and fluids until he has EGD as this will decrease the likelihood of something getting stuck.  Advised patient that I will reach out to Dr. Crespo's office to see about getting EGD ASAP.  Discussed that if he develops obstruction over the weekend to go to ER and they can get urgent EGD if needed.     Understanding voiced and no additional questions or concerns.

## 2024-01-15 NOTE — PROGRESS NOTES
Cyril Gregg is a 63 y.o. male here for treatment for GE Junction cancer.  Pt is taking Capecitabine. (Pt put on hold due to Pt choking on them - missed 3 doses)  -5 lbs since last visit.  Has appt with gastro tomorrow.     1. Have you been to the ER, urgent care clinic since your last visit?  Hospitalized since your last visit?       no    2. Have you seen or consulted any other health care providers outside of the [facility] since your last visit?  Include any pap smears or colon screening.  no

## 2024-01-17 ENCOUNTER — OFFICE VISIT (OUTPATIENT)
Age: 64
End: 2024-01-17
Payer: COMMERCIAL

## 2024-01-17 ENCOUNTER — HOSPITAL ENCOUNTER (OUTPATIENT)
Facility: HOSPITAL | Age: 64
Setting detail: INFUSION SERIES
Discharge: HOME | End: 2024-01-17
Payer: COMMERCIAL

## 2024-01-17 VITALS
OXYGEN SATURATION: 98 % | TEMPERATURE: 97.7 F | HEIGHT: 70 IN | SYSTOLIC BLOOD PRESSURE: 143 MMHG | DIASTOLIC BLOOD PRESSURE: 80 MMHG | BODY MASS INDEX: 32.45 KG/M2 | WEIGHT: 226.63 LBS | HEART RATE: 73 BPM | RESPIRATION RATE: 16 BRPM

## 2024-01-17 VITALS
RESPIRATION RATE: 16 BRPM | SYSTOLIC BLOOD PRESSURE: 156 MMHG | HEIGHT: 70 IN | WEIGHT: 226.6 LBS | TEMPERATURE: 97.7 F | HEART RATE: 69 BPM | BODY MASS INDEX: 32.44 KG/M2 | OXYGEN SATURATION: 98 % | DIASTOLIC BLOOD PRESSURE: 87 MMHG

## 2024-01-17 DIAGNOSIS — Z51.11 ENCOUNTER FOR ANTINEOPLASTIC CHEMOTHERAPY AND IMMUNOTHERAPY: ICD-10-CM

## 2024-01-17 DIAGNOSIS — Z51.12 ENCOUNTER FOR ANTINEOPLASTIC CHEMOTHERAPY AND IMMUNOTHERAPY: ICD-10-CM

## 2024-01-17 DIAGNOSIS — C16.0 GE JUNCTION CARCINOMA (HCC): Primary | ICD-10-CM

## 2024-01-17 DIAGNOSIS — C77.2 MALIGNANT NEOPLASM METASTATIC TO INTRA-ABDOMINAL LYMPH NODE (HCC): ICD-10-CM

## 2024-01-17 LAB
ALBUMIN SERPL-MCNC: 3.7 G/DL (ref 3.5–5)
ALBUMIN/GLOB SERPL: 0.9 (ref 1.1–2.2)
ALP SERPL-CCNC: 156 U/L (ref 45–117)
ALT SERPL-CCNC: 28 U/L (ref 12–78)
ANION GAP SERPL CALC-SCNC: 6 MMOL/L (ref 5–15)
AST SERPL-CCNC: 19 U/L (ref 15–37)
BASOPHILS # BLD: 0 K/UL (ref 0–0.1)
BASOPHILS NFR BLD: 1 % (ref 0–1)
BILIRUB SERPL-MCNC: 0.8 MG/DL (ref 0.2–1)
BUN SERPL-MCNC: 14 MG/DL (ref 6–20)
BUN/CREAT SERPL: 14 (ref 12–20)
CALCIUM SERPL-MCNC: 9.3 MG/DL (ref 8.5–10.1)
CHLORIDE SERPL-SCNC: 108 MMOL/L (ref 97–108)
CO2 SERPL-SCNC: 24 MMOL/L (ref 21–32)
CREAT SERPL-MCNC: 1 MG/DL (ref 0.7–1.3)
DIFFERENTIAL METHOD BLD: ABNORMAL
EOSINOPHIL # BLD: 0.2 K/UL (ref 0–0.4)
EOSINOPHIL NFR BLD: 3 % (ref 0–7)
ERYTHROCYTE [DISTWIDTH] IN BLOOD BY AUTOMATED COUNT: 13.6 % (ref 11.5–14.5)
GLOBULIN SER CALC-MCNC: 3.9 G/DL (ref 2–4)
GLUCOSE SERPL-MCNC: 222 MG/DL (ref 65–100)
HCT VFR BLD AUTO: 44 % (ref 36.6–50.3)
HGB BLD-MCNC: 15.3 G/DL (ref 12.1–17)
IMM GRANULOCYTES # BLD AUTO: 0 K/UL (ref 0–0.04)
IMM GRANULOCYTES NFR BLD AUTO: 0 % (ref 0–0.5)
LYMPHOCYTES # BLD: 1.3 K/UL (ref 0.8–3.5)
LYMPHOCYTES NFR BLD: 23 % (ref 12–49)
MCH RBC QN AUTO: 33.5 PG (ref 26–34)
MCHC RBC AUTO-ENTMCNC: 34.8 G/DL (ref 30–36.5)
MCV RBC AUTO: 96.3 FL (ref 80–99)
MONOCYTES # BLD: 0.7 K/UL (ref 0–1)
MONOCYTES NFR BLD: 12 % (ref 5–13)
NEUTS SEG # BLD: 3.5 K/UL (ref 1.8–8)
NEUTS SEG NFR BLD: 61 % (ref 32–75)
NRBC # BLD: 0 K/UL (ref 0–0.01)
NRBC BLD-RTO: 0 PER 100 WBC
PLATELET # BLD AUTO: 187 K/UL (ref 150–400)
PMV BLD AUTO: 8.7 FL (ref 8.9–12.9)
POTASSIUM SERPL-SCNC: 3.6 MMOL/L (ref 3.5–5.1)
PROT SERPL-MCNC: 7.6 G/DL (ref 6.4–8.2)
RBC # BLD AUTO: 4.57 M/UL (ref 4.1–5.7)
SODIUM SERPL-SCNC: 138 MMOL/L (ref 136–145)
TSH SERPL DL<=0.05 MIU/L-ACNC: 1.16 UIU/ML (ref 0.36–3.74)
WBC # BLD AUTO: 5.7 K/UL (ref 4.1–11.1)

## 2024-01-17 PROCEDURE — 1123F ACP DISCUSS/DSCN MKR DOCD: CPT | Performed by: INTERNAL MEDICINE

## 2024-01-17 PROCEDURE — 36415 COLL VENOUS BLD VENIPUNCTURE: CPT

## 2024-01-17 PROCEDURE — 96413 CHEMO IV INFUSION 1 HR: CPT

## 2024-01-17 PROCEDURE — 96368 THER/DIAG CONCURRENT INF: CPT

## 2024-01-17 PROCEDURE — 85025 COMPLETE CBC W/AUTO DIFF WBC: CPT

## 2024-01-17 PROCEDURE — 99215 OFFICE O/P EST HI 40 MIN: CPT | Performed by: INTERNAL MEDICINE

## 2024-01-17 PROCEDURE — 3077F SYST BP >= 140 MM HG: CPT | Performed by: INTERNAL MEDICINE

## 2024-01-17 PROCEDURE — 3079F DIAST BP 80-89 MM HG: CPT | Performed by: INTERNAL MEDICINE

## 2024-01-17 PROCEDURE — 96375 TX/PRO/DX INJ NEW DRUG ADDON: CPT

## 2024-01-17 PROCEDURE — 96416 CHEMO PROLONG INFUSE W/PUMP: CPT

## 2024-01-17 PROCEDURE — 2580000003 HC RX 258: Performed by: INTERNAL MEDICINE

## 2024-01-17 PROCEDURE — 80053 COMPREHEN METABOLIC PANEL: CPT

## 2024-01-17 PROCEDURE — 84443 ASSAY THYROID STIM HORMONE: CPT

## 2024-01-17 PROCEDURE — 96411 CHEMO IV PUSH ADDL DRUG: CPT

## 2024-01-17 PROCEDURE — 6360000002 HC RX W HCPCS: Performed by: INTERNAL MEDICINE

## 2024-01-17 RX ORDER — HEPARIN 100 UNIT/ML
500 SYRINGE INTRAVENOUS PRN
Status: CANCELLED | OUTPATIENT
Start: 2024-01-19

## 2024-01-17 RX ORDER — SODIUM CHLORIDE 9 MG/ML
5-250 INJECTION, SOLUTION INTRAVENOUS PRN
Status: CANCELLED | OUTPATIENT
Start: 2024-01-17

## 2024-01-17 RX ORDER — SODIUM CHLORIDE 9 MG/ML
INJECTION, SOLUTION INTRAVENOUS CONTINUOUS
Status: CANCELLED | OUTPATIENT
Start: 2024-01-17

## 2024-01-17 RX ORDER — HEPARIN 100 UNIT/ML
500 SYRINGE INTRAVENOUS PRN
Status: CANCELLED | OUTPATIENT
Start: 2024-01-17

## 2024-01-17 RX ORDER — SODIUM CHLORIDE 0.9 % (FLUSH) 0.9 %
5-40 SYRINGE (ML) INJECTION PRN
Status: CANCELLED | OUTPATIENT
Start: 2024-01-17

## 2024-01-17 RX ORDER — FLUOROURACIL 50 MG/ML
400 INJECTION, SOLUTION INTRAVENOUS ONCE
Status: CANCELLED | OUTPATIENT
Start: 2024-01-17 | End: 2024-01-17

## 2024-01-17 RX ORDER — PALONOSETRON 0.05 MG/ML
0.25 INJECTION, SOLUTION INTRAVENOUS ONCE
Status: COMPLETED | OUTPATIENT
Start: 2024-01-17 | End: 2024-01-17

## 2024-01-17 RX ORDER — MEPERIDINE HYDROCHLORIDE 25 MG/ML
12.5 INJECTION INTRAMUSCULAR; INTRAVENOUS; SUBCUTANEOUS PRN
Status: CANCELLED | OUTPATIENT
Start: 2024-01-17

## 2024-01-17 RX ORDER — ACETAMINOPHEN 325 MG/1
650 TABLET ORAL
Status: CANCELLED | OUTPATIENT
Start: 2024-01-17

## 2024-01-17 RX ORDER — SODIUM CHLORIDE 0.9 % (FLUSH) 0.9 %
5-40 SYRINGE (ML) INJECTION PRN
Status: CANCELLED | OUTPATIENT
Start: 2024-01-19

## 2024-01-17 RX ORDER — SODIUM CHLORIDE 9 MG/ML
5-250 INJECTION, SOLUTION INTRAVENOUS PRN
Status: CANCELLED | OUTPATIENT
Start: 2024-01-19

## 2024-01-17 RX ORDER — EPINEPHRINE 1 MG/ML
0.3 INJECTION, SOLUTION, CONCENTRATE INTRAVENOUS PRN
Status: CANCELLED | OUTPATIENT
Start: 2024-01-17

## 2024-01-17 RX ORDER — DEXTROSE MONOHYDRATE 50 MG/ML
5-250 INJECTION, SOLUTION INTRAVENOUS PRN
Status: CANCELLED | OUTPATIENT
Start: 2024-01-17

## 2024-01-17 RX ORDER — DIPHENHYDRAMINE HYDROCHLORIDE 50 MG/ML
50 INJECTION INTRAMUSCULAR; INTRAVENOUS
Status: CANCELLED | OUTPATIENT
Start: 2024-01-17

## 2024-01-17 RX ORDER — SODIUM CHLORIDE 9 MG/ML
5-250 INJECTION, SOLUTION INTRAVENOUS PRN
Status: DISCONTINUED | OUTPATIENT
Start: 2024-01-17 | End: 2024-01-18 | Stop reason: HOSPADM

## 2024-01-17 RX ORDER — SODIUM CHLORIDE 0.9 % (FLUSH) 0.9 %
5-40 SYRINGE (ML) INJECTION PRN
Status: DISCONTINUED | OUTPATIENT
Start: 2024-01-17 | End: 2024-01-18 | Stop reason: HOSPADM

## 2024-01-17 RX ORDER — ALBUTEROL SULFATE 90 UG/1
4 AEROSOL, METERED RESPIRATORY (INHALATION) PRN
Status: CANCELLED | OUTPATIENT
Start: 2024-01-17

## 2024-01-17 RX ORDER — PALONOSETRON 0.05 MG/ML
0.25 INJECTION, SOLUTION INTRAVENOUS ONCE
Status: CANCELLED | OUTPATIENT
Start: 2024-01-17 | End: 2024-01-17

## 2024-01-17 RX ORDER — ONDANSETRON 2 MG/ML
8 INJECTION INTRAMUSCULAR; INTRAVENOUS
Status: CANCELLED | OUTPATIENT
Start: 2024-01-17

## 2024-01-17 RX ORDER — FLUOROURACIL 50 MG/ML
400 INJECTION, SOLUTION INTRAVENOUS ONCE
Status: COMPLETED | OUTPATIENT
Start: 2024-01-17 | End: 2024-01-17

## 2024-01-17 RX ADMIN — Medication 12 MG: at 13:12

## 2024-01-17 RX ADMIN — FLUOROURACIL 5000 MG: 50 INJECTION, SOLUTION INTRAVENOUS at 14:52

## 2024-01-17 RX ADMIN — SODIUM CHLORIDE 25 ML/HR: 9 INJECTION, SOLUTION INTRAVENOUS at 13:08

## 2024-01-17 RX ADMIN — LEUCOVORIN CALCIUM 200 MG: 200 INJECTION, POWDER, LYOPHILIZED, FOR SOLUTION INTRAMUSCULAR; INTRAVENOUS at 14:11

## 2024-01-17 RX ADMIN — PALONOSETRON 0.25 MG: 0.05 INJECTION, SOLUTION INTRAVENOUS at 13:16

## 2024-01-17 RX ADMIN — SODIUM CHLORIDE 240 MG: 9 INJECTION, SOLUTION INTRAVENOUS at 13:37

## 2024-01-17 RX ADMIN — FLUOROURACIL 900 MG: 50 INJECTION, SOLUTION INTRAVENOUS at 14:46

## 2024-01-17 NOTE — PROGRESS NOTES
Name: Cyril Gregg    MRN: [id number]         : 1960    Mr. Gregg Arrived ambulatory and in no distress for C1 D1 of Opdivo & 5FU Regimen. Assessment was completed, no acute issues at this time, no new complaints voiced. Patient did state that his numbness and tingling is getting worse in his lower extremities and his swallowing is also becoming more challenging. Right chest wall port accessed without difficulty, labs drawn & sent for processing.      Patient proceed to appointment with Dr. Wang.    Patient Vitals for the past 24 hrs:   BP Temp Temp src Pulse Resp SpO2 Height Weight   24 1457 (!) 156/87 -- -- 69 -- -- -- --   24 0903 (!) 143/80 97.7 °F (36.5 °C) Temporal 73 16 98 % 1.778 m (5' 10\") 102.8 kg (226 lb 9.6 oz)        Lab results were obtained and reviewed.  Recent Results (from the past 12 hour(s))   Comprehensive metabolic panel    Collection Time: 24  9:02 AM   Result Value Ref Range    Sodium 138 136 - 145 mmol/L    Potassium 3.6 3.5 - 5.1 mmol/L    Chloride 108 97 - 108 mmol/L    CO2 24 21 - 32 mmol/L    Anion Gap 6 5 - 15 mmol/L    Glucose 222 (H) 65 - 100 mg/dL    BUN 14 6 - 20 MG/DL    Creatinine 1.00 0.70 - 1.30 MG/DL    Bun/Cre Ratio 14 12 - 20      Est, Glom Filt Rate >60 >60 ml/min/1.73m2    Calcium 9.3 8.5 - 10.1 MG/DL    Total Bilirubin 0.8 0.2 - 1.0 MG/DL    ALT 28 12 - 78 U/L    AST 19 15 - 37 U/L    Alk Phosphatase 156 (H) 45 - 117 U/L    Total Protein 7.6 6.4 - 8.2 g/dL    Albumin 3.7 3.5 - 5.0 g/dL    Globulin 3.9 2.0 - 4.0 g/dL    Albumin/Globulin Ratio 0.9 (L) 1.1 - 2.2     CBC With Auto Differential    Collection Time: 24  9:02 AM   Result Value Ref Range    WBC 5.7 4.1 - 11.1 K/uL    RBC 4.57 4.10 - 5.70 M/uL    Hemoglobin 15.3 12.1 - 17.0 g/dL    Hematocrit 44.0 36.6 - 50.3 %    MCV 96.3 80.0 - 99.0 FL    MCH 33.5 26.0 - 34.0 PG    MCHC 34.8 30.0 - 36.5 g/dL    RDW 13.6 11.5 - 14.5 %    Platelets 187 150 - 400 K/uL    MPV 8.7 (L) 8.9 -  Route  IntraVENous Administered By  Tanika Paul, RN            Two nurses verified prior to administering: Drug name, drug dose, infusion volume or drug volume when prepared in a syringe, rate of administration, route of administration,  expiration dates and/or times, appearance and physical integrity of the drugs, rate set on infusion pump, when used sequencing of drug administration.     Mr. Gregg tolerated treatment well and [facility] in stable condition at 1450. Port left accessed per protocol 2/2 patient going home on pump.He is to return on 1/19/2024 for his next appointment.    Tanika Paul RN  January 17, 2024

## 2024-01-17 NOTE — PROGRESS NOTES
[facility]  at [facility]  [address], [address]  [place] [address]  [phone]       Hematology Oncology Progress Note      Patient: Cyril Gregg MRN: [id number]  SSN: [id number]    YOB: 1960  Age: 63 y.o.  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     Treatment:     Palliative therapy  mFOLFOX + Nivolumab cycle 11, Ox stopped with cycle 8  5-FU/Nivo   Cape/Nivolumab, 1 cycle - stopped due to dysphagia    Subjective:      Cyril Gregg is a 63 y.o. male who I am seeing in consultation for a new diagnosis of metastatic GEJ carcinoma. He has been experiencing some difficulty in swallowing for a few months. He has lost over 40 lbs and he is fatigued. An EGD revealed GEJ mass which was biopsied and came back as GEJ carcinoma. CT and a subsequent PET shows enlarged left supraclavicular and RP efrem disease. He has seen Khloe De La Torre at [facility]. He decided to receive systemic therapy with us.     Interval History:     Mr. Gregg received mFOLFOX + Nivolumab for some time. I stopped Ox after 7th tt. I switched to Cape and he unable to swallow the medicine.       Review of Systems:  Constitutional: negative  Eyes: negative  Ears, Nose, Mouth, Throat, and Face: negative  Respiratory: negative  Cardiovascular: negative  Gastrointestinal: negative  Genitourinary:negative  Integument/Breast: rash in arms and trunk  Hematologic/Lymphatic: negative  Musculoskeletal: right rib pain  Neurological: negative    Review of systems was reviewed and updated as needed on 01/17/24.      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

## 2024-01-18 RX ORDER — 0.9 % SODIUM CHLORIDE 0.9 %
1000 INTRAVENOUS SOLUTION INTRAVENOUS ONCE
Status: CANCELLED
Start: 2024-01-19

## 2024-01-19 ENCOUNTER — ANESTHESIA EVENT (OUTPATIENT)
Facility: HOSPITAL | Age: 64
End: 2024-01-19
Payer: COMMERCIAL

## 2024-01-19 ENCOUNTER — ANESTHESIA (OUTPATIENT)
Facility: HOSPITAL | Age: 64
End: 2024-01-19
Payer: COMMERCIAL

## 2024-01-19 ENCOUNTER — APPOINTMENT (OUTPATIENT)
Facility: HOSPITAL | Age: 64
End: 2024-01-19
Payer: COMMERCIAL

## 2024-01-19 ENCOUNTER — HOSPITAL ENCOUNTER (OUTPATIENT)
Facility: HOSPITAL | Age: 64
Setting detail: INFUSION SERIES
Discharge: HOME | End: 2024-01-19
Payer: COMMERCIAL

## 2024-01-19 ENCOUNTER — HOSPITAL ENCOUNTER (OUTPATIENT)
Facility: HOSPITAL | Age: 64
Setting detail: OUTPATIENT SURGERY
Discharge: HOME | End: 2024-01-19
Attending: INTERNAL MEDICINE | Admitting: INTERNAL MEDICINE
Payer: COMMERCIAL

## 2024-01-19 VITALS
RESPIRATION RATE: 19 BRPM | WEIGHT: 227 LBS | SYSTOLIC BLOOD PRESSURE: 125 MMHG | HEIGHT: 70 IN | TEMPERATURE: 98.2 F | OXYGEN SATURATION: 97 % | DIASTOLIC BLOOD PRESSURE: 80 MMHG | HEART RATE: 68 BPM | BODY MASS INDEX: 32.5 KG/M2

## 2024-01-19 VITALS
HEART RATE: 56 BPM | RESPIRATION RATE: 18 BRPM | OXYGEN SATURATION: 96 % | SYSTOLIC BLOOD PRESSURE: 151 MMHG | TEMPERATURE: 98.4 F | DIASTOLIC BLOOD PRESSURE: 85 MMHG

## 2024-01-19 DIAGNOSIS — C16.0 GE JUNCTION CARCINOMA (HCC): Primary | ICD-10-CM

## 2024-01-19 PROCEDURE — 2709999900 HC NON-CHARGEABLE SUPPLY: Performed by: INTERNAL MEDICINE

## 2024-01-19 PROCEDURE — 2580000003 HC RX 258: Performed by: NURSE PRACTITIONER

## 2024-01-19 PROCEDURE — 2580000003 HC RX 258: Performed by: INTERNAL MEDICINE

## 2024-01-19 PROCEDURE — 2500000003 HC RX 250 WO HCPCS: Performed by: NURSE ANESTHETIST, CERTIFIED REGISTERED

## 2024-01-19 PROCEDURE — 3600007512: Performed by: INTERNAL MEDICINE

## 2024-01-19 PROCEDURE — 3700000001 HC ADD 15 MINUTES (ANESTHESIA): Performed by: INTERNAL MEDICINE

## 2024-01-19 PROCEDURE — 96360 HYDRATION IV INFUSION INIT: CPT

## 2024-01-19 PROCEDURE — 3600007502: Performed by: INTERNAL MEDICINE

## 2024-01-19 PROCEDURE — 3700000000 HC ANESTHESIA ATTENDED CARE: Performed by: INTERNAL MEDICINE

## 2024-01-19 PROCEDURE — 7100000010 HC PHASE II RECOVERY - FIRST 15 MIN: Performed by: INTERNAL MEDICINE

## 2024-01-19 PROCEDURE — 6360000002 HC RX W HCPCS: Performed by: NURSE ANESTHETIST, CERTIFIED REGISTERED

## 2024-01-19 RX ORDER — SODIUM CHLORIDE 0.9 % (FLUSH) 0.9 %
5-40 SYRINGE (ML) INJECTION EVERY 12 HOURS SCHEDULED
Status: DISCONTINUED | OUTPATIENT
Start: 2024-01-19 | End: 2024-01-19 | Stop reason: HOSPADM

## 2024-01-19 RX ORDER — SODIUM CHLORIDE 9 MG/ML
25 INJECTION, SOLUTION INTRAVENOUS PRN
Status: DISCONTINUED | OUTPATIENT
Start: 2024-01-19 | End: 2024-01-19 | Stop reason: HOSPADM

## 2024-01-19 RX ORDER — 0.9 % SODIUM CHLORIDE 0.9 %
1000 INTRAVENOUS SOLUTION INTRAVENOUS ONCE
Status: COMPLETED | OUTPATIENT
Start: 2024-01-19 | End: 2024-01-19

## 2024-01-19 RX ORDER — LIDOCAINE HYDROCHLORIDE 20 MG/ML
INJECTION, SOLUTION EPIDURAL; INFILTRATION; INTRACAUDAL; PERINEURAL PRN
Status: DISCONTINUED | OUTPATIENT
Start: 2024-01-19 | End: 2024-01-19 | Stop reason: SDUPTHER

## 2024-01-19 RX ORDER — SODIUM CHLORIDE 0.9 % (FLUSH) 0.9 %
5-40 SYRINGE (ML) INJECTION PRN
Status: DISCONTINUED | OUTPATIENT
Start: 2024-01-19 | End: 2024-01-20 | Stop reason: HOSPADM

## 2024-01-19 RX ORDER — SODIUM CHLORIDE 0.9 % (FLUSH) 0.9 %
5-40 SYRINGE (ML) INJECTION PRN
Status: DISCONTINUED | OUTPATIENT
Start: 2024-01-19 | End: 2024-01-19 | Stop reason: HOSPADM

## 2024-01-19 RX ADMIN — PROPOFOL 100 MG: 10 INJECTION, EMULSION INTRAVENOUS at 11:35

## 2024-01-19 RX ADMIN — SODIUM CHLORIDE 25 ML: 9 INJECTION, SOLUTION INTRAVENOUS at 11:21

## 2024-01-19 RX ADMIN — SODIUM CHLORIDE, PRESERVATIVE FREE 10 ML: 5 INJECTION INTRAVENOUS at 17:14

## 2024-01-19 RX ADMIN — PROPOFOL 100 MG: 10 INJECTION, EMULSION INTRAVENOUS at 11:30

## 2024-01-19 RX ADMIN — LIDOCAINE HYDROCHLORIDE 100 MG: 20 INJECTION, SOLUTION EPIDURAL; INFILTRATION; INTRACAUDAL; PERINEURAL at 11:30

## 2024-01-19 RX ADMIN — SODIUM CHLORIDE, PRESERVATIVE FREE 10 ML: 5 INJECTION INTRAVENOUS at 16:09

## 2024-01-19 RX ADMIN — PROPOFOL 100 MG: 10 INJECTION, EMULSION INTRAVENOUS at 11:33

## 2024-01-19 RX ADMIN — SODIUM CHLORIDE 1000 ML: 9 INJECTION, SOLUTION INTRAVENOUS at 16:09

## 2024-01-19 ASSESSMENT — PAIN - FUNCTIONAL ASSESSMENT: PAIN_FUNCTIONAL_ASSESSMENT: 0-10

## 2024-01-19 NOTE — OP NOTE
NAME:  Cyril Gregg   :   1960   MRN:   [id number]     Date/Time:  2024 11:47 AM    Esophagogastroduodenoscopy (EGD) Procedure Note    Procedure: Esophagogastroduodenoscopy --diagnostic    Indication: Esophageal adenocarcinoma  Pre-operative Diagnosis: see indication above  Post-operative Diagnosis: see findings below  :  Figueroa Dawson MD  Referring Provider:   -Daryl Wang MD, Mike Crespo MD-Osei Winchester MD    Exam:  Airway: clear, no airway problems anticipated  Heart: RRR, without gallops or rubs  Lungs: clear bilaterally without wheezes, crackles, or rhonchi  Abdomen: soft, nontender, nondistended, bowel sounds present  Mental Status: awake, alert and oriented to person, place and time     Anethesia/Sedation:  MAC anesthesia Propofol  Procedure Details   After informed consent was obtained for the procedure, with all risks and benefits of procedure explained the patient was taken to the endoscopy suite and placed in the left lateral decubitus position.  Following sequential administration of sedation as per above, the [id number] gastroscope was inserted into the mouth and advanced under direct vision to duodenal bulb.  A careful inspection was made as the gastroscope was withdrawn, including a retroflexed view of the proximal stomach; findings and interventions are described below.                  Findings:   Known malignant stricture at GE junction at 40 cm distal to the incisors. This still could not be traversed with a standard EGD scope. In order to get more information in case an esophageal stent is pursued in the future I switched to a pediatric EGD scope  Sliding hiatal hernia from 40 cm to 44 cm distal to the incisors  Normal stomach, including retroflexion  Normal duodenal bulb and 2nd portion of the duodenum    Therapies:  None    Specimens: None    EBL:  None.         Complications:   None; patient tolerated the procedure well.           Impression:    Known

## 2024-01-19 NOTE — PERIOP NOTE
ARRIVAL INFORMATION:  Verified patient name and date of birth, scheduled procedure, and informed consent.     : Raisa wife contact number: [phone]  Physician and staff can share information with the .     Belongings with patient include:  Clothing,None    GI FOCUSED ASSESSMENT:  Neuro: Awake, alert, oriented x4  Respiratory: even and unlabored   GI: soft and non-distended  EKG Rhythm: normal sinus rhythm    Education:Reviewed general discharge instructions and  information.

## 2024-01-19 NOTE — PROGRESS NOTES
TRANSFER - IN REPORT:    Verbal report received from Brina Gregg  being received from bk for routine progression of patient care      Report consisted of patient's Situation, Background, Assessment and   Recommendations(SBAR).     Information from the following report(s) Nurse Handoff Report was reviewed with the receiving nurse.    Opportunity for questions and clarification was provided.      Assessment completed upon patient's arrival to unit and care assumed.

## 2024-01-19 NOTE — PROGRESS NOTES
Endoscopy Case End Note:    1144:  Procedure scope was pre-cleaned, per protocol, at bedside by Pete.      1144:  Report received from anesthesia - CAMRON Guerrero.  See anesthesia flowsheet for intra-procedure vital signs and events.

## 2024-01-19 NOTE — DISCHARGE INSTRUCTIONS
Cyril Gregg  [id number]  1960    EGD DISCHARGE INSTRUCTIONS  Discomfort:  Sore throat- throat lozenges or warm salt water gargle  redness at IV site- apply warm compress to area; if redness or soreness persist- contact your physician  Gaseous discomfort- walking, belching will help relieve any discomfort  You may not operate a vehicle for 12 hours  You may not engage in an occupation involving machinery or appliances for rest of today  You may not drink alcoholic beverages for at least 12 hours  Avoid making any critical decisions for at least 24 hour  DIET  Resume prior diet (liquids)    MEDICATIONS:  [unfilled]    ACTIVITY  You may resume your normal daily activities until tomorrow AM;  Spend the remainder of the day resting -  avoid any strenuous activity.  CALL M.D.  ANY SIGN OF   Increasing pain, nausea, vomiting  Abdominal distension (swelling)  New increased bleeding (oral or rectal)  Fever (chills)  Pain in chest area  Bloody discharge from nose or mouth  Shortness of breath    You may not take any Advil, Aspirin, Ibuprofen, Motrin, Aleve, or Goody’s for 10 days, ONLY  Tylenol as needed for pain.    IMPRESSION:  Impression:    Known malignant stricture at GE junction at 40 cm distal to the incisors. This still could not be traversed with a standard EGD scope. In order to get more information in case an esophageal stent is pursued in the future I switched to a pediatric EGD scope which could traverse lesion  Sliding hiatal hernia from 40 cm to 44 cm distal to the incisors  Normal stomach, including retroflexion  Normal duodenal bulb and 2nd portion of the duodenum    Recommendations:  Resume prior diet (liquids)  I have personally notified Dr. Wang and Dr. Crespo of above. Will need to determine esophageal stent vs feeding tube. Will defer to Dr. Wang and Dr. Crespo    Follow-up Instructions:   Telephone # [phone]    Figueroa Dawson MD  Patient Education on Sedation / Analgesia

## 2024-01-19 NOTE — H&P
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    Cervical myelopathy (HCC)     Esophageal cancer (HCC)     GERD (gastroesophageal reflux disease)     High cholesterol     Prostate cancer (HCC)     PUD (peptic ulcer disease)     Sleep apnea     C-PAP    Viral meningitis       Past Surgical History:   Procedure Laterality Date    ADENOIDECTOMY  1964    APPENDECTOMY  1974    GI  195    PYLORIC STENOSIS    GI      COLONOSCOPY    HEENT      PATCHES IN EAR DRUMS    HEENT      Lasik surgery both eyes    HEENT      deviated septum repair    IR BIOPSY LYMPH NODE SUPERVICIAL  2023    IR BIOPSY LYMPH NODE SUPERVICIAL 2023 GAVINO Kennedy NP [facility]    IR PORT PLACEMENT EQUAL OR GREATER THAN 5 YEARS  2023    IR PORT PLACEMENT EQUAL OR GREATER THAN 5 YEARS 2023 GAVINO Kennedy NP [facility]    KNEE ARTHROSCOPY Left     meniscus repair; ACL repair    ORTHOPEDIC SURGERY Left     CUT ON LAWN     ORTHOPEDIC SURGERY      stenosis    OTHER SURGICAL HISTORY  2008    REMOVAL SCCA ON LT. ARM    TONSILLECTOMY  1964      Social History     Socioeconomic History    Marital status:      Spouse name: None    Number of children: None    Years of education: None    Highest education level: None   Tobacco Use    Smoking status: Former     Current packs/day: 0.00     Types: Cigarettes     Quit date: 2/10/2001     Years since quittin.9    Smokeless tobacco: Never   Substance and Sexual Activity    Alcohol use: Not Currently    Drug use: No      Family History   Problem Relation Age of Onset    Hypertension Mother     High Cholesterol Mother     Diabetes Mother     Stomach Cancer Mother 83    Other Father         MVA     hour while feeds are running. Flush additional 100ml 4 times during daytime.   Will need dual feeding tube pump, backpack, syringes and gauze/tape. 8/14/23   Elinor Bourne APRN - NP   Grape Seed 100 MG CAPS Take 100 mg by mouth daily  Patient not taking: Reported on 8/15/2023    Kelly Mcdaniels MD   Omega-3 Fatty Acids (OMEGA-3 FISH OIL) 1000 MG CAPS Take by mouth  Patient not taking: Reported on 8/15/2023    Kelly Mcdaniels MD   MULTIPLE MINERALS-VITAMINS PO Take by mouth    Kelly Mcdaniels MD   clindamycin (CLEOCIN) 300 MG capsule Take 3 capsules by mouth once  Patient not taking: Reported on 8/15/2023 1/25/23   Kelly Mcdaniels MD   ondansetron (ZOFRAN) 8 MG tablet  7/20/23   Kelly Mcdaniels MD   terazosin (HYTRIN) 2 MG capsule  4/21/16   Kelly Mcdaniels MD   lidocaine-prilocaine (EMLA) 2.5-2.5 % cream Apply topically as needed to port site 30min-1hour prior to chemotherapy appointments and cover with saran wrap 7/21/23   Daryl Wang MD   ondansetron (ZOFRAN-ODT) 4 MG disintegrating tablet Take 1 tablet by mouth every 6 hours as needed for Nausea or Vomiting 7/21/23   Daryl Wang MD   prochlorperazine (COMPAZINE) 10 MG tablet Take 1 tablet by mouth every 6 hours as needed (nausea)  Patient not taking: Reported on 1/19/2024 7/21/23   Daryl Wang MD   empagliflozin (JARDIANCE) 25 MG tablet Take 1 tablet by mouth daily 8/22/22   Kelly Mcdaniels MD   glipiZIDE (GLUCOTROL) 5 MG tablet Take 1 tablet by mouth    Automatic Reconciliation, Ar   losartan (COZAAR) 50 MG tablet Take 1 tablet by mouth daily    Automatic Reconciliation, Ar     Physical Exam:   Vital Signs: Blood pressure 133/79, pulse 68, temperature 98.2 °F (36.8 °C), temperature source Temporal, resp. rate 16, height 1.778 m (5' 10\"), weight 103 kg (227 lb), SpO2 90 %.  General: well developed, well nourished   HEENT: unremarkable   Heart: regular rhythm no mumur    Lungs: clear   Abdominal:  benign

## 2024-01-19 NOTE — ANESTHESIA PRE PROCEDURE
Department of Anesthesiology  Preprocedure Note       Name:  Cyril Gregg   Age:  63 y.o.  :  1960                                          MRN:  [id number]         Date:  2024      Surgeon: Surgeon(s):  Figueroa Dawson MD    Procedure: Procedure(s):  EGD BIOPSY    Medications prior to admission:   Prior to Admission medications    Medication Sig Start Date End Date Taking? Authorizing Provider   triamcinolone (KENALOG) 0.1 % cream Apply topically 2 times daily. 23   India Loco APRN - NP   capecitabine (XELODA) 150 MG chemo tablet Take 1 tablet (150 mg total) by mouth 2 times daily with 1 other capecitabine prescription for 2,150 mg total For 21 days then 7 days off. 23   India Loco APRN - NP   capecitabine (XELODA) 500 MG chemo tablet Take 4 tablets (2,000 mg total) by mouth 2 times daily with 1 other capecitabine prescription for 2,150 mg total For 21 days then 7 days off. 23   India Loco APRN - NP   ACCU-CHEK DALE PLUS strip USE TO CHECK BLOOD SUGAR ONCE DAILY 23   Kelly Mcdaniels MD   Nutritional Supplements (NUTREN 2.0) LIQD 5 cartons by Per J Tube route daily Nutren 2.0 @ 105ml/hr x 12 hours. Give 150ml water flushes q3 hour while feeds are running. Flush additional 100ml 4 times during daytime.   Will need dual feeding tube pump, backpack, syringes and gauze/tape. 23   Elinor Bourne APRN - NP   Grape Seed 100 MG CAPS Take 100 mg by mouth daily  Patient not taking: Reported on 8/15/2023    Kelly Mcdaniels MD   Omega-3 Fatty Acids (OMEGA-3 FISH OIL) 1000 MG CAPS Take by mouth  Patient not taking: Reported on 8/15/2023    Kelly Mcdaniels MD   MULTIPLE MINERALS-VITAMINS PO Take by mouth    Kelly Mcdaniels MD   clindamycin (CLEOCIN) 300 MG capsule Take 3 capsules by mouth once  Patient not taking: Reported on 8/15/2023 1/25/23   Kelly Mcdaniels MD   ondansetron (ZOFRAN) 8 MG tablet  23   Arslan

## 2024-01-19 NOTE — PROGRESS NOTES
[facility] Short Consult Note:    1600 Pt arrived at [facility] ambulatory and in no distress for pump disconnect + hydation.  Patient took his own Zofran ODT before the hydration as he says the fluids make him nauseous.    Patient denied having any symptoms of COVID-19, i.e. SOB, coughing, fever, or generally not feeling well.    BP (!) 148/83   Pulse 61   Temp 98.4 °F (36.9 °C) (Temporal)   Resp 18   SpO2 96%     All chemo contents infused. Patient reports pump completed at 1221.      Medications given:  NS 1L IV bolus over 1 hour (hydration)    BP (!) 151/85   Pulse 56   Temp 98.4 °F (36.9 °C) (Temporal)   Resp 18   SpO2 96%      1717 Tolerated treatment well, no adverse reaction noted.  Port flushed per policy and needle removed, 2x2 and paper tape placed. D/Cd from [facility] ambulatory and in no distress accompanied by spouse.  Next appt 1/31/24 @ 2017.

## 2024-01-22 ENCOUNTER — PATIENT MESSAGE (OUTPATIENT)
Age: 64
End: 2024-01-22

## 2024-01-23 RX ORDER — HEPARIN 100 UNIT/ML
500 SYRINGE INTRAVENOUS PRN
Status: CANCELLED | OUTPATIENT
Start: 2024-02-02

## 2024-01-23 RX ORDER — HEPARIN 100 UNIT/ML
500 SYRINGE INTRAVENOUS PRN
Status: CANCELLED | OUTPATIENT
Start: 2024-01-31

## 2024-01-23 RX ORDER — 0.9 % SODIUM CHLORIDE 0.9 %
1000 INTRAVENOUS SOLUTION INTRAVENOUS ONCE
Status: CANCELLED
Start: 2024-02-02 | End: 2024-02-02

## 2024-01-23 RX ORDER — SODIUM CHLORIDE 9 MG/ML
INJECTION, SOLUTION INTRAVENOUS CONTINUOUS
Status: CANCELLED | OUTPATIENT
Start: 2024-01-31

## 2024-01-23 RX ORDER — ACETAMINOPHEN 325 MG/1
650 TABLET ORAL
Status: CANCELLED | OUTPATIENT
Start: 2024-01-31

## 2024-01-23 RX ORDER — SODIUM CHLORIDE 9 MG/ML
5-250 INJECTION, SOLUTION INTRAVENOUS PRN
Status: CANCELLED | OUTPATIENT
Start: 2024-02-02

## 2024-01-23 RX ORDER — EPINEPHRINE 1 MG/ML
0.3 INJECTION, SOLUTION INTRAMUSCULAR; SUBCUTANEOUS PRN
Status: CANCELLED | OUTPATIENT
Start: 2024-01-31

## 2024-01-23 RX ORDER — DIPHENHYDRAMINE HYDROCHLORIDE 50 MG/ML
50 INJECTION INTRAMUSCULAR; INTRAVENOUS
Status: CANCELLED | OUTPATIENT
Start: 2024-01-31

## 2024-01-23 RX ORDER — SODIUM CHLORIDE 9 MG/ML
5-250 INJECTION, SOLUTION INTRAVENOUS PRN
Status: CANCELLED | OUTPATIENT
Start: 2024-01-31

## 2024-01-23 RX ORDER — ONDANSETRON 2 MG/ML
8 INJECTION INTRAMUSCULAR; INTRAVENOUS
Status: CANCELLED | OUTPATIENT
Start: 2024-01-31

## 2024-01-23 RX ORDER — ALBUTEROL SULFATE 90 UG/1
4 AEROSOL, METERED RESPIRATORY (INHALATION) PRN
Status: CANCELLED | OUTPATIENT
Start: 2024-01-31

## 2024-01-23 RX ORDER — SODIUM CHLORIDE 0.9 % (FLUSH) 0.9 %
5-40 SYRINGE (ML) INJECTION PRN
Status: CANCELLED | OUTPATIENT
Start: 2024-02-02

## 2024-01-23 RX ORDER — MEPERIDINE HYDROCHLORIDE 25 MG/ML
12.5 INJECTION INTRAMUSCULAR; INTRAVENOUS; SUBCUTANEOUS PRN
Status: CANCELLED | OUTPATIENT
Start: 2024-01-31

## 2024-01-29 RX ORDER — TRIAMCINOLONE ACETONIDE 1 MG/G
CREAM TOPICAL
Qty: 80 G | Refills: 1 | Status: SHIPPED | OUTPATIENT
Start: 2024-01-29

## 2024-01-31 ENCOUNTER — HOSPITAL ENCOUNTER (OUTPATIENT)
Facility: HOSPITAL | Age: 64
Setting detail: INFUSION SERIES
Discharge: HOME | End: 2024-01-31
Payer: COMMERCIAL

## 2024-01-31 ENCOUNTER — OFFICE VISIT (OUTPATIENT)
Age: 64
End: 2024-01-31
Payer: COMMERCIAL

## 2024-01-31 VITALS
SYSTOLIC BLOOD PRESSURE: 146 MMHG | BODY MASS INDEX: 31.37 KG/M2 | HEIGHT: 70 IN | DIASTOLIC BLOOD PRESSURE: 87 MMHG | TEMPERATURE: 98 F | OXYGEN SATURATION: 96 % | HEART RATE: 61 BPM | RESPIRATION RATE: 18 BRPM | WEIGHT: 219.14 LBS

## 2024-01-31 VITALS
OXYGEN SATURATION: 96 % | TEMPERATURE: 98 F | BODY MASS INDEX: 31.38 KG/M2 | DIASTOLIC BLOOD PRESSURE: 84 MMHG | SYSTOLIC BLOOD PRESSURE: 138 MMHG | HEART RATE: 61 BPM | RESPIRATION RATE: 18 BRPM | HEIGHT: 70 IN | WEIGHT: 219.2 LBS

## 2024-01-31 DIAGNOSIS — Z51.11 ENCOUNTER FOR ANTINEOPLASTIC CHEMOTHERAPY AND IMMUNOTHERAPY: ICD-10-CM

## 2024-01-31 DIAGNOSIS — E44.0 PROTEIN-CALORIE MALNUTRITION, MODERATE (HCC): ICD-10-CM

## 2024-01-31 DIAGNOSIS — Z51.12 ENCOUNTER FOR ANTINEOPLASTIC CHEMOTHERAPY AND IMMUNOTHERAPY: ICD-10-CM

## 2024-01-31 DIAGNOSIS — C16.0 GE JUNCTION CARCINOMA (HCC): Primary | ICD-10-CM

## 2024-01-31 DIAGNOSIS — C77.2 MALIGNANT NEOPLASM METASTATIC TO INTRA-ABDOMINAL LYMPH NODE (HCC): ICD-10-CM

## 2024-01-31 LAB
ALBUMIN SERPL-MCNC: 3.9 G/DL (ref 3.5–5)
ALBUMIN SERPL-MCNC: 4 G/DL (ref 3.5–5)
ALBUMIN/GLOB SERPL: 1 (ref 1.1–2.2)
ALBUMIN/GLOB SERPL: 1 (ref 1.1–2.2)
ALP SERPL-CCNC: 144 U/L (ref 45–117)
ALP SERPL-CCNC: 147 U/L (ref 45–117)
ALT SERPL-CCNC: 31 U/L (ref 12–78)
ALT SERPL-CCNC: 32 U/L (ref 12–78)
ANION GAP SERPL CALC-SCNC: 5 MMOL/L (ref 5–15)
AST SERPL-CCNC: 20 U/L (ref 15–37)
AST SERPL-CCNC: 21 U/L (ref 15–37)
BASOPHILS # BLD: 0.1 K/UL (ref 0–0.1)
BASOPHILS NFR BLD: 1 % (ref 0–1)
BILIRUB DIRECT SERPL-MCNC: 0.3 MG/DL (ref 0–0.2)
BILIRUB SERPL-MCNC: 1.8 MG/DL (ref 0.2–1)
BILIRUB SERPL-MCNC: 2 MG/DL (ref 0.2–1)
BUN SERPL-MCNC: 18 MG/DL (ref 6–20)
BUN/CREAT SERPL: 19 (ref 12–20)
CALCIUM SERPL-MCNC: 9.4 MG/DL (ref 8.5–10.1)
CHLORIDE SERPL-SCNC: 107 MMOL/L (ref 97–108)
CO2 SERPL-SCNC: 27 MMOL/L (ref 21–32)
CREAT SERPL-MCNC: 0.93 MG/DL (ref 0.7–1.3)
DIFFERENTIAL METHOD BLD: ABNORMAL
EOSINOPHIL # BLD: 0.1 K/UL (ref 0–0.4)
EOSINOPHIL NFR BLD: 2 % (ref 0–7)
ERYTHROCYTE [DISTWIDTH] IN BLOOD BY AUTOMATED COUNT: 13.1 % (ref 11.5–14.5)
GLOBULIN SER CALC-MCNC: 4.1 G/DL (ref 2–4)
GLOBULIN SER CALC-MCNC: 4.2 G/DL (ref 2–4)
GLUCOSE SERPL-MCNC: 144 MG/DL (ref 65–100)
HCT VFR BLD AUTO: 45.7 % (ref 36.6–50.3)
HGB BLD-MCNC: 16 G/DL (ref 12.1–17)
IMM GRANULOCYTES # BLD AUTO: 0 K/UL (ref 0–0.04)
IMM GRANULOCYTES NFR BLD AUTO: 0 % (ref 0–0.5)
LYMPHOCYTES # BLD: 1.2 K/UL (ref 0.8–3.5)
LYMPHOCYTES NFR BLD: 21 % (ref 12–49)
MCH RBC QN AUTO: 33.3 PG (ref 26–34)
MCHC RBC AUTO-ENTMCNC: 35 G/DL (ref 30–36.5)
MCV RBC AUTO: 95.2 FL (ref 80–99)
MONOCYTES # BLD: 0.7 K/UL (ref 0–1)
MONOCYTES NFR BLD: 13 % (ref 5–13)
NEUTS SEG # BLD: 3.5 K/UL (ref 1.8–8)
NEUTS SEG NFR BLD: 63 % (ref 32–75)
NRBC # BLD: 0 K/UL (ref 0–0.01)
NRBC BLD-RTO: 0 PER 100 WBC
PLATELET # BLD AUTO: 144 K/UL (ref 150–400)
PMV BLD AUTO: 8.5 FL (ref 8.9–12.9)
POTASSIUM SERPL-SCNC: 3.8 MMOL/L (ref 3.5–5.1)
PROT SERPL-MCNC: 8 G/DL (ref 6.4–8.2)
PROT SERPL-MCNC: 8.2 G/DL (ref 6.4–8.2)
RBC # BLD AUTO: 4.8 M/UL (ref 4.1–5.7)
SODIUM SERPL-SCNC: 139 MMOL/L (ref 136–145)
WBC # BLD AUTO: 5.6 K/UL (ref 4.1–11.1)

## 2024-01-31 PROCEDURE — 96417 CHEMO IV INFUS EACH ADDL SEQ: CPT

## 2024-01-31 PROCEDURE — 96416 CHEMO PROLONG INFUSE W/PUMP: CPT

## 2024-01-31 PROCEDURE — 85025 COMPLETE CBC W/AUTO DIFF WBC: CPT

## 2024-01-31 PROCEDURE — 1123F ACP DISCUSS/DSCN MKR DOCD: CPT | Performed by: INTERNAL MEDICINE

## 2024-01-31 PROCEDURE — 2580000003 HC RX 258: Performed by: INTERNAL MEDICINE

## 2024-01-31 PROCEDURE — 96413 CHEMO IV INFUSION 1 HR: CPT

## 2024-01-31 PROCEDURE — 80053 COMPREHEN METABOLIC PANEL: CPT

## 2024-01-31 PROCEDURE — 96411 CHEMO IV PUSH ADDL DRUG: CPT

## 2024-01-31 PROCEDURE — 3077F SYST BP >= 140 MM HG: CPT | Performed by: INTERNAL MEDICINE

## 2024-01-31 PROCEDURE — 36415 COLL VENOUS BLD VENIPUNCTURE: CPT

## 2024-01-31 PROCEDURE — 80076 HEPATIC FUNCTION PANEL: CPT

## 2024-01-31 PROCEDURE — 99215 OFFICE O/P EST HI 40 MIN: CPT | Performed by: INTERNAL MEDICINE

## 2024-01-31 PROCEDURE — 3079F DIAST BP 80-89 MM HG: CPT | Performed by: INTERNAL MEDICINE

## 2024-01-31 PROCEDURE — 6360000002 HC RX W HCPCS: Performed by: INTERNAL MEDICINE

## 2024-01-31 PROCEDURE — 96375 TX/PRO/DX INJ NEW DRUG ADDON: CPT

## 2024-01-31 RX ORDER — SODIUM CHLORIDE 0.9 % (FLUSH) 0.9 %
5-40 SYRINGE (ML) INJECTION PRN
Status: DISCONTINUED | OUTPATIENT
Start: 2024-01-31 | End: 2024-02-01 | Stop reason: HOSPADM

## 2024-01-31 RX ORDER — PALONOSETRON 0.05 MG/ML
0.25 INJECTION, SOLUTION INTRAVENOUS ONCE
Status: COMPLETED | OUTPATIENT
Start: 2024-01-31 | End: 2024-01-31

## 2024-01-31 RX ORDER — DEXTROSE MONOHYDRATE 50 MG/ML
5-250 INJECTION, SOLUTION INTRAVENOUS PRN
Status: DISCONTINUED | OUTPATIENT
Start: 2024-01-31 | End: 2024-02-01 | Stop reason: HOSPADM

## 2024-01-31 RX ORDER — SODIUM CHLORIDE 9 MG/ML
5-250 INJECTION, SOLUTION INTRAVENOUS PRN
Status: DISCONTINUED | OUTPATIENT
Start: 2024-01-31 | End: 2024-02-01 | Stop reason: HOSPADM

## 2024-01-31 RX ORDER — FLUOROURACIL 50 MG/ML
400 INJECTION, SOLUTION INTRAVENOUS ONCE
Status: COMPLETED | OUTPATIENT
Start: 2024-01-31 | End: 2024-01-31

## 2024-01-31 RX ADMIN — FLUOROURACIL 900 MG: 50 INJECTION, SOLUTION INTRAVENOUS at 12:30

## 2024-01-31 RX ADMIN — PALONOSETRON 0.25 MG: 0.05 INJECTION, SOLUTION INTRAVENOUS at 10:42

## 2024-01-31 RX ADMIN — SODIUM CHLORIDE 240 MG: 9 INJECTION, SOLUTION INTRAVENOUS at 11:12

## 2024-01-31 RX ADMIN — DEXAMETHASONE SODIUM PHOSPHATE 12 MG: 4 INJECTION, SOLUTION INTRAMUSCULAR; INTRAVENOUS at 10:46

## 2024-01-31 RX ADMIN — LEUCOVORIN CALCIUM 200 MG: 200 INJECTION, POWDER, LYOPHILIZED, FOR SOLUTION INTRAMUSCULAR; INTRAVENOUS at 11:54

## 2024-01-31 RX ADMIN — DEXTROSE MONOHYDRATE 25 ML/HR: 50 INJECTION, SOLUTION INTRAVENOUS at 11:52

## 2024-01-31 RX ADMIN — FLUOROURACIL 5000 MG: 50 INJECTION, SOLUTION INTRAVENOUS at 12:35

## 2024-01-31 RX ADMIN — SODIUM CHLORIDE 25 ML/HR: 9 INJECTION, SOLUTION INTRAVENOUS at 10:41

## 2024-01-31 NOTE — PROGRESS NOTES
[facility] Visit Note    Pt arrived to [facility] ambulatory in no acute distress for 5FU/leucovorin/Opdivo C2.  Assessment completed; pt continues to have difficulties swallowing and has been experiencing intermittent, sharp abdominal and back pain.  R chest port accessed without issue and positive blood return noted.  Labs obtained, CBC and CMP.    Recent Results (from the past 12 hour(s))   Comprehensive metabolic panel    Collection Time: 01/31/24  8:43 AM   Result Value Ref Range    Sodium 139 136 - 145 mmol/L    Potassium 3.8 3.5 - 5.1 mmol/L    Chloride 107 97 - 108 mmol/L    CO2 27 21 - 32 mmol/L    Anion Gap 5 5 - 15 mmol/L    Glucose 144 (H) 65 - 100 mg/dL    BUN 18 6 - 20 MG/DL    Creatinine 0.93 0.70 - 1.30 MG/DL    Bun/Cre Ratio 19 12 - 20      Est, Glom Filt Rate >60 >60 ml/min/1.73m2    Calcium 9.4 8.5 - 10.1 MG/DL    Total Bilirubin 2.0 (H) 0.2 - 1.0 MG/DL    ALT 32 12 - 78 U/L    AST 21 15 - 37 U/L    Alk Phosphatase 144 (H) 45 - 117 U/L    Total Protein 8.0 6.4 - 8.2 g/dL    Albumin 3.9 3.5 - 5.0 g/dL    Globulin 4.1 (H) 2.0 - 4.0 g/dL    Albumin/Globulin Ratio 1.0 (L) 1.1 - 2.2     CBC With Auto Differential    Collection Time: 01/31/24  8:43 AM   Result Value Ref Range    WBC 5.6 4.1 - 11.1 K/uL    RBC 4.80 4.10 - 5.70 M/uL    Hemoglobin 16.0 12.1 - 17.0 g/dL    Hematocrit 45.7 36.6 - 50.3 %    MCV 95.2 80.0 - 99.0 FL    MCH 33.3 26.0 - 34.0 PG    MCHC 35.0 30.0 - 36.5 g/dL    RDW 13.1 11.5 - 14.5 %    Platelets 144 (L) 150 - 400 K/uL    MPV 8.5 (L) 8.9 - 12.9 FL    Nucleated RBCs 0.0 0  WBC    nRBC 0.00 0.00 - 0.01 K/uL    Neutrophils % 63 32 - 75 %    Lymphocytes % 21 12 - 49 %    Monocytes % 13 5 - 13 %    Eosinophils % 2 0 - 7 %    Basophils % 1 0 - 1 %    Immature Granulocytes 0 0.0 - 0.5 %    Neutrophils Absolute 3.5 1.8 - 8.0 K/UL    Lymphocytes Absolute 1.2 0.8 - 3.5 K/UL    Monocytes Absolute 0.7 0.0 - 1.0 K/UL    Eosinophils Absolute 0.1 0.0 - 0.4 K/UL

## 2024-01-31 NOTE — PROGRESS NOTES
Cyril MCKINNON Gregg is a 63 y.o. male here for treatment for [place] cancer.  EGD biopsy 1/19/24.  -7 lbs since last visit.  Intermittent pain in abdominal area. Level 5 at it's worse.   Still dealing with pruritic rash. Asking if there is something stronger he can take.     1. Have you been to the ER, urgent care clinic since your last visit?  Hospitalized since your last visit?   no         2. Have you seen or consulted any other health care providers outside of the [facility] since your last visit?  Include any pap smears or colon screening.  Dr Crespo  
This is too small to  characterize and is unchanged.  STOMACH: Unremarkable.  SMALL BOWEL: No dilatation or wall thickening.  COLON: No dilatation or wall thickening.  APPENDIX: Not visualized, no inflammation..  PERITONEUM: No ascites or pneumoperitoneum.  RETROPERITONEUM: The left-sided retroperitoneal adenopathy inferior to the level  of the renal vein previously measured 16 x 16 mm has decreased now measuring 7 x  5 mm. Other lymph nodes in the left-sided retroperitoneum have also decreased. 7  mm lymph node previously seen at the level of the aortic bifurcation now  measures approximately 3 mm. No new areas of adenopathy..  REPRODUCTIVE ORGANS: Unremarkable.  URINARY BLADDER: No mass or calculus.  BONES: Left hip prosthesis is noted this does cause streak artifact in the  pelvis.. There is continued increased sclerosis of the anterolateral aspect of  the right seventh rib likely a healing response.  ADDITIONAL COMMENTS: Small right-sided inguinal hernia previously with fluid in  this now demonstrates no fluid within the small right inguinal hernia    Impression  1. No change in circumferential thickening of the distal esophagus.  2. Small pulmonary nodules are stable, these are likely not related to  metastases.  3. Left supraclavicular adenopathy has resolved. The previously seen adenopathy  in the abdomen and retroperitoneum has also resolved. Now present are normal  size lymph nodes in the areas of previous adenopathy.          Lab Results   Component Value Date    WBC 5.6 01/31/2024    HGB 16.0 01/31/2024    HCT 45.7 01/31/2024    MCV 95.2 01/31/2024     (L) 01/31/2024         Lab Results   Component Value Date     01/31/2024    K 3.8 01/31/2024     01/31/2024    CO2 27 01/31/2024    BUN 18 01/31/2024    CREATININE 0.93 01/31/2024    GLUCOSE 144 (H) 01/31/2024    CALCIUM 9.4 01/31/2024    PROT 8.0 01/31/2024    LABALBU 3.9 01/31/2024    BILITOT 2.0 (H) 01/31/2024    ALKPHOS 144 (H)

## 2024-02-01 ENCOUNTER — TELEPHONE (OUTPATIENT)
Age: 64
End: 2024-02-01

## 2024-02-01 ENCOUNTER — HOSPITAL ENCOUNTER (OUTPATIENT)
Facility: HOSPITAL | Age: 64
Setting detail: INFUSION SERIES
Discharge: HOME | End: 2024-02-01
Payer: COMMERCIAL

## 2024-02-01 ENCOUNTER — CLINICAL DOCUMENTATION (OUTPATIENT)
Age: 64
End: 2024-02-01

## 2024-02-01 VITALS
SYSTOLIC BLOOD PRESSURE: 164 MMHG | TEMPERATURE: 97.9 F | OXYGEN SATURATION: 95 % | RESPIRATION RATE: 18 BRPM | HEART RATE: 72 BPM | DIASTOLIC BLOOD PRESSURE: 80 MMHG

## 2024-02-01 DIAGNOSIS — C16.0 GE JUNCTION CARCINOMA (HCC): Primary | ICD-10-CM

## 2024-02-01 LAB
ANION GAP BLD CALC-SCNC: 9 MMOL/L (ref 10–20)
CA-I BLD-MCNC: 1.24 MMOL/L (ref 1.12–1.32)
CHLORIDE BLD-SCNC: 104 MMOL/L (ref 98–107)
CO2 BLD-SCNC: 33 MMOL/L (ref 21–32)
CREAT BLD-MCNC: 0.8 MG/DL (ref 0.6–1.3)
GLUCOSE BLD-MCNC: 155 MG/DL (ref 65–100)
POTASSIUM BLD-SCNC: 3.9 MMOL/L (ref 3.5–5.1)
SERVICE CMNT-IMP: ABNORMAL
SODIUM BLD-SCNC: 146 MMOL/L (ref 136–145)

## 2024-02-01 PROCEDURE — 96360 HYDRATION IV INFUSION INIT: CPT

## 2024-02-01 PROCEDURE — 2580000003 HC RX 258: Performed by: NURSE PRACTITIONER

## 2024-02-01 PROCEDURE — 80047 BASIC METABLC PNL IONIZED CA: CPT

## 2024-02-01 RX ORDER — ALBUTEROL SULFATE 90 UG/1
4 AEROSOL, METERED RESPIRATORY (INHALATION) PRN
Status: CANCELLED | OUTPATIENT
Start: 2024-02-01

## 2024-02-01 RX ORDER — 0.9 % SODIUM CHLORIDE 0.9 %
1000 INTRAVENOUS SOLUTION INTRAVENOUS ONCE
Status: COMPLETED | OUTPATIENT
Start: 2024-02-01 | End: 2024-02-01

## 2024-02-01 RX ORDER — ONDANSETRON 2 MG/ML
8 INJECTION INTRAMUSCULAR; INTRAVENOUS
Status: CANCELLED | OUTPATIENT
Start: 2024-02-01

## 2024-02-01 RX ORDER — DIPHENHYDRAMINE HYDROCHLORIDE 50 MG/ML
50 INJECTION INTRAMUSCULAR; INTRAVENOUS
Status: CANCELLED | OUTPATIENT
Start: 2024-02-01

## 2024-02-01 RX ORDER — SODIUM CHLORIDE 9 MG/ML
5-250 INJECTION, SOLUTION INTRAVENOUS PRN
Status: CANCELLED | OUTPATIENT
Start: 2024-02-01

## 2024-02-01 RX ORDER — EPINEPHRINE 1 MG/ML
0.3 INJECTION, SOLUTION, CONCENTRATE INTRAVENOUS PRN
Status: CANCELLED | OUTPATIENT
Start: 2024-02-01

## 2024-02-01 RX ORDER — EPINEPHRINE 1 MG/ML
0.3 INJECTION, SOLUTION INTRAMUSCULAR; SUBCUTANEOUS PRN
Status: CANCELLED | OUTPATIENT
Start: 2024-02-01

## 2024-02-01 RX ORDER — HEPARIN 100 UNIT/ML
500 SYRINGE INTRAVENOUS PRN
Status: CANCELLED | OUTPATIENT
Start: 2024-02-01

## 2024-02-01 RX ORDER — SODIUM CHLORIDE 0.9 % (FLUSH) 0.9 %
5-40 SYRINGE (ML) INJECTION PRN
Status: CANCELLED | OUTPATIENT
Start: 2024-02-01

## 2024-02-01 RX ORDER — ACETAMINOPHEN 325 MG/1
650 TABLET ORAL
Status: CANCELLED | OUTPATIENT
Start: 2024-02-01

## 2024-02-01 RX ORDER — 0.9 % SODIUM CHLORIDE 0.9 %
1000 INTRAVENOUS SOLUTION INTRAVENOUS ONCE
Status: CANCELLED | OUTPATIENT
Start: 2024-02-01 | End: 2024-02-01

## 2024-02-01 RX ORDER — SODIUM CHLORIDE 9 MG/ML
INJECTION, SOLUTION INTRAVENOUS CONTINUOUS
Status: CANCELLED | OUTPATIENT
Start: 2024-02-01

## 2024-02-01 RX ORDER — SODIUM CHLORIDE 0.9 % (FLUSH) 0.9 %
5-40 SYRINGE (ML) INJECTION PRN
Status: DISCONTINUED | OUTPATIENT
Start: 2024-02-01 | End: 2024-02-02 | Stop reason: HOSPADM

## 2024-02-01 RX ADMIN — SODIUM CHLORIDE 1000 ML: 9 INJECTION, SOLUTION INTRAVENOUS at 14:33

## 2024-02-01 RX ADMIN — SODIUM CHLORIDE, PRESERVATIVE FREE 10 ML: 5 INJECTION INTRAVENOUS at 14:33

## 2024-02-01 NOTE — PROGRESS NOTES
[facility] Visit Note    Pt arrived to [facility] ambulatory in no acute distress at 1430 for Hydration.  Assessment unremarkable except difficulty swalling. R chest port already accessed with CADD pump infusing. Chemo paused, port flushed without issue and positive blood return noted.  Labs obtained, Chem8.    Patient denied having any symptoms of COVID-19, i.e. SOB, coughing, fever, or generally not feeling well.      BP (!) 164/80   Pulse 72   Temp 97.9 °F (36.6 °C)   Resp 18   SpO2 95%     Recent Results (from the past 12 hour(s))   POC CHEM 8    Collection Time: 02/01/24  2:46 PM   Result Value Ref Range    POC Ionized Calcium 1.24 1.12 - 1.32 mmol/L    POC Sodium 146 (H) 136 - 145 mmol/L    POC Potassium 3.9 3.5 - 5.1 mmol/L    POC Chloride 104 98 - 107 mmol/L    POC TCO2 33.0 (H) 21 - 32 mmol/L    Anion Gap, POC 9 (L) 10 - 20 mmol/L    POC Glucose 155 (H) 65 - 100 mg/dL    POC Creatinine 0.80 0.6 - 1.3 mg/dL    eGFR, POC >60 >60 ml/min/1.73m2    UA Comment Comment Not Indicated.         Two nurses verified prior to administering:  Drug name  Drug dose  Infusion volume or drug volume when prepared in a syringe  Rate of administration  Route of administration  Expiration dates and/or times  Appearance and physical integrity of the drugs  Rate set on infusion pump, when used  Sequencing of drug administration     The following medications administered:  Medications Administered         sodium chloride 0.9 % bolus 1,000 mL Admin Date  02/01/2024 Action  New Bag Dose  1,000 mL Rate  983.6 mL/hr Route  IntraVENous Administered By  Elizabeth Renae, RN        sodium chloride flush 0.9 % injection 5-40 mL Admin Date  02/01/2024 Action  Given Dose  10 mL Rate   Route  IntraVENous Administered By  Elizabeth Renae, RN              Pt tolerated treatment well.  No adverse reaction noted. Port flushed per policy and CADD pump placed and resumed infusion.  Pt discharged ambulatory in no acute  distress at 1540, accompanied by self.  Next appointment 2/2/24 at 1130.

## 2024-02-01 NOTE — TELEPHONE ENCOUNTER
Pt spouse called in regards to portal message sent. Pt spouse stated that pt needs to move forward with feeding tube. Stated that pt is having a hard time with eat and drinking water.

## 2024-02-01 NOTE — TELEPHONE ENCOUNTER
Spoke with patient 2 identifiers. Informed him will check with Dr. Chang re feeding tube placement and if he need appointment prior to surgery. Last seen 08/2023. Express understanding .

## 2024-02-01 NOTE — TELEPHONE ENCOUNTER
Patient came in from kimberly's office to schedule consult for placement of feeding tube, asked if we had a sooner availability and also making sure this is something we see please advise    [phone]

## 2024-02-01 NOTE — PROGRESS NOTES
See mychart messages.    Pt wife called as well.  Returned call to pt wife, she said he is really having a hard time and he is a diabetic and not able to get those medications down either.    Case d/w NP and [facility] and  pt will come in today at 230 for fluids as well as labs.

## 2024-02-02 ENCOUNTER — HOSPITAL ENCOUNTER (OUTPATIENT)
Facility: HOSPITAL | Age: 64
Setting detail: INFUSION SERIES
Discharge: HOME | End: 2024-02-02
Payer: COMMERCIAL

## 2024-02-02 ENCOUNTER — APPOINTMENT (OUTPATIENT)
Facility: HOSPITAL | Age: 64
End: 2024-02-02
Payer: COMMERCIAL

## 2024-02-02 VITALS
SYSTOLIC BLOOD PRESSURE: 159 MMHG | DIASTOLIC BLOOD PRESSURE: 84 MMHG | HEART RATE: 78 BPM | OXYGEN SATURATION: 95 % | TEMPERATURE: 98.1 F | RESPIRATION RATE: 18 BRPM

## 2024-02-02 DIAGNOSIS — C16.0 GE JUNCTION CARCINOMA (HCC): Primary | ICD-10-CM

## 2024-02-02 PROCEDURE — 96360 HYDRATION IV INFUSION INIT: CPT

## 2024-02-02 PROCEDURE — 2580000003 HC RX 258: Performed by: INTERNAL MEDICINE

## 2024-02-02 RX ORDER — 0.9 % SODIUM CHLORIDE 0.9 %
1000 INTRAVENOUS SOLUTION INTRAVENOUS ONCE
Status: COMPLETED | OUTPATIENT
Start: 2024-02-02 | End: 2024-02-02

## 2024-02-02 RX ORDER — SODIUM CHLORIDE 0.9 % (FLUSH) 0.9 %
5-40 SYRINGE (ML) INJECTION PRN
Status: DISCONTINUED | OUTPATIENT
Start: 2024-02-02 | End: 2024-02-03 | Stop reason: HOSPADM

## 2024-02-02 RX ADMIN — SODIUM CHLORIDE, PRESERVATIVE FREE 10 ML: 5 INJECTION INTRAVENOUS at 11:33

## 2024-02-02 RX ADMIN — SODIUM CHLORIDE 1000 ML: 9 INJECTION, SOLUTION INTRAVENOUS at 11:33

## 2024-02-02 RX ADMIN — SODIUM CHLORIDE, PRESERVATIVE FREE 10 ML: 5 INJECTION INTRAVENOUS at 12:43

## 2024-02-02 NOTE — PROGRESS NOTES
[facility] Short Consult Note:    1130 Pt arrived at [facility] ambulatory and in no distress for pump disconnect.  No new complaints voiced.      BP (!) 159/84   Pulse 78   Temp 98.1 °F (36.7 °C)   Resp 18   SpO2 95%     All chemo contents infused.      Medications Administered         sodium chloride 0.9 % bolus 1,000 mL Admin Date  02/02/2024 Action  New Bag Dose  1,000 mL Rate  999 mL/hr Route  IntraVENous Administered By  Elizabeth Renae, RN        sodium chloride flush 0.9 % injection 5-40 mL Admin Date  02/02/2024 Action  Given Dose  10 mL Rate   Route  IntraVENous Administered By  Elizabeth Renae, RN            Port flushed per policy and needle removed, 2x2 and paper tape placed.    1245 Tolerated treatment well, no adverse reaction noted.  D/Cd from [facility] ambulatory and in no distress accompanied by self.  Next appt 2/4/24 at 1100 at [facility].

## 2024-02-04 ENCOUNTER — HOSPITAL ENCOUNTER (OUTPATIENT)
Facility: HOSPITAL | Age: 64
Setting detail: INFUSION SERIES
Discharge: HOME | End: 2024-02-04
Payer: COMMERCIAL

## 2024-02-04 VITALS
DIASTOLIC BLOOD PRESSURE: 87 MMHG | SYSTOLIC BLOOD PRESSURE: 133 MMHG | HEART RATE: 73 BPM | RESPIRATION RATE: 18 BRPM | TEMPERATURE: 98.2 F

## 2024-02-04 DIAGNOSIS — C16.0 GE JUNCTION CARCINOMA (HCC): Primary | ICD-10-CM

## 2024-02-04 PROCEDURE — 2580000003 HC RX 258: Performed by: NURSE PRACTITIONER

## 2024-02-04 PROCEDURE — 96360 HYDRATION IV INFUSION INIT: CPT

## 2024-02-04 RX ORDER — ALBUTEROL SULFATE 90 UG/1
4 AEROSOL, METERED RESPIRATORY (INHALATION) PRN
OUTPATIENT
Start: 2024-02-04

## 2024-02-04 RX ORDER — SODIUM CHLORIDE 0.9 % (FLUSH) 0.9 %
5-40 SYRINGE (ML) INJECTION PRN
OUTPATIENT
Start: 2024-02-04

## 2024-02-04 RX ORDER — HEPARIN 100 UNIT/ML
500 SYRINGE INTRAVENOUS PRN
OUTPATIENT
Start: 2024-02-04

## 2024-02-04 RX ORDER — 0.9 % SODIUM CHLORIDE 0.9 %
1000 INTRAVENOUS SOLUTION INTRAVENOUS ONCE
Status: COMPLETED | OUTPATIENT
Start: 2024-02-04 | End: 2024-02-04

## 2024-02-04 RX ORDER — ONDANSETRON 2 MG/ML
8 INJECTION INTRAMUSCULAR; INTRAVENOUS
OUTPATIENT
Start: 2024-02-04

## 2024-02-04 RX ORDER — 0.9 % SODIUM CHLORIDE 0.9 %
1000 INTRAVENOUS SOLUTION INTRAVENOUS ONCE
OUTPATIENT
Start: 2024-02-04 | End: 2024-02-04

## 2024-02-04 RX ORDER — SODIUM CHLORIDE 9 MG/ML
INJECTION, SOLUTION INTRAVENOUS CONTINUOUS
OUTPATIENT
Start: 2024-02-04

## 2024-02-04 RX ORDER — DIPHENHYDRAMINE HYDROCHLORIDE 50 MG/ML
50 INJECTION INTRAMUSCULAR; INTRAVENOUS
OUTPATIENT
Start: 2024-02-04

## 2024-02-04 RX ORDER — ACETAMINOPHEN 325 MG/1
650 TABLET ORAL
OUTPATIENT
Start: 2024-02-04

## 2024-02-04 RX ORDER — SODIUM CHLORIDE 9 MG/ML
5-250 INJECTION, SOLUTION INTRAVENOUS PRN
OUTPATIENT
Start: 2024-02-04

## 2024-02-04 RX ORDER — EPINEPHRINE 1 MG/ML
0.3 INJECTION, SOLUTION INTRAMUSCULAR; SUBCUTANEOUS PRN
OUTPATIENT
Start: 2024-02-04

## 2024-02-04 RX ADMIN — SODIUM CHLORIDE 1000 ML: 9 INJECTION, SOLUTION INTRAVENOUS at 10:30

## 2024-02-04 ASSESSMENT — PAIN SCALES - GENERAL: PAINLEVEL_OUTOF10: 0

## 2024-02-04 NOTE — PROGRESS NOTES
[facility] Progress Note    Date: February 4, 2024        1030: Mr. Gregg arrived to [facility] ambulatory and in stable condition for  Hydration. Assessment completed, no acute issues or new complaints at this time.  Right chest wall port accessed without difficulty, positive blood return noted.      Mr. Gregg's vitals were reviewed.  Patient Vitals for the past 12 hrs:   Temp Pulse Resp BP   02/04/24 1130 -- -- -- 133/87   02/04/24 1023 98.2 °F (36.8 °C) 73 18 (!) 148/67         Medications given:  Medications Administered         sodium chloride 0.9 % bolus 1,000 mL Admin Date  02/04/2024 Action  New Bag Dose  1,000 mL Rate  983.6 mL/hr Route  IntraVENous Administered By  Ana Luisa Dumont, RN                1140: Patient was discharged in stable condition. Port flushed and de-accessed, gauze and band-aid applied. Patient is aware of next scheduled [facility] appointment on 02/14/24.      Ana Luisa Dumont RN  February 4, 2024

## 2024-02-05 RX ORDER — OMEPRAZOLE 20 MG/1
40 CAPSULE, DELAYED RELEASE ORAL DAILY
COMMUNITY

## 2024-02-06 ENCOUNTER — ANESTHESIA EVENT (OUTPATIENT)
Facility: HOSPITAL | Age: 64
End: 2024-02-06
Payer: COMMERCIAL

## 2024-02-06 ENCOUNTER — HOSPITAL ENCOUNTER (OUTPATIENT)
Facility: HOSPITAL | Age: 64
Setting detail: OBSERVATION
Discharge: HOME | End: 2024-02-07
Attending: SURGERY | Admitting: SURGERY
Payer: COMMERCIAL

## 2024-02-06 ENCOUNTER — ANESTHESIA (OUTPATIENT)
Facility: HOSPITAL | Age: 64
End: 2024-02-06
Payer: COMMERCIAL

## 2024-02-06 DIAGNOSIS — C15.5 MALIGNANT NEOPLASM OF LOWER THIRD OF ESOPHAGUS (HCC): Primary | ICD-10-CM

## 2024-02-06 PROBLEM — C15.9 ESOPHAGEAL CANCER (HCC): Status: ACTIVE | Noted: 2024-02-06

## 2024-02-06 LAB
GLUCOSE BLD STRIP.AUTO-MCNC: 133 MG/DL (ref 65–117)
GLUCOSE BLD STRIP.AUTO-MCNC: 139 MG/DL (ref 65–117)
SERVICE CMNT-IMP: ABNORMAL
SERVICE CMNT-IMP: ABNORMAL

## 2024-02-06 PROCEDURE — 7100000000 HC PACU RECOVERY - FIRST 15 MIN: Performed by: SURGERY

## 2024-02-06 PROCEDURE — 3700000000 HC ANESTHESIA ATTENDED CARE: Performed by: SURGERY

## 2024-02-06 PROCEDURE — 6360000002 HC RX W HCPCS: Performed by: SURGERY

## 2024-02-06 PROCEDURE — G0378 HOSPITAL OBSERVATION PER HR: HCPCS

## 2024-02-06 PROCEDURE — 2709999900 HC NON-CHARGEABLE SUPPLY: Performed by: SURGERY

## 2024-02-06 PROCEDURE — 7100000001 HC PACU RECOVERY - ADDTL 15 MIN: Performed by: SURGERY

## 2024-02-06 PROCEDURE — 2500000003 HC RX 250 WO HCPCS: Performed by: ANESTHESIOLOGY

## 2024-02-06 PROCEDURE — 96372 THER/PROPH/DIAG INJ SC/IM: CPT

## 2024-02-06 PROCEDURE — 6360000002 HC RX W HCPCS: Performed by: NURSE ANESTHETIST, CERTIFIED REGISTERED

## 2024-02-06 PROCEDURE — 6360000002 HC RX W HCPCS: Performed by: ANESTHESIOLOGY

## 2024-02-06 PROCEDURE — 3700000001 HC ADD 15 MINUTES (ANESTHESIA): Performed by: SURGERY

## 2024-02-06 PROCEDURE — 2500000003 HC RX 250 WO HCPCS: Performed by: NURSE ANESTHETIST, CERTIFIED REGISTERED

## 2024-02-06 PROCEDURE — 2580000003 HC RX 258: Performed by: ANESTHESIOLOGY

## 2024-02-06 PROCEDURE — 2580000003 HC RX 258: Performed by: SURGERY

## 2024-02-06 PROCEDURE — 3600000019 HC SURGERY ROBOT ADDTL 15MIN: Performed by: SURGERY

## 2024-02-06 PROCEDURE — 82962 GLUCOSE BLOOD TEST: CPT

## 2024-02-06 PROCEDURE — 3600000009 HC SURGERY ROBOT BASE: Performed by: SURGERY

## 2024-02-06 PROCEDURE — S2900 ROBOTIC SURGICAL SYSTEM: HCPCS | Performed by: SURGERY

## 2024-02-06 RX ORDER — ONDANSETRON 4 MG/1
4 TABLET, ORALLY DISINTEGRATING ORAL EVERY 8 HOURS PRN
Status: DISCONTINUED | OUTPATIENT
Start: 2024-02-06 | End: 2024-02-07 | Stop reason: HOSPADM

## 2024-02-06 RX ORDER — MIDAZOLAM HYDROCHLORIDE 1 MG/ML
INJECTION INTRAMUSCULAR; INTRAVENOUS PRN
Status: DISCONTINUED | OUTPATIENT
Start: 2024-02-06 | End: 2024-02-06 | Stop reason: SDUPTHER

## 2024-02-06 RX ORDER — OXYCODONE HCL 5 MG/5 ML
5 SOLUTION, ORAL ORAL EVERY 4 HOURS PRN
Status: DISCONTINUED | OUTPATIENT
Start: 2024-02-06 | End: 2024-02-07 | Stop reason: HOSPADM

## 2024-02-06 RX ORDER — SODIUM CHLORIDE 0.9 % (FLUSH) 0.9 %
5-40 SYRINGE (ML) INJECTION EVERY 12 HOURS SCHEDULED
Status: DISCONTINUED | OUTPATIENT
Start: 2024-02-06 | End: 2024-02-06 | Stop reason: HOSPADM

## 2024-02-06 RX ORDER — KETOROLAC TROMETHAMINE 30 MG/ML
15 INJECTION, SOLUTION INTRAMUSCULAR; INTRAVENOUS EVERY 6 HOURS
Status: DISCONTINUED | OUTPATIENT
Start: 2024-02-06 | End: 2024-02-07 | Stop reason: HOSPADM

## 2024-02-06 RX ORDER — SODIUM CHLORIDE 9 MG/ML
INJECTION, SOLUTION INTRAVENOUS PRN
Status: DISCONTINUED | OUTPATIENT
Start: 2024-02-06 | End: 2024-02-06 | Stop reason: HOSPADM

## 2024-02-06 RX ORDER — FENTANYL CITRATE 50 UG/ML
50 INJECTION, SOLUTION INTRAMUSCULAR; INTRAVENOUS EVERY 5 MIN PRN
Status: DISCONTINUED | OUTPATIENT
Start: 2024-02-06 | End: 2024-02-06 | Stop reason: HOSPADM

## 2024-02-06 RX ORDER — MEPERIDINE HYDROCHLORIDE 25 MG/ML
12.5 INJECTION INTRAMUSCULAR; INTRAVENOUS; SUBCUTANEOUS EVERY 5 MIN PRN
Status: DISCONTINUED | OUTPATIENT
Start: 2024-02-06 | End: 2024-02-06 | Stop reason: HOSPADM

## 2024-02-06 RX ORDER — PROCHLORPERAZINE EDISYLATE 5 MG/ML
5 INJECTION INTRAMUSCULAR; INTRAVENOUS
Status: COMPLETED | OUTPATIENT
Start: 2024-02-06 | End: 2024-02-06

## 2024-02-06 RX ORDER — ONDANSETRON 2 MG/ML
4 INJECTION INTRAMUSCULAR; INTRAVENOUS
Status: DISCONTINUED | OUTPATIENT
Start: 2024-02-06 | End: 2024-02-06 | Stop reason: HOSPADM

## 2024-02-06 RX ORDER — SODIUM CHLORIDE 0.9 % (FLUSH) 0.9 %
5-40 SYRINGE (ML) INJECTION EVERY 12 HOURS SCHEDULED
Status: DISCONTINUED | OUTPATIENT
Start: 2024-02-06 | End: 2024-02-07 | Stop reason: HOSPADM

## 2024-02-06 RX ORDER — PROPOFOL 10 MG/ML
INJECTION, EMULSION INTRAVENOUS PRN
Status: DISCONTINUED | OUTPATIENT
Start: 2024-02-06 | End: 2024-02-06 | Stop reason: SDUPTHER

## 2024-02-06 RX ORDER — LIDOCAINE HYDROCHLORIDE 20 MG/ML
INJECTION, SOLUTION EPIDURAL; INFILTRATION; INTRACAUDAL; PERINEURAL PRN
Status: DISCONTINUED | OUTPATIENT
Start: 2024-02-06 | End: 2024-02-06 | Stop reason: SDUPTHER

## 2024-02-06 RX ORDER — HYDROMORPHONE HYDROCHLORIDE 1 MG/ML
0.5 INJECTION, SOLUTION INTRAMUSCULAR; INTRAVENOUS; SUBCUTANEOUS EVERY 5 MIN PRN
Status: COMPLETED | OUTPATIENT
Start: 2024-02-06 | End: 2024-02-06

## 2024-02-06 RX ORDER — SODIUM CHLORIDE 9 MG/ML
INJECTION, SOLUTION INTRAVENOUS PRN
Status: DISCONTINUED | OUTPATIENT
Start: 2024-02-06 | End: 2024-02-07 | Stop reason: HOSPADM

## 2024-02-06 RX ORDER — LIDOCAINE HYDROCHLORIDE 10 MG/ML
1 INJECTION, SOLUTION EPIDURAL; INFILTRATION; INTRACAUDAL; PERINEURAL
Status: DISCONTINUED | OUTPATIENT
Start: 2024-02-06 | End: 2024-02-06 | Stop reason: HOSPADM

## 2024-02-06 RX ORDER — SODIUM CHLORIDE 9 MG/ML
INJECTION, SOLUTION INTRAVENOUS CONTINUOUS
Status: DISCONTINUED | OUTPATIENT
Start: 2024-02-06 | End: 2024-02-07 | Stop reason: HOSPADM

## 2024-02-06 RX ORDER — SODIUM CHLORIDE, SODIUM LACTATE, POTASSIUM CHLORIDE, CALCIUM CHLORIDE 600; 310; 30; 20 MG/100ML; MG/100ML; MG/100ML; MG/100ML
INJECTION, SOLUTION INTRAVENOUS ONCE
Status: COMPLETED | OUTPATIENT
Start: 2024-02-06 | End: 2024-02-06

## 2024-02-06 RX ORDER — ONDANSETRON 2 MG/ML
4 INJECTION INTRAMUSCULAR; INTRAVENOUS EVERY 6 HOURS PRN
Status: DISCONTINUED | OUTPATIENT
Start: 2024-02-06 | End: 2024-02-07 | Stop reason: HOSPADM

## 2024-02-06 RX ORDER — SODIUM CHLORIDE 0.9 % (FLUSH) 0.9 %
5-40 SYRINGE (ML) INJECTION PRN
Status: DISCONTINUED | OUTPATIENT
Start: 2024-02-06 | End: 2024-02-06 | Stop reason: HOSPADM

## 2024-02-06 RX ORDER — HYDROMORPHONE HYDROCHLORIDE 1 MG/ML
0.5 INJECTION, SOLUTION INTRAMUSCULAR; INTRAVENOUS; SUBCUTANEOUS
Status: DISCONTINUED | OUTPATIENT
Start: 2024-02-06 | End: 2024-02-06

## 2024-02-06 RX ORDER — MORPHINE SULFATE 4 MG/ML
2 INJECTION, SOLUTION INTRAMUSCULAR; INTRAVENOUS EVERY 4 HOURS PRN
Status: DISCONTINUED | OUTPATIENT
Start: 2024-02-06 | End: 2024-02-07 | Stop reason: HOSPADM

## 2024-02-06 RX ORDER — ROCURONIUM BROMIDE 10 MG/ML
INJECTION, SOLUTION INTRAVENOUS PRN
Status: DISCONTINUED | OUTPATIENT
Start: 2024-02-06 | End: 2024-02-06 | Stop reason: SDUPTHER

## 2024-02-06 RX ORDER — BUPIVACAINE HYDROCHLORIDE 5 MG/ML
INJECTION, SOLUTION EPIDURAL; INTRACAUDAL PRN
Status: DISCONTINUED | OUTPATIENT
Start: 2024-02-06 | End: 2024-02-06 | Stop reason: ALTCHOICE

## 2024-02-06 RX ORDER — ENOXAPARIN SODIUM 100 MG/ML
40 INJECTION SUBCUTANEOUS DAILY
Status: DISCONTINUED | OUTPATIENT
Start: 2024-02-06 | End: 2024-02-07 | Stop reason: HOSPADM

## 2024-02-06 RX ORDER — DEXAMETHASONE SODIUM PHOSPHATE 4 MG/ML
INJECTION, SOLUTION INTRA-ARTICULAR; INTRALESIONAL; INTRAMUSCULAR; INTRAVENOUS; SOFT TISSUE PRN
Status: DISCONTINUED | OUTPATIENT
Start: 2024-02-06 | End: 2024-02-06 | Stop reason: SDUPTHER

## 2024-02-06 RX ORDER — ONDANSETRON 2 MG/ML
INJECTION INTRAMUSCULAR; INTRAVENOUS PRN
Status: DISCONTINUED | OUTPATIENT
Start: 2024-02-06 | End: 2024-02-06 | Stop reason: SDUPTHER

## 2024-02-06 RX ORDER — FENTANYL CITRATE 50 UG/ML
INJECTION, SOLUTION INTRAMUSCULAR; INTRAVENOUS PRN
Status: DISCONTINUED | OUTPATIENT
Start: 2024-02-06 | End: 2024-02-06 | Stop reason: SDUPTHER

## 2024-02-06 RX ORDER — SODIUM CHLORIDE, SODIUM LACTATE, POTASSIUM CHLORIDE, CALCIUM CHLORIDE 600; 310; 30; 20 MG/100ML; MG/100ML; MG/100ML; MG/100ML
INJECTION, SOLUTION INTRAVENOUS CONTINUOUS
Status: DISCONTINUED | OUTPATIENT
Start: 2024-02-06 | End: 2024-02-06 | Stop reason: HOSPADM

## 2024-02-06 RX ORDER — SUCCINYLCHOLINE/SOD CL,ISO/PF 200MG/10ML
SYRINGE (ML) INTRAVENOUS PRN
Status: DISCONTINUED | OUTPATIENT
Start: 2024-02-06 | End: 2024-02-06 | Stop reason: SDUPTHER

## 2024-02-06 RX ORDER — SODIUM CHLORIDE 0.9 % (FLUSH) 0.9 %
5-40 SYRINGE (ML) INJECTION PRN
Status: DISCONTINUED | OUTPATIENT
Start: 2024-02-06 | End: 2024-02-07 | Stop reason: HOSPADM

## 2024-02-06 RX ORDER — HYDROMORPHONE HYDROCHLORIDE 2 MG/ML
INJECTION, SOLUTION INTRAMUSCULAR; INTRAVENOUS; SUBCUTANEOUS PRN
Status: DISCONTINUED | OUTPATIENT
Start: 2024-02-06 | End: 2024-02-06 | Stop reason: SDUPTHER

## 2024-02-06 RX ADMIN — KETOROLAC TROMETHAMINE 15 MG: 30 INJECTION INTRAMUSCULAR; INTRAVENOUS at 18:04

## 2024-02-06 RX ADMIN — KETOROLAC TROMETHAMINE 15 MG: 30 INJECTION INTRAMUSCULAR; INTRAVENOUS at 12:17

## 2024-02-06 RX ADMIN — DEXAMETHASONE SODIUM PHOSPHATE 4 MG: 4 INJECTION, SOLUTION INTRAMUSCULAR; INTRAVENOUS at 10:20

## 2024-02-06 RX ADMIN — ROCURONIUM BROMIDE 5 MG: 10 INJECTION INTRAVENOUS at 09:53

## 2024-02-06 RX ADMIN — HYDROMORPHONE HYDROCHLORIDE 0.4 MG: 2 INJECTION INTRAMUSCULAR; INTRAVENOUS; SUBCUTANEOUS at 11:00

## 2024-02-06 RX ADMIN — FENTANYL CITRATE 50 MCG: 50 INJECTION, SOLUTION INTRAMUSCULAR; INTRAVENOUS at 10:25

## 2024-02-06 RX ADMIN — PROPOFOL 150 MG: 10 INJECTION, EMULSION INTRAVENOUS at 09:53

## 2024-02-06 RX ADMIN — SODIUM CHLORIDE, POTASSIUM CHLORIDE, SODIUM LACTATE AND CALCIUM CHLORIDE: 600; 310; 30; 20 INJECTION, SOLUTION INTRAVENOUS at 09:48

## 2024-02-06 RX ADMIN — SODIUM CHLORIDE: 9 INJECTION, SOLUTION INTRAVENOUS at 17:58

## 2024-02-06 RX ADMIN — Medication 160 MG: at 09:53

## 2024-02-06 RX ADMIN — HYDROMORPHONE HYDROCHLORIDE 0.4 MG: 2 INJECTION INTRAMUSCULAR; INTRAVENOUS; SUBCUTANEOUS at 10:33

## 2024-02-06 RX ADMIN — ROCURONIUM BROMIDE 35 MG: 10 INJECTION INTRAVENOUS at 09:58

## 2024-02-06 RX ADMIN — MIDAZOLAM HYDROCHLORIDE 2 MG: 1 INJECTION, SOLUTION INTRAMUSCULAR; INTRAVENOUS at 09:48

## 2024-02-06 RX ADMIN — ENOXAPARIN SODIUM 40 MG: 100 INJECTION SUBCUTANEOUS at 12:16

## 2024-02-06 RX ADMIN — WATER 2000 MG: 1 INJECTION INTRAMUSCULAR; INTRAVENOUS; SUBCUTANEOUS at 10:06

## 2024-02-06 RX ADMIN — KETOROLAC TROMETHAMINE 15 MG: 30 INJECTION INTRAMUSCULAR; INTRAVENOUS at 23:15

## 2024-02-06 RX ADMIN — FENTANYL CITRATE 50 MCG: 50 INJECTION, SOLUTION INTRAMUSCULAR; INTRAVENOUS at 09:53

## 2024-02-06 RX ADMIN — SODIUM CHLORIDE, PRESERVATIVE FREE 10 ML: 5 INJECTION INTRAVENOUS at 22:36

## 2024-02-06 RX ADMIN — SUGAMMADEX 200 MG: 100 INJECTION, SOLUTION INTRAVENOUS at 11:00

## 2024-02-06 RX ADMIN — HYDROMORPHONE HYDROCHLORIDE 0.5 MG: 1 INJECTION, SOLUTION INTRAMUSCULAR; INTRAVENOUS; SUBCUTANEOUS at 13:00

## 2024-02-06 RX ADMIN — ONDANSETRON 4 MG: 2 INJECTION INTRAMUSCULAR; INTRAVENOUS at 18:06

## 2024-02-06 RX ADMIN — ONDANSETRON HYDROCHLORIDE 4 MG: 2 INJECTION, SOLUTION INTRAMUSCULAR; INTRAVENOUS at 10:22

## 2024-02-06 RX ADMIN — HYDROMORPHONE HYDROCHLORIDE 0.5 MG: 1 INJECTION, SOLUTION INTRAMUSCULAR; INTRAVENOUS; SUBCUTANEOUS at 13:15

## 2024-02-06 RX ADMIN — PROCHLORPERAZINE EDISYLATE 5 MG: 5 INJECTION INTRAMUSCULAR; INTRAVENOUS at 13:19

## 2024-02-06 RX ADMIN — LIDOCAINE HYDROCHLORIDE 60 MG: 20 INJECTION, SOLUTION EPIDURAL; INFILTRATION; INTRACAUDAL; PERINEURAL at 09:53

## 2024-02-06 ASSESSMENT — PAIN DESCRIPTION - LOCATION
LOCATION: ABDOMEN
LOCATION: ABDOMEN

## 2024-02-06 ASSESSMENT — PAIN SCALES - GENERAL
PAINLEVEL_OUTOF10: 0
PAINLEVEL_OUTOF10: 0
PAINLEVEL_OUTOF10: 4
PAINLEVEL_OUTOF10: 0
PAINLEVEL_OUTOF10: 0
PAINLEVEL_OUTOF10: 5
PAINLEVEL_OUTOF10: 1

## 2024-02-06 ASSESSMENT — PAIN DESCRIPTION - DESCRIPTORS
DESCRIPTORS: PRESSURE
DESCRIPTORS: PRESSURE

## 2024-02-06 ASSESSMENT — PAIN DESCRIPTION - ORIENTATION
ORIENTATION: ANTERIOR
ORIENTATION: ANTERIOR

## 2024-02-06 NOTE — ANESTHESIA PRE PROCEDURE
Department of Anesthesiology  Preprocedure Note       Name:  Cyril Gregg   Age:  63 y.o.  :  1960                                          MRN:  [id number]         Date:  2024      Surgeon: Surgeon(s):  Vabihav Chang MD    Procedure: Procedure(s):  ROBOTIC GASTROSTOMY TUBE PLACEMENT    Medications prior to admission:   Prior to Admission medications    Medication Sig Start Date End Date Taking? Authorizing Provider   omeprazole (PRILOSEC) 20 MG delayed release capsule Take 2 capsules by mouth daily   Yes Kelly Mcdaniels MD   triamcinolone (KENALOG) 0.1 % cream APPLY TOPICALLY TO THE AFFECTED AREA TWICE DAILY 24   India Loco APRN - NP   NONFORMULARY Chemo drup. Name unknown on pump    Kelly Mcdaniels MD   capecitabine (XELODA) 150 MG chemo tablet Take 1 tablet (150 mg total) by mouth 2 times daily with 1 other capecitabine prescription for 2,150 mg total For 21 days then 7 days off.  Patient not taking: Reported on 23   India Loco APRN - NP   capecitabine (XELODA) 500 MG chemo tablet Take 4 tablets (2,000 mg total) by mouth 2 times daily with 1 other capecitabine prescription for 2,150 mg total For 21 days then 7 days off.  Patient not taking: Reported on 23   India Loco APRN - NP   ACCU-CHEK DALE PLUS strip USE TO CHECK BLOOD SUGAR ONCE DAILY 23   Kelly Mcdaniels MD   Nutritional Supplements (NUTREN 2.0) LIQD 5 cartons by Per J Tube route daily Nutren 2.0 @ 105ml/hr x 12 hours. Give 150ml water flushes q3 hour while feeds are running. Flush additional 100ml 4 times during daytime.   Will need dual feeding tube pump, backpack, syringes and gauze/tape. 23   Elinor Bourne APRN - NP   Grape Seed 100 MG CAPS Take 100 mg by mouth daily  Patient not taking: Reported on 8/15/2023    Kelly Mcdaniels MD   Omega-3 Fatty Acids (OMEGA-3 FISH OIL) 1000 MG CAPS Take by mouth  Patient not taking: Reported on

## 2024-02-06 NOTE — ANESTHESIA POSTPROCEDURE EVALUATION
Department of Anesthesiology  Postprocedure Note    Patient: Cyril Gregg  MRN: [id number]  YOB: 1960  Date of evaluation: 2/6/2024    Procedure Summary       Date: 02/06/24 Room / Location: [facility] MA[facility] OR M1 / [facility] MA[facility] OR    Anesthesia Start: 0948 Anesthesia Stop: 1115    Procedure: ROBOTIC GASTROSTOMY TUBE PLACEMENT (Abdomen) Diagnosis:       Malignant neoplasm of esophagus, unspecified location (HCC)      (Malignant neoplasm of esophagus, unspecified location (HCC) [C15.9])    Providers: Vaibhav Chang MD Responsible Provider: Osei Dukes MD    Anesthesia Type: General ASA Status: 3            Anesthesia Type: General    Mt Phase I: Mt Score: 8    Mt Phase II:      Anesthesia Post Evaluation    Patient location during evaluation: PACU  Patient participation: complete - patient participated  Level of consciousness: sleepy but conscious and responsive to verbal stimuli  Airway patency: patent  Nausea & Vomiting: no vomiting and no nausea  Cardiovascular status: blood pressure returned to baseline and hemodynamically stable  Respiratory status: acceptable  Hydration status: stable    No notable events documented.

## 2024-02-06 NOTE — H&P
H&P    Assessment:   Malignant neoplasm of esophagus, unspecified location (HCC) [C15.9]    Body mass index is 30.97 kg/m².    Plan:   ROBOTIC GASTROSTOMY TUBE PLACEMENT (Abdomen) Discussed the risk of surgery including bleeding, infection, injury to adjacent structures, and the risks of general anesthetic.  The patient understands the risks; any and all questions were answered to the patient's satisfaction.    The patient was counseled at length about the risks of amando Covid-19 during their perioperative period and any recovery window from their procedure.  The patient was made aware that amando Covid-19  may worsen their prognosis for recovering from their procedure and lend to a higher morbidity and/or mortality risk.  All material risks, benefits, and reasonable alternatives including postponing the procedure were discussed. The patient wishes to proceed with the procedure at this time.    Subjective:      Cyril Gregg is a 63 y.o. male who presents for above procedure.     Objective:   Blood pressure (!) 165/83, pulse 61, temperature 97.8 °F (36.6 °C), temperature source Oral, resp. rate 12, height 1.778 m (5' 10\"), weight 97.9 kg (215 lb 13.3 oz), SpO2 98 %.  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

## 2024-02-06 NOTE — PERIOP NOTE
CATY Mace NP notified of patients sensitivity to dilaudid in the PACU. She will adjust the medication ordered for breakthrough pain.

## 2024-02-06 NOTE — BRIEF OP NOTE
Brief Postoperative Note  [id number]    Patient: Cyril Gregg  YOB: 1960  MRN: [id number]    Date of Procedure: 2/6/2024    Pre-Op Diagnosis Codes:     * Malignant neoplasm of esophagus, unspecified location (HCC) [C15.9]    Post-Op Diagnosis: Same       Procedure(s):  ROBOTIC GASTROSTOMY TUBE PLACEMENT    Surgeon(s):  Vaibhav Chang MD    Assistant:  First Assistant: Elizabeth Collins RN    Anesthesia: General    Estimated Blood Loss (mL): Minimal    Complications: None immediate    Specimens:   * No specimens in log *    Implants:  * No implants in log *      Drains: * No LDAs found *    Findings: no gross dz      Electronically signed by Vaibhav Chang MD on 2/6/2024 at 11:41 AM   

## 2024-02-07 VITALS
DIASTOLIC BLOOD PRESSURE: 78 MMHG | BODY MASS INDEX: 30.9 KG/M2 | WEIGHT: 215.83 LBS | RESPIRATION RATE: 16 BRPM | OXYGEN SATURATION: 97 % | HEIGHT: 70 IN | SYSTOLIC BLOOD PRESSURE: 133 MMHG | TEMPERATURE: 98.1 F | HEART RATE: 61 BPM

## 2024-02-07 PROCEDURE — 43653 LAPAROSCOPY GASTROSTOMY: CPT | Performed by: SURGERY

## 2024-02-07 PROCEDURE — 96376 TX/PRO/DX INJ SAME DRUG ADON: CPT

## 2024-02-07 PROCEDURE — 96374 THER/PROPH/DIAG INJ IV PUSH: CPT

## 2024-02-07 PROCEDURE — 6360000002 HC RX W HCPCS: Performed by: SURGERY

## 2024-02-07 PROCEDURE — 96372 THER/PROPH/DIAG INJ SC/IM: CPT

## 2024-02-07 PROCEDURE — G0378 HOSPITAL OBSERVATION PER HR: HCPCS

## 2024-02-07 PROCEDURE — 2580000003 HC RX 258: Performed by: SURGERY

## 2024-02-07 RX ORDER — OXYCODONE HCL 5 MG/5 ML
5 SOLUTION, ORAL ORAL EVERY 4 HOURS PRN
Qty: 50 ML | Refills: 0 | Status: SHIPPED | OUTPATIENT
Start: 2024-02-07 | End: 2024-02-10

## 2024-02-07 RX ADMIN — SODIUM CHLORIDE: 9 INJECTION, SOLUTION INTRAVENOUS at 05:47

## 2024-02-07 RX ADMIN — KETOROLAC TROMETHAMINE 15 MG: 30 INJECTION INTRAMUSCULAR; INTRAVENOUS at 12:07

## 2024-02-07 RX ADMIN — KETOROLAC TROMETHAMINE 15 MG: 30 INJECTION INTRAMUSCULAR; INTRAVENOUS at 05:48

## 2024-02-07 RX ADMIN — ENOXAPARIN SODIUM 40 MG: 100 INJECTION SUBCUTANEOUS at 09:14

## 2024-02-07 RX ADMIN — SODIUM CHLORIDE, PRESERVATIVE FREE 10 ML: 5 INJECTION INTRAVENOUS at 09:14

## 2024-02-07 ASSESSMENT — PAIN DESCRIPTION - LOCATION: LOCATION: ABDOMEN

## 2024-02-07 ASSESSMENT — PAIN DESCRIPTION - DESCRIPTORS: DESCRIPTORS: ACHING

## 2024-02-07 ASSESSMENT — PAIN SCALES - GENERAL
PAINLEVEL_OUTOF10: 0
PAINLEVEL_OUTOF10: 4

## 2024-02-07 NOTE — PROGRESS NOTES
[facility]  Preoperative Instructions        Surgery Date 2/6/2024     Time of Arrival TBD  Contact# [phone]    1. On the day of your surgery, please report to the Surgical Services Registration Desk and sign in at your designated time. The Surgery Center is located to the right of the Emergency Room.     2. You must have someone with you to drive you home. You should not drive a car for 24 hours following surgery. Please make arrangements for a friend or family member to stay with you for the first 24 hours after your surgery.    3. Do not have anything to eat or drink (including water, gum, mints, coffee, juice) after midnight ??2/5/2024 .?This may not apply to medications prescribed by your physician. ?(Please note below the special instructions with medications to take the morning of your procedure.)    4. We recommend you do not drink any alcoholic beverages for 24 hours before and after your surgery.    5. Contact your surgeon’s office for instructions on the following medications: non-steroidal anti-inflammatory drugs (i.e. Advil, Aleve), vitamins, and supplements. (Some surgeon’s will want you to stop these medications prior to surgery and others may allow you to take them)  **If you are currently taking Plavix, Coumadin, Aspirin and/or other blood-thinning agents, contact your surgeon for instructions.** Your surgeon will partner with the physician prescribing these medications to determine if it is safe to stop or if you need to continue taking.  Please do not stop taking these medications without instructions from your surgeon    6. Wear comfortable clothes.  Wear glasses instead of contacts.  Do not bring any money or jewelry. Please bring picture ID, insurance card, and any prearranged co-payment or hospital payment.  Do not wear make-up, particularly mascara the morning of your surgery.  Do not wear nail polish, particularly if you are having foot /hand surgery.  Wear 
     Nutrition Note    Consult received, chart reviewed.  Spoke with pt and his wife at bedside.  He is s/p G tube placement yesterday.  Est needs are 2150-2450kcals per day (22-25kcals/kg) and 97-117g Protein (1-1.2gPro/kg).      Recommend Jevity 1.5 500mL bolus TID + 150mL bolus before and after each feed (2250kcals/95gPro/323gCHO/2040mL). Pt can be adjust to smaller, more frequent boluses prn based on tolerance.  Formula can be substituted, would recommend 1.5kcal/mL or more formula based on est kcal and protein needs.      Full note to follow, refeeding risk is fairly low based on nutritional assessment.    Electronically signed by Yancy Kirkland RD, [facility] on 2/7/24 at 11:23 AM EST    Contact: ext [phone]    
End of Shift Note    Bedside shift change report given to Mei (oncoming nurse) by Karlene Arreola RN (offgoing nurse).  Report included the following information SBAR, Kardex, and MAR    Shift worked:  7p-7a     Shift summary and any significant changes:     Scheduled medications were given, see MAR.  IV has been flushed and the patient is getting maintenance fluid.  Patient is up ad imani.  Family  member was presenst at bedside throughout my shift.  Patient teaching and routine rounding has been done.     Concerns for physician to address:       Zone phone for oncoming shift:          Activity:     Number times ambulated in hallways past shift: 0  Number of times OOB to chair past shift: 0    Cardiac:   Cardiac Monitoring: No           Access:  Current line(s): PIV     Genitourinary:   Urinary status: voiding    Respiratory:      Chronic home O2 use?: NO  Incentive spirometer at bedside: NO       GI:     Current diet:  Diet NPO Exceptions are: Sips of Clear Liquids, Sips of Water with Meds, Ice Chips, Popsicles  Passing flatus: YES  Tolerating current diet: YES       Pain Management:   Patient states pain is manageable on current regimen: YES    Skin:     Interventions: increase time out of bed    Patient Safety:  Fall Score:    Interventions: gripper socks       Length of Stay:  Expected LOS: 1  Actual LOS: 0      Karlene Arreola RN                            
Necessity for home/long term enteral feeding     1. Duration of enteral feeding: >90 days.   2. Gastrointestinal (GI) Impairment requiring enteral: medical records documenting the patient has:    a condition involving the small intestine and/or its exocrine glands which significantly impairs the absorption of nutrients   a disease of the stomach and/or intestine which is a motility disorder and impairs the ability of nutrients to be transported through and absorbed by the gastrointestinal system.   Patient with non-operative esophageal cancer.   3. recommend 500mL bolus Jevity 1.5 TID + 150mL flush before and after each bolus (provides 2250kcals/95gPro/323gCHO/2040mL)  to meet 100% of dietary needs.   4. Substitutions allowed by dietitian as needed.     GAVINO Hernandez - NP    
criteria.  Pt reports surgeon was able to bolus some ensure clear into his G tube earlier this morning and he tolerated it well.  Still awaiting pump and formula to start TF order.  He will be on a bolus regimen at home.  Pt was working with outpatient Oncology RD last year and plans to continue working with her.  In order to meet 100% kcal and protein needs recommend 6 bottles per day of Jevity 1.5 or equivalent formula.  Pt can titrate this down prn based on oral intake, discussed making sure he is consuming high protein foods/shakes as well to ensure protein needs are met (otherwise he may need protein modulars to meet protein needs).  All questions were answered.  Discussed recommendations with case management as well as surgical NP to expedite discharge as pt is ready to go.  Education will be conducted by home TF company.  Nutrition services can provide pt with formula to last a couple of days should delivery be delayed.    Wt Readings from Last 10 Encounters:   02/06/24 97.9 kg (215 lb 13.3 oz)   01/31/24 99.4 kg (219 lb 3.2 oz)   01/31/24 99.4 kg (219 lb 2.2 oz)   01/19/24 103 kg (227 lb)   01/17/24 102.8 kg (226 lb 9.6 oz)   01/17/24 102.8 kg (226 lb 10.1 oz)   12/20/23 104.8 kg (231 lb 1.6 oz)   12/20/23 104.8 kg (231 lb 0.7 oz)   12/06/23 104.3 kg (230 lb)   12/06/23 104.3 kg (229 lb 15 oz)            Nutrition Related Findings:    Meds: NS@75mL/h.    BM: PTA   Wound Type: Surgical Incision       Current Nutrition Intake & Therapies:    Average Meal Intake: NPO     Diet NPO Exceptions are: Sips of Clear Liquids, Sips of Water with Meds, Ice Chips, Popsicles  ADULT TUBE FEEDING; Gastrostomy; Standard with Fiber; Continuous; 20; No; 120; Q 4 hours  DIET ONE TIME MESSAGE;    Anthropometric Measures:  Height: 177.8 cm (5' 10\")  Ideal Body Weight (IBW): 166 lbs (75 kg)       Current Body Weight: 97.9 kg (215 lb 13.3 oz), 130 % IBW. Weight Source: Standing Scale  Current BMI (kg/m2): 31  Usual Body Weight:

## 2024-02-07 NOTE — OP NOTE
[facility]  OPERATIVE REPORT    Name:  ANASTASIA PARKS  MR#:  [id number]  :  1960  ACCOUNT #:  [id number]  DATE OF SERVICE:  2024      PREOPERATIVE DIAGNOSIS:  Esophageal cancer.    POSTOPERATIVE DIAGNOSIS:  Esophageal cancer.    PROCEDURE PERFORMED:  Robot-assisted laparoscopic placement of feeding gastrostomy tube.    SURGEON:  Vaibhav Chang MD    ASSISTANT:  Staff.    ANESTHESIA:  General.    COMPLICATIONS:  None immediate.    SPECIMENS REMOVED:  None.    IMPLANTS:  None.    ESTIMATED BLOOD LOSS:  Minimal.    DRAINS:  None.    FINDINGS:  No evidence of gross disease within the abdominal cavity.  The stomach appeared unremarkable.    DESCRIPTION OF PROCEDURE:  After obtaining informed consent, the patient was taken to the operating room and placed supine on the operating table.  An operative time-out was performed and general endotracheal anesthesia was induced.  Preoperative antibiotics were administered and the abdomen was prepped and draped in the usual sterile fashion.  The abdomen was then entered in the right upper quadrant using an 8-mm optical trocar.  The abdomen was insufflated without incident, it was visually explored.  Under direct vision,a supraumbilical port was placed, followed by another in the left mid-abdomen.  Once all the ports were place the stomach was inspected and the planned site for J-tube was selected.  This was in the left epigastrium.  An incision was made here and a 5-mm port was introduced.  This was then removed and the G-tube was placed through this opening using a Elizabeth grasper.  The balloon was inflated with water and was inspected.  There was no leaking.  This was left in place, but out of the way so that we could then address the stomach.  The mid body of the stomach was identified and a pursestring of absorbable 0 V-Loc suture was placed.  In the center of this, the stomach was entered using minimal electrocautery and cold nanette.  We

## 2024-02-07 NOTE — CARE COORDINATION
Transition of Care Plan:    RUR: OBS  Prior Level of Functioning:   Disposition: Home w/family & tube feedings provided by [employer]  If SNF or IPR: Date FOC offered:   Date FOC received:   Accepting facility:   Date authorization started with reference number:   Date authorization received and expires:   Follow up appointments: on AVS  DME needed: n/a  Transportation at discharge: wife  IM/IMM Medicare/ letter given: n/a  Is patient a [place] and connected with [facility]?    If yes, was  transfer form completed and [facility] notified?   Caregiver Contact:   Discharge Caregiver contacted prior to discharge? Wife at bedside  Care Conference needed?   Barriers to discharge:     12:57  Tube feed order sent to [employer].  Juan [facility] Dietitian will provide pt w/formula to d/c with.    10:47  Patient is going home w/tube feedings.  CM will send order to [employer] when available.     IM letter signed & placed on pt's chart.  Patient expects to go home today.  No needs or concerns identified at this time.    Yaneth Elliott  Ext [phone]

## 2024-02-07 NOTE — DISCHARGE INSTRUCTIONS
Gastrostomy tube care, tips and tricks  - Keep sight clean and dry.   - May shower  - May use ONE split sponge under the flange for drainage  - May use zinc cream (diaper rash cream) around the sight IF skin irritation occurs.   - If having leaking around the tube, tighten the flange by holding the tube straight out and gently pushing the flange until it is snug to the skin  - When using the tube, always flush with water after use.   - IF using for medications, liquid medications are preferred. Can crush meds but need to be cautious to crush them well and mix with warm water. Not all meds can safely be crushed. Talk to your pharmacy.

## 2024-02-09 DIAGNOSIS — C77.2 MALIGNANT NEOPLASM METASTATIC TO INTRA-ABDOMINAL LYMPH NODE (HCC): ICD-10-CM

## 2024-02-09 DIAGNOSIS — R20.0 NUMBNESS IN BOTH LEGS: Primary | ICD-10-CM

## 2024-02-09 RX ORDER — ALBUTEROL SULFATE 90 UG/1
4 AEROSOL, METERED RESPIRATORY (INHALATION) PRN
Status: CANCELLED | OUTPATIENT
Start: 2024-02-14

## 2024-02-09 RX ORDER — SODIUM CHLORIDE 9 MG/ML
5-250 INJECTION, SOLUTION INTRAVENOUS PRN
Status: CANCELLED | OUTPATIENT
Start: 2024-02-14

## 2024-02-09 RX ORDER — HEPARIN 100 UNIT/ML
500 SYRINGE INTRAVENOUS PRN
Status: CANCELLED | OUTPATIENT
Start: 2024-02-14

## 2024-02-09 RX ORDER — SODIUM CHLORIDE 0.9 % (FLUSH) 0.9 %
5-40 SYRINGE (ML) INJECTION PRN
Status: CANCELLED | OUTPATIENT
Start: 2024-02-16

## 2024-02-09 RX ORDER — ACETAMINOPHEN 325 MG/1
650 TABLET ORAL
Status: CANCELLED | OUTPATIENT
Start: 2024-02-14

## 2024-02-09 RX ORDER — SODIUM CHLORIDE 9 MG/ML
INJECTION, SOLUTION INTRAVENOUS CONTINUOUS
Status: CANCELLED | OUTPATIENT
Start: 2024-02-14

## 2024-02-09 RX ORDER — SODIUM CHLORIDE 9 MG/ML
5-250 INJECTION, SOLUTION INTRAVENOUS PRN
Status: CANCELLED | OUTPATIENT
Start: 2024-02-16

## 2024-02-09 RX ORDER — HEPARIN 100 UNIT/ML
500 SYRINGE INTRAVENOUS PRN
Status: CANCELLED | OUTPATIENT
Start: 2024-02-16

## 2024-02-09 RX ORDER — DIPHENHYDRAMINE HYDROCHLORIDE 50 MG/ML
50 INJECTION INTRAMUSCULAR; INTRAVENOUS
Status: CANCELLED | OUTPATIENT
Start: 2024-02-14

## 2024-02-09 RX ORDER — MEPERIDINE HYDROCHLORIDE 25 MG/ML
12.5 INJECTION INTRAMUSCULAR; INTRAVENOUS; SUBCUTANEOUS PRN
Status: CANCELLED | OUTPATIENT
Start: 2024-02-14

## 2024-02-09 RX ORDER — EPINEPHRINE 1 MG/ML
0.3 INJECTION, SOLUTION INTRAMUSCULAR; SUBCUTANEOUS PRN
Status: CANCELLED | OUTPATIENT
Start: 2024-02-14

## 2024-02-09 RX ORDER — TRIAMCINOLONE ACETONIDE 1 MG/G
CREAM TOPICAL
Qty: 80 G | Refills: 1 | Status: SHIPPED | OUTPATIENT
Start: 2024-02-09

## 2024-02-09 RX ORDER — 0.9 % SODIUM CHLORIDE 0.9 %
1000 INTRAVENOUS SOLUTION INTRAVENOUS ONCE
Status: CANCELLED
Start: 2024-02-16 | End: 2024-02-16

## 2024-02-09 RX ORDER — ONDANSETRON 2 MG/ML
8 INJECTION INTRAMUSCULAR; INTRAVENOUS
Status: CANCELLED | OUTPATIENT
Start: 2024-02-14

## 2024-02-09 NOTE — PROGRESS NOTES
PER MARIN FROM DR HARMON NM BONE SCAN WH BODY as a one time order.      See patient advice request encounter for further information pertaining to above.

## 2024-02-13 ENCOUNTER — HOSPITAL ENCOUNTER (OUTPATIENT)
Age: 64
Discharge: HOME | End: 2024-02-16
Payer: COMMERCIAL

## 2024-02-13 ENCOUNTER — OFFICE VISIT (OUTPATIENT)
Age: 64
End: 2024-02-13

## 2024-02-13 ENCOUNTER — CLINICAL DOCUMENTATION (OUTPATIENT)
Facility: HOSPITAL | Age: 64
End: 2024-02-13

## 2024-02-13 ENCOUNTER — TELEPHONE (OUTPATIENT)
Age: 64
End: 2024-02-13

## 2024-02-13 VITALS
RESPIRATION RATE: 18 BRPM | BODY MASS INDEX: 32.56 KG/M2 | HEART RATE: 81 BPM | SYSTOLIC BLOOD PRESSURE: 132 MMHG | WEIGHT: 227.4 LBS | TEMPERATURE: 97.3 F | OXYGEN SATURATION: 97 % | DIASTOLIC BLOOD PRESSURE: 82 MMHG | HEIGHT: 70 IN

## 2024-02-13 DIAGNOSIS — C16.0 GE JUNCTION CARCINOMA (HCC): ICD-10-CM

## 2024-02-13 DIAGNOSIS — Z48.89 POSTOPERATIVE VISIT: Primary | ICD-10-CM

## 2024-02-13 PROCEDURE — 74177 CT ABD & PELVIS W/CONTRAST: CPT

## 2024-02-13 PROCEDURE — 6360000004 HC RX CONTRAST MEDICATION: Performed by: RADIOLOGY

## 2024-02-13 PROCEDURE — 99024 POSTOP FOLLOW-UP VISIT: CPT | Performed by: SURGERY

## 2024-02-13 RX ADMIN — IOPAMIDOL 100 ML: 755 INJECTION, SOLUTION INTRAVENOUS at 10:44

## 2024-02-13 NOTE — PROGRESS NOTES
Patient Assistance    Met with:     Navigator Type: Infusion  Documentation Type: New Patient  Contact Type: Telephone  Navigation Status: Copay Enrollment          Additional notes:     Drug Name: Opdivo  Form of PAP Assistance: Copay - Pending

## 2024-02-13 NOTE — PROGRESS NOTES
Identified pt with two pt identifiers(name and ). Reviewed record in preparation for visit and have obtained necessary documentation. All patient medications has been reviewed.    Chief Complaint   Patient presents with    Post-Op Check     Suture removal per [facility] Due   Topic    Pneumococcal 65+ years Vaccine (1 - PCV)    Depression Screen     HIV screen     Hepatitis C screen     DTaP/Tdap/Td vaccine (1 - Tdap)    Shingles vaccine (1 of 2)    Diabetes screen     Lipids     Prostate Specific Antigen (PSA) Screening or Monitoring     Colorectal Cancer Screen     Respiratory Syncytial Virus (RSV) Pregnant or age 60 yrs+ (1 - 1-dose 60+ series)    COVID-19 Vaccine (2 - Pfizer risk series)       Vitals:    24 1616   BP: 132/82   Site: Right Upper Arm   Position: Sitting   Pulse: 81   Resp: 18   Temp: 97.3 °F (36.3 °C)   TempSrc: Temporal   SpO2: 97%   Weight: 103.1 kg (227 lb 6.4 oz)   Height: 1.778 m (5' 10\")         4.Have you been to the ER, urgent care clinic since your last visit?  Hospitalized since your last visit? No      5. Have you seen or consulted any other health care providers outside of the [facility] since your last visit?  Include any pap smears or colon screening. No      Patient is accompanied by wife I have received verbal consent from Cyril Gregg to discuss any/all medical information while they are present in the room.

## 2024-02-14 ENCOUNTER — OFFICE VISIT (OUTPATIENT)
Age: 64
End: 2024-02-14
Payer: COMMERCIAL

## 2024-02-14 ENCOUNTER — HOSPITAL ENCOUNTER (OUTPATIENT)
Facility: HOSPITAL | Age: 64
Setting detail: INFUSION SERIES
Discharge: HOME | End: 2024-02-14
Payer: COMMERCIAL

## 2024-02-14 VITALS
OXYGEN SATURATION: 96 % | BODY MASS INDEX: 31.34 KG/M2 | DIASTOLIC BLOOD PRESSURE: 69 MMHG | TEMPERATURE: 98 F | HEART RATE: 65 BPM | SYSTOLIC BLOOD PRESSURE: 126 MMHG | HEIGHT: 70 IN | WEIGHT: 218.92 LBS

## 2024-02-14 VITALS
SYSTOLIC BLOOD PRESSURE: 133 MMHG | BODY MASS INDEX: 31.34 KG/M2 | TEMPERATURE: 98 F | HEART RATE: 68 BPM | OXYGEN SATURATION: 97 % | WEIGHT: 218.9 LBS | HEIGHT: 70 IN | DIASTOLIC BLOOD PRESSURE: 77 MMHG | RESPIRATION RATE: 18 BRPM

## 2024-02-14 DIAGNOSIS — Z51.11 ENCOUNTER FOR ANTINEOPLASTIC CHEMOTHERAPY AND IMMUNOTHERAPY: ICD-10-CM

## 2024-02-14 DIAGNOSIS — C77.2 MALIGNANT NEOPLASM METASTATIC TO INTRA-ABDOMINAL LYMPH NODE (HCC): ICD-10-CM

## 2024-02-14 DIAGNOSIS — C16.0 GE JUNCTION CARCINOMA (HCC): Primary | ICD-10-CM

## 2024-02-14 DIAGNOSIS — Z51.12 ENCOUNTER FOR ANTINEOPLASTIC CHEMOTHERAPY AND IMMUNOTHERAPY: ICD-10-CM

## 2024-02-14 LAB
ALBUMIN SERPL-MCNC: 3.3 G/DL (ref 3.5–5)
ALBUMIN/GLOB SERPL: 0.9 (ref 1.1–2.2)
ALP SERPL-CCNC: 122 U/L (ref 45–117)
ALT SERPL-CCNC: 20 U/L (ref 12–78)
ANION GAP SERPL CALC-SCNC: 5 MMOL/L (ref 5–15)
AST SERPL-CCNC: 12 U/L (ref 15–37)
BASOPHILS # BLD: 0.1 K/UL (ref 0–0.1)
BASOPHILS NFR BLD: 1 % (ref 0–1)
BILIRUB SERPL-MCNC: 1 MG/DL (ref 0.2–1)
BUN SERPL-MCNC: 13 MG/DL (ref 6–20)
BUN/CREAT SERPL: 16 (ref 12–20)
CALCIUM SERPL-MCNC: 8.9 MG/DL (ref 8.5–10.1)
CHLORIDE SERPL-SCNC: 107 MMOL/L (ref 97–108)
CO2 SERPL-SCNC: 27 MMOL/L (ref 21–32)
CREAT SERPL-MCNC: 0.79 MG/DL (ref 0.7–1.3)
DIFFERENTIAL METHOD BLD: ABNORMAL
EOSINOPHIL # BLD: 0.2 K/UL (ref 0–0.4)
EOSINOPHIL NFR BLD: 4 % (ref 0–7)
ERYTHROCYTE [DISTWIDTH] IN BLOOD BY AUTOMATED COUNT: 12.7 % (ref 11.5–14.5)
EST. AVERAGE GLUCOSE BLD GHB EST-MCNC: 134 MG/DL
GLOBULIN SER CALC-MCNC: 3.8 G/DL (ref 2–4)
GLUCOSE SERPL-MCNC: 137 MG/DL (ref 65–100)
HBA1C MFR BLD: 6.3 % (ref 4–5.6)
HCT VFR BLD AUTO: 39.2 % (ref 36.6–50.3)
HGB BLD-MCNC: 13.8 G/DL (ref 12.1–17)
IMM GRANULOCYTES # BLD AUTO: 0 K/UL (ref 0–0.04)
IMM GRANULOCYTES NFR BLD AUTO: 1 % (ref 0–0.5)
LYMPHOCYTES # BLD: 1.1 K/UL (ref 0.8–3.5)
LYMPHOCYTES NFR BLD: 20 % (ref 12–49)
MCH RBC QN AUTO: 34.2 PG (ref 26–34)
MCHC RBC AUTO-ENTMCNC: 35.2 G/DL (ref 30–36.5)
MCV RBC AUTO: 97 FL (ref 80–99)
MONOCYTES # BLD: 0.7 K/UL (ref 0–1)
MONOCYTES NFR BLD: 13 % (ref 5–13)
NEUTS SEG # BLD: 3.3 K/UL (ref 1.8–8)
NEUTS SEG NFR BLD: 61 % (ref 32–75)
NRBC # BLD: 0 K/UL (ref 0–0.01)
NRBC BLD-RTO: 0 PER 100 WBC
PLATELET # BLD AUTO: 180 K/UL (ref 150–400)
PMV BLD AUTO: 8.8 FL (ref 8.9–12.9)
POTASSIUM SERPL-SCNC: 3.9 MMOL/L (ref 3.5–5.1)
PROT SERPL-MCNC: 7.1 G/DL (ref 6.4–8.2)
RBC # BLD AUTO: 4.04 M/UL (ref 4.1–5.7)
SODIUM SERPL-SCNC: 139 MMOL/L (ref 136–145)
WBC # BLD AUTO: 5.4 K/UL (ref 4.1–11.1)

## 2024-02-14 PROCEDURE — 96413 CHEMO IV INFUSION 1 HR: CPT

## 2024-02-14 PROCEDURE — 6360000002 HC RX W HCPCS: Performed by: INTERNAL MEDICINE

## 2024-02-14 PROCEDURE — 96375 TX/PRO/DX INJ NEW DRUG ADDON: CPT

## 2024-02-14 PROCEDURE — 96416 CHEMO PROLONG INFUSE W/PUMP: CPT

## 2024-02-14 PROCEDURE — 3078F DIAST BP <80 MM HG: CPT | Performed by: INTERNAL MEDICINE

## 2024-02-14 PROCEDURE — 96367 TX/PROPH/DG ADDL SEQ IV INF: CPT

## 2024-02-14 PROCEDURE — 3074F SYST BP LT 130 MM HG: CPT | Performed by: INTERNAL MEDICINE

## 2024-02-14 PROCEDURE — 2580000003 HC RX 258: Performed by: INTERNAL MEDICINE

## 2024-02-14 PROCEDURE — 85025 COMPLETE CBC W/AUTO DIFF WBC: CPT

## 2024-02-14 PROCEDURE — 83036 HEMOGLOBIN GLYCOSYLATED A1C: CPT

## 2024-02-14 PROCEDURE — 96411 CHEMO IV PUSH ADDL DRUG: CPT

## 2024-02-14 PROCEDURE — 36415 COLL VENOUS BLD VENIPUNCTURE: CPT

## 2024-02-14 PROCEDURE — 99215 OFFICE O/P EST HI 40 MIN: CPT | Performed by: INTERNAL MEDICINE

## 2024-02-14 PROCEDURE — 1123F ACP DISCUSS/DSCN MKR DOCD: CPT | Performed by: INTERNAL MEDICINE

## 2024-02-14 PROCEDURE — 80053 COMPREHEN METABOLIC PANEL: CPT

## 2024-02-14 RX ORDER — FLUOROURACIL 50 MG/ML
400 INJECTION, SOLUTION INTRAVENOUS ONCE
Status: COMPLETED | OUTPATIENT
Start: 2024-02-14 | End: 2024-02-14

## 2024-02-14 RX ORDER — SODIUM CHLORIDE 9 MG/ML
5-250 INJECTION, SOLUTION INTRAVENOUS PRN
Status: DISCONTINUED | OUTPATIENT
Start: 2024-02-14 | End: 2024-02-15 | Stop reason: HOSPADM

## 2024-02-14 RX ORDER — DEXTROSE MONOHYDRATE 50 MG/ML
5-250 INJECTION, SOLUTION INTRAVENOUS PRN
Status: DISCONTINUED | OUTPATIENT
Start: 2024-02-14 | End: 2024-02-15 | Stop reason: HOSPADM

## 2024-02-14 RX ORDER — PALONOSETRON 0.05 MG/ML
0.25 INJECTION, SOLUTION INTRAVENOUS ONCE
Status: COMPLETED | OUTPATIENT
Start: 2024-02-14 | End: 2024-02-14

## 2024-02-14 RX ORDER — SODIUM CHLORIDE 0.9 % (FLUSH) 0.9 %
5-40 SYRINGE (ML) INJECTION PRN
Status: DISCONTINUED | OUTPATIENT
Start: 2024-02-14 | End: 2024-02-15 | Stop reason: HOSPADM

## 2024-02-14 RX ADMIN — FLUOROURACIL 900 MG: 50 INJECTION, SOLUTION INTRAVENOUS at 13:00

## 2024-02-14 RX ADMIN — FLUOROURACIL 5000 MG: 50 INJECTION, SOLUTION INTRAVENOUS at 13:05

## 2024-02-14 RX ADMIN — PALONOSETRON 0.25 MG: 0.05 INJECTION, SOLUTION INTRAVENOUS at 10:17

## 2024-02-14 RX ADMIN — SODIUM CHLORIDE 25 ML/HR: 9 INJECTION, SOLUTION INTRAVENOUS at 10:14

## 2024-02-14 RX ADMIN — SODIUM CHLORIDE 240 MG: 9 INJECTION, SOLUTION INTRAVENOUS at 11:25

## 2024-02-14 RX ADMIN — LEUCOVORIN CALCIUM 200 MG: 200 INJECTION, POWDER, LYOPHILIZED, FOR SOLUTION INTRAMUSCULAR; INTRAVENOUS at 12:14

## 2024-02-14 RX ADMIN — DEXAMETHASONE SODIUM PHOSPHATE 12 MG: 4 INJECTION, SOLUTION INTRAMUSCULAR; INTRAVENOUS at 10:43

## 2024-02-14 RX ADMIN — DEXTROSE MONOHYDRATE 25 ML/HR: 50 INJECTION, SOLUTION INTRAVENOUS at 12:10

## 2024-02-14 NOTE — TELEPHONE ENCOUNTER
[facility] [place] [facility]   [address] [place] [address]   W: [phone]  F: [phone]    Medical Nutrition Therapy  Nutrition Encounter:     Called and spoke with patient.  He has had progressively dysphagia, unable to take anything by mouth.  Had a Gtube placed by surgery on on 2/6/23.   He was discharged home with bolus tube feeds.  Inpatient dietitian started feeds in hospital.    Tube Feed order:  Jevity 1.5 - 500ml (2 cartons) three times a day. Flush with 150ml before and after each feed.  This will provide 2250kcal, 95g protein and 323g carbohydrates and 2040ml.    Supplies: [employer]    He reports doing ok with the feeds, having a little back flow but company is sending him a clamp which he believes will help.     Diagnosis: Metastatic GEJ carcinoma   Diabetic   Barium swallow esophogram on 5/12/23 showed:  There is narrowing of the distal esophagus just above the hernia which demonstrates relatively smooth margins and may represent a benign stricture. 12 mm barium tablet was retained in this region.  PET scan showed GE junction mass.     Treatment course: 5FU + nivolumab     Ht Readings from Last 1 Encounters:   02/14/24 1.778 m (5' 10\")       Wt Readings from Last 5 Encounters:   02/14/24 99.3 kg (218 lb 14.7 oz)   02/14/24 99.3 kg (218 lb 14.4 oz)   02/13/24 103.1 kg (227 lb 6.4 oz)   02/06/24 97.9 kg (215 lb 13.3 oz)   01/31/24 99.4 kg (219 lb 3.2 oz)       Estimated Nutrition Needs:   Calorie Range: 2400-2875kcal/day      Protein Range: 95-114g/day     Fluid Needs: 2000ml    Plan:  - Continue with current TF regimen  - If volume is too much, can switch to more calorically dense formula.        Signed By: EDWIGE BUNCH, RD    [phone]

## 2024-02-14 NOTE — PROGRESS NOTES
Cyril Gregg is a 63 y.o. male here for treatment for GE Junction cancer.  Feeding tube 2/6/24  Pt adjusting to feeding tube. Working with nutrition company who provides liquid nutrition.  Rash is the same.   Abdominal pain has resolved.   RSV and Pneumonia shot on 1/25/24     1. Have you been to the ER, urgent care clinic since your last visit?  Hospitalized since your last visit?    no         2. Have you seen or consulted any other health care providers outside of the [facility] since your last visit?  Include any pap smears or colon screening.  no      
  NONFORMULARY Chemo drup. Name unknown on pump    Kelly Mcdaniels MD   capecitabine (XELODA) 150 MG chemo tablet Take 1 tablet (150 mg total) by mouth 2 times daily with 1 other capecitabine prescription for 2,150 mg total For 21 days then 7 days off.  Patient not taking: Reported on 1/19/2024 12/13/23   India Loco APRN - NP   capecitabine (XELODA) 500 MG chemo tablet Take 4 tablets (2,000 mg total) by mouth 2 times daily with 1 other capecitabine prescription for 2,150 mg total For 21 days then 7 days off.  Patient not taking: Reported on 1/19/2024 12/13/23   India Loco APRN - NP   Grape Seed 100 MG CAPS Take 100 mg by mouth daily  Patient not taking: Reported on 8/15/2023    Kelly Mcdaniels MD   Omega-3 Fatty Acids (OMEGA-3 FISH OIL) 1000 MG CAPS Take by mouth  Patient not taking: Reported on 8/15/2023    Kelly Mcdaniels MD   clindamycin (CLEOCIN) 300 MG capsule Take 3 capsules by mouth once  Patient not taking: Reported on 8/15/2023 1/25/23   Kelly Mcdaniels MD   ondansetron (ZOFRAN) 8 MG tablet  7/20/23   Kelly Mcdaniels MD   terazosin (HYTRIN) 2 MG capsule  4/21/16   Kelly Mcdaniels MD   prochlorperazine (COMPAZINE) 10 MG tablet Take 1 tablet by mouth every 6 hours as needed (nausea)  Patient not taking: Reported on 1/19/2024 7/21/23   Daryl Wang MD            Allergies   Allergen Reactions    Hydromorphone Nausea Only     Pt. Developed Nausea, Flushing after administration.     Bee Venom      Other reaction(s): Unknown (comments)    Penicillins Other (See Comments)     Pt states was told as a child he was allergic            Objective:     Vitals:    02/14/24 0913   BP: 126/69   Site: Left Upper Arm   Position: Sitting   Pulse: 65   Temp: 98 °F (36.7 °C)   TempSrc: Temporal   SpO2: 96%   Weight: 99.3 kg (218 lb 14.7 oz)   Height: 1.778 m (5' 10\")       Pain Score:   0 - No pain       PHYSICAL EXAM:  GENERAL: alert, cooperative, no distress, appears

## 2024-02-14 NOTE — PROGRESS NOTES
Status post robot-assisted laparoscopic placement of feeding gastrostomy tube on 2/6/2024.  He is doing extremely well.  Tolerating tube feeds.  He has a little discomfort from the stitch holding the tube in place but that is all.    On exam, the incisions are healing nicely and the tube is intact.  There is skin irritation typical of anchoring nylon suture.  No evidence of infection.    Assessment and plan: Appropriate recovery.  I removed the anchoring stitch today.  We discussed how to care for the tube and what to do if it ever comes out.  We discussed the fact that these tubes degrade over time and it will need to be replaced at some point.  Told him to go to the emergency department or call us when there are problems with the tube.

## 2024-02-14 NOTE — PROGRESS NOTES
Name: Cyril Gregg    MRN: [id number]         : 1960    Mr. Gregg arrived ambulatory and in no distress for C3D1 of 5FU/Opdivo Regimen.  Assessment was completed. Pt had gastrostomy tube placed ylast week and is experiencing intermittent nausea, neuropathy in both feet and systemic rash with dry, flaky skin. Right chest wall port accessed without difficulty. Labs drawn & sent for processing. Port flushed and alcohol cap applied.    Patient proceeded to appointment with Dr. Wang.    Mr. Gregg's vitals were reviewed.  Patient Vitals for the past 24 hrs:   BP Temp Temp src Pulse Resp SpO2 Height Weight   24 1311 133/77 -- -- 68 -- -- -- --   24 0900 126/69 98 °F (36.7 °C) Temporal 65 18 97 % 1.778 m (5' 10\") 99.3 kg (218 lb 14.4 oz)       Lab results were obtained and reviewed.  Recent Results (from the past 12 hour(s))   CBC With Auto Differential    Collection Time: 24  8:33 AM   Result Value Ref Range    WBC 5.4 4.1 - 11.1 K/uL    RBC 4.04 (L) 4.10 - 5.70 M/uL    Hemoglobin 13.8 12.1 - 17.0 g/dL    Hematocrit 39.2 36.6 - 50.3 %    MCV 97.0 80.0 - 99.0 FL    MCH 34.2 (H) 26.0 - 34.0 PG    MCHC 35.2 30.0 - 36.5 g/dL    RDW 12.7 11.5 - 14.5 %    Platelets 180 150 - 400 K/uL    MPV 8.8 (L) 8.9 - 12.9 FL    Nucleated RBCs 0.0 0  WBC    nRBC 0.00 0.00 - 0.01 K/uL    Neutrophils % 61 32 - 75 %    Lymphocytes % 20 12 - 49 %    Monocytes % 13 5 - 13 %    Eosinophils % 4 0 - 7 %    Basophils % 1 0 - 1 %    Immature Granulocytes 1 (H) 0.0 - 0.5 %    Neutrophils Absolute 3.3 1.8 - 8.0 K/UL    Lymphocytes Absolute 1.1 0.8 - 3.5 K/UL    Monocytes Absolute 0.7 0.0 - 1.0 K/UL    Eosinophils Absolute 0.2 0.0 - 0.4 K/UL    Basophils Absolute 0.1 0.0 - 0.1 K/UL    Absolute Immature Granulocyte 0.0 0.00 - 0.04 K/UL    Differential Type AUTOMATED     Comprehensive metabolic panel    Collection Time: 24  8:33 AM   Result Value Ref Range    Sodium 139 136 - 145 mmol/L    Potassium  3.9 3.5 - 5.1 mmol/L    Chloride 107 97 - 108 mmol/L    CO2 27 21 - 32 mmol/L    Anion Gap 5 5 - 15 mmol/L    Glucose 137 (H) 65 - 100 mg/dL    BUN 13 6 - 20 MG/DL    Creatinine 0.79 0.70 - 1.30 MG/DL    Bun/Cre Ratio 16 12 - 20      Est, Glom Filt Rate >60 >60 ml/min/1.73m2    Calcium 8.9 8.5 - 10.1 MG/DL    Total Bilirubin 1.0 0.2 - 1.0 MG/DL    ALT 20 12 - 78 U/L    AST 12 (L) 15 - 37 U/L    Alk Phosphatase 122 (H) 45 - 117 U/L    Total Protein 7.1 6.4 - 8.2 g/dL    Albumin 3.3 (L) 3.5 - 5.0 g/dL    Globulin 3.8 2.0 - 4.0 g/dL    Albumin/Globulin Ratio 0.9 (L) 1.1 - 2.2         Medications:  Medications Administered         0.9 % sodium chloride infusion Admin Date  02/14/2024 Action  New Bag Dose  25 mL/hr Rate  25 mL/hr Route  IntraVENous Administered By  Yaneth Dejesus RN        dexAMETHasone (DECADRON) 12 mg in sodium chloride 0.9% 50 mL IVPB Admin Date  02/14/2024 Action  New Bag Dose  12 mg Rate  200 mL/hr Route  IntraVENous Administered By  Yaneth Dejesus RN        dextrose 5 % solution Admin Date  02/14/2024 Action  New Bag Dose  25 mL/hr Rate  25 mL/hr Route  IntraVENous Administered By  Yaneth Dejesus RN        fluorouracil (ADRUCIL) 5,000 mg in 100 mL chemo infusion Admin Date  02/14/2024 Action  Given Dose  5,000 mg Rate  2.2 mL/hr Route  IntraVENous Administered By  Yaneth Dejesus RN        fluorouracil (ADRUCIL) chemo syringe 900 mg Admin Date  02/14/2024 Action  Given Dose  900 mg Rate   Route  IntraVENous Administered By  Yaneth Dejesus RN        leucovorin calcium (WELLCOVORIN) 200 mg in dextrose 5 % 250 mL IVPB Admin Date  02/14/2024 Action  New Bag Dose  200 mg Rate  570 mL/hr Route  IntraVENous Administered By  Yaneth Dejesus RN        nivolumab (OPDIVO) 240 mg in sodium chloride 0.9 % 100 mL IVPB Admin Date  02/14/2024 Action  New Bag Dose  240 mg Rate  268 mL/hr Route  IntraVENous Administered By  Yaneth Dejesus, NE        palonosetron (ALOXI) injection 0.25 mg Admin Date  02/14/2024 Action  Given

## 2024-02-15 ENCOUNTER — PATIENT MESSAGE (OUTPATIENT)
Age: 64
End: 2024-02-15

## 2024-02-16 ENCOUNTER — HOSPITAL ENCOUNTER (OUTPATIENT)
Facility: HOSPITAL | Age: 64
Setting detail: INFUSION SERIES
Discharge: HOME | End: 2024-02-16
Payer: COMMERCIAL

## 2024-02-16 ENCOUNTER — APPOINTMENT (OUTPATIENT)
Facility: HOSPITAL | Age: 64
End: 2024-02-16
Payer: COMMERCIAL

## 2024-02-16 VITALS
RESPIRATION RATE: 16 BRPM | SYSTOLIC BLOOD PRESSURE: 155 MMHG | TEMPERATURE: 98.3 F | HEART RATE: 65 BPM | DIASTOLIC BLOOD PRESSURE: 85 MMHG | OXYGEN SATURATION: 97 %

## 2024-02-16 DIAGNOSIS — C16.0 GE JUNCTION CARCINOMA (HCC): Primary | ICD-10-CM

## 2024-02-16 PROCEDURE — 2580000003 HC RX 258: Performed by: INTERNAL MEDICINE

## 2024-02-16 PROCEDURE — 96360 HYDRATION IV INFUSION INIT: CPT

## 2024-02-16 RX ORDER — 0.9 % SODIUM CHLORIDE 0.9 %
1000 INTRAVENOUS SOLUTION INTRAVENOUS ONCE
Status: COMPLETED | OUTPATIENT
Start: 2024-02-16 | End: 2024-02-16

## 2024-02-16 RX ORDER — SODIUM CHLORIDE 0.9 % (FLUSH) 0.9 %
5-40 SYRINGE (ML) INJECTION PRN
Status: DISCONTINUED | OUTPATIENT
Start: 2024-02-16 | End: 2024-02-17 | Stop reason: HOSPADM

## 2024-02-16 RX ADMIN — SODIUM CHLORIDE, PRESERVATIVE FREE 10 ML: 5 INJECTION INTRAVENOUS at 15:41

## 2024-02-16 RX ADMIN — SODIUM CHLORIDE, PRESERVATIVE FREE 10 ML: 5 INJECTION INTRAVENOUS at 16:46

## 2024-02-16 RX ADMIN — SODIUM CHLORIDE 1000 ML: 9 INJECTION, SOLUTION INTRAVENOUS at 15:41

## 2024-02-16 NOTE — PROGRESS NOTES
[facility] Short Consult Note:    1534 Pt arrived at [facility] ambulatory and in no distress for pump disconnect + hydration.  No new complaints voiced.      Patient denied having any symptoms of COVID-19, i.e. SOB, coughing, fever, or generally not feeling well.    BP (!) 145/87   Pulse 79   Temp 98.3 °F (36.8 °C) (Temporal)   Resp 16   SpO2 97%     All chemo contents infused. Pt reports pump completed at 1030.    Medications given:  NS 1L IV bolus over 1 hour     Port flushed per policy and needle removed, 2x2 and paper tape placed.    BP (!) 155/85   Pulse 65   Temp 98.3 °F (36.8 °C) (Temporal)   Resp 16   SpO2 97%      1650 Tolerated treatment well, no adverse reaction noted.  D/Cd from [facility] ambulatory and in no distress accompanied by self.  Next appt 2/28/24 @ 0900.

## 2024-02-16 NOTE — TELEPHONE ENCOUNTER
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     Advance Directives:     Narrative: letter requested for [employer] policy related to Opdivo.  SW called and left pt a msg on 2/16 at 9:43am asking best way to get the letter to him.     Barriers to Care:     Plan:     Referral/Handouts:         Insurance/Entitlements referral

## 2024-02-19 ENCOUNTER — HOSPITAL ENCOUNTER (OUTPATIENT)
Facility: HOSPITAL | Age: 64
Discharge: HOME | End: 2024-02-22
Attending: INTERNAL MEDICINE
Payer: COMMERCIAL

## 2024-02-19 DIAGNOSIS — R20.0 NUMBNESS IN BOTH LEGS: ICD-10-CM

## 2024-02-19 DIAGNOSIS — C77.2 MALIGNANT NEOPLASM METASTATIC TO INTRA-ABDOMINAL LYMPH NODE (HCC): ICD-10-CM

## 2024-02-19 PROCEDURE — 3430000000 HC RX DIAGNOSTIC RADIOPHARMACEUTICAL: Performed by: INTERNAL MEDICINE

## 2024-02-19 PROCEDURE — A9503 TC99M MEDRONATE: HCPCS | Performed by: INTERNAL MEDICINE

## 2024-02-19 PROCEDURE — 78306 BONE IMAGING WHOLE BODY: CPT | Performed by: INTERNAL MEDICINE

## 2024-02-19 RX ORDER — TC 99M MEDRONATE 20 MG/10ML
21.1 INJECTION, POWDER, LYOPHILIZED, FOR SOLUTION INTRAVENOUS
Status: COMPLETED | OUTPATIENT
Start: 2024-02-19 | End: 2024-02-19

## 2024-02-19 RX ADMIN — TC 99M MEDRONATE 21.1 MILLICURIE: 20 INJECTION, POWDER, LYOPHILIZED, FOR SOLUTION INTRAVENOUS at 09:30

## 2024-02-20 RX ORDER — 0.9 % SODIUM CHLORIDE 0.9 %
1000 INTRAVENOUS SOLUTION INTRAVENOUS ONCE
Status: CANCELLED
Start: 2024-03-01 | End: 2024-03-01

## 2024-02-20 RX ORDER — HEPARIN 100 UNIT/ML
500 SYRINGE INTRAVENOUS PRN
Status: CANCELLED | OUTPATIENT
Start: 2024-03-01

## 2024-02-20 RX ORDER — ONDANSETRON 2 MG/ML
8 INJECTION INTRAMUSCULAR; INTRAVENOUS
Status: CANCELLED | OUTPATIENT
Start: 2024-02-28

## 2024-02-20 RX ORDER — DIPHENHYDRAMINE HYDROCHLORIDE 50 MG/ML
50 INJECTION INTRAMUSCULAR; INTRAVENOUS
Status: CANCELLED | OUTPATIENT
Start: 2024-02-28

## 2024-02-20 RX ORDER — EPINEPHRINE 1 MG/ML
0.3 INJECTION, SOLUTION INTRAMUSCULAR; SUBCUTANEOUS PRN
Status: CANCELLED | OUTPATIENT
Start: 2024-02-28

## 2024-02-20 RX ORDER — MEPERIDINE HYDROCHLORIDE 25 MG/ML
12.5 INJECTION INTRAMUSCULAR; INTRAVENOUS; SUBCUTANEOUS PRN
Status: CANCELLED | OUTPATIENT
Start: 2024-02-28

## 2024-02-20 RX ORDER — ACETAMINOPHEN 325 MG/1
650 TABLET ORAL
Status: CANCELLED | OUTPATIENT
Start: 2024-02-28

## 2024-02-20 RX ORDER — SODIUM CHLORIDE 9 MG/ML
5-250 INJECTION, SOLUTION INTRAVENOUS PRN
Status: CANCELLED | OUTPATIENT
Start: 2024-03-01

## 2024-02-20 RX ORDER — ALBUTEROL SULFATE 90 UG/1
4 AEROSOL, METERED RESPIRATORY (INHALATION) PRN
Status: CANCELLED | OUTPATIENT
Start: 2024-02-28

## 2024-02-20 RX ORDER — SODIUM CHLORIDE 9 MG/ML
INJECTION, SOLUTION INTRAVENOUS CONTINUOUS
Status: CANCELLED | OUTPATIENT
Start: 2024-02-28

## 2024-02-20 RX ORDER — SODIUM CHLORIDE 0.9 % (FLUSH) 0.9 %
5-40 SYRINGE (ML) INJECTION PRN
Status: CANCELLED | OUTPATIENT
Start: 2024-03-01

## 2024-02-27 ENCOUNTER — OFFICE VISIT (OUTPATIENT)
Age: 64
End: 2024-02-27

## 2024-02-27 VITALS
RESPIRATION RATE: 20 BRPM | HEART RATE: 73 BPM | TEMPERATURE: 97.7 F | DIASTOLIC BLOOD PRESSURE: 88 MMHG | SYSTOLIC BLOOD PRESSURE: 135 MMHG | OXYGEN SATURATION: 95 % | WEIGHT: 209 LBS | BODY MASS INDEX: 29.92 KG/M2 | HEIGHT: 70 IN

## 2024-02-27 DIAGNOSIS — Z48.89 POSTOPERATIVE VISIT: Primary | ICD-10-CM

## 2024-02-27 PROCEDURE — 99024 POSTOP FOLLOW-UP VISIT: CPT | Performed by: SURGERY

## 2024-02-27 NOTE — PROGRESS NOTES
Identified pt with two pt identifiers(name and ). Reviewed record in preparation for visit and have obtained necessary documentation. All patient medications has been reviewed.    Chief Complaint   Patient presents with    Post-Op Check     Robot-assisted laparoscopic placement of feeding gastrostomy tube.2024       Health Maintenance Due   Topic    Pneumococcal 65+ years Vaccine (1 - PCV)    Depression Screen     HIV screen     Hepatitis C screen     DTaP/Tdap/Td vaccine (1 - Tdap)    Shingles vaccine (1 of 2)    Lipids     Prostate Specific Antigen (PSA) Screening or Monitoring     Colorectal Cancer Screen     COVID-19 Vaccine (2 - Pfizer risk series)       [unfilled]    4.Have you been to the ER, urgent care clinic since your last visit?  Hospitalized since your last visit?no    5. Have you seen or consulted any other health care providers outside of the [facility] since your last visit?  Include any pap smears or colon screening. no      Patient is accompanied by spouse I have received verbal consent from Cyril Gregg to discuss any/all medical information while they are present in the room.

## 2024-02-28 ENCOUNTER — OFFICE VISIT (OUTPATIENT)
Age: 64
End: 2024-02-28
Payer: COMMERCIAL

## 2024-02-28 ENCOUNTER — HOSPITAL ENCOUNTER (OUTPATIENT)
Facility: HOSPITAL | Age: 64
Setting detail: INFUSION SERIES
Discharge: HOME | End: 2024-02-28
Payer: COMMERCIAL

## 2024-02-28 VITALS
OXYGEN SATURATION: 96 % | BODY MASS INDEX: 30.74 KG/M2 | TEMPERATURE: 98.7 F | HEART RATE: 67 BPM | HEIGHT: 70 IN | WEIGHT: 214.73 LBS | DIASTOLIC BLOOD PRESSURE: 79 MMHG | SYSTOLIC BLOOD PRESSURE: 152 MMHG

## 2024-02-28 VITALS
OXYGEN SATURATION: 94 % | SYSTOLIC BLOOD PRESSURE: 152 MMHG | DIASTOLIC BLOOD PRESSURE: 88 MMHG | RESPIRATION RATE: 16 BRPM | HEIGHT: 70 IN | TEMPERATURE: 98.7 F | HEART RATE: 63 BPM | BODY MASS INDEX: 30.74 KG/M2 | WEIGHT: 214.7 LBS

## 2024-02-28 DIAGNOSIS — Z51.12 ENCOUNTER FOR ANTINEOPLASTIC CHEMOTHERAPY AND IMMUNOTHERAPY: ICD-10-CM

## 2024-02-28 DIAGNOSIS — C16.0 GE JUNCTION CARCINOMA (HCC): Primary | ICD-10-CM

## 2024-02-28 DIAGNOSIS — C77.2 MALIGNANT NEOPLASM METASTATIC TO INTRA-ABDOMINAL LYMPH NODE (HCC): ICD-10-CM

## 2024-02-28 DIAGNOSIS — Z51.11 ENCOUNTER FOR ANTINEOPLASTIC CHEMOTHERAPY AND IMMUNOTHERAPY: ICD-10-CM

## 2024-02-28 LAB
ALBUMIN SERPL-MCNC: 3.5 G/DL (ref 3.5–5)
ALBUMIN/GLOB SERPL: 0.9 (ref 1.1–2.2)
ALP SERPL-CCNC: 151 U/L (ref 45–117)
ALT SERPL-CCNC: 18 U/L (ref 12–78)
ANION GAP SERPL CALC-SCNC: 6 MMOL/L (ref 5–15)
AST SERPL-CCNC: 15 U/L (ref 15–37)
BASOPHILS # BLD: 0.1 K/UL (ref 0–0.1)
BASOPHILS NFR BLD: 1 % (ref 0–1)
BILIRUB SERPL-MCNC: 1.2 MG/DL (ref 0.2–1)
BUN SERPL-MCNC: 24 MG/DL (ref 6–20)
BUN/CREAT SERPL: 27 (ref 12–20)
CALCIUM SERPL-MCNC: 9.5 MG/DL (ref 8.5–10.1)
CHLORIDE SERPL-SCNC: 104 MMOL/L (ref 97–108)
CO2 SERPL-SCNC: 27 MMOL/L (ref 21–32)
CREAT SERPL-MCNC: 0.89 MG/DL (ref 0.7–1.3)
DIFFERENTIAL METHOD BLD: ABNORMAL
EOSINOPHIL # BLD: 0.2 K/UL (ref 0–0.4)
EOSINOPHIL NFR BLD: 3 % (ref 0–7)
ERYTHROCYTE [DISTWIDTH] IN BLOOD BY AUTOMATED COUNT: 12.8 % (ref 11.5–14.5)
GLOBULIN SER CALC-MCNC: 3.8 G/DL (ref 2–4)
GLUCOSE SERPL-MCNC: 264 MG/DL (ref 65–100)
HCT VFR BLD AUTO: 40.8 % (ref 36.6–50.3)
HGB BLD-MCNC: 14.3 G/DL (ref 12.1–17)
IMM GRANULOCYTES # BLD AUTO: 0 K/UL (ref 0–0.04)
IMM GRANULOCYTES NFR BLD AUTO: 0 % (ref 0–0.5)
LYMPHOCYTES # BLD: 0.9 K/UL (ref 0.8–3.5)
LYMPHOCYTES NFR BLD: 16 % (ref 12–49)
MCH RBC QN AUTO: 33.3 PG (ref 26–34)
MCHC RBC AUTO-ENTMCNC: 35 G/DL (ref 30–36.5)
MCV RBC AUTO: 94.9 FL (ref 80–99)
MONOCYTES # BLD: 0.8 K/UL (ref 0–1)
MONOCYTES NFR BLD: 13 % (ref 5–13)
NEUTS SEG # BLD: 4.1 K/UL (ref 1.8–8)
NEUTS SEG NFR BLD: 67 % (ref 32–75)
NRBC # BLD: 0 K/UL (ref 0–0.01)
NRBC BLD-RTO: 0 PER 100 WBC
PLATELET # BLD AUTO: 186 K/UL (ref 150–400)
PMV BLD AUTO: 8.4 FL (ref 8.9–12.9)
POTASSIUM SERPL-SCNC: 4 MMOL/L (ref 3.5–5.1)
PROT SERPL-MCNC: 7.3 G/DL (ref 6.4–8.2)
RBC # BLD AUTO: 4.3 M/UL (ref 4.1–5.7)
SODIUM SERPL-SCNC: 137 MMOL/L (ref 136–145)
TSH SERPL DL<=0.05 MIU/L-ACNC: 1.42 UIU/ML (ref 0.36–3.74)
WBC # BLD AUTO: 6 K/UL (ref 4.1–11.1)

## 2024-02-28 PROCEDURE — 85025 COMPLETE CBC W/AUTO DIFF WBC: CPT

## 2024-02-28 PROCEDURE — 96375 TX/PRO/DX INJ NEW DRUG ADDON: CPT

## 2024-02-28 PROCEDURE — 6360000002 HC RX W HCPCS: Performed by: INTERNAL MEDICINE

## 2024-02-28 PROCEDURE — 84443 ASSAY THYROID STIM HORMONE: CPT

## 2024-02-28 PROCEDURE — 3078F DIAST BP <80 MM HG: CPT | Performed by: INTERNAL MEDICINE

## 2024-02-28 PROCEDURE — 99215 OFFICE O/P EST HI 40 MIN: CPT | Performed by: INTERNAL MEDICINE

## 2024-02-28 PROCEDURE — 3077F SYST BP >= 140 MM HG: CPT | Performed by: INTERNAL MEDICINE

## 2024-02-28 PROCEDURE — 1123F ACP DISCUSS/DSCN MKR DOCD: CPT | Performed by: INTERNAL MEDICINE

## 2024-02-28 PROCEDURE — 96417 CHEMO IV INFUS EACH ADDL SEQ: CPT

## 2024-02-28 PROCEDURE — 2580000003 HC RX 258: Performed by: INTERNAL MEDICINE

## 2024-02-28 PROCEDURE — 36415 COLL VENOUS BLD VENIPUNCTURE: CPT

## 2024-02-28 PROCEDURE — 96413 CHEMO IV INFUSION 1 HR: CPT

## 2024-02-28 PROCEDURE — 96416 CHEMO PROLONG INFUSE W/PUMP: CPT

## 2024-02-28 PROCEDURE — 96411 CHEMO IV PUSH ADDL DRUG: CPT

## 2024-02-28 PROCEDURE — 80053 COMPREHEN METABOLIC PANEL: CPT

## 2024-02-28 RX ORDER — SODIUM CHLORIDE 9 MG/ML
5-250 INJECTION, SOLUTION INTRAVENOUS PRN
Status: DISCONTINUED | OUTPATIENT
Start: 2024-02-28 | End: 2024-02-29 | Stop reason: HOSPADM

## 2024-02-28 RX ORDER — FLUOROURACIL 50 MG/ML
400 INJECTION, SOLUTION INTRAVENOUS ONCE
Status: COMPLETED | OUTPATIENT
Start: 2024-02-28 | End: 2024-02-28

## 2024-02-28 RX ORDER — SODIUM CHLORIDE 0.9 % (FLUSH) 0.9 %
5-40 SYRINGE (ML) INJECTION PRN
Status: DISCONTINUED | OUTPATIENT
Start: 2024-02-28 | End: 2024-02-29 | Stop reason: HOSPADM

## 2024-02-28 RX ORDER — HEPARIN 100 UNIT/ML
500 SYRINGE INTRAVENOUS PRN
Status: DISCONTINUED | OUTPATIENT
Start: 2024-02-28 | End: 2024-02-29 | Stop reason: HOSPADM

## 2024-02-28 RX ORDER — DEXTROSE MONOHYDRATE 50 MG/ML
5-250 INJECTION, SOLUTION INTRAVENOUS PRN
Status: DISCONTINUED | OUTPATIENT
Start: 2024-02-28 | End: 2024-02-29 | Stop reason: HOSPADM

## 2024-02-28 RX ORDER — PALONOSETRON 0.05 MG/ML
0.25 INJECTION, SOLUTION INTRAVENOUS ONCE
Status: COMPLETED | OUTPATIENT
Start: 2024-02-28 | End: 2024-02-28

## 2024-02-28 RX ADMIN — DEXAMETHASONE SODIUM PHOSPHATE 12 MG: 4 INJECTION, SOLUTION INTRAMUSCULAR; INTRAVENOUS at 11:25

## 2024-02-28 RX ADMIN — SODIUM CHLORIDE 25 ML/HR: 9 INJECTION, SOLUTION INTRAVENOUS at 11:19

## 2024-02-28 RX ADMIN — FLUOROURACIL 900 MG: 50 INJECTION, SOLUTION INTRAVENOUS at 13:03

## 2024-02-28 RX ADMIN — FLUOROURACIL 5000 MG: 50 INJECTION, SOLUTION INTRAVENOUS at 13:03

## 2024-02-28 RX ADMIN — PALONOSETRON 0.25 MG: 0.05 INJECTION, SOLUTION INTRAVENOUS at 11:19

## 2024-02-28 RX ADMIN — SODIUM CHLORIDE 240 MG: 9 INJECTION, SOLUTION INTRAVENOUS at 11:46

## 2024-02-28 RX ADMIN — LEUCOVORIN CALCIUM 200 MG: 200 INJECTION, POWDER, LYOPHILIZED, FOR SOLUTION INTRAMUSCULAR; INTRAVENOUS at 12:24

## 2024-02-28 RX ADMIN — DEXTROSE MONOHYDRATE 25 ML/HR: 50 INJECTION, SOLUTION INTRAVENOUS at 12:22

## 2024-02-28 NOTE — PROGRESS NOTES
Cyril Gregg is a 63 y.o. male here for treatment for GE Junction cancer.  -4 lbs since last visit.   Pt doing better with feeding tube.     1. Have you been to the ER, urgent care clinic since your last visit?  Hospitalized since your last visit? no    2. Have you seen or consulted any other health care providers outside of the [facility] since your last visit?  Include any pap smears or colon screening.  no  
able to swallow [place].   Switch back to 5-FU/Avastin    Gr 2 dermatitis - IRAE  A detailed system by system evaluation of side effect was performed to assess adverse events.   Blood counts are acceptable. Results reviewed with the patient    Chemotherapy and immunotherapy administration is associated with myriad of side effects and would be considered high risk medication.      CT - continued good response to therapy.      2. Protein calorie malnutrition    Protein supplementation  Feeding tube on hold. He is not enthusiastic about getting it.       3. Thrombocytopenia    Observation      4. Gr 1/2 dermatitis - IRAE    Switch from hydrocortisone to Triamcinolone cream  Taper Prednisone      5. DM, uncontrolled    I am considering referral to Endocrine for DM management        Plan:       Continue systemic therapy - 5-FU/Nivo  Follow up 2 weeks  Topical steroids  Feeding via tube      Signed by: Daryl Wang MD                     February 28, 2024

## 2024-02-28 NOTE — PROGRESS NOTES
[facility] Visit Note    Pt arrived to [facility] ambulatory in no acute distress at 0900 for 5FU/Opdivo C4.  Assessment unremarkable except he states his swallowing is still progressively getting worse and has experienced some constipation since starting on tube feeds. He continues to have a generalized rash and neuropathy.  R chest port accessed without issue and positive blood return noted.  Labs obtained- CBC, CMP, and TSH.    Patient to MD office for apt.    Patient denied having any symptoms of COVID-19, i.e. SOB, coughing, fever, or generally not feeling well.      BP (!) 159/91   Pulse 80   Temp 98.7 °F (37.1 °C) (Temporal)   Resp 18   Ht 1.778 m (5' 10\")   Wt 97.4 kg (214 lb 11.2 oz)   SpO2 94%   BMI 30.81 kg/m²     Recent Results (from the past 12 hour(s))   TSH without Reflex    Collection Time: 02/28/24  9:10 AM   Result Value Ref Range    TSH, 3RD GENERATION 1.42 0.36 - 3.74 uIU/mL   Comprehensive metabolic panel    Collection Time: 02/28/24  9:10 AM   Result Value Ref Range    Sodium 137 136 - 145 mmol/L    Potassium 4.0 3.5 - 5.1 mmol/L    Chloride 104 97 - 108 mmol/L    CO2 27 21 - 32 mmol/L    Anion Gap 6 5 - 15 mmol/L    Glucose 264 (H) 65 - 100 mg/dL    BUN 24 (H) 6 - 20 MG/DL    Creatinine 0.89 0.70 - 1.30 MG/DL    Bun/Cre Ratio 27 (H) 12 - 20      Est, Glom Filt Rate >60 >60 ml/min/1.73m2    Calcium 9.5 8.5 - 10.1 MG/DL    Total Bilirubin 1.2 (H) 0.2 - 1.0 MG/DL    ALT 18 12 - 78 U/L    AST 15 15 - 37 U/L    Alk Phosphatase 151 (H) 45 - 117 U/L    Total Protein 7.3 6.4 - 8.2 g/dL    Albumin 3.5 3.5 - 5.0 g/dL    Globulin 3.8 2.0 - 4.0 g/dL    Albumin/Globulin Ratio 0.9 (L) 1.1 - 2.2     CBC With Auto Differential    Collection Time: 02/28/24  9:10 AM   Result Value Ref Range    WBC 6.0 4.1 - 11.1 K/uL    RBC 4.30 4.10 - 5.70 M/uL    Hemoglobin 14.3 12.1 - 17.0 g/dL    Hematocrit 40.8 36.6 - 50.3 %    MCV 94.9 80.0 - 99.0 FL    MCH 33.3 26.0 - 34.0 PG    MCHC 35.0

## 2024-03-01 ENCOUNTER — HOSPITAL ENCOUNTER (OUTPATIENT)
Facility: HOSPITAL | Age: 64
Setting detail: INFUSION SERIES
Discharge: HOME | End: 2024-03-01
Payer: COMMERCIAL

## 2024-03-01 ENCOUNTER — APPOINTMENT (OUTPATIENT)
Facility: HOSPITAL | Age: 64
End: 2024-03-01
Payer: COMMERCIAL

## 2024-03-01 VITALS — HEART RATE: 91 BPM | SYSTOLIC BLOOD PRESSURE: 151 MMHG | DIASTOLIC BLOOD PRESSURE: 70 MMHG

## 2024-03-01 DIAGNOSIS — C16.0 GE JUNCTION CARCINOMA (HCC): Primary | ICD-10-CM

## 2024-03-01 PROCEDURE — 96360 HYDRATION IV INFUSION INIT: CPT

## 2024-03-01 PROCEDURE — 2580000003 HC RX 258: Performed by: INTERNAL MEDICINE

## 2024-03-01 RX ORDER — 0.9 % SODIUM CHLORIDE 0.9 %
1000 INTRAVENOUS SOLUTION INTRAVENOUS ONCE
Status: COMPLETED | OUTPATIENT
Start: 2024-03-01 | End: 2024-03-01

## 2024-03-01 RX ADMIN — SODIUM CHLORIDE 1000 ML: 9 INJECTION, SOLUTION INTRAVENOUS at 15:34

## 2024-03-01 ASSESSMENT — PAIN SCALES - GENERAL: PAINLEVEL_OUTOF10: 0

## 2024-03-01 NOTE — PROGRESS NOTES
hospitals Progress Note    Date: 2024    Name: Cyril Gregg    MRN: [id number]         : 1960       Pt arrived to hospitals for Pump D/C ambulatory in stable condition. Assessment completed. No new concerns voiced.       Mr. Gregg's vitals were reviewed prior to and after treatment.   Vitals:    24 1537   BP: (!) 151/70   Pulse: 91          Medications given:   Medications Administered         sodium chloride 0.9 % bolus 1,000 mL Admin Date  2024 Action  New Bag Dose  1,000 mL Rate  983.6 mL/hr Route  IntraVENous Administered By  Dimitri Su RN            Verified Completion of Chemo Pump. Port flushed and de-accessed per protocol.     Mr. Gregg tolerated the infusion, and had no complaints.    [facility]. Gregg was [facility] in stable condition.     Future Appointments   Date Time Provider Department Center   3/13/2024  8:30 AM [facility]   3/13/2024  8:45 AM Daryl Wang MD [facility]   3/15/2024  3:30 PM [facility] 3 [facility]   3/27/2024  9:00 AM [facility]   3/27/2024  9:15 AM Daryl Wang MD ONC [facility]   3/29/2024  3:30 PM [facility] 6 [facility]   4/10/2024  8:30 AM [facility] MED7 [place] [facility]   2024  3:30 PM [facility] 2 [facility]   2024  9:00 AM [facility]5 [place] [facility]   2024  3:30 PM [facility] 7 [facility]   2024  9:00 AM [facility]2 [place] [facility]   5/10/2024  3:30 PM [facility] 7 [facility]   2024  9:00 AM [facility]5 [place] [facility]   2024  3:30 PM [facility] 7 [facility]   2024  9:00 AM [facility] LONG5 [place] [facility]   2024  3:30 PM [facility] FA[facility] 7 [facility]   2024  9:00 AM [facility] LONGLois NICHOLAS [facility]   2024  3:30 PM [facility] FA[facility] 7 [facility]       Dimitri Su RN  2024  3:47 PM

## 2024-03-05 RX ORDER — ACETAMINOPHEN 325 MG/1
650 TABLET ORAL
Status: CANCELLED | OUTPATIENT
Start: 2024-03-13

## 2024-03-05 RX ORDER — ALBUTEROL SULFATE 90 UG/1
4 AEROSOL, METERED RESPIRATORY (INHALATION) PRN
Status: CANCELLED | OUTPATIENT
Start: 2024-03-13

## 2024-03-05 RX ORDER — SODIUM CHLORIDE 0.9 % (FLUSH) 0.9 %
5-40 SYRINGE (ML) INJECTION PRN
Status: CANCELLED | OUTPATIENT
Start: 2024-03-15

## 2024-03-05 RX ORDER — SODIUM CHLORIDE 9 MG/ML
5-250 INJECTION, SOLUTION INTRAVENOUS PRN
Status: CANCELLED | OUTPATIENT
Start: 2024-03-13

## 2024-03-05 RX ORDER — HEPARIN 100 UNIT/ML
500 SYRINGE INTRAVENOUS PRN
Status: CANCELLED | OUTPATIENT
Start: 2024-03-15

## 2024-03-05 RX ORDER — SODIUM CHLORIDE 9 MG/ML
5-250 INJECTION, SOLUTION INTRAVENOUS PRN
Status: CANCELLED | OUTPATIENT
Start: 2024-03-15

## 2024-03-05 RX ORDER — SODIUM CHLORIDE 9 MG/ML
INJECTION, SOLUTION INTRAVENOUS CONTINUOUS
Status: CANCELLED | OUTPATIENT
Start: 2024-03-13

## 2024-03-05 RX ORDER — DIPHENHYDRAMINE HYDROCHLORIDE 50 MG/ML
50 INJECTION INTRAMUSCULAR; INTRAVENOUS
Status: CANCELLED | OUTPATIENT
Start: 2024-03-13

## 2024-03-05 RX ORDER — ONDANSETRON 2 MG/ML
8 INJECTION INTRAMUSCULAR; INTRAVENOUS
Status: CANCELLED | OUTPATIENT
Start: 2024-03-13

## 2024-03-05 RX ORDER — 0.9 % SODIUM CHLORIDE 0.9 %
1000 INTRAVENOUS SOLUTION INTRAVENOUS ONCE
Status: CANCELLED
Start: 2024-03-15 | End: 2024-03-15

## 2024-03-05 RX ORDER — EPINEPHRINE 1 MG/ML
0.3 INJECTION, SOLUTION INTRAMUSCULAR; SUBCUTANEOUS PRN
Status: CANCELLED | OUTPATIENT
Start: 2024-03-13

## 2024-03-05 RX ORDER — MEPERIDINE HYDROCHLORIDE 25 MG/ML
12.5 INJECTION INTRAMUSCULAR; INTRAVENOUS; SUBCUTANEOUS PRN
Status: CANCELLED | OUTPATIENT
Start: 2024-03-13

## 2024-03-05 RX ORDER — HEPARIN 100 UNIT/ML
500 SYRINGE INTRAVENOUS PRN
Status: CANCELLED | OUTPATIENT
Start: 2024-03-13

## 2024-03-05 NOTE — PROGRESS NOTES
Status post robot-assisted laparoscopic placement of feeding gastrostomy tube on 2/6/2024.      He has noticed a little skin irritation under the flange of the tube.  He is wondering what he can do about this.  He is doing extremely well otherwise.    On exam, there is a small amount of skin irritation likely from moisture.  He has a polyester sponge under the flange.    Assessment and plan: New skin irritation under the bumper of the G-tube.  I recommended that he not use polyester gauze since this holds moisture more than cotton gauze.  I recommended changing the cotton gauze at least 3 or 4 times a day to keep it dry.  We also reviewed the position of the flange relative to the centimeter markings on the tube.  This should remain taught in order to minimize leaking from the stomach.  I recommended washing the area with antibacterial soap every time he showers.  Told to give us a call with any concerns.

## 2024-03-08 NOTE — PROGRESS NOTES
Cyril Gregg is a 63 y.o. male here for treatment for GE Junction cancer.  -9 lbs since last visit.  Pt having extreme fatigue. Loose stools this morning. Nausea. Still working on feeding tube. Generally not feeling well recently.   First two weeks after treatment wife says he was lifeless and extremely fatigued. She was very concerned. This is a change from how he normally is.   He cannot eat or swallow anything anymore. Choking on his saliva at night.     1. Have you been to the ER, urgent care clinic since your last visit?  Hospitalized since your last visit? no    2. Have you seen or consulted any other health care providers outside of the [facility] since your last visit?  Include any pap smears or colon screening.  no

## 2024-03-13 ENCOUNTER — OFFICE VISIT (OUTPATIENT)
Age: 64
End: 2024-03-13
Payer: COMMERCIAL

## 2024-03-13 ENCOUNTER — HOSPITAL ENCOUNTER (OUTPATIENT)
Facility: HOSPITAL | Age: 64
Setting detail: INFUSION SERIES
Discharge: HOME | End: 2024-03-13
Payer: COMMERCIAL

## 2024-03-13 VITALS
DIASTOLIC BLOOD PRESSURE: 84 MMHG | SYSTOLIC BLOOD PRESSURE: 152 MMHG | WEIGHT: 205.8 LBS | HEART RATE: 71 BPM | OXYGEN SATURATION: 95 % | BODY MASS INDEX: 29.46 KG/M2 | TEMPERATURE: 98.2 F | HEIGHT: 70 IN

## 2024-03-13 VITALS
WEIGHT: 205.91 LBS | OXYGEN SATURATION: 96 % | BODY MASS INDEX: 29.48 KG/M2 | TEMPERATURE: 98.2 F | DIASTOLIC BLOOD PRESSURE: 75 MMHG | SYSTOLIC BLOOD PRESSURE: 131 MMHG | HEIGHT: 70 IN | HEART RATE: 87 BPM

## 2024-03-13 DIAGNOSIS — Z51.11 ENCOUNTER FOR ANTINEOPLASTIC CHEMOTHERAPY AND IMMUNOTHERAPY: ICD-10-CM

## 2024-03-13 DIAGNOSIS — E44.0 PROTEIN-CALORIE MALNUTRITION, MODERATE (HCC): ICD-10-CM

## 2024-03-13 DIAGNOSIS — C16.0 GE JUNCTION CARCINOMA (HCC): Primary | ICD-10-CM

## 2024-03-13 DIAGNOSIS — E11.9 DIABETES MELLITUS TYPE 2, NONINSULIN DEPENDENT (HCC): ICD-10-CM

## 2024-03-13 DIAGNOSIS — Z51.12 ENCOUNTER FOR ANTINEOPLASTIC CHEMOTHERAPY AND IMMUNOTHERAPY: ICD-10-CM

## 2024-03-13 DIAGNOSIS — R53.0 NEOPLASTIC MALIGNANT RELATED FATIGUE: ICD-10-CM

## 2024-03-13 LAB
ALBUMIN SERPL-MCNC: 3.6 G/DL (ref 3.5–5)
ALBUMIN/GLOB SERPL: 0.9 (ref 1.1–2.2)
ALP SERPL-CCNC: 162 U/L (ref 45–117)
ALT SERPL-CCNC: 18 U/L (ref 12–78)
ANION GAP SERPL CALC-SCNC: 7 MMOL/L (ref 5–15)
AST SERPL-CCNC: 11 U/L (ref 15–37)
BASOPHILS # BLD: 0.1 K/UL (ref 0–0.1)
BASOPHILS NFR BLD: 1 % (ref 0–1)
BILIRUB SERPL-MCNC: 1.4 MG/DL (ref 0.2–1)
BUN SERPL-MCNC: 36 MG/DL (ref 6–20)
BUN/CREAT SERPL: 33 (ref 12–20)
CALCIUM SERPL-MCNC: 9 MG/DL (ref 8.5–10.1)
CHLORIDE SERPL-SCNC: 102 MMOL/L (ref 97–108)
CO2 SERPL-SCNC: 28 MMOL/L (ref 21–32)
CREAT SERPL-MCNC: 1.08 MG/DL (ref 0.7–1.3)
DIFFERENTIAL METHOD BLD: ABNORMAL
EOSINOPHIL # BLD: 0.2 K/UL (ref 0–0.4)
EOSINOPHIL NFR BLD: 2 % (ref 0–7)
ERYTHROCYTE [DISTWIDTH] IN BLOOD BY AUTOMATED COUNT: 12.3 % (ref 11.5–14.5)
GLOBULIN SER CALC-MCNC: 3.8 G/DL (ref 2–4)
GLUCOSE SERPL-MCNC: 314 MG/DL (ref 65–100)
HCT VFR BLD AUTO: 41.7 % (ref 36.6–50.3)
HGB BLD-MCNC: 14.3 G/DL (ref 12.1–17)
IMM GRANULOCYTES # BLD AUTO: 0 K/UL (ref 0–0.04)
IMM GRANULOCYTES NFR BLD AUTO: 1 % (ref 0–0.5)
LYMPHOCYTES # BLD: 1.5 K/UL (ref 0.8–3.5)
LYMPHOCYTES NFR BLD: 19 % (ref 12–49)
MCH RBC QN AUTO: 32.7 PG (ref 26–34)
MCHC RBC AUTO-ENTMCNC: 34.3 G/DL (ref 30–36.5)
MCV RBC AUTO: 95.4 FL (ref 80–99)
MONOCYTES # BLD: 0.9 K/UL (ref 0–1)
MONOCYTES NFR BLD: 11 % (ref 5–13)
NEUTS SEG # BLD: 5.2 K/UL (ref 1.8–8)
NEUTS SEG NFR BLD: 66 % (ref 32–75)
NRBC # BLD: 0 K/UL (ref 0–0.01)
NRBC BLD-RTO: 0 PER 100 WBC
PLATELET # BLD AUTO: 202 K/UL (ref 150–400)
PMV BLD AUTO: 9.1 FL (ref 8.9–12.9)
POTASSIUM SERPL-SCNC: 3.8 MMOL/L (ref 3.5–5.1)
PROT SERPL-MCNC: 7.4 G/DL (ref 6.4–8.2)
RBC # BLD AUTO: 4.37 M/UL (ref 4.1–5.7)
SODIUM SERPL-SCNC: 137 MMOL/L (ref 136–145)
WBC # BLD AUTO: 7.8 K/UL (ref 4.1–11.1)

## 2024-03-13 PROCEDURE — 3075F SYST BP GE 130 - 139MM HG: CPT | Performed by: INTERNAL MEDICINE

## 2024-03-13 PROCEDURE — 80053 COMPREHEN METABOLIC PANEL: CPT

## 2024-03-13 PROCEDURE — 99215 OFFICE O/P EST HI 40 MIN: CPT | Performed by: INTERNAL MEDICINE

## 2024-03-13 PROCEDURE — 3078F DIAST BP <80 MM HG: CPT | Performed by: INTERNAL MEDICINE

## 2024-03-13 PROCEDURE — 36415 COLL VENOUS BLD VENIPUNCTURE: CPT

## 2024-03-13 PROCEDURE — 85025 COMPLETE CBC W/AUTO DIFF WBC: CPT

## 2024-03-13 PROCEDURE — 6360000002 HC RX W HCPCS: Performed by: INTERNAL MEDICINE

## 2024-03-13 PROCEDURE — 3044F HG A1C LEVEL LT 7.0%: CPT | Performed by: INTERNAL MEDICINE

## 2024-03-13 PROCEDURE — 96416 CHEMO PROLONG INFUSE W/PUMP: CPT

## 2024-03-13 PROCEDURE — 96367 TX/PROPH/DG ADDL SEQ IV INF: CPT

## 2024-03-13 PROCEDURE — 96375 TX/PRO/DX INJ NEW DRUG ADDON: CPT

## 2024-03-13 PROCEDURE — 96413 CHEMO IV INFUSION 1 HR: CPT

## 2024-03-13 PROCEDURE — 2580000003 HC RX 258: Performed by: INTERNAL MEDICINE

## 2024-03-13 PROCEDURE — 1123F ACP DISCUSS/DSCN MKR DOCD: CPT | Performed by: INTERNAL MEDICINE

## 2024-03-13 RX ORDER — FLUOROURACIL 50 MG/ML
400 INJECTION, SOLUTION INTRAVENOUS ONCE
Status: DISCONTINUED | OUTPATIENT
Start: 2024-03-13 | End: 2024-03-13

## 2024-03-13 RX ORDER — PALONOSETRON 0.05 MG/ML
0.25 INJECTION, SOLUTION INTRAVENOUS ONCE
Status: COMPLETED | OUTPATIENT
Start: 2024-03-13 | End: 2024-03-13

## 2024-03-13 RX ORDER — SODIUM CHLORIDE 9 MG/ML
5-250 INJECTION, SOLUTION INTRAVENOUS PRN
Status: DISCONTINUED | OUTPATIENT
Start: 2024-03-13 | End: 2024-03-14 | Stop reason: HOSPADM

## 2024-03-13 RX ORDER — DEXTROSE MONOHYDRATE 50 MG/ML
5-250 INJECTION, SOLUTION INTRAVENOUS PRN
Status: DISCONTINUED | OUTPATIENT
Start: 2024-03-13 | End: 2024-03-14 | Stop reason: HOSPADM

## 2024-03-13 RX ORDER — SODIUM CHLORIDE 0.9 % (FLUSH) 0.9 %
5-40 SYRINGE (ML) INJECTION PRN
Status: DISCONTINUED | OUTPATIENT
Start: 2024-03-13 | End: 2024-03-14 | Stop reason: HOSPADM

## 2024-03-13 RX ADMIN — FLUOROURACIL 5000 MG: 50 INJECTION, SOLUTION INTRAVENOUS at 12:50

## 2024-03-13 RX ADMIN — SODIUM CHLORIDE, PRESERVATIVE FREE 10 ML: 5 INJECTION INTRAVENOUS at 08:30

## 2024-03-13 RX ADMIN — SODIUM CHLORIDE, PRESERVATIVE FREE 10 ML: 5 INJECTION INTRAVENOUS at 12:49

## 2024-03-13 RX ADMIN — PALONOSETRON 0.25 MG: 0.05 INJECTION, SOLUTION INTRAVENOUS at 10:28

## 2024-03-13 RX ADMIN — SODIUM CHLORIDE 25 ML/HR: 9 INJECTION, SOLUTION INTRAVENOUS at 10:28

## 2024-03-13 RX ADMIN — DEXAMETHASONE SODIUM PHOSPHATE 12 MG: 4 INJECTION, SOLUTION INTRAMUSCULAR; INTRAVENOUS at 10:33

## 2024-03-13 RX ADMIN — SODIUM CHLORIDE 240 MG: 9 INJECTION, SOLUTION INTRAVENOUS at 11:32

## 2024-03-13 RX ADMIN — LEUCOVORIN CALCIUM 200 MG: 200 INJECTION, POWDER, LYOPHILIZED, FOR SOLUTION INTRAMUSCULAR; INTRAVENOUS at 12:12

## 2024-03-13 NOTE — PROGRESS NOTES
Name: Cyril Gregg    MRN: [id number]         : 1960    Mr. Gregg Arrived ambulatory and in no distress for C5D1 of Opdivo & Leucovorin/5FU.  Assessment was completed, no acute issues at this time, no new complaints voiced.  Patient voiced unchanged neuropathy and continued fatigue; however, he does note increased tolerance to tube feeds. He is up to 2 bottles of his tube feeds without any nausea or diarrhea. Right chest wall port accessed without difficulty, labs drawn & sent for processing.    Patient proceed to appointment with Dr. Wang.    Mr. Gregg's vitals were reviewed.  Vitals:    24 0833   Pulse: 91   Temp: 98.2 °F (36.8 °C)   SpO2: 95%       Lab results were obtained and reviewed.  Recent Results (from the past 12 hour(s))   Comprehensive metabolic panel    Collection Time: 24  8:35 AM   Result Value Ref Range    Sodium 137 136 - 145 mmol/L    Potassium 3.8 3.5 - 5.1 mmol/L    Chloride 102 97 - 108 mmol/L    CO2 28 21 - 32 mmol/L    Anion Gap 7 5 - 15 mmol/L    Glucose 314 (H) 65 - 100 mg/dL    BUN 36 (H) 6 - 20 MG/DL    Creatinine 1.08 0.70 - 1.30 MG/DL    Bun/Cre Ratio 33 (H) 12 - 20      Est, Glom Filt Rate >60 >60 ml/min/1.73m2    Calcium 9.0 8.5 - 10.1 MG/DL    Total Bilirubin 1.4 (H) 0.2 - 1.0 MG/DL    ALT 18 12 - 78 U/L    AST 11 (L) 15 - 37 U/L    Alk Phosphatase 162 (H) 45 - 117 U/L    Total Protein 7.4 6.4 - 8.2 g/dL    Albumin 3.6 3.5 - 5.0 g/dL    Globulin 3.8 2.0 - 4.0 g/dL    Albumin/Globulin Ratio 0.9 (L) 1.1 - 2.2     CBC With Auto Differential    Collection Time: 24  8:35 AM   Result Value Ref Range    WBC 7.8 4.1 - 11.1 K/uL    RBC 4.37 4.10 - 5.70 M/uL    Hemoglobin 14.3 12.1 - 17.0 g/dL    Hematocrit 41.7 36.6 - 50.3 %    MCV 95.4 80.0 - 99.0 FL    MCH 32.7 26.0 - 34.0 PG    MCHC 34.3 30.0 - 36.5 g/dL    RDW 12.3 11.5 - 14.5 %    Platelets 202 150 - 400 K/uL    MPV 9.1 8.9 - 12.9 FL    Nucleated RBCs 0.0 0  WBC    nRBC 0.00 0.00 - 0.01  K/uL    Neutrophils % 66 32 - 75 %    Lymphocytes % 19 12 - 49 %    Monocytes % 11 5 - 13 %    Eosinophils % 2 0 - 7 %    Basophils % 1 0 - 1 %    Immature Granulocytes 1 (H) 0.0 - 0.5 %    Neutrophils Absolute 5.2 1.8 - 8.0 K/UL    Lymphocytes Absolute 1.5 0.8 - 3.5 K/UL    Monocytes Absolute 0.9 0.0 - 1.0 K/UL    Eosinophils Absolute 0.2 0.0 - 0.4 K/UL    Basophils Absolute 0.1 0.0 - 0.1 K/UL    Absolute Immature Granulocyte 0.0 0.00 - 0.04 K/UL    Differential Type AUTOMATED      **Dr. Wang's office notified of patient's elevated glucose level of 314 today. Okay to treat with elevated glucose was obtained from MD.    Medications:    Medications Administered         0.9 % sodium chloride infusion Admin Date  03/13/2024 Action  New Bag Dose  25 mL/hr Rate  25 mL/hr Route  IntraVENous Administered By  Christin Lozada RN        dexAMETHasone (DECADRON) 12 mg in sodium chloride 0.9% 50 mL IVPB Admin Date  03/13/2024 Action  New Bag Dose  12 mg Rate  200 mL/hr Route  IntraVENous Administered By  Christin Lozada RN        fluorouracil (ADRUCIL) 5,000 mg in 100 mL chemo infusion Admin Date  03/13/2024 Action  Given Dose  5,000 mg Rate  2.2 mL/hr Route  IntraVENous Administered By  Christin Lozada RN        leucovorin calcium (WELLCOVORIN) 200 mg in sodium chloride 0.9 % 250 mL IVPB Admin Date  03/13/2024 Action  New Bag Dose  200 mg Rate  570 mL/hr Route  IntraVENous Administered By  Christin Lozada RN        nivolumab (OPDIVO) 240 mg in sodium chloride 0.9 % 100 mL IVPB Admin Date  03/13/2024 Action  New Bag Dose  240 mg Rate  268 mL/hr Route  IntraVENous Administered By  Christin Lozada RN        palonosetron (ALOXI) injection 0.25 mg Admin Date  03/13/2024 Action  Given Dose  0.25 mg Rate   Route  IntraVENous Administered By  Christin Lozada RN        sodium chloride flush 0.9 % injection 5-40 mL Admin Date  03/13/2024 Action  Given Dose  10 mL Rate   Route  IntraVENous Administered By  Christin Lozada RN

## 2024-03-13 NOTE — PROGRESS NOTES
[facility] [place]  at [facility]  [address], [address], [address]  [place] [address]  [phone]       Hematology Oncology Progress Note      Patient: Cryil Gregg MRN: [id number]  SSN: [id number]    YOB: 1960  Age: 63 y.o.  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     Treatment:     Palliative therapy  mFOLFOX + Nivolumab, Ox stopped with cycle 8  5-FU/Nivo, ongoing  Cape/Nivolumab, 1 cycle - stopped due to dysphagia    Subjective:      Cyril Gregg is a 63 y.o. male who I am seeing in consultation for a new diagnosis of metastatic GEJ carcinoma. He has been experiencing some difficulty in swallowing for a few months. He has lost over 40 lbs and he is fatigued. An EGD revealed GEJ mass which was biopsied and came back as GEJ carcinoma. CT and a subsequent PET shows enlarged left supraclavicular and RP efrem disease. He has seen Khloe De La Torre at [facility]. He decided to receive systemic therapy with us.     Interval History:     Mr. Gregg received mFOLFOX + Nivolumab for some time. I stopped Ox after 7th tt. I switched to Cape and he was unable to swallow the medicine. He is back on 5-FU + Nivolumab. Rash is intermittent. He is choking on his own saliva and is unable to lay down flat. He has been extremely fatigued after the last cycle of treatment.       Review of Systems:  Constitutional: negative  Eyes: negative  Ears, Nose, Mouth, Throat, and Face: negative  Respiratory: negative  Cardiovascular: negative  Gastrointestinal: negative  Genitourinary:negative  Integument/Breast: rash in arms and trunk  Hematologic/Lymphatic: negative  Musculoskeletal: right rib pain  Neurological: negative    Review of systems was reviewed and updated as needed on 03/13/24.      Past Medical History:   Diagnosis Date    Arthritis     Cancer (HCC)     SCCA ON ARM

## 2024-03-14 ENCOUNTER — TELEPHONE (OUTPATIENT)
Age: 64
End: 2024-03-14

## 2024-03-14 NOTE — TELEPHONE ENCOUNTER
[facility] [place] at [facility]   [address] [place] [address]   W: [phone]  F: [phone]    Medical Nutrition Therapy  Nutrition Encounter:     Called and spoke with patient. He recently had his formula change. He was changed from Jevity 1.5 to TwoCal HN.    He is trying to do 1.5 carton at a time. He attempted to do more, but felt nauseous. The goal is 5.5 cartons daily which would provide 2612kcal, 109g protein and 880ml free water.        He has had progressively dysphagia, unable to take anything by mouth.     History: Had a Gtube placed by surgery on on 2/6/23.   He was discharged home with bolus tube feeds.  Inpatient dietitian started feeds in hospital.  Tube Feed order:  Jevity 1.5 - 500ml (2 cartons) three times a day. Flush with 150ml before and after each feed.  This will provide 2250kcal, 95g protein and 323g carbohydrates and 2040ml.    Supplies: [employer]    Diagnosis: Metastatic GEJ carcinoma   Diabetic   Barium swallow esophogram on 5/12/23 showed:  There is narrowing of the distal esophagus just above the hernia which demonstrates relatively smooth margins and may represent a benign stricture. 12 mm barium tablet was retained in this region.  PET scan showed GE junction mass.     Treatment course: 5FU + nivolumab     Ht Readings from Last 1 Encounters:   03/13/24 1.778 m (5' 10\")       Wt Readings from Last 5 Encounters:   03/13/24 93.4 kg (205 lb 12.8 oz)   03/13/24 93.4 kg (205 lb 14.6 oz)   02/28/24 97.4 kg (214 lb 11.2 oz)   02/28/24 97.4 kg (214 lb 11.7 oz)   02/27/24 94.8 kg (209 lb)       Estimated Nutrition Needs:   Calorie Range: 2400-2875kcal/day      Protein Range: 95-114g/day     Fluid Needs: 2000ml    Plan:  -Continue to work up to 5.5 cans of TwoCal HN         Signed By: EDWIGE BUNCH, RD    [phone]

## 2024-03-15 ENCOUNTER — APPOINTMENT (OUTPATIENT)
Facility: HOSPITAL | Age: 64
End: 2024-03-15
Payer: COMMERCIAL

## 2024-03-15 ENCOUNTER — HOSPITAL ENCOUNTER (OUTPATIENT)
Facility: HOSPITAL | Age: 64
Setting detail: INFUSION SERIES
Discharge: HOME | End: 2024-03-15
Payer: COMMERCIAL

## 2024-03-15 VITALS
TEMPERATURE: 98.7 F | RESPIRATION RATE: 20 BRPM | DIASTOLIC BLOOD PRESSURE: 64 MMHG | SYSTOLIC BLOOD PRESSURE: 138 MMHG | OXYGEN SATURATION: 98 % | HEART RATE: 83 BPM

## 2024-03-15 DIAGNOSIS — C16.0 GE JUNCTION CARCINOMA (HCC): Primary | ICD-10-CM

## 2024-03-15 PROCEDURE — 96360 HYDRATION IV INFUSION INIT: CPT

## 2024-03-15 PROCEDURE — 2580000003 HC RX 258: Performed by: INTERNAL MEDICINE

## 2024-03-15 RX ORDER — 0.9 % SODIUM CHLORIDE 0.9 %
1000 INTRAVENOUS SOLUTION INTRAVENOUS ONCE
Status: COMPLETED | OUTPATIENT
Start: 2024-03-15 | End: 2024-03-15

## 2024-03-15 RX ORDER — SODIUM CHLORIDE 0.9 % (FLUSH) 0.9 %
5-40 SYRINGE (ML) INJECTION PRN
Status: DISCONTINUED | OUTPATIENT
Start: 2024-03-15 | End: 2024-03-16 | Stop reason: HOSPADM

## 2024-03-15 RX ADMIN — SODIUM CHLORIDE 1000 ML: 9 INJECTION, SOLUTION INTRAVENOUS at 15:39

## 2024-03-15 RX ADMIN — SODIUM CHLORIDE, PRESERVATIVE FREE 10 ML: 5 INJECTION INTRAVENOUS at 15:39

## 2024-03-15 NOTE — PROGRESS NOTES
[facility] VISIT NOTE    1530  Pt arrived at [facility] ambulatory and in no distress for 5FU pump disconnect + hydration.  Assessment completed, pt c/o nausea, difficulty swallowing and fatigue.  Patient says pump finished at 9:20am today.    Blood pressure 138/64, pulse 83, temperature 98.7 °F (37.1 °C), temperature source Temporal, resp. rate 20, SpO2 98 %.    Medications received:  Medications Administered         sodium chloride 0.9 % bolus 1,000 mL Admin Date  03/15/2024 Action  New Bag Dose  1,000 mL Rate  983.6 mL/hr Route  IntraVENous Administered By  Tom Peña RN        sodium chloride flush 0.9 % injection 5-40 mL Admin Date  03/15/2024 Action  Given Dose  10 mL Rate   Route  IntraVENous Administered By  Tom Peña RN          Tolerated treatment well, no adverse reaction noted. Port de-accessed and flushed per protocol. Positive blood return noted.    1640  D/C'd from [facility] ambulatory and in no distress accompanied by his wife. Next appointment is 3/27/24 at 9am.

## 2024-03-19 ENCOUNTER — TELEPHONE (OUTPATIENT)
Age: 64
End: 2024-03-19

## 2024-03-19 ENCOUNTER — HOSPITAL ENCOUNTER (OUTPATIENT)
Facility: HOSPITAL | Age: 64
Discharge: HOME | End: 2024-03-22
Payer: COMMERCIAL

## 2024-03-19 DIAGNOSIS — C16.0 GE JUNCTION CARCINOMA (HCC): ICD-10-CM

## 2024-03-19 DIAGNOSIS — R53.0 NEOPLASTIC MALIGNANT RELATED FATIGUE: ICD-10-CM

## 2024-03-19 LAB
GLUCOSE BLD STRIP.AUTO-MCNC: 204 MG/DL (ref 65–117)
SERVICE CMNT-IMP: ABNORMAL

## 2024-03-19 PROCEDURE — 78815 PET IMAGE W/CT SKULL-THIGH: CPT

## 2024-03-19 PROCEDURE — A9609 HC RX DIAGNOSTIC RADIOPHARMACEUTICAL: HCPCS | Performed by: NURSE PRACTITIONER

## 2024-03-19 PROCEDURE — 3430000000 HC RX DIAGNOSTIC RADIOPHARMACEUTICAL: Performed by: NURSE PRACTITIONER

## 2024-03-19 PROCEDURE — 82962 GLUCOSE BLOOD TEST: CPT

## 2024-03-19 RX ORDER — MEPERIDINE HYDROCHLORIDE 25 MG/ML
12.5 INJECTION INTRAMUSCULAR; INTRAVENOUS; SUBCUTANEOUS PRN
Status: CANCELLED | OUTPATIENT
Start: 2024-03-27

## 2024-03-19 RX ORDER — SODIUM CHLORIDE 9 MG/ML
5-250 INJECTION, SOLUTION INTRAVENOUS PRN
Status: CANCELLED | OUTPATIENT
Start: 2024-03-27

## 2024-03-19 RX ORDER — FLUDEOXYGLUCOSE F-18 500 MCI/ML
10 INJECTION INTRAVENOUS ONCE
Status: COMPLETED | OUTPATIENT
Start: 2024-03-19 | End: 2024-03-19

## 2024-03-19 RX ORDER — EPINEPHRINE 1 MG/ML
0.3 INJECTION, SOLUTION INTRAMUSCULAR; SUBCUTANEOUS PRN
Status: CANCELLED | OUTPATIENT
Start: 2024-03-27

## 2024-03-19 RX ORDER — DIPHENHYDRAMINE HYDROCHLORIDE 50 MG/ML
50 INJECTION INTRAMUSCULAR; INTRAVENOUS
Status: CANCELLED | OUTPATIENT
Start: 2024-03-27

## 2024-03-19 RX ORDER — PALONOSETRON 0.05 MG/ML
0.25 INJECTION, SOLUTION INTRAVENOUS ONCE
Status: CANCELLED | OUTPATIENT
Start: 2024-03-27 | End: 2024-03-27

## 2024-03-19 RX ORDER — SODIUM CHLORIDE 9 MG/ML
5-250 INJECTION, SOLUTION INTRAVENOUS PRN
Status: CANCELLED | OUTPATIENT
Start: 2024-03-29

## 2024-03-19 RX ORDER — ONDANSETRON 2 MG/ML
8 INJECTION INTRAMUSCULAR; INTRAVENOUS
Status: CANCELLED | OUTPATIENT
Start: 2024-03-27

## 2024-03-19 RX ORDER — ACETAMINOPHEN 325 MG/1
650 TABLET ORAL
Status: CANCELLED | OUTPATIENT
Start: 2024-03-27

## 2024-03-19 RX ORDER — DEXTROSE MONOHYDRATE 50 MG/ML
5-250 INJECTION, SOLUTION INTRAVENOUS PRN
Status: CANCELLED | OUTPATIENT
Start: 2024-03-27

## 2024-03-19 RX ORDER — SODIUM CHLORIDE 0.9 % (FLUSH) 0.9 %
5-40 SYRINGE (ML) INJECTION PRN
Status: CANCELLED | OUTPATIENT
Start: 2024-03-27

## 2024-03-19 RX ORDER — FLUOROURACIL 50 MG/ML
400 INJECTION, SOLUTION INTRAVENOUS ONCE
Status: CANCELLED | OUTPATIENT
Start: 2024-03-27 | End: 2024-03-27

## 2024-03-19 RX ORDER — SODIUM CHLORIDE 9 MG/ML
INJECTION, SOLUTION INTRAVENOUS CONTINUOUS
Status: CANCELLED | OUTPATIENT
Start: 2024-03-27

## 2024-03-19 RX ORDER — ALBUTEROL SULFATE 90 UG/1
4 AEROSOL, METERED RESPIRATORY (INHALATION) PRN
Status: CANCELLED | OUTPATIENT
Start: 2024-03-27

## 2024-03-19 RX ORDER — SODIUM CHLORIDE 0.9 % (FLUSH) 0.9 %
5-40 SYRINGE (ML) INJECTION PRN
Status: CANCELLED | OUTPATIENT
Start: 2024-03-29

## 2024-03-19 RX ORDER — HEPARIN 100 UNIT/ML
500 SYRINGE INTRAVENOUS PRN
Status: CANCELLED | OUTPATIENT
Start: 2024-03-27

## 2024-03-19 RX ORDER — HEPARIN 100 UNIT/ML
500 SYRINGE INTRAVENOUS PRN
Status: CANCELLED | OUTPATIENT
Start: 2024-03-29

## 2024-03-19 RX ORDER — 0.9 % SODIUM CHLORIDE 0.9 %
1000 INTRAVENOUS SOLUTION INTRAVENOUS ONCE
Status: CANCELLED
Start: 2024-03-29 | End: 2024-03-29

## 2024-03-19 RX ADMIN — FLUDEOXYGLUCOSE F-18 10 MILLICURIE: 500 INJECTION INTRAVENOUS at 07:10

## 2024-03-19 NOTE — TELEPHONE ENCOUNTER
Patient called and stated that he would like to discuss his PET results from today. Patient also stated that he recently wrote in about some reflux medication that he would like to talk about.    # [phone]

## 2024-03-20 ENCOUNTER — HOSPITAL ENCOUNTER (OUTPATIENT)
Facility: HOSPITAL | Age: 64
Setting detail: INFUSION SERIES
Discharge: HOME | End: 2024-03-20
Payer: COMMERCIAL

## 2024-03-20 ENCOUNTER — OFFICE VISIT (OUTPATIENT)
Age: 64
End: 2024-03-20
Payer: COMMERCIAL

## 2024-03-20 ENCOUNTER — TELEPHONE (OUTPATIENT)
Age: 64
End: 2024-03-20

## 2024-03-20 ENCOUNTER — CLINICAL DOCUMENTATION (OUTPATIENT)
Age: 64
End: 2024-03-20

## 2024-03-20 VITALS
DIASTOLIC BLOOD PRESSURE: 90 MMHG | TEMPERATURE: 98 F | OXYGEN SATURATION: 98 % | HEIGHT: 70 IN | WEIGHT: 203.6 LBS | HEART RATE: 100 BPM | BODY MASS INDEX: 29.15 KG/M2 | SYSTOLIC BLOOD PRESSURE: 147 MMHG

## 2024-03-20 VITALS
TEMPERATURE: 99 F | RESPIRATION RATE: 20 BRPM | DIASTOLIC BLOOD PRESSURE: 85 MMHG | HEART RATE: 76 BPM | OXYGEN SATURATION: 97 % | SYSTOLIC BLOOD PRESSURE: 139 MMHG

## 2024-03-20 DIAGNOSIS — R50.9 FEVER, UNSPECIFIED FEVER CAUSE: ICD-10-CM

## 2024-03-20 DIAGNOSIS — C16.0 GE JUNCTION CARCINOMA (HCC): Primary | ICD-10-CM

## 2024-03-20 DIAGNOSIS — J69.0 ASPIRATION PNEUMONIA, UNSPECIFIED ASPIRATION PNEUMONIA TYPE, UNSPECIFIED LATERALITY, UNSPECIFIED PART OF LUNG (HCC): ICD-10-CM

## 2024-03-20 PROCEDURE — 96375 TX/PRO/DX INJ NEW DRUG ADDON: CPT

## 2024-03-20 PROCEDURE — 3077F SYST BP >= 140 MM HG: CPT | Performed by: INTERNAL MEDICINE

## 2024-03-20 PROCEDURE — 96374 THER/PROPH/DIAG INJ IV PUSH: CPT

## 2024-03-20 PROCEDURE — 3080F DIAST BP >= 90 MM HG: CPT | Performed by: INTERNAL MEDICINE

## 2024-03-20 PROCEDURE — 2580000003 HC RX 258: Performed by: INTERNAL MEDICINE

## 2024-03-20 PROCEDURE — 96360 HYDRATION IV INFUSION INIT: CPT

## 2024-03-20 PROCEDURE — 6360000002 HC RX W HCPCS: Performed by: INTERNAL MEDICINE

## 2024-03-20 PROCEDURE — 99215 OFFICE O/P EST HI 40 MIN: CPT | Performed by: INTERNAL MEDICINE

## 2024-03-20 PROCEDURE — 1123F ACP DISCUSS/DSCN MKR DOCD: CPT | Performed by: INTERNAL MEDICINE

## 2024-03-20 RX ORDER — 0.9 % SODIUM CHLORIDE 0.9 %
1000 INTRAVENOUS SOLUTION INTRAVENOUS ONCE
Status: COMPLETED | OUTPATIENT
Start: 2024-03-20 | End: 2024-03-20

## 2024-03-20 RX ORDER — ONDANSETRON 2 MG/ML
4 INJECTION INTRAMUSCULAR; INTRAVENOUS EVERY 6 HOURS PRN
Status: DISCONTINUED | OUTPATIENT
Start: 2024-03-20 | End: 2024-03-21 | Stop reason: HOSPADM

## 2024-03-20 RX ORDER — PROCHLORPERAZINE EDISYLATE 5 MG/ML
5 INJECTION INTRAMUSCULAR; INTRAVENOUS EVERY 6 HOURS PRN
Status: DISCONTINUED | OUTPATIENT
Start: 2024-03-20 | End: 2024-03-21 | Stop reason: HOSPADM

## 2024-03-20 RX ADMIN — PROCHLORPERAZINE EDISYLATE 5 MG: 5 INJECTION INTRAMUSCULAR; INTRAVENOUS at 17:11

## 2024-03-20 RX ADMIN — SODIUM CHLORIDE 1000 ML: 9 INJECTION, SOLUTION INTRAVENOUS at 16:46

## 2024-03-20 RX ADMIN — ONDANSETRON 4 MG: 2 INJECTION INTRAMUSCULAR; INTRAVENOUS at 16:58

## 2024-03-20 NOTE — TELEPHONE ENCOUNTER
Replied to pt via [employer] regarding medication.    Attempted to pt and pt wife, no answer on either phone and went straight to .  Offering appt for today at 1pm or 3pm.

## 2024-03-20 NOTE — TELEPHONE ENCOUNTER
APPROVED PER VORB FROM DR HARMON    Requested Prescriptions     Pending Prescriptions Disp Refills    lansoprazole (PREVACID) 3 mg/mL oral suspension 300 mL 0     Sig: Take 10 mLs by mouth every morning (before breakfast)

## 2024-03-20 NOTE — PROGRESS NOTES
Cyril Gregg is a 63 y.o. male here for follow up for GE Junction cancer.  Here to discuss PET done 3/19/24.     1. Have you been to the ER, urgent care clinic since your last visit?  Hospitalized since your last visit? no    2. Have you seen or consulted any other health care providers outside of the [facility] since your last visit?  Include any pap smears or colon screening.  no  
Information on cyramza and taxol provided to pt and pt wife.    Educated on palliative care consult.  They have an appt for tomorrow at 1pm (they received a call while I was in the room with them).    Pt was having hot flashes during visit and nauseated.    He ambulated down to [facility] for [id number] lab draw.    MARIN FROM DR HARMON REFERRAL TO PALLIATIVE CARE.  
Prednisone      5. DM, uncontrolled    Referral to Endocrine for DM management        Plan:       Discontinue 5-FU/Nivo. Start Cyramza/Taxol.   Follow up 2 weeks  Topical steroids  Feeding via tube  Endocrine for DM management  Abx for aspiration PNA      Signed by: Daryl Wang MD                     March 20, 2024

## 2024-03-20 NOTE — TELEPHONE ENCOUNTER
Called pt and pt wife and left VM on both lines.  Would like for pt to come in today at 1pm or 3pm to review scans.

## 2024-03-20 NOTE — PROGRESS NOTES
[facility] Visit Note    Pt arrived to [facility] ambulatory in no acute distress at 1630 for [facility] lab testing.  Assessment completed; pt is visibly fatigued and weak. He is short of breath and gagging when he swallows. He is having a lot of diarrhea and a hard time with his tube feedings.  R chest port accessed without issue and positive blood return noted.  Labs obtained- [facility] lab kit.    Patient denied having any symptoms of COVID-19, i.e. SOB, coughing, fever, or generally not feeling well.      BP (!) 149/96   Pulse 93   Temp 99 °F (37.2 °C) (Temporal)   Resp 20   SpO2 97%       The following medications administered:  Medications Administered         ondansetron (ZOFRAN) injection 4 mg Admin Date  03/20/2024 Action  Given Dose  4 mg Rate   Route  IntraVENous Administered By  Kristan Florence, RN        prochlorperazine (COMPAZINE) injection 5 mg Admin Date  03/20/2024 Action  Given Dose  5 mg Rate   Route  IntraVENous Administered By  Kristan Florence, RN        sodium chloride 0.9 % bolus 1,000 mL Admin Date  03/20/2024 Action  New Bag Dose  1,000 mL Rate  999 mL/hr Route  IntraVENous Administered By  Kristan Florence, NE             Pt tolerated treatment well.  No adverse reaction noted.  Port flushed per policy and needle removed, 2x2 and paper tape placed.  Pt discharged ambulatory in no acute distress at 1910, accompanied by spouse.  Next appointment 3/27/24.

## 2024-03-20 NOTE — TELEPHONE ENCOUNTER
[facility] [place] at [facility]   [address] [place] [address]   W: [phone]  F: [phone]    Medical Nutrition Therapy  Nutrition Encounter:     Received a message from [employer] (supplies formula for patient) regarding intolerance to current formula of TwoCal HN.  Patient also mentioned elevated BG levels.      Call patient this morning and left a message providing contact information.        Diagnosis: Metastatic GEJ carcinoma   Diabetic   Barium swallow esophogram on 5/12/23 showed:  There is narrowing of the distal esophagus just above the hernia which demonstrates relatively smooth margins and may represent a benign stricture. 12 mm barium tablet was retained in this region.  PET scan showed GE junction mass.     Treatment course: 5FU + nivolumab     Ht Readings from Last 1 Encounters:   03/13/24 1.778 m (5' 10\")       Wt Readings from Last 5 Encounters:   03/13/24 93.4 kg (205 lb 12.8 oz)   03/13/24 93.4 kg (205 lb 14.6 oz)   02/28/24 97.4 kg (214 lb 11.2 oz)   02/28/24 97.4 kg (214 lb 11.7 oz)   02/27/24 94.8 kg (209 lb)       Estimated Nutrition Needs:   Calorie Range: 2400-2875kcal/day      Protein Range: 95-114g/day     Fluid Needs: 2000ml      Signed By: EDWIGE BUNCH, RD    [phone]    Addendum: Patients wife called back.  Spoke with her and patient.  He found the TwoCal was causing gas/bloating and running to the toilet. He then tried 1 can Jevity 1.5 and 1 can TwoCal HN, no improvement. Constantly nauseous.  Using the plunger with feeds. Each carton taking 5-6 mins. Giving water flushes with liquid IV, concerned about sugar content. BG elevated, going to see endo.    Plan:  - Trial of gravity bags, to  at office.  - Next feed, do 1 carton of Jevity, then the following feeds do 1 can Jevity and 1 TwoCal.    Will follow up tomorrow.

## 2024-03-21 ENCOUNTER — TELEPHONE (OUTPATIENT)
Age: 64
End: 2024-03-21

## 2024-03-21 ENCOUNTER — OFFICE VISIT (OUTPATIENT)
Age: 64
End: 2024-03-21
Payer: COMMERCIAL

## 2024-03-21 ENCOUNTER — CLINICAL DOCUMENTATION (OUTPATIENT)
Age: 64
End: 2024-03-21

## 2024-03-21 VITALS
OXYGEN SATURATION: 94 % | DIASTOLIC BLOOD PRESSURE: 74 MMHG | BODY MASS INDEX: 29.06 KG/M2 | SYSTOLIC BLOOD PRESSURE: 156 MMHG | HEART RATE: 95 BPM | RESPIRATION RATE: 18 BRPM | TEMPERATURE: 96.6 F | HEIGHT: 70 IN | WEIGHT: 203 LBS

## 2024-03-21 DIAGNOSIS — C16.0 GE JUNCTION CARCINOMA (HCC): Primary | ICD-10-CM

## 2024-03-21 DIAGNOSIS — K11.7 SIALORRHEA: ICD-10-CM

## 2024-03-21 DIAGNOSIS — R11.2 INTRACTABLE NAUSEA AND VOMITING: ICD-10-CM

## 2024-03-21 DIAGNOSIS — K59.1 FUNCTIONAL DIARRHEA: ICD-10-CM

## 2024-03-21 PROCEDURE — 1123F ACP DISCUSS/DSCN MKR DOCD: CPT | Performed by: INTERNAL MEDICINE

## 2024-03-21 PROCEDURE — 3077F SYST BP >= 140 MM HG: CPT | Performed by: INTERNAL MEDICINE

## 2024-03-21 PROCEDURE — 99205 OFFICE O/P NEW HI 60 MIN: CPT | Performed by: INTERNAL MEDICINE

## 2024-03-21 PROCEDURE — 3078F DIAST BP <80 MM HG: CPT | Performed by: INTERNAL MEDICINE

## 2024-03-21 RX ORDER — LEVOFLOXACIN 25 MG/ML
500 SOLUTION ORAL DAILY
Qty: 200 ML | Refills: 0 | Status: SHIPPED | OUTPATIENT
Start: 2024-03-21 | End: 2024-03-31

## 2024-03-21 RX ORDER — AMOXICILLIN AND CLAVULANATE POTASSIUM 250; 62.5 MG/5ML; MG/5ML
17.5 POWDER, FOR SUSPENSION ORAL 2 TIMES DAILY
Qty: 350 ML | Refills: 0 | Status: SHIPPED | OUTPATIENT
Start: 2024-03-21 | End: 2024-03-21

## 2024-03-21 RX ORDER — SCOLOPAMINE TRANSDERMAL SYSTEM 1 MG/1
1 PATCH, EXTENDED RELEASE TRANSDERMAL
Qty: 15 PATCH | Refills: 2 | Status: SHIPPED | OUTPATIENT
Start: 2024-03-21

## 2024-03-21 ASSESSMENT — PATIENT HEALTH QUESTIONNAIRE - PHQ9
SUM OF ALL RESPONSES TO PHQ QUESTIONS 1-9: 0
SUM OF ALL RESPONSES TO PHQ9 QUESTIONS 1 & 2: 0
1. LITTLE INTEREST OR PLEASURE IN DOING THINGS: NOT AT ALL
SUM OF ALL RESPONSES TO PHQ QUESTIONS 1-9: 0
2. FEELING DOWN, DEPRESSED OR HOPELESS: NOT AT ALL

## 2024-03-21 NOTE — PROGRESS NOTES
Palliative Medicine Outpatient Services  [place]: [phone] ([phone])    Patient Name: Cyril Gregg  YOB: 1960    Date of Current Visit: 03/21/2024  Location of Current Visit:    [] [facility] Office  [] [facility] Office  [x] [facility] Office  [] Home  []Synchronous real-time A/V virtual visit    Date of Initial Visit: 3/21/2024  Referral from: Dr. Wang's  Primary Care Physician: Osei Winchester MD      SUMMARY:   Cyril Gregg is a 63 y.o. year old with a  history of GEJ adenocarcinoma with mets to the left supraclavicular and RP nodes, initial diagnosis today, status post chemo, recent progression of disease of the GE junction mass along with mediastinal lymph nodes, feeding tube placement with who was referred to Palliative Medicine by Dr. Wang for management of symptoms and psychosocial support.  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  Has excellent support from his peers and family.  He enjoys traveling, has traveled to all [address] [place] [address].    Current treatment- 5FU + Nivolimab plan to change to  Due to progression of disease and recent scans.    Initial Referral Intake note reviewed   PALLIATIVE DIAGNOSES:      Diagnosis Orders   1. GE junction carcinoma (HCC)        2. Intractable nausea and vomiting        3. Sialorrhea        4. Functional diarrhea               PLAN:     Patient Instructions   Dear Cyril Gregg ,    It was a pleasure seeing you today    We will see you again in   weeks    If labs or imaging tests have been ordered for you today, please call the office  at [phone] 48 hours after completion to obtain the results.        This is the plan we talked about:    Severe reflux, inability to swallow even saliva, feeding difficulty due to the same  -We talked about all of his symptoms in detail today and it is more than likely that you have an upper

## 2024-03-21 NOTE — TELEPHONE ENCOUNTER
[facility] [place] at [facility]   [address] [place] [address]   W: [phone]  F: [phone]    Medical Nutrition Therapy  Nutrition Encounter:     Called and left a message on both patient and wife's phone.      Addendum: Patients wife called back.  She reports he is tolerating the gravity feeds much better.  They have started doing 1/2 jevity and two judie. He will continue with this and progress to only TwoCal HN.      Update from yesterday:   He found the TwoCal was causing gas/bloating and running to the toilet. He then tried 1 can Jevity 1.5 and 1 can TwoCal HN, no improvement. Constantly nauseous.  Using the plunger with feeds. Each carton taking 5-6 mins. Giving water flushes with liquid IV, concerned about sugar content. BG elevated, going to see endo.    Plan:  - Trial of gravity bags, to  at office.  - Next feed, do 1 carton of Jevity, then the following feeds do 1 can Jevity and 1 TwoCal.    Will follow up tomorrow.          Diagnosis: Metastatic GEJ carcinoma   Diabetic   Barium swallow esophogram on 5/12/23 showed:  There is narrowing of the distal esophagus just above the hernia which demonstrates relatively smooth margins and may represent a benign stricture. 12 mm barium tablet was retained in this region.  PET scan showed GE junction mass.   Treatment course: 5FU + nivolumab     Ht Readings from Last 1 Encounters:   03/20/24 1.778 m (5' 10\")       Wt Readings from Last 5 Encounters:   03/20/24 92.4 kg (203 lb 9.6 oz)   03/13/24 93.4 kg (205 lb 12.8 oz)   03/13/24 93.4 kg (205 lb 14.6 oz)   02/28/24 97.4 kg (214 lb 11.2 oz)   02/28/24 97.4 kg (214 lb 11.7 oz)       Estimated Nutrition Needs:   Calorie Range: 2400-2875kcal/day      Protein Range: 95-114g/day     Fluid Needs: 2000ml      Signed By: EDWIGE BUNCH, RD    [phone]

## 2024-03-21 NOTE — PROGRESS NOTES
Palliative Medicine Outpatient Services  [place]: [phone]    Patient name: Cyril Gregg  YOB: 1960      Date of current visit: 3/21/24  Location of current visit:  [] [facility] office  [] [facility] office  [x] [facility] office  [] Synchronous real-time A/V virtual visit      Narrative:   This  met with Mr. Cyril Gregg and his wife Raisa along with Dr. Sánchez for his new patient appointment.  This  remained in the appointment with the entirety of the visit to introduce the  role in the palliative medicine office and offer emotional support.    Mr. Gregg was appropriately emotional throughout the appointment.  Not being able to take anything by mouth has caused extreme distress.  He is coping the best he can currently.  He has a very supportive wife and adult children.    This  is available as needed for emotional support/resources/supportive counseling.        ANTONIO Hayden [facility] Palliative outpatient   [phone]

## 2024-03-22 ENCOUNTER — PATIENT MESSAGE (OUTPATIENT)
Age: 64
End: 2024-03-22

## 2024-03-22 DIAGNOSIS — C16.0 GE JUNCTION CARCINOMA (HCC): Primary | ICD-10-CM

## 2024-03-26 RX ORDER — ONDANSETRON HYDROCHLORIDE 4 MG/5ML
8 SOLUTION ORAL EVERY 8 HOURS PRN
Qty: 90 ML | Refills: 1 | Status: SHIPPED | OUTPATIENT
Start: 2024-03-26

## 2024-03-26 NOTE — PROGRESS NOTES
[facility] [place] at [facility]   [address] [place] [address]   W: [phone]  F: [phone]    Medical Nutrition Therapy  Nutrition Encounter:     Received a message from patients wife, he is doing well with gravity bags.   He no longer is having nausea/vomiting with tube feeds due to intolerance with bolus feeds.  Doing well with gravity feeds.      Order for tube feeds:  TwoCal HN 5.5 cans daily via gravity bag.  Give 2 cans, 2 cans and 1.5 cans during the day.  Flush with 180ml before and after doing gravity feeds.      Diagnosis: Metastatic GEJ carcinoma   Diabetic   Barium swallow esophogram on 5/12/23 showed:  There is narrowing of the distal esophagus just above the hernia which demonstrates relatively smooth margins and may represent a benign stricture. 12 mm barium tablet was retained in this region.  PET scan showed GE junction mass.   Treatment course: 5FU + nivolumab     Ht Readings from Last 1 Encounters:   03/21/24 1.778 m (5' 10\")       Wt Readings from Last 5 Encounters:   03/21/24 92.1 kg (203 lb)   03/20/24 92.4 kg (203 lb 9.6 oz)   03/13/24 93.4 kg (205 lb 12.8 oz)   03/13/24 93.4 kg (205 lb 14.6 oz)   02/28/24 97.4 kg (214 lb 11.2 oz)       Estimated Nutrition Needs:   Calorie Range: 2400-2875kcal/day      Protein Range: 95-114g/day     Fluid Needs: 2000ml      Signed By: EDWIGE BUCNH, RD    [phone]

## 2024-03-26 NOTE — PATIENT INSTRUCTIONS
Dear Cyril Gregg ,    It was a pleasure seeing you today    We will see you again in   weeks    If labs or imaging tests have been ordered for you today, please call the office  at [phone] 48 hours after completion to obtain the results.        This is the plan we talked about:    Severe reflux, inability to swallow even saliva, feeding difficulty due to the same  -We talked about all of his symptoms in detail today and it is more than likely that you have an upper esophageal narrowing because of the mediastinal lymphadenopathy in addition to the GE junction tumor that is causing narrowing of his esophagus.  This makes sense in terms of not being able to even swallow your saliva.  -Our hope is that treatment will help shrink the lymph nodes within your treatment.  -In the meantime, let us try scopolamine patch, this will help dry up your mouth so at least you will not feel like you are choking on your own saliva at nighttime.  -We talked about elevating the head of your bed a little bit more to help sleep better and not feel like you are choking on your saliva.    Feeding difficulties, functional diarrhea  -You are working closely with Jaclyn Tinsley, our dietitian in identifying the right tube feeds for you.    Low threshold for dehydration  -We talked about this at length.  You feel very poorly when you have severe diarrhea and it is very important to identify this early so we can help you with IV fluids.  You received IV fluids yesterday after feeling very poorly and this was very helpful.  You can see a night and a change in how you feel.  We will schedule you to receive IV fluids on the weeks you are off of chemotherapy which will be every 3 weeks from now on.  I will also give you IV Pepcid and IV Zofran which will help with decreasing your acid and help with nausea.    Disease understanding and coping  -You and your family have very good understanding of your medical condition.  -You have excellent

## 2024-03-27 ENCOUNTER — CLINICAL DOCUMENTATION (OUTPATIENT)
Age: 64
End: 2024-03-27

## 2024-03-27 ENCOUNTER — OFFICE VISIT (OUTPATIENT)
Age: 64
End: 2024-03-27
Payer: COMMERCIAL

## 2024-03-27 ENCOUNTER — HOSPITAL ENCOUNTER (OUTPATIENT)
Facility: HOSPITAL | Age: 64
Setting detail: INFUSION SERIES
Discharge: HOME | End: 2024-03-27
Payer: COMMERCIAL

## 2024-03-27 VITALS
BODY MASS INDEX: 29.07 KG/M2 | SYSTOLIC BLOOD PRESSURE: 130 MMHG | WEIGHT: 203.04 LBS | OXYGEN SATURATION: 95 % | HEART RATE: 91 BPM | TEMPERATURE: 98 F | DIASTOLIC BLOOD PRESSURE: 77 MMHG | HEIGHT: 70 IN

## 2024-03-27 VITALS
HEIGHT: 70 IN | DIASTOLIC BLOOD PRESSURE: 84 MMHG | OXYGEN SATURATION: 93 % | TEMPERATURE: 98 F | HEART RATE: 75 BPM | WEIGHT: 203 LBS | RESPIRATION RATE: 18 BRPM | BODY MASS INDEX: 29.06 KG/M2 | SYSTOLIC BLOOD PRESSURE: 127 MMHG

## 2024-03-27 DIAGNOSIS — Z51.12 ENCOUNTER FOR ANTINEOPLASTIC CHEMOTHERAPY AND IMMUNOTHERAPY: ICD-10-CM

## 2024-03-27 DIAGNOSIS — Z51.11 ENCOUNTER FOR ANTINEOPLASTIC CHEMOTHERAPY AND IMMUNOTHERAPY: ICD-10-CM

## 2024-03-27 DIAGNOSIS — C16.0 GE JUNCTION CARCINOMA (HCC): Primary | ICD-10-CM

## 2024-03-27 DIAGNOSIS — J69.0 ASPIRATION PNEUMONIA, UNSPECIFIED ASPIRATION PNEUMONIA TYPE, UNSPECIFIED LATERALITY, UNSPECIFIED PART OF LUNG (HCC): ICD-10-CM

## 2024-03-27 DIAGNOSIS — E11.9 DIABETES MELLITUS TYPE 2, NONINSULIN DEPENDENT (HCC): ICD-10-CM

## 2024-03-27 LAB
ALBUMIN SERPL-MCNC: 3.2 G/DL (ref 3.5–5)
ALBUMIN/GLOB SERPL: 0.8 (ref 1.1–2.2)
ALP SERPL-CCNC: 155 U/L (ref 45–117)
ALT SERPL-CCNC: 19 U/L (ref 12–78)
ANION GAP SERPL CALC-SCNC: 7 MMOL/L (ref 5–15)
APPEARANCE UR: CLEAR
AST SERPL-CCNC: 10 U/L (ref 15–37)
BACTERIA URNS QL MICRO: NEGATIVE /HPF
BASOPHILS # BLD: 0.1 K/UL (ref 0–0.1)
BASOPHILS NFR BLD: 1 % (ref 0–1)
BILIRUB SERPL-MCNC: 0.7 MG/DL (ref 0.2–1)
BILIRUB UR QL: NEGATIVE
BUN SERPL-MCNC: 33 MG/DL (ref 6–20)
BUN/CREAT SERPL: 31 (ref 12–20)
CALCIUM SERPL-MCNC: 9.8 MG/DL (ref 8.5–10.1)
CHLORIDE SERPL-SCNC: 100 MMOL/L (ref 97–108)
CO2 SERPL-SCNC: 28 MMOL/L (ref 21–32)
COLOR UR: ABNORMAL
CREAT SERPL-MCNC: 1.05 MG/DL (ref 0.7–1.3)
DIFFERENTIAL METHOD BLD: ABNORMAL
EOSINOPHIL # BLD: 0.2 K/UL (ref 0–0.4)
EOSINOPHIL NFR BLD: 2 % (ref 0–7)
EPITH CASTS URNS QL MICRO: ABNORMAL /LPF
ERYTHROCYTE [DISTWIDTH] IN BLOOD BY AUTOMATED COUNT: 12.8 % (ref 11.5–14.5)
GLOBULIN SER CALC-MCNC: 4 G/DL (ref 2–4)
GLUCOSE SERPL-MCNC: 374 MG/DL (ref 65–100)
GLUCOSE UR STRIP.AUTO-MCNC: 250 MG/DL
HCT VFR BLD AUTO: 40.4 % (ref 36.6–50.3)
HGB BLD-MCNC: 14.4 G/DL (ref 12.1–17)
HGB UR QL STRIP: NEGATIVE
HYALINE CASTS URNS QL MICRO: ABNORMAL /LPF (ref 0–2)
IMM GRANULOCYTES # BLD AUTO: 0.1 K/UL (ref 0–0.04)
IMM GRANULOCYTES NFR BLD AUTO: 1 % (ref 0–0.5)
KETONES UR QL STRIP.AUTO: ABNORMAL MG/DL
LEUKOCYTE ESTERASE UR QL STRIP.AUTO: NEGATIVE
LYMPHOCYTES # BLD: 1.1 K/UL (ref 0.8–3.5)
LYMPHOCYTES NFR BLD: 10 % (ref 12–49)
MCH RBC QN AUTO: 33.9 PG (ref 26–34)
MCHC RBC AUTO-ENTMCNC: 35.6 G/DL (ref 30–36.5)
MCV RBC AUTO: 95.1 FL (ref 80–99)
MONOCYTES # BLD: 0.8 K/UL (ref 0–1)
MONOCYTES NFR BLD: 8 % (ref 5–13)
NEUTS SEG # BLD: 8.3 K/UL (ref 1.8–8)
NEUTS SEG NFR BLD: 78 % (ref 32–75)
NITRITE UR QL STRIP.AUTO: NEGATIVE
NRBC # BLD: 0 K/UL (ref 0–0.01)
NRBC BLD-RTO: 0 PER 100 WBC
PH UR STRIP: 5.5 (ref 5–8)
PLATELET # BLD AUTO: 250 K/UL (ref 150–400)
PMV BLD AUTO: 9.1 FL (ref 8.9–12.9)
POTASSIUM SERPL-SCNC: 4 MMOL/L (ref 3.5–5.1)
PROT SERPL-MCNC: 7.2 G/DL (ref 6.4–8.2)
PROT UR STRIP-MCNC: ABNORMAL MG/DL
RBC # BLD AUTO: 4.25 M/UL (ref 4.1–5.7)
RBC #/AREA URNS HPF: ABNORMAL /HPF (ref 0–5)
SODIUM SERPL-SCNC: 135 MMOL/L (ref 136–145)
SP GR UR REFRACTOMETRY: >1.03 (ref 1–1.03)
UROBILINOGEN UR QL STRIP.AUTO: 0.2 EU/DL (ref 0.2–1)
WBC # BLD AUTO: 10.6 K/UL (ref 4.1–11.1)
WBC URNS QL MICRO: ABNORMAL /HPF (ref 0–4)

## 2024-03-27 PROCEDURE — 1123F ACP DISCUSS/DSCN MKR DOCD: CPT | Performed by: INTERNAL MEDICINE

## 2024-03-27 PROCEDURE — 3078F DIAST BP <80 MM HG: CPT | Performed by: INTERNAL MEDICINE

## 2024-03-27 PROCEDURE — 85025 COMPLETE CBC W/AUTO DIFF WBC: CPT

## 2024-03-27 PROCEDURE — 2580000003 HC RX 258: Performed by: INTERNAL MEDICINE

## 2024-03-27 PROCEDURE — 99215 OFFICE O/P EST HI 40 MIN: CPT | Performed by: INTERNAL MEDICINE

## 2024-03-27 PROCEDURE — 81001 URINALYSIS AUTO W/SCOPE: CPT

## 2024-03-27 PROCEDURE — 2500000003 HC RX 250 WO HCPCS: Performed by: INTERNAL MEDICINE

## 2024-03-27 PROCEDURE — 96417 CHEMO IV INFUS EACH ADDL SEQ: CPT

## 2024-03-27 PROCEDURE — 36415 COLL VENOUS BLD VENIPUNCTURE: CPT

## 2024-03-27 PROCEDURE — 3075F SYST BP GE 130 - 139MM HG: CPT | Performed by: INTERNAL MEDICINE

## 2024-03-27 PROCEDURE — 96413 CHEMO IV INFUSION 1 HR: CPT

## 2024-03-27 PROCEDURE — 96375 TX/PRO/DX INJ NEW DRUG ADDON: CPT

## 2024-03-27 PROCEDURE — 80053 COMPREHEN METABOLIC PANEL: CPT

## 2024-03-27 PROCEDURE — 6360000002 HC RX W HCPCS: Performed by: INTERNAL MEDICINE

## 2024-03-27 PROCEDURE — A4216 STERILE WATER/SALINE, 10 ML: HCPCS | Performed by: INTERNAL MEDICINE

## 2024-03-27 PROCEDURE — 3044F HG A1C LEVEL LT 7.0%: CPT | Performed by: INTERNAL MEDICINE

## 2024-03-27 RX ORDER — DIPHENHYDRAMINE HYDROCHLORIDE 50 MG/ML
50 INJECTION INTRAMUSCULAR; INTRAVENOUS
OUTPATIENT
Start: 2024-04-04

## 2024-03-27 RX ORDER — ONDANSETRON 2 MG/ML
8 INJECTION INTRAMUSCULAR; INTRAVENOUS ONCE
OUTPATIENT
Start: 2024-04-10 | End: 2024-04-10

## 2024-03-27 RX ORDER — MEPERIDINE HYDROCHLORIDE 25 MG/ML
12.5 INJECTION INTRAMUSCULAR; INTRAVENOUS; SUBCUTANEOUS PRN
OUTPATIENT
Start: 2024-04-04

## 2024-03-27 RX ORDER — MEPERIDINE HYDROCHLORIDE 25 MG/ML
12.5 INJECTION INTRAMUSCULAR; INTRAVENOUS; SUBCUTANEOUS PRN
OUTPATIENT
Start: 2024-04-10

## 2024-03-27 RX ORDER — ALBUTEROL SULFATE 90 UG/1
4 AEROSOL, METERED RESPIRATORY (INHALATION) PRN
OUTPATIENT
Start: 2024-04-04

## 2024-03-27 RX ORDER — DEXAMETHASONE SODIUM PHOSPHATE 10 MG/ML
10 INJECTION, SOLUTION INTRAMUSCULAR; INTRAVENOUS ONCE
OUTPATIENT
Start: 2024-04-04 | End: 2024-04-03

## 2024-03-27 RX ORDER — ALBUTEROL SULFATE 90 UG/1
4 AEROSOL, METERED RESPIRATORY (INHALATION) PRN
OUTPATIENT
Start: 2024-04-10

## 2024-03-27 RX ORDER — ONDANSETRON 2 MG/ML
8 INJECTION INTRAMUSCULAR; INTRAVENOUS ONCE
Status: CANCELLED | OUTPATIENT
Start: 2024-03-27 | End: 2024-03-27

## 2024-03-27 RX ORDER — DIPHENHYDRAMINE HYDROCHLORIDE 50 MG/ML
50 INJECTION INTRAMUSCULAR; INTRAVENOUS ONCE
Status: CANCELLED | OUTPATIENT
Start: 2024-04-10 | End: 2024-04-10

## 2024-03-27 RX ORDER — EPINEPHRINE 1 MG/ML
0.3 INJECTION, SOLUTION, CONCENTRATE INTRAVENOUS PRN
Status: CANCELLED | OUTPATIENT
Start: 2024-03-27

## 2024-03-27 RX ORDER — DIPHENHYDRAMINE HYDROCHLORIDE 50 MG/ML
50 INJECTION INTRAMUSCULAR; INTRAVENOUS
Status: CANCELLED | OUTPATIENT
Start: 2024-03-27

## 2024-03-27 RX ORDER — ACETAMINOPHEN 325 MG/1
650 TABLET ORAL
OUTPATIENT
Start: 2024-04-04

## 2024-03-27 RX ORDER — DEXAMETHASONE SODIUM PHOSPHATE 10 MG/ML
10 INJECTION, SOLUTION INTRAMUSCULAR; INTRAVENOUS ONCE
Status: CANCELLED | OUTPATIENT
Start: 2024-03-27 | End: 2024-03-27

## 2024-03-27 RX ORDER — SODIUM CHLORIDE 9 MG/ML
5-250 INJECTION, SOLUTION INTRAVENOUS PRN
OUTPATIENT
Start: 2024-04-04

## 2024-03-27 RX ORDER — SODIUM CHLORIDE 9 MG/ML
5-250 INJECTION, SOLUTION INTRAVENOUS PRN
OUTPATIENT
Start: 2024-04-10

## 2024-03-27 RX ORDER — ONDANSETRON 2 MG/ML
8 INJECTION INTRAMUSCULAR; INTRAVENOUS
Status: CANCELLED | OUTPATIENT
Start: 2024-03-27

## 2024-03-27 RX ORDER — HEPARIN 100 UNIT/ML
500 SYRINGE INTRAVENOUS PRN
OUTPATIENT
Start: 2024-04-10

## 2024-03-27 RX ORDER — SODIUM CHLORIDE 9 MG/ML
INJECTION, SOLUTION INTRAVENOUS CONTINUOUS
OUTPATIENT
Start: 2024-04-10

## 2024-03-27 RX ORDER — DIPHENHYDRAMINE HYDROCHLORIDE 50 MG/ML
50 INJECTION INTRAMUSCULAR; INTRAVENOUS ONCE
Status: CANCELLED | OUTPATIENT
Start: 2024-04-04 | End: 2024-04-03

## 2024-03-27 RX ORDER — ONDANSETRON 2 MG/ML
8 INJECTION INTRAMUSCULAR; INTRAVENOUS
OUTPATIENT
Start: 2024-04-10

## 2024-03-27 RX ORDER — MEPERIDINE HYDROCHLORIDE 25 MG/ML
12.5 INJECTION INTRAMUSCULAR; INTRAVENOUS; SUBCUTANEOUS PRN
Status: CANCELLED | OUTPATIENT
Start: 2024-03-27

## 2024-03-27 RX ORDER — ONDANSETRON 2 MG/ML
8 INJECTION INTRAMUSCULAR; INTRAVENOUS ONCE
OUTPATIENT
Start: 2024-04-04 | End: 2024-04-03

## 2024-03-27 RX ORDER — SODIUM CHLORIDE 9 MG/ML
INJECTION, SOLUTION INTRAVENOUS CONTINUOUS
Status: DISCONTINUED | OUTPATIENT
Start: 2024-03-27 | End: 2024-03-28 | Stop reason: HOSPADM

## 2024-03-27 RX ORDER — EPINEPHRINE 1 MG/ML
0.3 INJECTION, SOLUTION, CONCENTRATE INTRAVENOUS PRN
OUTPATIENT
Start: 2024-04-10

## 2024-03-27 RX ORDER — DEXAMETHASONE SODIUM PHOSPHATE 10 MG/ML
10 INJECTION, SOLUTION INTRAMUSCULAR; INTRAVENOUS ONCE
OUTPATIENT
Start: 2024-04-10 | End: 2024-04-10

## 2024-03-27 RX ORDER — DIPHENHYDRAMINE HYDROCHLORIDE 50 MG/ML
50 INJECTION INTRAMUSCULAR; INTRAVENOUS
OUTPATIENT
Start: 2024-04-10

## 2024-03-27 RX ORDER — EPINEPHRINE 1 MG/ML
0.3 INJECTION, SOLUTION, CONCENTRATE INTRAVENOUS PRN
OUTPATIENT
Start: 2024-04-04

## 2024-03-27 RX ORDER — SODIUM CHLORIDE 9 MG/ML
INJECTION, SOLUTION INTRAVENOUS CONTINUOUS
Status: CANCELLED | OUTPATIENT
Start: 2024-03-27

## 2024-03-27 RX ORDER — DIPHENHYDRAMINE HYDROCHLORIDE 50 MG/ML
50 INJECTION INTRAMUSCULAR; INTRAVENOUS ONCE
Status: CANCELLED | OUTPATIENT
Start: 2024-03-27 | End: 2024-03-27

## 2024-03-27 RX ORDER — EPINEPHRINE 1 MG/ML
0.3 INJECTION, SOLUTION INTRAMUSCULAR; SUBCUTANEOUS PRN
Status: DISCONTINUED | OUTPATIENT
Start: 2024-03-27 | End: 2024-03-28 | Stop reason: HOSPADM

## 2024-03-27 RX ORDER — ALBUTEROL SULFATE 90 UG/1
4 AEROSOL, METERED RESPIRATORY (INHALATION) PRN
Status: DISCONTINUED | OUTPATIENT
Start: 2024-03-27 | End: 2024-03-28 | Stop reason: HOSPADM

## 2024-03-27 RX ORDER — DIPHENHYDRAMINE HYDROCHLORIDE 50 MG/ML
50 INJECTION INTRAMUSCULAR; INTRAVENOUS
Status: DISCONTINUED | OUTPATIENT
Start: 2024-03-27 | End: 2024-03-28 | Stop reason: HOSPADM

## 2024-03-27 RX ORDER — ONDANSETRON 2 MG/ML
8 INJECTION INTRAMUSCULAR; INTRAVENOUS
Status: DISCONTINUED | OUTPATIENT
Start: 2024-03-27 | End: 2024-03-28 | Stop reason: HOSPADM

## 2024-03-27 RX ORDER — ONDANSETRON 2 MG/ML
8 INJECTION INTRAMUSCULAR; INTRAVENOUS ONCE
Status: COMPLETED | OUTPATIENT
Start: 2024-03-27 | End: 2024-03-27

## 2024-03-27 RX ORDER — SODIUM CHLORIDE 0.9 % (FLUSH) 0.9 %
5-40 SYRINGE (ML) INJECTION PRN
OUTPATIENT
Start: 2024-04-10

## 2024-03-27 RX ORDER — SODIUM CHLORIDE 9 MG/ML
5-250 INJECTION, SOLUTION INTRAVENOUS PRN
Status: DISCONTINUED | OUTPATIENT
Start: 2024-03-27 | End: 2024-03-28 | Stop reason: HOSPADM

## 2024-03-27 RX ORDER — SODIUM CHLORIDE 0.9 % (FLUSH) 0.9 %
5-40 SYRINGE (ML) INJECTION PRN
OUTPATIENT
Start: 2024-04-04

## 2024-03-27 RX ORDER — HEPARIN 100 UNIT/ML
500 SYRINGE INTRAVENOUS PRN
Status: CANCELLED | OUTPATIENT
Start: 2024-03-27

## 2024-03-27 RX ORDER — SODIUM CHLORIDE 9 MG/ML
INJECTION, SOLUTION INTRAVENOUS CONTINUOUS
OUTPATIENT
Start: 2024-04-04

## 2024-03-27 RX ORDER — ACETAMINOPHEN 325 MG/1
650 TABLET ORAL
Status: DISCONTINUED | OUTPATIENT
Start: 2024-03-27 | End: 2024-03-28 | Stop reason: HOSPADM

## 2024-03-27 RX ORDER — SODIUM CHLORIDE 9 MG/ML
5-250 INJECTION, SOLUTION INTRAVENOUS PRN
Status: CANCELLED | OUTPATIENT
Start: 2024-03-27

## 2024-03-27 RX ORDER — HEPARIN 100 UNIT/ML
500 SYRINGE INTRAVENOUS PRN
OUTPATIENT
Start: 2024-04-04

## 2024-03-27 RX ORDER — SODIUM CHLORIDE 0.9 % (FLUSH) 0.9 %
5-40 SYRINGE (ML) INJECTION PRN
Status: CANCELLED | OUTPATIENT
Start: 2024-03-27

## 2024-03-27 RX ORDER — MEPERIDINE HYDROCHLORIDE 25 MG/ML
12.5 INJECTION INTRAMUSCULAR; INTRAVENOUS; SUBCUTANEOUS PRN
Status: DISCONTINUED | OUTPATIENT
Start: 2024-03-27 | End: 2024-03-28 | Stop reason: HOSPADM

## 2024-03-27 RX ORDER — ACETAMINOPHEN 325 MG/1
650 TABLET ORAL
OUTPATIENT
Start: 2024-04-10

## 2024-03-27 RX ORDER — ONDANSETRON 2 MG/ML
8 INJECTION INTRAMUSCULAR; INTRAVENOUS
OUTPATIENT
Start: 2024-04-04

## 2024-03-27 RX ORDER — ALBUTEROL SULFATE 90 UG/1
4 AEROSOL, METERED RESPIRATORY (INHALATION) PRN
Status: CANCELLED | OUTPATIENT
Start: 2024-03-27

## 2024-03-27 RX ORDER — SODIUM CHLORIDE 0.9 % (FLUSH) 0.9 %
5-40 SYRINGE (ML) INJECTION PRN
Status: DISCONTINUED | OUTPATIENT
Start: 2024-03-27 | End: 2024-03-28 | Stop reason: HOSPADM

## 2024-03-27 RX ORDER — DEXAMETHASONE SODIUM PHOSPHATE 10 MG/ML
10 INJECTION, SOLUTION INTRAMUSCULAR; INTRAVENOUS ONCE
Status: COMPLETED | OUTPATIENT
Start: 2024-03-27 | End: 2024-03-27

## 2024-03-27 RX ORDER — ACETAMINOPHEN 325 MG/1
650 TABLET ORAL
Status: CANCELLED | OUTPATIENT
Start: 2024-03-27

## 2024-03-27 RX ORDER — DIPHENHYDRAMINE HYDROCHLORIDE 50 MG/ML
50 INJECTION INTRAMUSCULAR; INTRAVENOUS ONCE
Status: COMPLETED | OUTPATIENT
Start: 2024-03-27 | End: 2024-03-27

## 2024-03-27 RX ADMIN — SODIUM CHLORIDE, PRESERVATIVE FREE 10 ML: 5 INJECTION INTRAVENOUS at 14:36

## 2024-03-27 RX ADMIN — DEXAMETHASONE SODIUM PHOSPHATE 10 MG: 10 INJECTION, SOLUTION INTRAMUSCULAR; INTRAVENOUS at 11:26

## 2024-03-27 RX ADMIN — PACLITAXEL 168 MG: 6 INJECTION, SOLUTION INTRAVENOUS at 13:24

## 2024-03-27 RX ADMIN — ONDANSETRON 8 MG: 2 INJECTION INTRAMUSCULAR; INTRAVENOUS at 11:22

## 2024-03-27 RX ADMIN — DIPHENHYDRAMINE HYDROCHLORIDE 50 MG: 50 INJECTION INTRAMUSCULAR; INTRAVENOUS at 11:21

## 2024-03-27 RX ADMIN — FAMOTIDINE 20 MG: 10 INJECTION, SOLUTION INTRAVENOUS at 11:24

## 2024-03-27 RX ADMIN — SODIUM CHLORIDE 737 MG: 9 INJECTION, SOLUTION INTRAVENOUS at 12:16

## 2024-03-27 RX ADMIN — SODIUM CHLORIDE 25 ML/HR: 9 INJECTION, SOLUTION INTRAVENOUS at 11:21

## 2024-03-27 NOTE — PROGRESS NOTES
Cyril Gregg is a 63 y.o. male here for new treatment for GE Junction cancer.  He is dealing with dry mouth. No other changes.    1. Have you been to the ER, urgent care clinic since your last visit?  Hospitalized since your last visit? no    2. Have you seen or consulted any other health care providers outside of the [facility] since your last visit?  Include any pap smears or colon screening. no  
hydronephrosis.  STOMACH: There is a new gastrostomy tube with its tip in the stomach. There is  pneumoperitoneum which is likely related to the recent gastrostomy tube  placement. Small locules of gas in the anterior abdominal wall are also  consistent with recent surgery..  SMALL BOWEL: No dilatation or wall thickening.  COLON: No dilatation or wall thickening.  APPENDIX: Not visualized.  PERITONEUM: No ascites or pneumoperitoneum.  RETROPERITONEUM: No lymphadenopathy or aortic aneurysm. Normal size lymph nodes  in the left side of the retroperitoneum inferior to the left renal vein has  decreased. Normal size lymph nodes adjacent to the distal abdominal aorta are  unchanged. No enlarged lymph nodes are noted.  REPRODUCTIVE ORGANS: Unremarkable  URINARY BLADDER: No mass or calculus.  BONES: No destructive bone lesion. Left hip prosthesis causes streak artifact on  images of the pelvis.. The previously described sclerosis in the anterior aspect  of the right seventh rib appears to have resolved on today's examination.  ADDITIONAL COMMENTS: Small right inguinal hernia now has a minimal amount of  fluid within the which has increased when compared with 11/17/2023 but less than  9/8/2023.    Impression  1. Significant increase in circumferential thickening of the wall of the distal  esophagus when compared with the previous examination.. This is concerning for  increasing tumor involvement.  2. Post gastrostomy tube placement. There is pneumoperitoneum present consistent  with recent postoperative state.  3. New small clustered nodular opacities and tree-in-bud opacities posteriorly  in each lower lobe right greater than left is mild but new in the interval. This  is consistent with aspiration. This would also be consistent with  bronchitis/bronchiolitis. The distribution of the disease would favor mild  aspiration.  4. No adenopathy is detected.    I personally reviewed the images. Continued good response to therapy.

## 2024-03-27 NOTE — PROGRESS NOTES
[facility] Visit Note    Pt arrived to [facility] ambulatory in no acute distress for Cyramzi/Taxol C1D1.  Assessment completed - pt continues to have difficulty swallowing, SOB and nausea; however, tube feedings are better tolerated now.  R chest port accessed without issue and positive blood return noted.  Labs obtained: CBC, CMP and urinalysis.    Recent Results (from the past 12 hour(s))   CBC with Auto Differential    Collection Time: 03/27/24  9:09 AM   Result Value Ref Range    WBC 10.6 4.1 - 11.1 K/uL    RBC 4.25 4.10 - 5.70 M/uL    Hemoglobin 14.4 12.1 - 17.0 g/dL    Hematocrit 40.4 36.6 - 50.3 %    MCV 95.1 80.0 - 99.0 FL    MCH 33.9 26.0 - 34.0 PG    MCHC 35.6 30.0 - 36.5 g/dL    RDW 12.8 11.5 - 14.5 %    Platelets 250 150 - 400 K/uL    MPV 9.1 8.9 - 12.9 FL    Nucleated RBCs 0.0 0  WBC    nRBC 0.00 0.00 - 0.01 K/uL    Neutrophils % 78 (H) 32 - 75 %    Lymphocytes % 10 (L) 12 - 49 %    Monocytes % 8 5 - 13 %    Eosinophils % 2 0 - 7 %    Basophils % 1 0 - 1 %    Immature Granulocytes 1 (H) 0.0 - 0.5 %    Neutrophils Absolute 8.3 (H) 1.8 - 8.0 K/UL    Lymphocytes Absolute 1.1 0.8 - 3.5 K/UL    Monocytes Absolute 0.8 0.0 - 1.0 K/UL    Eosinophils Absolute 0.2 0.0 - 0.4 K/UL    Basophils Absolute 0.1 0.0 - 0.1 K/UL    Absolute Immature Granulocyte 0.1 (H) 0.00 - 0.04 K/UL    Differential Type AUTOMATED     Comprehensive Metabolic Panel    Collection Time: 03/27/24  9:09 AM   Result Value Ref Range    Sodium 135 (L) 136 - 145 mmol/L    Potassium 4.0 3.5 - 5.1 mmol/L    Chloride 100 97 - 108 mmol/L    CO2 28 21 - 32 mmol/L    Anion Gap 7 5 - 15 mmol/L    Glucose 374 (H) 65 - 100 mg/dL    BUN 33 (H) 6 - 20 MG/DL    Creatinine 1.05 0.70 - 1.30 MG/DL    Bun/Cre Ratio 31 (H) 12 - 20      Est, Glom Filt Rate 79 >60 ml/min/1.73m2    Calcium 9.8 8.5 - 10.1 MG/DL    Total Bilirubin 0.7 0.2 - 1.0 MG/DL    ALT 19 12 - 78 U/L    AST 10 (L) 15 - 37 U/L    Alk Phosphatase 155 (H) 45 - 117  [facility]   7/31/2024  9:00 AM [facility] [place] [facility]

## 2024-03-27 NOTE — PROGRESS NOTES
Chemotherapy/Immunotherapy education and consent discussed with the patient and the patient denied any questions or concerns. Pt signed consent.

## 2024-03-29 ENCOUNTER — TELEPHONE (OUTPATIENT)
Age: 64
End: 2024-03-29

## 2024-03-29 RX ORDER — DIPHENHYDRAMINE HYDROCHLORIDE 50 MG/ML
25 INJECTION INTRAMUSCULAR; INTRAVENOUS ONCE
Status: CANCELLED | OUTPATIENT
Start: 2024-04-04 | End: 2024-04-04

## 2024-03-29 NOTE — TELEPHONE ENCOUNTER
Pt called in very upset, stating the guardant test he had done.. sent him a bill for $8,000.    CB# [phone]

## 2024-04-01 ENCOUNTER — OFFICE VISIT (OUTPATIENT)
Age: 64
End: 2024-04-01
Payer: COMMERCIAL

## 2024-04-01 VITALS
DIASTOLIC BLOOD PRESSURE: 77 MMHG | HEIGHT: 70 IN | BODY MASS INDEX: 29.23 KG/M2 | WEIGHT: 204.2 LBS | HEART RATE: 97 BPM | SYSTOLIC BLOOD PRESSURE: 135 MMHG

## 2024-04-01 DIAGNOSIS — E11.9 TYPE 2 DIABETES MELLITUS WITHOUT COMPLICATION, WITHOUT LONG-TERM CURRENT USE OF INSULIN (HCC): Primary | ICD-10-CM

## 2024-04-01 DIAGNOSIS — C16.0 GE JUNCTION CARCINOMA (HCC): ICD-10-CM

## 2024-04-01 DIAGNOSIS — E11.9 TYPE 2 DIABETES MELLITUS WITHOUT COMPLICATION, WITHOUT LONG-TERM CURRENT USE OF INSULIN (HCC): ICD-10-CM

## 2024-04-01 PROCEDURE — 3078F DIAST BP <80 MM HG: CPT | Performed by: GENERAL ACUTE CARE HOSPITAL

## 2024-04-01 PROCEDURE — 1123F ACP DISCUSS/DSCN MKR DOCD: CPT | Performed by: GENERAL ACUTE CARE HOSPITAL

## 2024-04-01 PROCEDURE — 3075F SYST BP GE 130 - 139MM HG: CPT | Performed by: GENERAL ACUTE CARE HOSPITAL

## 2024-04-01 PROCEDURE — 3044F HG A1C LEVEL LT 7.0%: CPT | Performed by: GENERAL ACUTE CARE HOSPITAL

## 2024-04-01 PROCEDURE — 99204 OFFICE O/P NEW MOD 45 MIN: CPT | Performed by: GENERAL ACUTE CARE HOSPITAL

## 2024-04-01 NOTE — PROGRESS NOTES
[facility]  DR HEATH Gregg is a 63 y.o. male  has a past medical history of Arthritis, Cancer (HCC), Cervical myelopathy (HCC), Diabetes mellitus (HCC), Esophageal cancer (HCC), GERD (gastroesophageal reflux disease), High cholesterol, Hypertension, Prolonged emergence from general anesthesia, Prostate cancer (HCC), PUD (peptic ulcer disease), Sleep apnea, and Viral meningitis.       ASSESSMENT AND PLAN:     Type 2 diabetes Mellitus, well controlled on hemoglobin A1c   Complications: none  Discussed the details of diabetes mellitus including pathophysiology and diabetes care and importance of achieving target blood sugar numbers for a goal a1c of less than 7%    Has postprandial hyperglycemia, plan to have him take glipizide before meals and to take 20 minutes before taking the meal through G tube    Medications:  Take glipizide 20 minutes before each feeding as follows:  - Breakfast = 2 tabs   - Lunch = 1 tab  - Dinner = 1 tab    Check blood sugar 3 times per day  Completed Diabetes Mellitus education  Discussed hypoglycemia management  Annual Ophthalm, Regular foot self checks and regular dental care  Discussed important lifestyle aspects and importance of staying active    Mixed Hyperlipidemia  Not on statins, to send labs   No results found for: \"CHOL\", \"TRIG\", \"LDLCALC\", \"HDL\", \"CHOLHDLRATIO\"    Microalbumin: No results found for: \"MALBCR\" will obtain today  Blood pressure is well controlled today, cont current meds  On Aspirin: denies     -------------------------------------------------------------------------------------------------    Past Medical History:   Diagnosis Date    Arthritis     Cancer (HCC)     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

## 2024-04-01 NOTE — PATIENT INSTRUCTIONS
PLAN FOR TODAY    We will plan to make the following changes to your diabetes medications:  Take glipizide 20 minutes before each feeding as follows:  - Breakfast = 2 tabs   - Lunch = 1 tab  - Dinner = 1 tab    It will be important to continue checking your glucose just as you did previously.   I would like you at the very least to check you glucose during:    AM fasting before breakfast  Before Dinner   Bedtime   Any other time that you are not feeling well.     Always provide a glucose log that is completed at every visit so that we can review the results of your home glucose together. Without this, it is not possible to make accurate changes to your diabetes regimen.    MD [facility]       Diabetes Dental Care  When you have diabetes, managing blood sugar levels and taking good care of your teeth and gums are both important. When blood sugar levels are high, there's a greater risk for Gum (periodontal) disease. Tooth decay. Fungal infections in the mouth, like thrush. Dry mouth.   Keeping your blood sugar levels in your target range can help prevent problems with the teeth and gums. If you have any problems with your teeth or gums, it is important see your dentist.  How do you care for your teeth and gums when you have diabetes?  Brush your teeth twice a day.  Floss daily. Make sure to press the floss against your teeth and not your gums.  Check each day for areas where your gums might be red or painful. Be sure to let your dentist know of any sores in your mouth.  See your dentist regularly for professional cleaning of your teeth and to look for gum problems. Many dentists recommend getting checkups twice a year. Remind your dentist that you have diabetes before any work is done.  Don't smoke or use smokeless tobacco.    ------------------------------------------------------------------------------      Diabetes Foot Care    When you have diabetes, your feet need extra care

## 2024-04-02 ENCOUNTER — TELEPHONE (OUTPATIENT)
Age: 64
End: 2024-04-02

## 2024-04-02 LAB
CREAT UR-MCNC: 205 MG/DL
MICROALBUMIN UR-MCNC: 3.55 MG/DL
MICROALBUMIN/CREAT UR-RTO: 17 MG/G (ref 0–30)

## 2024-04-02 NOTE — TELEPHONE ENCOUNTER
[facility] at [facility]   [address] [place] [address]   W: [phone]  F: [phone]    Medical Nutrition Therapy  Nutrition Encounter:     Called and left a message on both patient and wife's phone.          Diagnosis: Metastatic GEJ carcinoma   Diabetic   Barium swallow esophogram on 5/12/23 showed:  There is narrowing of the distal esophagus just above the hernia which demonstrates relatively smooth margins and may represent a benign stricture. 12 mm barium tablet was retained in this region.  PET scan showed GE junction mass.   Treatment course: 5FU + nivolumab     Ht Readings from Last 1 Encounters:   04/01/24 1.778 m (5' 10\")       Wt Readings from Last 5 Encounters:   04/01/24 92.6 kg (204 lb 3.2 oz)   03/27/24 92.1 kg (203 lb)   03/27/24 92.1 kg (203 lb 0.7 oz)   03/21/24 92.1 kg (203 lb)   03/20/24 92.4 kg (203 lb 9.6 oz)       Estimated Nutrition Needs:   Calorie Range: 2400-2875kcal/day      Protein Range: 95-114g/day     Fluid Needs: 2000ml      Signed By: EDWIGE BUNCH, RD    [phone]

## 2024-04-03 PROBLEM — E11.9 TYPE 2 DIABETES MELLITUS WITHOUT COMPLICATION, WITHOUT LONG-TERM CURRENT USE OF INSULIN (HCC): Status: ACTIVE | Noted: 2024-04-03

## 2024-04-03 RX ORDER — GLIPIZIDE 5 MG/1
TABLET ORAL
Qty: 120 TABLET | Refills: 3 | Status: SHIPPED | OUTPATIENT
Start: 2024-04-03

## 2024-04-04 ENCOUNTER — HOSPITAL ENCOUNTER (OUTPATIENT)
Facility: HOSPITAL | Age: 64
Setting detail: INFUSION SERIES
Discharge: HOME | End: 2024-04-04
Payer: COMMERCIAL

## 2024-04-04 VITALS
OXYGEN SATURATION: 98 % | HEART RATE: 87 BPM | DIASTOLIC BLOOD PRESSURE: 90 MMHG | RESPIRATION RATE: 18 BRPM | SYSTOLIC BLOOD PRESSURE: 152 MMHG | TEMPERATURE: 97.7 F

## 2024-04-04 DIAGNOSIS — C16.0 GE JUNCTION CARCINOMA (HCC): Primary | ICD-10-CM

## 2024-04-04 LAB
ALBUMIN SERPL-MCNC: 3 G/DL (ref 3.5–5)
ALBUMIN/GLOB SERPL: 0.7 (ref 1.1–2.2)
ALP SERPL-CCNC: 142 U/L (ref 45–117)
ALT SERPL-CCNC: 24 U/L (ref 12–78)
ANION GAP SERPL CALC-SCNC: 6 MMOL/L (ref 5–15)
AST SERPL-CCNC: 16 U/L (ref 15–37)
BASOPHILS # BLD: 0.1 K/UL (ref 0–0.1)
BASOPHILS NFR BLD: 1 % (ref 0–1)
BILIRUB SERPL-MCNC: 0.6 MG/DL (ref 0.2–1)
BUN SERPL-MCNC: 23 MG/DL (ref 6–20)
BUN/CREAT SERPL: 25 (ref 12–20)
CALCIUM SERPL-MCNC: 9 MG/DL (ref 8.5–10.1)
CHLORIDE SERPL-SCNC: 102 MMOL/L (ref 97–108)
CO2 SERPL-SCNC: 26 MMOL/L (ref 21–32)
CREAT SERPL-MCNC: 0.93 MG/DL (ref 0.7–1.3)
DIFFERENTIAL METHOD BLD: ABNORMAL
EOSINOPHIL # BLD: 0.2 K/UL (ref 0–0.4)
EOSINOPHIL NFR BLD: 3 % (ref 0–7)
ERYTHROCYTE [DISTWIDTH] IN BLOOD BY AUTOMATED COUNT: 12.6 % (ref 11.5–14.5)
GLOBULIN SER CALC-MCNC: 4.3 G/DL (ref 2–4)
GLUCOSE SERPL-MCNC: 330 MG/DL (ref 65–100)
HCT VFR BLD AUTO: 40.4 % (ref 36.6–50.3)
HGB BLD-MCNC: 14.3 G/DL (ref 12.1–17)
IMM GRANULOCYTES # BLD AUTO: 0.1 K/UL (ref 0–0.04)
IMM GRANULOCYTES NFR BLD AUTO: 1 % (ref 0–0.5)
LYMPHOCYTES # BLD: 1.2 K/UL (ref 0.8–3.5)
LYMPHOCYTES NFR BLD: 23 % (ref 12–49)
MCH RBC QN AUTO: 33.3 PG (ref 26–34)
MCHC RBC AUTO-ENTMCNC: 35.4 G/DL (ref 30–36.5)
MCV RBC AUTO: 94.2 FL (ref 80–99)
MONOCYTES # BLD: 0.6 K/UL (ref 0–1)
MONOCYTES NFR BLD: 11 % (ref 5–13)
NEUTS SEG # BLD: 3.3 K/UL (ref 1.8–8)
NEUTS SEG NFR BLD: 61 % (ref 32–75)
NRBC # BLD: 0 K/UL (ref 0–0.01)
NRBC BLD-RTO: 0 PER 100 WBC
PLATELET # BLD AUTO: 295 K/UL (ref 150–400)
PMV BLD AUTO: 9.4 FL (ref 8.9–12.9)
POTASSIUM SERPL-SCNC: 4.2 MMOL/L (ref 3.5–5.1)
PROT SERPL-MCNC: 7.3 G/DL (ref 6.4–8.2)
RBC # BLD AUTO: 4.29 M/UL (ref 4.1–5.7)
SODIUM SERPL-SCNC: 134 MMOL/L (ref 136–145)
WBC # BLD AUTO: 5.4 K/UL (ref 4.1–11.1)

## 2024-04-04 PROCEDURE — 80053 COMPREHEN METABOLIC PANEL: CPT

## 2024-04-04 PROCEDURE — 36415 COLL VENOUS BLD VENIPUNCTURE: CPT

## 2024-04-04 PROCEDURE — 96375 TX/PRO/DX INJ NEW DRUG ADDON: CPT

## 2024-04-04 PROCEDURE — 85025 COMPLETE CBC W/AUTO DIFF WBC: CPT

## 2024-04-04 PROCEDURE — 96413 CHEMO IV INFUSION 1 HR: CPT

## 2024-04-04 PROCEDURE — 2500000003 HC RX 250 WO HCPCS: Performed by: INTERNAL MEDICINE

## 2024-04-04 PROCEDURE — 2580000003 HC RX 258: Performed by: INTERNAL MEDICINE

## 2024-04-04 PROCEDURE — A4216 STERILE WATER/SALINE, 10 ML: HCPCS | Performed by: INTERNAL MEDICINE

## 2024-04-04 PROCEDURE — 6360000002 HC RX W HCPCS: Performed by: INTERNAL MEDICINE

## 2024-04-04 RX ORDER — SODIUM CHLORIDE 9 MG/ML
5-250 INJECTION, SOLUTION INTRAVENOUS PRN
Status: DISCONTINUED | OUTPATIENT
Start: 2024-04-04 | End: 2024-04-05 | Stop reason: HOSPADM

## 2024-04-04 RX ORDER — SODIUM CHLORIDE 0.9 % (FLUSH) 0.9 %
5-40 SYRINGE (ML) INJECTION PRN
Status: DISCONTINUED | OUTPATIENT
Start: 2024-04-04 | End: 2024-04-05 | Stop reason: HOSPADM

## 2024-04-04 RX ORDER — ONDANSETRON 2 MG/ML
8 INJECTION INTRAMUSCULAR; INTRAVENOUS ONCE
Status: COMPLETED | OUTPATIENT
Start: 2024-04-04 | End: 2024-04-04

## 2024-04-04 RX ORDER — DEXAMETHASONE SODIUM PHOSPHATE 10 MG/ML
10 INJECTION, SOLUTION INTRAMUSCULAR; INTRAVENOUS ONCE
Status: COMPLETED | OUTPATIENT
Start: 2024-04-04 | End: 2024-04-04

## 2024-04-04 RX ORDER — DIPHENHYDRAMINE HYDROCHLORIDE 50 MG/ML
25 INJECTION INTRAMUSCULAR; INTRAVENOUS ONCE
Status: COMPLETED | OUTPATIENT
Start: 2024-04-04 | End: 2024-04-04

## 2024-04-04 RX ADMIN — DEXAMETHASONE SODIUM PHOSPHATE 10 MG: 10 INJECTION, SOLUTION INTRAMUSCULAR; INTRAVENOUS at 15:13

## 2024-04-04 RX ADMIN — ONDANSETRON 8 MG: 2 INJECTION INTRAMUSCULAR; INTRAVENOUS at 15:00

## 2024-04-04 RX ADMIN — PACLITAXEL 168 MG: 6 INJECTION, SOLUTION, CONCENTRATE INTRAVENOUS at 16:06

## 2024-04-04 RX ADMIN — DIPHENHYDRAMINE HYDROCHLORIDE 25 MG: 50 INJECTION INTRAMUSCULAR; INTRAVENOUS at 15:24

## 2024-04-04 RX ADMIN — SODIUM CHLORIDE 25 ML/HR: 9 INJECTION, SOLUTION INTRAVENOUS at 14:58

## 2024-04-04 RX ADMIN — FAMOTIDINE 20 MG: 10 INJECTION, SOLUTION INTRAVENOUS at 15:07

## 2024-04-04 NOTE — PROGRESS NOTES
Name: Cyril Gregg    MRN: [id number]         : 1960    Mr. Gregg arrived ambulatory and in no distress for C1D8 of Taxol Regimen. Assessment was completed. Pt complained of nausea, diarrhea, neuropathy in all extremities, fatigue and intermittently feeling flushed and hot. Right chest wall port accessed without difficulty. Labs drawn & sent for processing. Port flushed and alcohol cap applied.      Mr. Gregg's vitals were reviewed.  Patient Vitals for the past 24 hrs:   BP Temp Temp src Pulse Resp SpO2   24 1711 (!) 152/90 -- -- 87 -- 98 %   24 1330 115/76 97.7 °F (36.5 °C) Temporal 100 18 96 %       Lab results were obtained and reviewed.  Recent Results (from the past 12 hour(s))   CBC With Auto Differential    Collection Time: 24  1:42 PM   Result Value Ref Range    WBC 5.4 4.1 - 11.1 K/uL    RBC 4.29 4.10 - 5.70 M/uL    Hemoglobin 14.3 12.1 - 17.0 g/dL    Hematocrit 40.4 36.6 - 50.3 %    MCV 94.2 80.0 - 99.0 FL    MCH 33.3 26.0 - 34.0 PG    MCHC 35.4 30.0 - 36.5 g/dL    RDW 12.6 11.5 - 14.5 %    Platelets 295 150 - 400 K/uL    MPV 9.4 8.9 - 12.9 FL    Nucleated RBCs 0.0 0  WBC    nRBC 0.00 0.00 - 0.01 K/uL    Neutrophils % 61 32 - 75 %    Lymphocytes % 23 12 - 49 %    Monocytes % 11 5 - 13 %    Eosinophils % 3 0 - 7 %    Basophils % 1 0 - 1 %    Immature Granulocytes 1 (H) 0.0 - 0.5 %    Neutrophils Absolute 3.3 1.8 - 8.0 K/UL    Lymphocytes Absolute 1.2 0.8 - 3.5 K/UL    Monocytes Absolute 0.6 0.0 - 1.0 K/UL    Eosinophils Absolute 0.2 0.0 - 0.4 K/UL    Basophils Absolute 0.1 0.0 - 0.1 K/UL    Absolute Immature Granulocyte 0.1 (H) 0.00 - 0.04 K/UL    Differential Type AUTOMATED     Comprehensive metabolic panel    Collection Time: 24  1:42 PM   Result Value Ref Range    Sodium 134 (L) 136 - 145 mmol/L    Potassium 4.2 3.5 - 5.1 mmol/L    Chloride 102 97 - 108 mmol/L    CO2 26 21 - 32 mmol/L    Anion Gap 6 5 - 15 mmol/L    Glucose 330 (H) 65 - 100 mg/dL

## 2024-04-04 NOTE — PROGRESS NOTES
Cyril Gregg is a 63 y.o. male here for treatment for GE Junction cancer.  Sharp pain in lower right quadrant after feeding. Right side is swollen in groin area.   Pain goes away after using bathroom.  Needs refill on Liquid Lansoprazole.    1. Have you been to the ER, urgent care clinic since your last visit?  Hospitalized since your last visit? no    2. Have you seen or consulted any other health care providers outside of the [facility] since your last visit?  Include any pap smears or colon screening.  no

## 2024-04-05 ENCOUNTER — OFFICE VISIT (OUTPATIENT)
Age: 64
End: 2024-04-05

## 2024-04-05 ENCOUNTER — TELEPHONE (OUTPATIENT)
Age: 64
End: 2024-04-05

## 2024-04-05 VITALS
WEIGHT: 211 LBS | OXYGEN SATURATION: 96 % | HEART RATE: 100 BPM | BODY MASS INDEX: 30.21 KG/M2 | HEIGHT: 70 IN | DIASTOLIC BLOOD PRESSURE: 78 MMHG | TEMPERATURE: 97.3 F | SYSTOLIC BLOOD PRESSURE: 113 MMHG | RESPIRATION RATE: 20 BRPM

## 2024-04-05 DIAGNOSIS — Z48.89 POSTOPERATIVE VISIT: Primary | ICD-10-CM

## 2024-04-05 PROCEDURE — 99024 POSTOP FOLLOW-UP VISIT: CPT | Performed by: SURGERY

## 2024-04-05 ASSESSMENT — PATIENT HEALTH QUESTIONNAIRE - PHQ9
SUM OF ALL RESPONSES TO PHQ QUESTIONS 1-9: 0
SUM OF ALL RESPONSES TO PHQ QUESTIONS 1-9: 0
SUM OF ALL RESPONSES TO PHQ9 QUESTIONS 1 & 2: 0
1. LITTLE INTEREST OR PLEASURE IN DOING THINGS: NOT AT ALL
SUM OF ALL RESPONSES TO PHQ QUESTIONS 1-9: 0
SUM OF ALL RESPONSES TO PHQ QUESTIONS 1-9: 0
2. FEELING DOWN, DEPRESSED OR HOPELESS: NOT AT ALL

## 2024-04-05 NOTE — PROGRESS NOTES
Identified pt with two pt identifiers (name and ). Reviewed chart in preparation for visit and have obtained necessary documentation.    Cyril Gregg is a 63 y.o. male  Chief Complaint   Patient presents with    Follow-up     Eval G tube leaking     /78 (Site: Left Upper Arm, Position: Sitting, Cuff Size: Large Adult)   Pulse 100   Temp 97.3 °F (36.3 °C) (Temporal)   Resp 20   Ht 1.778 m (5' 10\")   Wt 95.7 kg (211 lb)   SpO2 96%   BMI 30.28 kg/m²     1. Have you been to the ER, urgent care clinic since your last visit?  Hospitalized since your last visit?no    2. Have you seen or consulted any other health care providers outside of the [facility] since your last visit?  Include any pap smears or colon screening. no

## 2024-04-05 NOTE — PROGRESS NOTES
Some leaking around the tube.  There were only 7 cc in the balloon.  I filled it to 20.  He knows to keep the flange at the 4 .  Told to call with any concerns.

## 2024-04-10 ENCOUNTER — OFFICE VISIT (OUTPATIENT)
Age: 64
End: 2024-04-10
Payer: COMMERCIAL

## 2024-04-10 ENCOUNTER — HOSPITAL ENCOUNTER (OUTPATIENT)
Facility: HOSPITAL | Age: 64
Setting detail: INFUSION SERIES
Discharge: HOME | End: 2024-04-10
Payer: COMMERCIAL

## 2024-04-10 ENCOUNTER — PATIENT MESSAGE (OUTPATIENT)
Age: 64
End: 2024-04-10

## 2024-04-10 VITALS
TEMPERATURE: 97.8 F | HEIGHT: 70 IN | HEART RATE: 93 BPM | OXYGEN SATURATION: 98 % | SYSTOLIC BLOOD PRESSURE: 121 MMHG | RESPIRATION RATE: 17 BRPM | BODY MASS INDEX: 29.73 KG/M2 | WEIGHT: 207.67 LBS | DIASTOLIC BLOOD PRESSURE: 74 MMHG

## 2024-04-10 VITALS
WEIGHT: 207.6 LBS | DIASTOLIC BLOOD PRESSURE: 98 MMHG | TEMPERATURE: 97.8 F | RESPIRATION RATE: 17 BRPM | SYSTOLIC BLOOD PRESSURE: 164 MMHG | HEIGHT: 70 IN | HEART RATE: 89 BPM | BODY MASS INDEX: 29.72 KG/M2 | OXYGEN SATURATION: 96 %

## 2024-04-10 DIAGNOSIS — C77.2 MALIGNANT NEOPLASM METASTATIC TO INTRA-ABDOMINAL LYMPH NODE (HCC): ICD-10-CM

## 2024-04-10 DIAGNOSIS — Z51.11 ENCOUNTER FOR ANTINEOPLASTIC CHEMOTHERAPY: ICD-10-CM

## 2024-04-10 DIAGNOSIS — C16.0 GE JUNCTION CARCINOMA (HCC): Primary | ICD-10-CM

## 2024-04-10 DIAGNOSIS — C16.0 GE JUNCTION CARCINOMA (HCC): ICD-10-CM

## 2024-04-10 LAB
ALBUMIN SERPL-MCNC: 3 G/DL (ref 3.5–5)
ALBUMIN/GLOB SERPL: 0.8 (ref 1.1–2.2)
ALP SERPL-CCNC: 126 U/L (ref 45–117)
ALT SERPL-CCNC: 26 U/L (ref 12–78)
ANION GAP SERPL CALC-SCNC: 6 MMOL/L (ref 5–15)
AST SERPL-CCNC: 15 U/L (ref 15–37)
BASOPHILS # BLD: 0.1 K/UL (ref 0–0.1)
BASOPHILS NFR BLD: 1 % (ref 0–1)
BILIRUB SERPL-MCNC: 0.6 MG/DL (ref 0.2–1)
BUN SERPL-MCNC: 22 MG/DL (ref 6–20)
BUN/CREAT SERPL: 26 (ref 12–20)
CALCIUM SERPL-MCNC: 9.2 MG/DL (ref 8.5–10.1)
CHLORIDE SERPL-SCNC: 104 MMOL/L (ref 97–108)
CO2 SERPL-SCNC: 27 MMOL/L (ref 21–32)
CREAT SERPL-MCNC: 0.84 MG/DL (ref 0.7–1.3)
DIFFERENTIAL METHOD BLD: ABNORMAL
EOSINOPHIL # BLD: 0.1 K/UL (ref 0–0.4)
EOSINOPHIL NFR BLD: 2 % (ref 0–7)
ERYTHROCYTE [DISTWIDTH] IN BLOOD BY AUTOMATED COUNT: 12.3 % (ref 11.5–14.5)
GLOBULIN SER CALC-MCNC: 3.7 G/DL (ref 2–4)
GLUCOSE SERPL-MCNC: 301 MG/DL (ref 65–100)
HCT VFR BLD AUTO: 36.7 % (ref 36.6–50.3)
HGB BLD-MCNC: 12.8 G/DL (ref 12.1–17)
IMM GRANULOCYTES # BLD AUTO: 0 K/UL (ref 0–0.04)
IMM GRANULOCYTES NFR BLD AUTO: 1 % (ref 0–0.5)
LYMPHOCYTES # BLD: 1 K/UL (ref 0.8–3.5)
LYMPHOCYTES NFR BLD: 25 % (ref 12–49)
MCH RBC QN AUTO: 34 PG (ref 26–34)
MCHC RBC AUTO-ENTMCNC: 34.9 G/DL (ref 30–36.5)
MCV RBC AUTO: 97.6 FL (ref 80–99)
MONOCYTES # BLD: 0.3 K/UL (ref 0–1)
MONOCYTES NFR BLD: 7 % (ref 5–13)
NEUTS SEG # BLD: 2.7 K/UL (ref 1.8–8)
NEUTS SEG NFR BLD: 64 % (ref 32–75)
NRBC # BLD: 0 K/UL (ref 0–0.01)
NRBC BLD-RTO: 0 PER 100 WBC
PLATELET # BLD AUTO: 251 K/UL (ref 150–400)
PMV BLD AUTO: 9.6 FL (ref 8.9–12.9)
POTASSIUM SERPL-SCNC: 4.3 MMOL/L (ref 3.5–5.1)
PROT SERPL-MCNC: 6.7 G/DL (ref 6.4–8.2)
RBC # BLD AUTO: 3.76 M/UL (ref 4.1–5.7)
SODIUM SERPL-SCNC: 137 MMOL/L (ref 136–145)
WBC # BLD AUTO: 4.2 K/UL (ref 4.1–11.1)

## 2024-04-10 PROCEDURE — 96413 CHEMO IV INFUSION 1 HR: CPT

## 2024-04-10 PROCEDURE — 96375 TX/PRO/DX INJ NEW DRUG ADDON: CPT

## 2024-04-10 PROCEDURE — 2500000003 HC RX 250 WO HCPCS: Performed by: INTERNAL MEDICINE

## 2024-04-10 PROCEDURE — 3074F SYST BP LT 130 MM HG: CPT | Performed by: INTERNAL MEDICINE

## 2024-04-10 PROCEDURE — 36415 COLL VENOUS BLD VENIPUNCTURE: CPT

## 2024-04-10 PROCEDURE — 2580000003 HC RX 258: Performed by: INTERNAL MEDICINE

## 2024-04-10 PROCEDURE — 96417 CHEMO IV INFUS EACH ADDL SEQ: CPT

## 2024-04-10 PROCEDURE — 3078F DIAST BP <80 MM HG: CPT | Performed by: INTERNAL MEDICINE

## 2024-04-10 PROCEDURE — 99215 OFFICE O/P EST HI 40 MIN: CPT | Performed by: INTERNAL MEDICINE

## 2024-04-10 PROCEDURE — 1123F ACP DISCUSS/DSCN MKR DOCD: CPT | Performed by: INTERNAL MEDICINE

## 2024-04-10 PROCEDURE — 6360000002 HC RX W HCPCS: Performed by: INTERNAL MEDICINE

## 2024-04-10 PROCEDURE — 80053 COMPREHEN METABOLIC PANEL: CPT

## 2024-04-10 PROCEDURE — A4216 STERILE WATER/SALINE, 10 ML: HCPCS | Performed by: INTERNAL MEDICINE

## 2024-04-10 PROCEDURE — 85025 COMPLETE CBC W/AUTO DIFF WBC: CPT

## 2024-04-10 RX ORDER — SODIUM CHLORIDE 9 MG/ML
5-250 INJECTION, SOLUTION INTRAVENOUS PRN
Status: DISCONTINUED | OUTPATIENT
Start: 2024-04-10 | End: 2024-04-11 | Stop reason: HOSPADM

## 2024-04-10 RX ORDER — SODIUM CHLORIDE 0.9 % (FLUSH) 0.9 %
5-40 SYRINGE (ML) INJECTION PRN
Status: DISCONTINUED | OUTPATIENT
Start: 2024-04-10 | End: 2024-04-11 | Stop reason: HOSPADM

## 2024-04-10 RX ORDER — ONDANSETRON 2 MG/ML
8 INJECTION INTRAMUSCULAR; INTRAVENOUS ONCE
Status: COMPLETED | OUTPATIENT
Start: 2024-04-10 | End: 2024-04-10

## 2024-04-10 RX ORDER — DIPHENHYDRAMINE HYDROCHLORIDE 50 MG/ML
25 INJECTION INTRAMUSCULAR; INTRAVENOUS ONCE
Status: COMPLETED | OUTPATIENT
Start: 2024-04-10 | End: 2024-04-10

## 2024-04-10 RX ORDER — DEXAMETHASONE SODIUM PHOSPHATE 10 MG/ML
10 INJECTION, SOLUTION INTRAMUSCULAR; INTRAVENOUS ONCE
Status: COMPLETED | OUTPATIENT
Start: 2024-04-10 | End: 2024-04-10

## 2024-04-10 RX ADMIN — DIPHENHYDRAMINE HYDROCHLORIDE 25 MG: 50 INJECTION, SOLUTION INTRAMUSCULAR; INTRAVENOUS at 10:45

## 2024-04-10 RX ADMIN — SODIUM CHLORIDE 25 ML/HR: 9 INJECTION, SOLUTION INTRAVENOUS at 12:36

## 2024-04-10 RX ADMIN — ONDANSETRON 8 MG: 2 INJECTION INTRAMUSCULAR; INTRAVENOUS at 10:43

## 2024-04-10 RX ADMIN — PACLITAXEL 168 MG: 6 INJECTION, SOLUTION INTRAVENOUS at 12:39

## 2024-04-10 RX ADMIN — SODIUM CHLORIDE 25 ML/HR: 9 INJECTION, SOLUTION INTRAVENOUS at 10:40

## 2024-04-10 RX ADMIN — DEXAMETHASONE SODIUM PHOSPHATE 10 MG: 10 INJECTION INTRAMUSCULAR; INTRAVENOUS at 10:41

## 2024-04-10 RX ADMIN — SODIUM CHLORIDE 737 MG: 9 INJECTION, SOLUTION INTRAVENOUS at 11:22

## 2024-04-10 RX ADMIN — FAMOTIDINE 20 MG: 10 INJECTION, SOLUTION INTRAVENOUS at 10:40

## 2024-04-10 NOTE — PROGRESS NOTES
[facility] Visit Note    Pt arrived to [facility] ambulatory in no acute distress for C1D15 Cyramza/Taxol.  Assessment unremarkable except See FLOWSHEET.  R chest port accessed without issue and positive blood return noted.    Labs obtained, CBC and CMP.  Patient unable to provide urine sample and ok to treat obtained without urinalysis.    Recent Results (from the past 12 hour(s))   CBC with Auto Differential    Collection Time: 04/10/24  8:34 AM   Result Value Ref Range    WBC 4.2 4.1 - 11.1 K/uL    RBC 3.76 (L) 4.10 - 5.70 M/uL    Hemoglobin 12.8 12.1 - 17.0 g/dL    Hematocrit 36.7 36.6 - 50.3 %    MCV 97.6 80.0 - 99.0 FL    MCH 34.0 26.0 - 34.0 PG    MCHC 34.9 30.0 - 36.5 g/dL    RDW 12.3 11.5 - 14.5 %    Platelets 251 150 - 400 K/uL    MPV 9.6 8.9 - 12.9 FL    Nucleated RBCs 0.0 0  WBC    nRBC 0.00 0.00 - 0.01 K/uL    Neutrophils % 64 32 - 75 %    Lymphocytes % 25 12 - 49 %    Monocytes % 7 5 - 13 %    Eosinophils % 2 0 - 7 %    Basophils % 1 0 - 1 %    Immature Granulocytes % 1 (H) 0.0 - 0.5 %    Neutrophils Absolute 2.7 1.8 - 8.0 K/UL    Lymphocytes Absolute 1.0 0.8 - 3.5 K/UL    Monocytes Absolute 0.3 0.0 - 1.0 K/UL    Eosinophils Absolute 0.1 0.0 - 0.4 K/UL    Basophils Absolute 0.1 0.0 - 0.1 K/UL    Immature Granulocytes Absolute 0.0 0.00 - 0.04 K/UL    Differential Type AUTOMATED     Comprehensive Metabolic Panel    Collection Time: 04/10/24  8:34 AM   Result Value Ref Range    Sodium 137 136 - 145 mmol/L    Potassium 4.3 3.5 - 5.1 mmol/L    Chloride 104 97 - 108 mmol/L    CO2 27 21 - 32 mmol/L    Anion Gap 6 5 - 15 mmol/L    Glucose 301 (H) 65 - 100 mg/dL    BUN 22 (H) 6 - 20 MG/DL    Creatinine 0.84 0.70 - 1.30 MG/DL    Bun/Cre Ratio 26 (H) 12 - 20      Est, Glom Filt Rate >90 >60 ml/min/1.73m2    Calcium 9.2 8.5 - 10.1 MG/DL    Total Bilirubin 0.6 0.2 - 1.0 MG/DL    ALT 26 12 - 78 U/L    AST 15 15 - 37 U/L    Alk Phosphatase 126 (H) 45 - 117 U/L    Total Protein 6.7 6.4

## 2024-04-10 NOTE — PROGRESS NOTES
[facility] [place]  at [facility]  [address], [address]  [place] [address]  [phone]       Hematology Oncology Progress Note      Patient: Cyril Gregg MRN: [id number]  SSN: [id number]    YOB: 1960  Age: 63 y.o.  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     Treatment:     Palliative therapy  mFOLFOX + Nivolumab, Ox stopped with cycle 8  5-FU/Nivo, d/c'd due to progression  Cape/Nivolumab, 1 cycle - stopped due to dysphagia    Cyramza/Taxol, cycle 1 day 15    Subjective:      Cyril Gregg is a 63 y.o. male who I am seeing in consultation for a new diagnosis of metastatic GEJ carcinoma. He has been experiencing some difficulty in swallowing for a few months. He has lost over 40 lbs and he is fatigued. An EGD revealed GEJ mass which was biopsied and came back as GEJ carcinoma. CT and a subsequent PET shows enlarged left supraclavicular and RP efrem disease. He has seen Khloe De La Torre at [facility]. He decided to receive systemic therapy with us.     Interval History:     Mr. Gregg received mFOLFOX + Nivolumab for some time. I stopped Ox after 7th tt. I switched to Cape and he was unable to swallow the medicine. He is back on 5-FU + Nivolumab. He experienced disease progression and I have switched his treatment to Cyramza/Taxol. He has seen Palliative and symptoms are well controlled. He is doing well on current treatment. Some lower quadrant pain.         Review of Systems:  Constitutional: negative  Eyes: negative  Ears, Nose, Mouth, Throat, and Face: negative  Respiratory: negative  Cardiovascular: negative  Gastrointestinal: negative  Genitourinary:negative  Integument/Breast: rash in arms and trunk  Hematologic/Lymphatic: negative  Musculoskeletal: right rib pain  Neurological: negative    Review of systems was reviewed and updated as needed on

## 2024-04-11 DIAGNOSIS — C16.0 GE JUNCTION CARCINOMA (HCC): ICD-10-CM

## 2024-04-11 NOTE — TELEPHONE ENCOUNTER
Pt needed his prescription sent to [facility]    Approved per VORB from   Requested Prescriptions     Pending Prescriptions Disp Refills    lansoprazole (PREVACID) 3 mg/mL oral suspension 300 mL 5     Sig: Take 10 mLs by mouth every morning (before breakfast)       Called and spoke with Rae, pharmacist at Pharmacy and prevacid prescription with 5 refills.

## 2024-04-15 ENCOUNTER — OFFICE VISIT (OUTPATIENT)
Age: 64
End: 2024-04-15
Payer: COMMERCIAL

## 2024-04-15 ENCOUNTER — PREP FOR PROCEDURE (OUTPATIENT)
Age: 64
End: 2024-04-15

## 2024-04-15 VITALS
HEIGHT: 70 IN | BODY MASS INDEX: 29.78 KG/M2 | WEIGHT: 208 LBS | SYSTOLIC BLOOD PRESSURE: 125 MMHG | DIASTOLIC BLOOD PRESSURE: 81 MMHG | RESPIRATION RATE: 20 BRPM | HEART RATE: 83 BPM | OXYGEN SATURATION: 97 % | TEMPERATURE: 97.3 F

## 2024-04-15 DIAGNOSIS — K40.90 RIGHT INGUINAL HERNIA: ICD-10-CM

## 2024-04-15 DIAGNOSIS — R10.12 LEFT UPPER QUADRANT ABDOMINAL PAIN: Primary | ICD-10-CM

## 2024-04-15 PROCEDURE — 99212 OFFICE O/P EST SF 10 MIN: CPT | Performed by: SURGERY

## 2024-04-15 PROCEDURE — 1123F ACP DISCUSS/DSCN MKR DOCD: CPT | Performed by: SURGERY

## 2024-04-15 PROCEDURE — 3074F SYST BP LT 130 MM HG: CPT | Performed by: SURGERY

## 2024-04-15 PROCEDURE — 3079F DIAST BP 80-89 MM HG: CPT | Performed by: SURGERY

## 2024-04-15 ASSESSMENT — PATIENT HEALTH QUESTIONNAIRE - PHQ9
SUM OF ALL RESPONSES TO PHQ QUESTIONS 1-9: 0
SUM OF ALL RESPONSES TO PHQ QUESTIONS 1-9: 0
SUM OF ALL RESPONSES TO PHQ9 QUESTIONS 1 & 2: 0
2. FEELING DOWN, DEPRESSED OR HOPELESS: NOT AT ALL
1. LITTLE INTEREST OR PLEASURE IN DOING THINGS: NOT AT ALL
SUM OF ALL RESPONSES TO PHQ QUESTIONS 1-9: 0
SUM OF ALL RESPONSES TO PHQ QUESTIONS 1-9: 0

## 2024-04-15 NOTE — PROGRESS NOTES
Identified pt with two pt identifiers (name and ). Reviewed chart in preparation for visit and have obtained necessary documentation.    Cyril Gregg is a 63 y.o. male  Chief Complaint   Patient presents with    Follow-up     Possible hernia      /81 (Site: Right Upper Arm, Position: Sitting, Cuff Size: Large Adult)   Pulse 83   Temp 97.3 °F (36.3 °C) (Temporal)   Resp 20   Ht 1.778 m (5' 10\")   Wt 94.3 kg (208 lb)   SpO2 97%   BMI 29.84 kg/m²     1. Have you been to the ER, urgent care clinic since your last visit?  Hospitalized since your last visit?no    2. Have you seen or consulted any other health care providers outside of the [facility] since your last visit?  Include any pap smears or colon screening. no

## 2024-04-16 NOTE — PERIOP NOTE
[facility]  Preoperative Instructions        Surgery Date 4/18/24          Time of Arrival TBD    1. On the day of your surgery, please report to the Surgical Services Registration Desk and sign in at your designated time. The Surgery Center is located to the right of the Emergency Room.     2. You must have someone with you to drive you home. You should not drive a car for 24 hours following surgery. Please make arrangements for a friend or family member to stay with you for the first 24 hours after your surgery.    3. Do not have anything to eat or drink (including water, gum, mints, coffee, juice) after midnight 4/16/24.This may not apply to medications prescribed by your physician. ?(Please note below the special instructions with medications to take the morning of your procedure.)    4. We recommend you do not drink any alcoholic beverages for 24 hours before and after your surgery.    5. Contact your surgeon’s office for instructions on the following medications: non-steroidal anti-inflammatory drugs (i.e. Advil, Aleve), vitamins, and supplements. (Some surgeon’s will want you to stop these medications prior to surgery and others may allow you to take them)  **If you are currently taking Plavix, Coumadin, Aspirin and/or other blood-thinning agents, contact your surgeon for instructions.** Your surgeon will partner with the physician prescribing these medications to determine if it is safe to stop or if you need to continue taking.  Please do not stop taking these medications without instructions from your surgeon    6. Wear comfortable clothes.  Wear glasses instead of contacts.  Do not bring any money or jewelry. Please bring picture ID, insurance card, and any prearranged co-payment or hospital payment.  Do not wear make-up, particularly mascara the morning of your surgery.  Do not wear nail polish, particularly if you are having foot /hand surgery.  Wear your hair loose or down,  Conjunctivitis

## 2024-04-17 ENCOUNTER — HOSPITAL ENCOUNTER (OUTPATIENT)
Facility: HOSPITAL | Age: 64
Setting detail: INFUSION SERIES
Discharge: HOME | End: 2024-04-17
Payer: COMMERCIAL

## 2024-04-17 VITALS
RESPIRATION RATE: 18 BRPM | DIASTOLIC BLOOD PRESSURE: 83 MMHG | OXYGEN SATURATION: 97 % | HEART RATE: 85 BPM | TEMPERATURE: 97.5 F | SYSTOLIC BLOOD PRESSURE: 137 MMHG

## 2024-04-17 DIAGNOSIS — C16.0 GE JUNCTION CARCINOMA (HCC): Primary | ICD-10-CM

## 2024-04-17 LAB
ANION GAP BLD CALC-SCNC: 10.9 MMOL/L (ref 10–20)
CA-I BLD-MCNC: 1.16 MMOL/L (ref 1.12–1.32)
CHLORIDE BLD-SCNC: 100 MMOL/L (ref 98–107)
CO2 BLD-SCNC: 26.1 MMOL/L (ref 21–32)
CREAT BLD-MCNC: 0.35 MG/DL (ref 0.6–1.3)
GLUCOSE BLD-MCNC: 130 MG/DL (ref 65–100)
POTASSIUM BLD-SCNC: 4.3 MMOL/L (ref 3.5–5.1)
SERVICE CMNT-IMP: ABNORMAL
SODIUM BLD-SCNC: 137 MMOL/L (ref 136–145)

## 2024-04-17 PROCEDURE — 96360 HYDRATION IV INFUSION INIT: CPT

## 2024-04-17 PROCEDURE — 80047 BASIC METABLC PNL IONIZED CA: CPT

## 2024-04-17 PROCEDURE — 2580000003 HC RX 258: Performed by: NURSE PRACTITIONER

## 2024-04-17 RX ORDER — ACETAMINOPHEN 325 MG/1
650 TABLET ORAL
Status: CANCELLED | OUTPATIENT
Start: 2024-04-24

## 2024-04-17 RX ORDER — SODIUM CHLORIDE 0.9 % (FLUSH) 0.9 %
5-40 SYRINGE (ML) INJECTION PRN
Status: DISCONTINUED | OUTPATIENT
Start: 2024-04-17 | End: 2024-04-18 | Stop reason: HOSPADM

## 2024-04-17 RX ORDER — MEPERIDINE HYDROCHLORIDE 25 MG/ML
12.5 INJECTION INTRAMUSCULAR; INTRAVENOUS; SUBCUTANEOUS PRN
Status: CANCELLED | OUTPATIENT
Start: 2024-04-24

## 2024-04-17 RX ORDER — SODIUM CHLORIDE 0.9 % (FLUSH) 0.9 %
5-40 SYRINGE (ML) INJECTION PRN
OUTPATIENT
Start: 2024-04-17

## 2024-04-17 RX ORDER — 0.9 % SODIUM CHLORIDE 0.9 %
1000 INTRAVENOUS SOLUTION INTRAVENOUS ONCE
Status: COMPLETED | OUTPATIENT
Start: 2024-04-17 | End: 2024-04-17

## 2024-04-17 RX ORDER — HEPARIN 100 UNIT/ML
500 SYRINGE INTRAVENOUS PRN
OUTPATIENT
Start: 2024-04-17

## 2024-04-17 RX ORDER — EPINEPHRINE 1 MG/ML
0.3 INJECTION, SOLUTION INTRAMUSCULAR; SUBCUTANEOUS PRN
Status: CANCELLED | OUTPATIENT
Start: 2024-04-24

## 2024-04-17 RX ORDER — SODIUM CHLORIDE 9 MG/ML
INJECTION, SOLUTION INTRAVENOUS CONTINUOUS
Status: CANCELLED | OUTPATIENT
Start: 2024-04-24

## 2024-04-17 RX ORDER — DIPHENHYDRAMINE HYDROCHLORIDE 50 MG/ML
50 INJECTION INTRAMUSCULAR; INTRAVENOUS
Status: CANCELLED | OUTPATIENT
Start: 2024-04-24

## 2024-04-17 RX ORDER — 0.9 % SODIUM CHLORIDE 0.9 %
1000 INTRAVENOUS SOLUTION INTRAVENOUS ONCE
OUTPATIENT
Start: 2024-04-17 | End: 2024-04-17

## 2024-04-17 RX ORDER — ACETAMINOPHEN 325 MG/1
650 TABLET ORAL
OUTPATIENT
Start: 2024-04-17

## 2024-04-17 RX ORDER — ALBUTEROL SULFATE 90 UG/1
4 AEROSOL, METERED RESPIRATORY (INHALATION) PRN
OUTPATIENT
Start: 2024-04-17

## 2024-04-17 RX ORDER — EPINEPHRINE 1 MG/ML
0.3 INJECTION, SOLUTION INTRAMUSCULAR; SUBCUTANEOUS PRN
OUTPATIENT
Start: 2024-04-17

## 2024-04-17 RX ORDER — ONDANSETRON 2 MG/ML
8 INJECTION INTRAMUSCULAR; INTRAVENOUS
OUTPATIENT
Start: 2024-04-17

## 2024-04-17 RX ORDER — ONDANSETRON 2 MG/ML
8 INJECTION INTRAMUSCULAR; INTRAVENOUS
Status: CANCELLED | OUTPATIENT
Start: 2024-04-24

## 2024-04-17 RX ORDER — SODIUM CHLORIDE 9 MG/ML
5-250 INJECTION, SOLUTION INTRAVENOUS PRN
OUTPATIENT
Start: 2024-04-17

## 2024-04-17 RX ORDER — SODIUM CHLORIDE 9 MG/ML
INJECTION, SOLUTION INTRAVENOUS CONTINUOUS
OUTPATIENT
Start: 2024-04-17

## 2024-04-17 RX ORDER — DIPHENHYDRAMINE HYDROCHLORIDE 50 MG/ML
50 INJECTION INTRAMUSCULAR; INTRAVENOUS
OUTPATIENT
Start: 2024-04-17

## 2024-04-17 RX ORDER — ALBUTEROL SULFATE 90 UG/1
4 AEROSOL, METERED RESPIRATORY (INHALATION) PRN
Status: CANCELLED | OUTPATIENT
Start: 2024-04-24

## 2024-04-17 RX ADMIN — SODIUM CHLORIDE 1000 ML: 9 INJECTION, SOLUTION INTRAVENOUS at 15:31

## 2024-04-17 NOTE — PROGRESS NOTES
Name: Cyril Gregg    MRN: [id number]         : 1960    Mr. Gregg Arrived ambulatory and in no distress for Hydration.  Assessment was completed, no acute issues at this time, no new complaints voiced.  Right chest wall port accessed without difficulty, labs drawn & sent for processing.    Mr. Gregg's vitals were reviewed.  Vitals:    24 1522   BP: 137/83   Pulse: 85   Resp: 18   Temp: 97.5 °F (36.4 °C)   SpO2: 97%       Lab results were obtained and reviewed.  Recent Results (from the past 12 hour(s))   POC CHEM 8    Collection Time: 24  3:38 PM   Result Value Ref Range    POC Ionized Calcium 1.16 1.12 - 1.32 mmol/L    POC Sodium 137 136 - 145 mmol/L    POC Potassium 4.3 3.5 - 5.1 mmol/L    POC Chloride 100 98 - 107 mmol/L    POC TCO2 26.1 21 - 32 mmol/L    Anion Gap, POC 10.9 10 - 20 mmol/L    POC Glucose 130 (H) 65 - 100 mg/dL    POC Creatinine 0.35 (L) 0.6 - 1.3 mg/dL    eGFR, POC >90 >60 ml/min/1.73m2    UA Comment Comment Not Indicated.         Medications:  Medications Administered         sodium chloride 0.9 % bolus 1,000 mL Admin Date  2024 Action  New Bag Dose  1,000 mL Rate  983.6 mL/hr Route  IntraVENous Administered By  Shakila Rogel RN            Mr. Gregg tolerated treatment well and was discharged from [facility] in stable condition at 1640.   Port de-accessed, flushed per protocol. He is to return on 2024 for his next appointment.    SHAKILA ROGEL RN  2024

## 2024-04-18 ENCOUNTER — HOSPITAL ENCOUNTER (OUTPATIENT)
Facility: HOSPITAL | Age: 64
Setting detail: OUTPATIENT SURGERY
Discharge: HOME | End: 2024-04-18
Attending: SURGERY | Admitting: SURGERY
Payer: COMMERCIAL

## 2024-04-18 ENCOUNTER — ANESTHESIA EVENT (OUTPATIENT)
Facility: HOSPITAL | Age: 64
End: 2024-04-18
Payer: COMMERCIAL

## 2024-04-18 ENCOUNTER — ANESTHESIA (OUTPATIENT)
Facility: HOSPITAL | Age: 64
End: 2024-04-18
Payer: COMMERCIAL

## 2024-04-18 VITALS
HEART RATE: 77 BPM | OXYGEN SATURATION: 95 % | SYSTOLIC BLOOD PRESSURE: 128 MMHG | WEIGHT: 208.78 LBS | HEIGHT: 70 IN | DIASTOLIC BLOOD PRESSURE: 81 MMHG | TEMPERATURE: 98.3 F | BODY MASS INDEX: 29.89 KG/M2

## 2024-04-18 DIAGNOSIS — K40.90 RIGHT INGUINAL HERNIA: Primary | ICD-10-CM

## 2024-04-18 LAB
GLUCOSE BLD STRIP.AUTO-MCNC: 142 MG/DL (ref 65–117)
GLUCOSE BLD STRIP.AUTO-MCNC: 186 MG/DL (ref 65–117)
SERVICE CMNT-IMP: ABNORMAL
SERVICE CMNT-IMP: ABNORMAL

## 2024-04-18 PROCEDURE — 2500000003 HC RX 250 WO HCPCS: Performed by: NURSE ANESTHETIST, CERTIFIED REGISTERED

## 2024-04-18 PROCEDURE — 6360000002 HC RX W HCPCS: Performed by: NURSE ANESTHETIST, CERTIFIED REGISTERED

## 2024-04-18 PROCEDURE — 2580000003 HC RX 258: Performed by: SURGERY

## 2024-04-18 PROCEDURE — 3700000001 HC ADD 15 MINUTES (ANESTHESIA): Performed by: SURGERY

## 2024-04-18 PROCEDURE — 6360000002 HC RX W HCPCS: Performed by: SURGERY

## 2024-04-18 PROCEDURE — 7100000000 HC PACU RECOVERY - FIRST 15 MIN: Performed by: SURGERY

## 2024-04-18 PROCEDURE — 2500000003 HC RX 250 WO HCPCS

## 2024-04-18 PROCEDURE — 3600000009 HC SURGERY ROBOT BASE: Performed by: SURGERY

## 2024-04-18 PROCEDURE — 2580000003 HC RX 258: Performed by: ANESTHESIOLOGY

## 2024-04-18 PROCEDURE — 49650 LAP ING HERNIA REPAIR INIT: CPT | Performed by: SURGERY

## 2024-04-18 PROCEDURE — 6360000002 HC RX W HCPCS

## 2024-04-18 PROCEDURE — 7100000001 HC PACU RECOVERY - ADDTL 15 MIN: Performed by: SURGERY

## 2024-04-18 PROCEDURE — 3700000000 HC ANESTHESIA ATTENDED CARE: Performed by: SURGERY

## 2024-04-18 PROCEDURE — 7100000010 HC PHASE II RECOVERY - FIRST 15 MIN: Performed by: SURGERY

## 2024-04-18 PROCEDURE — C1781 MESH (IMPLANTABLE): HCPCS | Performed by: SURGERY

## 2024-04-18 PROCEDURE — 82962 GLUCOSE BLOOD TEST: CPT

## 2024-04-18 PROCEDURE — 2709999900 HC NON-CHARGEABLE SUPPLY: Performed by: SURGERY

## 2024-04-18 PROCEDURE — S2900 ROBOTIC SURGICAL SYSTEM: HCPCS | Performed by: SURGERY

## 2024-04-18 PROCEDURE — 3600000019 HC SURGERY ROBOT ADDTL 15MIN: Performed by: SURGERY

## 2024-04-18 DEVICE — LAPAROSCOPIC SELF-FIXATING MESH, RIGHT ANATOMICAL
Type: IMPLANTABLE DEVICE | Site: ABDOMEN | Status: FUNCTIONAL
Brand: PROGRIP

## 2024-04-18 RX ORDER — DEXMEDETOMIDINE HYDROCHLORIDE 100 UG/ML
INJECTION, SOLUTION INTRAVENOUS PRN
Status: DISCONTINUED | OUTPATIENT
Start: 2024-04-18 | End: 2024-04-18 | Stop reason: SDUPTHER

## 2024-04-18 RX ORDER — ONDANSETRON 2 MG/ML
4 INJECTION INTRAMUSCULAR; INTRAVENOUS
Status: DISCONTINUED | OUTPATIENT
Start: 2024-04-18 | End: 2024-04-18 | Stop reason: HOSPADM

## 2024-04-18 RX ORDER — SODIUM CHLORIDE 0.9 % (FLUSH) 0.9 %
5-40 SYRINGE (ML) INJECTION PRN
Status: DISCONTINUED | OUTPATIENT
Start: 2024-04-18 | End: 2024-04-18 | Stop reason: HOSPADM

## 2024-04-18 RX ORDER — IBUPROFEN 800 MG/1
800 TABLET ORAL
Qty: 20 TABLET | Refills: 0 | Status: ON HOLD | OUTPATIENT
Start: 2024-04-18 | End: 2024-04-28 | Stop reason: HOSPADM

## 2024-04-18 RX ORDER — LIDOCAINE HYDROCHLORIDE 20 MG/ML
INJECTION, SOLUTION EPIDURAL; INFILTRATION; INTRACAUDAL; PERINEURAL PRN
Status: DISCONTINUED | OUTPATIENT
Start: 2024-04-18 | End: 2024-04-18 | Stop reason: SDUPTHER

## 2024-04-18 RX ORDER — NALOXONE HYDROCHLORIDE 0.4 MG/ML
INJECTION, SOLUTION INTRAMUSCULAR; INTRAVENOUS; SUBCUTANEOUS PRN
Status: DISCONTINUED | OUTPATIENT
Start: 2024-04-18 | End: 2024-04-18 | Stop reason: HOSPADM

## 2024-04-18 RX ORDER — BUPIVACAINE HYDROCHLORIDE 5 MG/ML
INJECTION, SOLUTION PERINEURAL PRN
Status: DISCONTINUED | OUTPATIENT
Start: 2024-04-18 | End: 2024-04-18 | Stop reason: ALTCHOICE

## 2024-04-18 RX ORDER — HYDROCODONE BITARTRATE AND ACETAMINOPHEN 5; 325 MG/1; MG/1
1 TABLET ORAL EVERY 6 HOURS PRN
Qty: 20 TABLET | Refills: 0 | Status: SHIPPED | OUTPATIENT
Start: 2024-04-18 | End: 2024-04-23

## 2024-04-18 RX ORDER — SODIUM CHLORIDE 0.9 % (FLUSH) 0.9 %
5-40 SYRINGE (ML) INJECTION EVERY 12 HOURS SCHEDULED
Status: DISCONTINUED | OUTPATIENT
Start: 2024-04-18 | End: 2024-04-18 | Stop reason: HOSPADM

## 2024-04-18 RX ORDER — SODIUM CHLORIDE, SODIUM LACTATE, POTASSIUM CHLORIDE, CALCIUM CHLORIDE 600; 310; 30; 20 MG/100ML; MG/100ML; MG/100ML; MG/100ML
INJECTION, SOLUTION INTRAVENOUS CONTINUOUS
Status: DISCONTINUED | OUTPATIENT
Start: 2024-04-18 | End: 2024-04-18 | Stop reason: HOSPADM

## 2024-04-18 RX ORDER — FENTANYL CITRATE 50 UG/ML
INJECTION, SOLUTION INTRAMUSCULAR; INTRAVENOUS PRN
Status: DISCONTINUED | OUTPATIENT
Start: 2024-04-18 | End: 2024-04-18 | Stop reason: SDUPTHER

## 2024-04-18 RX ORDER — HYDROMORPHONE HYDROCHLORIDE 1 MG/ML
0.5 INJECTION, SOLUTION INTRAMUSCULAR; INTRAVENOUS; SUBCUTANEOUS EVERY 5 MIN PRN
Status: DISCONTINUED | OUTPATIENT
Start: 2024-04-18 | End: 2024-04-18 | Stop reason: HOSPADM

## 2024-04-18 RX ORDER — GLYCOPYRROLATE 0.2 MG/ML
INJECTION INTRAMUSCULAR; INTRAVENOUS PRN
Status: DISCONTINUED | OUTPATIENT
Start: 2024-04-18 | End: 2024-04-18 | Stop reason: SDUPTHER

## 2024-04-18 RX ORDER — MIDAZOLAM HYDROCHLORIDE 1 MG/ML
INJECTION INTRAMUSCULAR; INTRAVENOUS PRN
Status: DISCONTINUED | OUTPATIENT
Start: 2024-04-18 | End: 2024-04-18 | Stop reason: SDUPTHER

## 2024-04-18 RX ORDER — DEXAMETHASONE SODIUM PHOSPHATE 4 MG/ML
INJECTION, SOLUTION INTRA-ARTICULAR; INTRALESIONAL; INTRAMUSCULAR; INTRAVENOUS; SOFT TISSUE PRN
Status: DISCONTINUED | OUTPATIENT
Start: 2024-04-18 | End: 2024-04-18 | Stop reason: SDUPTHER

## 2024-04-18 RX ORDER — SUCCINYLCHOLINE/SOD CL,ISO/PF 200MG/10ML
SYRINGE (ML) INTRAVENOUS PRN
Status: DISCONTINUED | OUTPATIENT
Start: 2024-04-18 | End: 2024-04-18 | Stop reason: SDUPTHER

## 2024-04-18 RX ORDER — POLYETHYLENE GLYCOL 3350 17 G/17G
17 POWDER, FOR SOLUTION ORAL DAILY
Qty: 116 G | Refills: 0 | Status: SHIPPED | OUTPATIENT
Start: 2024-04-18 | End: 2024-04-25

## 2024-04-18 RX ORDER — FENTANYL CITRATE 50 UG/ML
25 INJECTION, SOLUTION INTRAMUSCULAR; INTRAVENOUS EVERY 5 MIN PRN
Status: DISCONTINUED | OUTPATIENT
Start: 2024-04-18 | End: 2024-04-18 | Stop reason: HOSPADM

## 2024-04-18 RX ORDER — ONDANSETRON 2 MG/ML
INJECTION INTRAMUSCULAR; INTRAVENOUS PRN
Status: DISCONTINUED | OUTPATIENT
Start: 2024-04-18 | End: 2024-04-18 | Stop reason: SDUPTHER

## 2024-04-18 RX ORDER — KETOROLAC TROMETHAMINE 30 MG/ML
INJECTION, SOLUTION INTRAMUSCULAR; INTRAVENOUS PRN
Status: DISCONTINUED | OUTPATIENT
Start: 2024-04-18 | End: 2024-04-18 | Stop reason: SDUPTHER

## 2024-04-18 RX ORDER — PROPOFOL 10 MG/ML
INJECTION, EMULSION INTRAVENOUS PRN
Status: DISCONTINUED | OUTPATIENT
Start: 2024-04-18 | End: 2024-04-18 | Stop reason: SDUPTHER

## 2024-04-18 RX ORDER — ROCURONIUM BROMIDE 10 MG/ML
INJECTION, SOLUTION INTRAVENOUS PRN
Status: DISCONTINUED | OUTPATIENT
Start: 2024-04-18 | End: 2024-04-18 | Stop reason: SDUPTHER

## 2024-04-18 RX ORDER — OXYCODONE HYDROCHLORIDE 5 MG/1
5 TABLET ORAL
Status: DISCONTINUED | OUTPATIENT
Start: 2024-04-18 | End: 2024-04-18 | Stop reason: HOSPADM

## 2024-04-18 RX ORDER — NEOSTIGMINE METHYLSULFATE 1 MG/ML
INJECTION, SOLUTION INTRAVENOUS PRN
Status: DISCONTINUED | OUTPATIENT
Start: 2024-04-18 | End: 2024-04-18 | Stop reason: SDUPTHER

## 2024-04-18 RX ADMIN — PROPOFOL 150 MG: 10 INJECTION, EMULSION INTRAVENOUS at 16:14

## 2024-04-18 RX ADMIN — MIDAZOLAM HYDROCHLORIDE 1 MG: 1 INJECTION, SOLUTION INTRAMUSCULAR; INTRAVENOUS at 16:03

## 2024-04-18 RX ADMIN — MIDAZOLAM HYDROCHLORIDE 1 MG: 1 INJECTION, SOLUTION INTRAMUSCULAR; INTRAVENOUS at 16:10

## 2024-04-18 RX ADMIN — SODIUM CHLORIDE, POTASSIUM CHLORIDE, SODIUM LACTATE AND CALCIUM CHLORIDE 25 ML: 600; 310; 30; 20 INJECTION, SOLUTION INTRAVENOUS at 12:12

## 2024-04-18 RX ADMIN — LIDOCAINE HYDROCHLORIDE 100 MG: 20 INJECTION, SOLUTION EPIDURAL; INFILTRATION; INTRACAUDAL; PERINEURAL at 16:14

## 2024-04-18 RX ADMIN — Medication 3 MG: at 17:12

## 2024-04-18 RX ADMIN — ROCURONIUM BROMIDE 5 MG: 10 INJECTION INTRAVENOUS at 16:14

## 2024-04-18 RX ADMIN — FENTANYL CITRATE 100 MCG: 50 INJECTION, SOLUTION INTRAMUSCULAR; INTRAVENOUS at 16:14

## 2024-04-18 RX ADMIN — DEXMEDETOMIDINE HYDROCHLORIDE 6 MCG: 100 INJECTION, SOLUTION, CONCENTRATE INTRAVENOUS at 16:52

## 2024-04-18 RX ADMIN — ROCURONIUM BROMIDE 35 MG: 10 INJECTION INTRAVENOUS at 16:20

## 2024-04-18 RX ADMIN — ONDANSETRON 4 MG: 2 INJECTION INTRAMUSCULAR; INTRAVENOUS at 17:11

## 2024-04-18 RX ADMIN — KETOROLAC TROMETHAMINE 15 MG: 30 INJECTION, SOLUTION INTRAMUSCULAR at 17:12

## 2024-04-18 RX ADMIN — ROCURONIUM BROMIDE 5 MG: 10 INJECTION INTRAVENOUS at 16:56

## 2024-04-18 RX ADMIN — FENTANYL CITRATE 50 MCG: 50 INJECTION, SOLUTION INTRAMUSCULAR; INTRAVENOUS at 17:01

## 2024-04-18 RX ADMIN — Medication 140 MG: at 16:14

## 2024-04-18 RX ADMIN — GLYCOPYRROLATE 0.6 MG: 0.2 INJECTION, SOLUTION INTRAMUSCULAR; INTRAVENOUS at 17:12

## 2024-04-18 RX ADMIN — DEXAMETHASONE SODIUM PHOSPHATE 8 MG: 4 INJECTION, SOLUTION INTRAMUSCULAR; INTRAVENOUS at 16:29

## 2024-04-18 RX ADMIN — WATER 2000 MG: 1 INJECTION INTRAMUSCULAR; INTRAVENOUS; SUBCUTANEOUS at 16:22

## 2024-04-18 ASSESSMENT — PAIN DESCRIPTION - DESCRIPTORS: DESCRIPTORS: ACHING;PRESSURE

## 2024-04-18 ASSESSMENT — PAIN - FUNCTIONAL ASSESSMENT: PAIN_FUNCTIONAL_ASSESSMENT: 0-10

## 2024-04-18 NOTE — FLOWSHEET NOTE
04/18/24 1730   Handoff   Communication Given Transfer Handoff   Handoff phase Phase I receiving   Handoff Given To Michelle OLIVIA   Handoff Received From ELISHA Lucas RN and TANG Lechuga CRNA   Handoff Communication Face to Face;At bedside

## 2024-04-18 NOTE — FLOWSHEET NOTE
04/18/24 1730   Handoff   Communication Given Transfer Handoff   Handoff phase Phase I receiving   Handoff Given To Michelle OLIVIA   Handoff Received From ELISHA Lucas RN and TANG Lechuga CRNA   Handoff Communication Face to Face;At bedside     1815 Patient voided in bathroom without difficulty.

## 2024-04-18 NOTE — DISCHARGE INSTRUCTIONS
Discharge Instructions: Hernia -- Dr. Chang    Call on next business day to arrange appointment for follow up in 3 weeks -- [phone].    Activity:  Walk regularly.  No lifting more than 10 pounds for 6 weeks.  Light aerobic activity is okay when you feel up to it.    You may resume driving in three days unless still requiring narcotics for pain.      Return to work in 1-2 weeks but no heavy lifting for 6 weeks.    Apply ice to areas of tenderness for 20 minutes at a time.  Keep a cloth between the skin and the bag of ice.      Work:  You may return to work in 1 or 2 weeks to light activity. No lifting more than 10 pounds (=\"light duty\") for 6 weeks.    If your employer can not accommodate \"light duty,\" your employer will need to provide any necessary paperwork to our office.   This document with serve as the initial \"note\" to your employer.    Diet:  You may resume normal diet.    Wound Care:  Glue dressing will fall off on its own.  If you experience a lot of drainage, develop redness around the wound, or a fever over 101 F occurs please call the office.  There will be significant swelling under the incision(s) that will develop over the next 3-4 days.  Ice will help reduce this.    Male patients who have inguinal hernia repairs may experience swelling and bruising of the scrotum -- this is normal.  However, if the scrotum becomes tense and painful you should call.  Wear a jock strap/athletic supporter for the next week to reduce scrotal swelling.  If you can't urinate within 8 hours, call.  Intentionally urinate every 2 hours today, even if you don't feel like you have to go.    For umbilical, ventral and incisional hernia repairs, wear your abdominal binder at all times for 3 weeks.  May remove to shower.  May wash binder.  Wear a cotton T-shirt under the binder for comfort.  Inguinal hernia repairs do not need a binder.      You may shower.  No baths or swimming for 3 weeks.    Medications:  See attached

## 2024-04-18 NOTE — FLOWSHEET NOTE
04/18/24 4405   AVS Reviewed   AVS & discharge instructions reviewed with patient and/or representative? Yes  (Raisa- spouse)   Reviewed instructions with Other (name and relationship in comment)  (Raisa- spouse)   Level of Understanding Questions answered;Verbalized understanding

## 2024-04-18 NOTE — OP NOTE
DATE OF PROCEDURE:  4/18/2024    SURGEON: Vaibhav Chang MD     PREOPERATIVE DIAGNOSIS: Symptomatic left inguinal hernia.     POSTOPERATIVE DIAGNOSIS: Symptomatic left inguinal hernia.     OPERATIVE PROCEDURE: Robot-assisted laparoscopic left inguinal hernia repair with mesh (CPT 67975)    ANESTHESIA: General endotracheal.     ESTIMATED BLOOD LOSS: Minimal.     IMPLANTS:     Implant Name Type Inv. Item Serial No.  Lot No. LRB No. Used Action   MESH SERINA [id number] TEXTILE MFIL POLYETH TEREPHTHALATE - RACIEL  MESH SERINA [id number] TEXTILE MFIL POLYETH TEREPHTHALATE [employer]  SURGICAL-WD [id number] Right 1 Implanted       SPECIMENS:    ID Type Source Tests Collected by Time Destination   1 : Ascites Body Fluid Liver CYTOLOGY, NON-GYN Vaibhav Chang MD 4/18/2024 1711         INDICATIONS: Groin pain with bulge.    FINDINGS: Indirect inguinal hernia.  No evidence of esophageal carcinoma metastases within the abdomen.  No implants.  No liver nodules.  No visible omental nodules.  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    DESCRIPTION OF THE PROCEDURE: After obtaining informed consent, the patient was taken to the operating room and placed supine on the operating table. An operative timeout was performed, and general endotracheal anesthesia was induced. Preoperative antibiotics were administered, and the abdomen was prepped and draped in the usual sterile fashion. An Ioban was employed.     The abdomen was then entered just above the umbilicus using an 8 mm optical trocar.  The abdomen was insufflated without incident and was visually explored.  No other gross pathology was visible.  Under direct vision 2 additional robotic ports were placed one on either side of the first.  Local anesthetic was used at each site prior to placement.      The robot was then docked with the patient in Trendelenburg position.  Using an atraumatic grasper and electrified nanette the peritoneum cephalad to

## 2024-04-18 NOTE — PERIOP NOTE
No   covid  test   recently  no s/s/    mepilex  sacrum border  preventatively applied   skin intact.    Voided  in holding  area.  Awaiting  surgery    call bell in reach.   Glucose    reading    186  in holding   area.

## 2024-04-18 NOTE — ANESTHESIA PRE PROCEDURE
Value Date/Time    COVID19 Negative 01/05/2022 11:14 AM           Anesthesia Evaluation  Patient summary reviewed and Nursing notes reviewed   history of anesthetic complications: PONV.  Airway: Mallampati: III  TM distance: >3 FB   Neck ROM: limited  Comment: Prior airway (2024) Tube size 7.5 mm; Laryngoscope Christina; Blade size 3; Location Oral; Grade view 1; Insertion attempts 1  Mouth opening: > = 3 FB and < 3 FB   Dental: normal exam         Pulmonary:normal exam  breath sounds clear to auscultation  (+)     sleep apnea: on CPAP,                                 ROS comment: Former smoker   Cardiovascular:    (+) hypertension:        Rhythm: regular  Rate: normal                    Neuro/Psych:   Negative Neuro/Psych ROS              GI/Hepatic/Renal:   (+) GERD:, PUD          Endo/Other:    (+) DiabetesType II DM, malignancy/cancer (esophagus cancer . GE junction CA, prostate CA).                 Abdominal:             Vascular: negative vascular ROS.         Other Findings:           Anesthesia Plan      general     ASA 3       Induction: rapid sequence.      Anesthetic plan and risks discussed with patient (have glidescope or similar available).                      Vaibhav Shultz MD   4/18/2024

## 2024-04-19 LAB — CYTOLOGY-NON GYN: NORMAL

## 2024-04-19 NOTE — ANESTHESIA POSTPROCEDURE EVALUATION
Department of Anesthesiology  Postprocedure Note    Patient: Cyril Gregg  MRN: [id number]  YOB: 1960  Date of evaluation: 4/19/2024    Procedure Summary       Date: 04/18/24 Room / Location: [facility] MAIN OR M1 / [facility] MAIN OR    Anesthesia Start: 1603 Anesthesia Stop: 1733    Procedure: ROBOTIC REPAIR OF RIGHT INGUINAL HERNIA WITH MESH (Right: Abdomen) Diagnosis:       Right inguinal hernia      (Right inguinal hernia [K40.90])    Providers: Vaibhav Chang MD Responsible Provider: Rodrick Kelley MD    Anesthesia Type: General ASA Status: 3            Anesthesia Type: General    Mt Phase I: Mt Score: 9    Mt Phase II:      Anesthesia Post Evaluation    Patient location during evaluation: PACU  Patient participation: complete - patient participated  Level of consciousness: awake and alert  Airway patency: patent  Nausea & Vomiting: no nausea  Cardiovascular status: hemodynamically stable  Respiratory status: acceptable  Hydration status: euvolemic  Pain management: adequate        No notable events documented.

## 2024-04-23 ENCOUNTER — TELEPHONE (OUTPATIENT)
Age: 64
End: 2024-04-23

## 2024-04-23 RX ORDER — ACETAMINOPHEN 325 MG/1
650 TABLET ORAL
Status: CANCELLED | OUTPATIENT
Start: 2024-05-01

## 2024-04-23 RX ORDER — HEPARIN 100 UNIT/ML
500 SYRINGE INTRAVENOUS PRN
Status: CANCELLED | OUTPATIENT
Start: 2024-05-08

## 2024-04-23 RX ORDER — DIPHENHYDRAMINE HYDROCHLORIDE 50 MG/ML
50 INJECTION INTRAMUSCULAR; INTRAVENOUS
Status: CANCELLED | OUTPATIENT
Start: 2024-05-08

## 2024-04-23 RX ORDER — ALBUTEROL SULFATE 90 UG/1
4 AEROSOL, METERED RESPIRATORY (INHALATION) PRN
Status: CANCELLED | OUTPATIENT
Start: 2024-05-08

## 2024-04-23 RX ORDER — ACETAMINOPHEN 325 MG/1
650 TABLET ORAL
Status: CANCELLED | OUTPATIENT
Start: 2024-05-08

## 2024-04-23 RX ORDER — ONDANSETRON 2 MG/ML
8 INJECTION INTRAMUSCULAR; INTRAVENOUS ONCE
Status: CANCELLED | OUTPATIENT
Start: 2024-05-08 | End: 2024-05-08

## 2024-04-23 RX ORDER — HEPARIN 100 UNIT/ML
500 SYRINGE INTRAVENOUS PRN
Status: CANCELLED | OUTPATIENT
Start: 2024-05-01

## 2024-04-23 RX ORDER — SODIUM CHLORIDE 9 MG/ML
5-250 INJECTION, SOLUTION INTRAVENOUS PRN
Status: CANCELLED | OUTPATIENT
Start: 2024-05-08

## 2024-04-23 RX ORDER — DIPHENHYDRAMINE HYDROCHLORIDE 50 MG/ML
25 INJECTION INTRAMUSCULAR; INTRAVENOUS ONCE
Status: CANCELLED | OUTPATIENT
Start: 2024-05-08 | End: 2024-05-08

## 2024-04-23 RX ORDER — ALBUTEROL SULFATE 90 UG/1
4 AEROSOL, METERED RESPIRATORY (INHALATION) PRN
Status: CANCELLED | OUTPATIENT
Start: 2024-05-01

## 2024-04-23 RX ORDER — SODIUM CHLORIDE 9 MG/ML
5-250 INJECTION, SOLUTION INTRAVENOUS PRN
Status: CANCELLED | OUTPATIENT
Start: 2024-05-01

## 2024-04-23 RX ORDER — DIPHENHYDRAMINE HYDROCHLORIDE 50 MG/ML
50 INJECTION INTRAMUSCULAR; INTRAVENOUS
Status: CANCELLED | OUTPATIENT
Start: 2024-05-01

## 2024-04-23 RX ORDER — ONDANSETRON 2 MG/ML
8 INJECTION INTRAMUSCULAR; INTRAVENOUS
Status: CANCELLED | OUTPATIENT
Start: 2024-05-01

## 2024-04-23 RX ORDER — SODIUM CHLORIDE 0.9 % (FLUSH) 0.9 %
5-40 SYRINGE (ML) INJECTION PRN
Status: CANCELLED | OUTPATIENT
Start: 2024-05-08

## 2024-04-23 RX ORDER — EPINEPHRINE 1 MG/ML
0.3 INJECTION, SOLUTION INTRAMUSCULAR; SUBCUTANEOUS PRN
Status: CANCELLED | OUTPATIENT
Start: 2024-05-01

## 2024-04-23 RX ORDER — SODIUM CHLORIDE 9 MG/ML
INJECTION, SOLUTION INTRAVENOUS CONTINUOUS
Status: CANCELLED | OUTPATIENT
Start: 2024-05-01

## 2024-04-23 RX ORDER — DEXAMETHASONE SODIUM PHOSPHATE 10 MG/ML
10 INJECTION, SOLUTION INTRAMUSCULAR; INTRAVENOUS ONCE
Status: CANCELLED | OUTPATIENT
Start: 2024-05-08 | End: 2024-05-08

## 2024-04-23 RX ORDER — EPINEPHRINE 1 MG/ML
0.3 INJECTION, SOLUTION INTRAMUSCULAR; SUBCUTANEOUS PRN
Status: CANCELLED | OUTPATIENT
Start: 2024-05-08

## 2024-04-23 RX ORDER — ONDANSETRON 2 MG/ML
8 INJECTION INTRAMUSCULAR; INTRAVENOUS
Status: CANCELLED | OUTPATIENT
Start: 2024-05-08

## 2024-04-23 RX ORDER — SODIUM CHLORIDE 9 MG/ML
INJECTION, SOLUTION INTRAVENOUS CONTINUOUS
Status: CANCELLED | OUTPATIENT
Start: 2024-05-08

## 2024-04-23 NOTE — TELEPHONE ENCOUNTER
Called patient to advise/confirm upcoming appt with Dr. Sánchez on 4/25/2024  at 3:30  at [facility] Office.  Left a message

## 2024-04-24 ENCOUNTER — OFFICE VISIT (OUTPATIENT)
Age: 64
End: 2024-04-24
Payer: COMMERCIAL

## 2024-04-24 ENCOUNTER — HOSPITAL ENCOUNTER (OUTPATIENT)
Facility: HOSPITAL | Age: 64
Setting detail: INFUSION SERIES
Discharge: HOME | End: 2024-04-24
Payer: COMMERCIAL

## 2024-04-24 VITALS
HEART RATE: 73 BPM | HEIGHT: 70 IN | BODY MASS INDEX: 30.9 KG/M2 | OXYGEN SATURATION: 98 % | RESPIRATION RATE: 16 BRPM | DIASTOLIC BLOOD PRESSURE: 80 MMHG | SYSTOLIC BLOOD PRESSURE: 152 MMHG | WEIGHT: 215.8 LBS | TEMPERATURE: 97.8 F

## 2024-04-24 VITALS
HEIGHT: 70 IN | WEIGHT: 215.83 LBS | BODY MASS INDEX: 30.9 KG/M2 | SYSTOLIC BLOOD PRESSURE: 124 MMHG | TEMPERATURE: 97.8 F | HEART RATE: 73 BPM | DIASTOLIC BLOOD PRESSURE: 66 MMHG | OXYGEN SATURATION: 97 %

## 2024-04-24 DIAGNOSIS — C77.2 MALIGNANT NEOPLASM METASTATIC TO INTRA-ABDOMINAL LYMPH NODE (HCC): ICD-10-CM

## 2024-04-24 DIAGNOSIS — Z51.11 ENCOUNTER FOR ANTINEOPLASTIC CHEMOTHERAPY: ICD-10-CM

## 2024-04-24 DIAGNOSIS — C16.0 GE JUNCTION CARCINOMA (HCC): Primary | ICD-10-CM

## 2024-04-24 LAB
ALBUMIN SERPL-MCNC: 2.9 G/DL (ref 3.5–5)
ALBUMIN/GLOB SERPL: 0.8 (ref 1.1–2.2)
ALP SERPL-CCNC: 132 U/L (ref 45–117)
ALT SERPL-CCNC: 22 U/L (ref 12–78)
ANION GAP SERPL CALC-SCNC: 6 MMOL/L (ref 5–15)
APPEARANCE UR: CLEAR
AST SERPL-CCNC: 19 U/L (ref 15–37)
BASOPHILS # BLD: 0.1 K/UL (ref 0–0.1)
BASOPHILS NFR BLD: 1 % (ref 0–1)
BILIRUB SERPL-MCNC: 0.3 MG/DL (ref 0.2–1)
BILIRUB UR QL: NEGATIVE
BUN SERPL-MCNC: 16 MG/DL (ref 6–20)
BUN/CREAT SERPL: 19 (ref 12–20)
CALCIUM SERPL-MCNC: 8.9 MG/DL (ref 8.5–10.1)
CHLORIDE SERPL-SCNC: 108 MMOL/L (ref 97–108)
CO2 SERPL-SCNC: 24 MMOL/L (ref 21–32)
COLOR UR: NORMAL
CREAT SERPL-MCNC: 0.84 MG/DL (ref 0.7–1.3)
DIFFERENTIAL METHOD BLD: ABNORMAL
EOSINOPHIL # BLD: 0.2 K/UL (ref 0–0.4)
EOSINOPHIL NFR BLD: 3 % (ref 0–7)
ERYTHROCYTE [DISTWIDTH] IN BLOOD BY AUTOMATED COUNT: 12.9 % (ref 11.5–14.5)
GLOBULIN SER CALC-MCNC: 3.8 G/DL (ref 2–4)
GLUCOSE SERPL-MCNC: 239 MG/DL (ref 65–100)
GLUCOSE UR STRIP.AUTO-MCNC: NEGATIVE MG/DL
HCT VFR BLD AUTO: 35.8 % (ref 36.6–50.3)
HGB BLD-MCNC: 12.4 G/DL (ref 12.1–17)
HGB UR QL STRIP: NEGATIVE
IMM GRANULOCYTES # BLD AUTO: 0.1 K/UL (ref 0–0.04)
IMM GRANULOCYTES NFR BLD AUTO: 2 % (ref 0–0.5)
KETONES UR QL STRIP.AUTO: NEGATIVE MG/DL
LEUKOCYTE ESTERASE UR QL STRIP.AUTO: NEGATIVE
LYMPHOCYTES # BLD: 1.3 K/UL (ref 0.8–3.5)
LYMPHOCYTES NFR BLD: 22 % (ref 12–49)
MCH RBC QN AUTO: 33.4 PG (ref 26–34)
MCHC RBC AUTO-ENTMCNC: 34.6 G/DL (ref 30–36.5)
MCV RBC AUTO: 96.5 FL (ref 80–99)
MONOCYTES # BLD: 0.8 K/UL (ref 0–1)
MONOCYTES NFR BLD: 14 % (ref 5–13)
NEUTS SEG # BLD: 3.5 K/UL (ref 1.8–8)
NEUTS SEG NFR BLD: 58 % (ref 32–75)
NITRITE UR QL STRIP.AUTO: NEGATIVE
NRBC # BLD: 0 K/UL (ref 0–0.01)
NRBC BLD-RTO: 0 PER 100 WBC
PH UR STRIP: 5.5 (ref 5–8)
PLATELET # BLD AUTO: 256 K/UL (ref 150–400)
PMV BLD AUTO: 9 FL (ref 8.9–12.9)
POTASSIUM SERPL-SCNC: 3.9 MMOL/L (ref 3.5–5.1)
PROT SERPL-MCNC: 6.7 G/DL (ref 6.4–8.2)
PROT UR STRIP-MCNC: NEGATIVE MG/DL
RBC # BLD AUTO: 3.71 M/UL (ref 4.1–5.7)
SODIUM SERPL-SCNC: 138 MMOL/L (ref 136–145)
SP GR UR REFRACTOMETRY: 1.02
UROBILINOGEN UR QL STRIP.AUTO: 0.2 EU/DL (ref 0.2–1)
WBC # BLD AUTO: 6 K/UL (ref 4.1–11.1)

## 2024-04-24 PROCEDURE — 2580000003 HC RX 258: Performed by: INTERNAL MEDICINE

## 2024-04-24 PROCEDURE — 96375 TX/PRO/DX INJ NEW DRUG ADDON: CPT

## 2024-04-24 PROCEDURE — 36415 COLL VENOUS BLD VENIPUNCTURE: CPT

## 2024-04-24 PROCEDURE — 3074F SYST BP LT 130 MM HG: CPT | Performed by: INTERNAL MEDICINE

## 2024-04-24 PROCEDURE — 2500000003 HC RX 250 WO HCPCS: Performed by: INTERNAL MEDICINE

## 2024-04-24 PROCEDURE — 96413 CHEMO IV INFUSION 1 HR: CPT

## 2024-04-24 PROCEDURE — 1123F ACP DISCUSS/DSCN MKR DOCD: CPT | Performed by: INTERNAL MEDICINE

## 2024-04-24 PROCEDURE — 99215 OFFICE O/P EST HI 40 MIN: CPT | Performed by: INTERNAL MEDICINE

## 2024-04-24 PROCEDURE — 96417 CHEMO IV INFUS EACH ADDL SEQ: CPT

## 2024-04-24 PROCEDURE — A4216 STERILE WATER/SALINE, 10 ML: HCPCS | Performed by: INTERNAL MEDICINE

## 2024-04-24 PROCEDURE — 6360000002 HC RX W HCPCS: Performed by: INTERNAL MEDICINE

## 2024-04-24 PROCEDURE — 3078F DIAST BP <80 MM HG: CPT | Performed by: INTERNAL MEDICINE

## 2024-04-24 PROCEDURE — 85025 COMPLETE CBC W/AUTO DIFF WBC: CPT

## 2024-04-24 PROCEDURE — 81002 URINALYSIS NONAUTO W/O SCOPE: CPT

## 2024-04-24 PROCEDURE — 80053 COMPREHEN METABOLIC PANEL: CPT

## 2024-04-24 RX ORDER — SODIUM CHLORIDE 0.9 % (FLUSH) 0.9 %
5-40 SYRINGE (ML) INJECTION PRN
Status: DISCONTINUED | OUTPATIENT
Start: 2024-04-24 | End: 2024-04-25 | Stop reason: HOSPADM

## 2024-04-24 RX ORDER — DEXAMETHASONE SODIUM PHOSPHATE 10 MG/ML
10 INJECTION, SOLUTION INTRAMUSCULAR; INTRAVENOUS ONCE
Status: COMPLETED | OUTPATIENT
Start: 2024-04-24 | End: 2024-04-24

## 2024-04-24 RX ORDER — SODIUM CHLORIDE 9 MG/ML
5-250 INJECTION, SOLUTION INTRAVENOUS PRN
Status: DISCONTINUED | OUTPATIENT
Start: 2024-04-24 | End: 2024-04-25 | Stop reason: HOSPADM

## 2024-04-24 RX ORDER — ONDANSETRON 2 MG/ML
8 INJECTION INTRAMUSCULAR; INTRAVENOUS ONCE
Status: COMPLETED | OUTPATIENT
Start: 2024-04-24 | End: 2024-04-24

## 2024-04-24 RX ORDER — HEPARIN 100 UNIT/ML
500 SYRINGE INTRAVENOUS PRN
Status: DISCONTINUED | OUTPATIENT
Start: 2024-04-24 | End: 2024-04-25 | Stop reason: HOSPADM

## 2024-04-24 RX ORDER — DIPHENHYDRAMINE HYDROCHLORIDE 50 MG/ML
25 INJECTION INTRAMUSCULAR; INTRAVENOUS ONCE
Status: COMPLETED | OUTPATIENT
Start: 2024-04-24 | End: 2024-04-24

## 2024-04-24 RX ADMIN — SODIUM CHLORIDE 25 ML/HR: 9 INJECTION, SOLUTION INTRAVENOUS at 10:56

## 2024-04-24 RX ADMIN — SODIUM CHLORIDE 737 MG: 9 INJECTION, SOLUTION INTRAVENOUS at 12:00

## 2024-04-24 RX ADMIN — FAMOTIDINE 20 MG: 10 INJECTION, SOLUTION INTRAVENOUS at 10:55

## 2024-04-24 RX ADMIN — DEXAMETHASONE SODIUM PHOSPHATE 10 MG: 10 INJECTION INTRAMUSCULAR; INTRAVENOUS at 11:00

## 2024-04-24 RX ADMIN — SODIUM CHLORIDE, PRESERVATIVE FREE 10 ML: 5 INJECTION INTRAVENOUS at 14:15

## 2024-04-24 RX ADMIN — DIPHENHYDRAMINE HYDROCHLORIDE 25 MG: 50 INJECTION INTRAMUSCULAR; INTRAVENOUS at 11:13

## 2024-04-24 RX ADMIN — ONDANSETRON 8 MG: 2 INJECTION INTRAMUSCULAR; INTRAVENOUS at 11:03

## 2024-04-24 RX ADMIN — PACLITAXEL 168 MG: 6 INJECTION, SOLUTION INTRAVENOUS at 13:13

## 2024-04-24 NOTE — PROGRESS NOTES
Cyril Gregg is a 63 y.o. male here for treatment for GE Junction cancer.  +8 lbs since last visit.   Pt feeling better with his hernia surgery. Denies any pain. Is able to swallow water better.     1. Have you been to the ER, urgent care clinic since your last visit?  Hospitalized since your last visit? no  4/18/24 hernia repair surgery    2. Have you seen or consulted any other health care providers outside of the [facility] since your last visit?  Include any pap smears or colon screening.  no    
MD at [facility] ENDOSCOPY      Family History   Problem Relation Age of Onset    Hypertension Mother     High Cholesterol Mother     Diabetes Mother     Stomach Cancer Mother 83    Other Father         MVA    Diabetes Father     Hypertension Father     High Cholesterol Father     Diabetes Sister     Mult Sclerosis Sister     High Cholesterol Brother     Hypertension Brother     No Known Problems Maternal Aunt     No Known Problems Maternal Uncle     No Known Problems Paternal Aunt     No Known Problems Paternal Uncle     No Known Problems Maternal Grandmother     No Known Problems Maternal Grandfather     No Known Problems Paternal Grandmother     No Known Problems Paternal Grandfather     Anesth Problems Neg Hx      Social History     Tobacco Use    Smoking status: Former     Current packs/day: 0.00     Types: Cigarettes     Quit date: 2/10/2001     Years since quittin.2    Smokeless tobacco: Never   Substance Use Topics    Alcohol use: Not Currently      Prior to Admission medications    Medication Sig Start Date End Date Taking? Authorizing Provider   ibuprofen (ADVIL;MOTRIN) 800 MG tablet Take 1 tablet by mouth 3 times daily (with meals) 24  Yes Vaibhav Chang MD   polyethylene glycol (GLYCOLAX) 17 GM/SCOOP powder Take 17 g by mouth daily for 7 days 24 Yes Vaibhav Chang MD   lansoprazole (PREVACID) 3 mg/mL oral suspension Take 10 mLs by mouth every morning (before breakfast) 4/10/24  Yes India Loco APRN - NP   glipiZIDE (GLUCOTROL) 5 MG tablet Take 2 tabs before breakfast, 1 tab before lunch, 1 tab before dinner (take 20 minutes before meal) 4/3/24  Yes Forrest Kuhn MD   Nutritional Supplements (TWOCAL HN 2.0) LIQD 5.5 Cans by Per G Tube route daily Give 2 cartons in gravity bag twice daily and 1.5 can once daily. Flush with 180ml before and after each feed. 3/26/24  Yes Daryl Wang MD   ACCU-CHEK DALE PLUS strip USE TO CHECK BLOOD SUGAR ONCE DAILY 23  Yes

## 2024-04-24 NOTE — PROGRESS NOTES
[facility] Visit Note    Pt arrived to [facility] ambulatory in no acute distress at 0900 for Cyramza/Taxol C2D1.  Assessment unremarkable except numbness and tingling in bilateral feet, fatigue, and nausea/diarrhea. He has been able to swallow liquids, which is new for him. He has a consult for radiation tomorrow.  R chest port accessed without issue and positive blood return noted.  Labs obtained- CBC, CMP, and Urinalysis.    Patient to MD office for apt.    Patient denied having any symptoms of COVID-19, i.e. SOB, coughing, fever, or generally not feeling well.      /64   Pulse 75   Temp 97.8 °F (36.6 °C) (Temporal)   Resp 18   Ht 1.778 m (5' 10\")   Wt 97.9 kg (215 lb 12.8 oz)   SpO2 98%   BMI 30.96 kg/m²     Recent Results (from the past 12 hour(s))   CBC With Auto Differential    Collection Time: 04/24/24  9:06 AM   Result Value Ref Range    WBC 6.0 4.1 - 11.1 K/uL    RBC 3.71 (L) 4.10 - 5.70 M/uL    Hemoglobin 12.4 12.1 - 17.0 g/dL    Hematocrit 35.8 (L) 36.6 - 50.3 %    MCV 96.5 80.0 - 99.0 FL    MCH 33.4 26.0 - 34.0 PG    MCHC 34.6 30.0 - 36.5 g/dL    RDW 12.9 11.5 - 14.5 %    Platelets 256 150 - 400 K/uL    MPV 9.0 8.9 - 12.9 FL    Nucleated RBCs 0.0 0  WBC    nRBC 0.00 0.00 - 0.01 K/uL    Neutrophils % 58 32 - 75 %    Lymphocytes % 22 12 - 49 %    Monocytes % 14 (H) 5 - 13 %    Eosinophils % 3 0 - 7 %    Basophils % 1 0 - 1 %    Immature Granulocytes % 2 (H) 0.0 - 0.5 %    Neutrophils Absolute 3.5 1.8 - 8.0 K/UL    Lymphocytes Absolute 1.3 0.8 - 3.5 K/UL    Monocytes Absolute 0.8 0.0 - 1.0 K/UL    Eosinophils Absolute 0.2 0.0 - 0.4 K/UL    Basophils Absolute 0.1 0.0 - 0.1 K/UL    Immature Granulocytes Absolute 0.1 (H) 0.00 - 0.04 K/UL    Differential Type AUTOMATED         Two nurses verified prior to administering:  Drug name  Drug dose  Infusion volume or drug volume when prepared in a syringe  Rate of administration  Route of administration  Expiration dates

## 2024-04-25 ENCOUNTER — OFFICE VISIT (OUTPATIENT)
Age: 64
End: 2024-04-25
Payer: COMMERCIAL

## 2024-04-25 VITALS
WEIGHT: 218.8 LBS | BODY MASS INDEX: 31.32 KG/M2 | TEMPERATURE: 97.4 F | DIASTOLIC BLOOD PRESSURE: 76 MMHG | HEART RATE: 68 BPM | OXYGEN SATURATION: 97 % | SYSTOLIC BLOOD PRESSURE: 132 MMHG | RESPIRATION RATE: 18 BRPM | HEIGHT: 70 IN

## 2024-04-25 DIAGNOSIS — G62.0 CHEMOTHERAPY-INDUCED PERIPHERAL NEUROPATHY (HCC): ICD-10-CM

## 2024-04-25 DIAGNOSIS — R53.0 NEOPLASTIC (MALIGNANT) RELATED FATIGUE: ICD-10-CM

## 2024-04-25 DIAGNOSIS — T45.1X5A CHEMOTHERAPY-INDUCED PERIPHERAL NEUROPATHY (HCC): ICD-10-CM

## 2024-04-25 DIAGNOSIS — K21.00 GASTROESOPHAGEAL REFLUX DISEASE WITH ESOPHAGITIS WITHOUT HEMORRHAGE: ICD-10-CM

## 2024-04-25 DIAGNOSIS — C16.0 GE JUNCTION CARCINOMA (HCC): Primary | ICD-10-CM

## 2024-04-25 PROCEDURE — 3078F DIAST BP <80 MM HG: CPT | Performed by: INTERNAL MEDICINE

## 2024-04-25 PROCEDURE — 99215 OFFICE O/P EST HI 40 MIN: CPT | Performed by: INTERNAL MEDICINE

## 2024-04-25 PROCEDURE — 1123F ACP DISCUSS/DSCN MKR DOCD: CPT | Performed by: INTERNAL MEDICINE

## 2024-04-25 PROCEDURE — 3075F SYST BP GE 130 - 139MM HG: CPT | Performed by: INTERNAL MEDICINE

## 2024-04-25 ASSESSMENT — PATIENT HEALTH QUESTIONNAIRE - PHQ9
SUM OF ALL RESPONSES TO PHQ QUESTIONS 1-9: 0
2. FEELING DOWN, DEPRESSED OR HOPELESS: NOT AT ALL
SUM OF ALL RESPONSES TO PHQ9 QUESTIONS 1 & 2: 0
1. LITTLE INTEREST OR PLEASURE IN DOING THINGS: NOT AT ALL
SUM OF ALL RESPONSES TO PHQ QUESTIONS 1-9: 0

## 2024-04-25 NOTE — PROGRESS NOTES
Palliative Medicine Outpatient Clinic  Nurse Check in Note  [phone]    Patient Name: Cyril Gregg  YOB: 1960      Date of Visit: 04/25/2024  Visit Location:  [facility]    Chief patient or family concern today: Recent Hernia repair     Patient's Last Palliative Medicine Clinic Visit Date:  3/21/2024    Have you been to an ER or urgent care center since your last visit?  No  Have you been hospitalized since your last visit? No  Have you seen or consulted any health care providers outside of the [facility] system since your last visit?  Yes - Hernia Surgury *  If Yes, alert PSR to request appropriate records from non-[facility] offices    Medications:  Med reconciliation was performed with:  Patient    Requested refills:  None    If prescribed an opioid, does patient have access to naloxone at home?:  NO  If No, pend naloxone nasal spray    Function and Symptoms:  Use of assist devices:  None    Palliative Performance Status (PPS):   Palliative Performance Scale (PPS)  PPS: 80    ESAS:  Modified-[place] Symptom Assessment Scale (ESAS)  Tiredness Score: 3  Drowsiness Score: 2  Depression Score: Not depressed  Pain Score: No pain  Anxiety Score: Not anxious  Nausea Score: 1  Appetite Score: Worst possible appetite  Dyspnea Score: No shortness of breath  Constipation: No  Wellbeing Score: 4  Other Problem Score: Best possible response    Constipation?  No  Last BM: 4/24/2024    Advance Care Planning:  Currently listed healthcare agent:      Is there an ACP Note within the past 12 months?  NO  If No, Alert Clinician and/or Social Work      Mary Ellen Petersen LPN          
Outpatient Medications   Medication Sig    glipiZIDE (GLUCOTROL) 5 MG tablet Take 2 tabs before breakfast, 1 tab before lunch, 1 tab before dinner (take 20 minutes before meal)    ondansetron (ZOFRAN) 4 MG/5ML solution 10 mLs by Per G Tube route every 8 hours as needed for Nausea or Vomiting    Nutritional Supplements (TWOCAL HN 2.0) LIQD 5.5 Cans by Per G Tube route daily Give 2 cartons in gravity bag twice daily and 1.5 can once daily. Flush with 180ml before and after each feed.    ACCU-CHEK DALE PLUS strip USE TO CHECK BLOOD SUGAR ONCE DAILY    lidocaine-prilocaine (EMLA) 2.5-2.5 % cream Apply topically as needed to port site 30min-1hour prior to chemotherapy appointments and cover with saran wrap    prochlorperazine (COMPAZINE) 10 MG tablet Take 1 tablet by mouth every 6 hours as needed (nausea)    Nutritional Supplements (NUTREN 2.0) LIQD 4 cartons by Per J Tube route daily Nutren 2.0 @ 105ml/hr x 12 hours. Give 150ml water flushes q3 hour while feeds are running. Flush additional 100ml 4 times during daytime.   Will need dual feeding tube pump, backpack, syringes and gauze/tape.    lansoprazole (PREVACID) 3 mg/mL oral suspension Take 10 mLs by mouth in the morning and 10 mLs in the evening.     No current facility-administered medications for this visit.          LAB and other DATA REVIEWED:     Lab Results   Component Value Date/Time    WBC 4.3 04/28/2024 10:56 AM    HGB 10.5 04/28/2024 10:56 AM     04/28/2024 10:56 AM     Lab Results   Component Value Date/Time     04/28/2024 10:56 AM    K 3.9 04/28/2024 10:56 AM     04/28/2024 10:56 AM    CO2 26 04/28/2024 10:56 AM    BUN 20 04/28/2024 10:56 AM      Lab Results   Component Value Date/Time    GLOB 3.7 04/27/2024 03:40 AM     Lab Results   Component Value Date/Time    INR 1.0 04/26/2024 10:17 AM      No results found for: \"IRON\", \"TIBC\", \"IBCT\", \"FERR\"     Detail chart reviewed. Other specialists and referral provider notes

## 2024-04-25 NOTE — PATIENT INSTRUCTIONS
Dear Cyril Gregg ,    It was a pleasure seeing you today    We will see you again in 4  weeks    If labs or imaging tests have been ordered for you today, please call the office  at [phone] 48 hours after completion to obtain the results.        This is the plan we talked about:    About to start RT to the GE junction mass  - Planned for 10 fractions to start after PET scan      Severe reflux, inability to swallow even saliva, feeding difficulty due to the same  -We talked about all of his symptoms in detail today and it is more than likely that you have an upper esophageal narrowing because of the mediastinal lymphadenopathy in addition to the GE junction tumor that is causing narrowing of his esophagus.  This makes sense in terms of not being able to even swallow your saliva.  -Our hope is that treatment will help shrink the lymph nodes within your treatment.  -In the meantime, let us try scopolamine patch, this will help dry up your mouth so at least you will not feel like you are choking on your own saliva at nighttime.  -We talked about elevating the head of your bed a little bit more to help sleep better and not feel like you are choking on your saliva.    Feeding difficulties, functional diarrhea  -You are working closely with Jaclyn Tinsley, our dietitian in identifying the right tube feeds for you.    Low threshold for dehydration  -We talked about this at length.  You feel very poorly when you have severe diarrhea and it is very important to identify this early so we can help you with IV fluids.  You received IV fluids yesterday after feeling very poorly and this was very helpful.  You can see a night and a change in how you feel.  We will schedule you to receive IV fluids on the weeks you are off of chemotherapy which will be every 3 weeks from now on.  I will also give you IV Pepcid and IV Zofran which will help with decreasing your acid and help with nausea.    Disease understanding and

## 2024-04-26 ENCOUNTER — APPOINTMENT (OUTPATIENT)
Facility: HOSPITAL | Age: 64
DRG: 378 | End: 2024-04-26
Payer: COMMERCIAL

## 2024-04-26 ENCOUNTER — HOSPITAL ENCOUNTER (INPATIENT)
Facility: HOSPITAL | Age: 64
LOS: 2 days | Discharge: HOME | DRG: 378 | End: 2024-04-28
Attending: EMERGENCY MEDICINE | Admitting: INTERNAL MEDICINE
Payer: COMMERCIAL

## 2024-04-26 DIAGNOSIS — K92.0 HEMATEMESIS WITH NAUSEA: Primary | ICD-10-CM

## 2024-04-26 DIAGNOSIS — C16.0 GE JUNCTION CARCINOMA (HCC): ICD-10-CM

## 2024-04-26 DIAGNOSIS — C15.5 MALIGNANT NEOPLASM OF LOWER THIRD OF ESOPHAGUS (HCC): ICD-10-CM

## 2024-04-26 PROBLEM — K92.2 ACUTE GI BLEEDING: Status: ACTIVE | Noted: 2024-04-26

## 2024-04-26 LAB
ALBUMIN SERPL-MCNC: 3 G/DL (ref 3.5–5)
ALBUMIN/GLOB SERPL: 0.8 (ref 1.1–2.2)
ALP SERPL-CCNC: 123 U/L (ref 45–117)
ALT SERPL-CCNC: 29 U/L (ref 12–78)
ANION GAP SERPL CALC-SCNC: 7 MMOL/L (ref 5–15)
AST SERPL-CCNC: 19 U/L (ref 15–37)
BASOPHILS # BLD: 0 K/UL (ref 0–0.1)
BASOPHILS NFR BLD: 1 % (ref 0–1)
BILIRUB SERPL-MCNC: 0.5 MG/DL (ref 0.2–1)
BUN SERPL-MCNC: 17 MG/DL (ref 6–20)
BUN/CREAT SERPL: 27 (ref 12–20)
CALCIUM SERPL-MCNC: 9.1 MG/DL (ref 8.5–10.1)
CHLORIDE SERPL-SCNC: 110 MMOL/L (ref 97–108)
CO2 SERPL-SCNC: 21 MMOL/L (ref 21–32)
CREAT SERPL-MCNC: 0.64 MG/DL (ref 0.7–1.3)
DIFFERENTIAL METHOD BLD: ABNORMAL
EOSINOPHIL # BLD: 0.1 K/UL (ref 0–0.4)
EOSINOPHIL NFR BLD: 2 % (ref 0–7)
ERYTHROCYTE [DISTWIDTH] IN BLOOD BY AUTOMATED COUNT: 12.8 % (ref 11.5–14.5)
GLOBULIN SER CALC-MCNC: 3.7 G/DL (ref 2–4)
GLUCOSE BLD STRIP.AUTO-MCNC: 150 MG/DL (ref 65–117)
GLUCOSE BLD STRIP.AUTO-MCNC: 152 MG/DL (ref 65–117)
GLUCOSE SERPL-MCNC: 224 MG/DL (ref 65–100)
HCT VFR BLD AUTO: 30.2 % (ref 36.6–50.3)
HCT VFR BLD AUTO: 32.1 % (ref 36.6–50.3)
HCT VFR BLD AUTO: 34.1 % (ref 36.6–50.3)
HGB BLD-MCNC: 10.4 G/DL (ref 12.1–17)
HGB BLD-MCNC: 11.1 G/DL (ref 12.1–17)
HGB BLD-MCNC: 12 G/DL (ref 12.1–17)
HISTORY CHECK: NORMAL
IMM GRANULOCYTES # BLD AUTO: 0.1 K/UL (ref 0–0.04)
IMM GRANULOCYTES NFR BLD AUTO: 1 % (ref 0–0.5)
INR PPP: 1 (ref 0.9–1.1)
LYMPHOCYTES # BLD: 1.4 K/UL (ref 0.8–3.5)
LYMPHOCYTES NFR BLD: 22 % (ref 12–49)
MCH RBC QN AUTO: 32.7 PG (ref 26–34)
MCHC RBC AUTO-ENTMCNC: 35.2 G/DL (ref 30–36.5)
MCV RBC AUTO: 92.9 FL (ref 80–99)
MONOCYTES # BLD: 0.4 K/UL (ref 0–1)
MONOCYTES NFR BLD: 6 % (ref 5–13)
NEUTS SEG # BLD: 4.4 K/UL (ref 1.8–8)
NEUTS SEG NFR BLD: 68 % (ref 32–75)
NRBC # BLD: 0 K/UL (ref 0–0.01)
NRBC BLD-RTO: 0 PER 100 WBC
PLATELET # BLD AUTO: 248 K/UL (ref 150–400)
PMV BLD AUTO: 9.3 FL (ref 8.9–12.9)
POTASSIUM SERPL-SCNC: 3.7 MMOL/L (ref 3.5–5.1)
PROT SERPL-MCNC: 6.7 G/DL (ref 6.4–8.2)
PROTHROMBIN TIME: 10.2 SEC (ref 9–11.1)
RBC # BLD AUTO: 3.67 M/UL (ref 4.1–5.7)
SERVICE CMNT-IMP: ABNORMAL
SERVICE CMNT-IMP: ABNORMAL
SODIUM SERPL-SCNC: 138 MMOL/L (ref 136–145)
WBC # BLD AUTO: 6.4 K/UL (ref 4.1–11.1)

## 2024-04-26 PROCEDURE — 3600007502: Performed by: STUDENT IN AN ORGANIZED HEALTH CARE EDUCATION/TRAINING PROGRAM

## 2024-04-26 PROCEDURE — 7100000010 HC PHASE II RECOVERY - FIRST 15 MIN: Performed by: STUDENT IN AN ORGANIZED HEALTH CARE EDUCATION/TRAINING PROGRAM

## 2024-04-26 PROCEDURE — 2580000003 HC RX 258: Performed by: INTERNAL MEDICINE

## 2024-04-26 PROCEDURE — 86900 BLOOD TYPING SEROLOGIC ABO: CPT

## 2024-04-26 PROCEDURE — 0DJ08ZZ INSPECTION OF UPPER INTESTINAL TRACT, VIA NATURAL OR ARTIFICIAL OPENING ENDOSCOPIC: ICD-10-PCS | Performed by: STUDENT IN AN ORGANIZED HEALTH CARE EDUCATION/TRAINING PROGRAM

## 2024-04-26 PROCEDURE — 6360000002 HC RX W HCPCS

## 2024-04-26 PROCEDURE — 85025 COMPLETE CBC W/AUTO DIFF WBC: CPT

## 2024-04-26 PROCEDURE — 82962 GLUCOSE BLOOD TEST: CPT

## 2024-04-26 PROCEDURE — 96374 THER/PROPH/DIAG INJ IV PUSH: CPT

## 2024-04-26 PROCEDURE — 6360000002 HC RX W HCPCS: Performed by: EMERGENCY MEDICINE

## 2024-04-26 PROCEDURE — 71275 CT ANGIOGRAPHY CHEST: CPT

## 2024-04-26 PROCEDURE — C9113 INJ PANTOPRAZOLE SODIUM, VIA: HCPCS | Performed by: INTERNAL MEDICINE

## 2024-04-26 PROCEDURE — C9113 INJ PANTOPRAZOLE SODIUM, VIA: HCPCS | Performed by: EMERGENCY MEDICINE

## 2024-04-26 PROCEDURE — 36415 COLL VENOUS BLD VENIPUNCTURE: CPT

## 2024-04-26 PROCEDURE — 2580000003 HC RX 258: Performed by: STUDENT IN AN ORGANIZED HEALTH CARE EDUCATION/TRAINING PROGRAM

## 2024-04-26 PROCEDURE — 86901 BLOOD TYPING SEROLOGIC RH(D): CPT

## 2024-04-26 PROCEDURE — 85014 HEMATOCRIT: CPT

## 2024-04-26 PROCEDURE — 85610 PROTHROMBIN TIME: CPT

## 2024-04-26 PROCEDURE — 86923 COMPATIBILITY TEST ELECTRIC: CPT

## 2024-04-26 PROCEDURE — 6360000002 HC RX W HCPCS: Performed by: INTERNAL MEDICINE

## 2024-04-26 PROCEDURE — 2580000003 HC RX 258: Performed by: EMERGENCY MEDICINE

## 2024-04-26 PROCEDURE — 99152 MOD SED SAME PHYS/QHP 5/>YRS: CPT | Performed by: STUDENT IN AN ORGANIZED HEALTH CARE EDUCATION/TRAINING PROGRAM

## 2024-04-26 PROCEDURE — A4216 STERILE WATER/SALINE, 10 ML: HCPCS | Performed by: EMERGENCY MEDICINE

## 2024-04-26 PROCEDURE — 99285 EMERGENCY DEPT VISIT HI MDM: CPT

## 2024-04-26 PROCEDURE — 85018 HEMOGLOBIN: CPT

## 2024-04-26 PROCEDURE — 86850 RBC ANTIBODY SCREEN: CPT

## 2024-04-26 PROCEDURE — 6360000004 HC RX CONTRAST MEDICATION: Performed by: INTERNAL MEDICINE

## 2024-04-26 PROCEDURE — 2709999900 HC NON-CHARGEABLE SUPPLY: Performed by: STUDENT IN AN ORGANIZED HEALTH CARE EDUCATION/TRAINING PROGRAM

## 2024-04-26 PROCEDURE — 2060000000 HC ICU INTERMEDIATE R&B

## 2024-04-26 PROCEDURE — 80053 COMPREHEN METABOLIC PANEL: CPT

## 2024-04-26 PROCEDURE — 6360000002 HC RX W HCPCS: Performed by: STUDENT IN AN ORGANIZED HEALTH CARE EDUCATION/TRAINING PROGRAM

## 2024-04-26 PROCEDURE — 96375 TX/PRO/DX INJ NEW DRUG ADDON: CPT

## 2024-04-26 PROCEDURE — 3600007512: Performed by: STUDENT IN AN ORGANIZED HEALTH CARE EDUCATION/TRAINING PROGRAM

## 2024-04-26 PROCEDURE — 2500000003 HC RX 250 WO HCPCS: Performed by: EMERGENCY MEDICINE

## 2024-04-26 PROCEDURE — A4216 STERILE WATER/SALINE, 10 ML: HCPCS | Performed by: INTERNAL MEDICINE

## 2024-04-26 RX ORDER — ACETAMINOPHEN 325 MG/1
650 TABLET ORAL EVERY 6 HOURS PRN
Status: DISCONTINUED | OUTPATIENT
Start: 2024-04-26 | End: 2024-04-28 | Stop reason: HOSPADM

## 2024-04-26 RX ORDER — INSULIN LISPRO 100 [IU]/ML
0-8 INJECTION, SOLUTION INTRAVENOUS; SUBCUTANEOUS
Status: DISCONTINUED | OUTPATIENT
Start: 2024-04-26 | End: 2024-04-28 | Stop reason: HOSPADM

## 2024-04-26 RX ORDER — ACETAMINOPHEN 650 MG/1
650 SUPPOSITORY RECTAL EVERY 6 HOURS PRN
Status: DISCONTINUED | OUTPATIENT
Start: 2024-04-26 | End: 2024-04-28 | Stop reason: HOSPADM

## 2024-04-26 RX ORDER — SODIUM CHLORIDE 0.9 % (FLUSH) 0.9 %
5-40 SYRINGE (ML) INJECTION PRN
Status: DISCONTINUED | OUTPATIENT
Start: 2024-04-26 | End: 2024-04-27 | Stop reason: SDUPTHER

## 2024-04-26 RX ORDER — MIDAZOLAM HYDROCHLORIDE 5 MG/5ML
2 INJECTION, SOLUTION INTRAMUSCULAR; INTRAVENOUS ONCE
Status: DISCONTINUED | OUTPATIENT
Start: 2024-04-26 | End: 2024-04-28 | Stop reason: HOSPADM

## 2024-04-26 RX ORDER — FENTANYL CITRATE 50 UG/ML
50 INJECTION, SOLUTION INTRAMUSCULAR; INTRAVENOUS ONCE
Status: DISCONTINUED | OUTPATIENT
Start: 2024-04-26 | End: 2024-04-28 | Stop reason: HOSPADM

## 2024-04-26 RX ORDER — ONDANSETRON 2 MG/ML
INJECTION INTRAMUSCULAR; INTRAVENOUS
Status: COMPLETED
Start: 2024-04-26 | End: 2024-04-26

## 2024-04-26 RX ORDER — SODIUM CHLORIDE 9 MG/ML
25 INJECTION, SOLUTION INTRAVENOUS PRN
Status: DISCONTINUED | OUTPATIENT
Start: 2024-04-26 | End: 2024-04-28 | Stop reason: HOSPADM

## 2024-04-26 RX ORDER — SODIUM CHLORIDE 9 MG/ML
INJECTION, SOLUTION INTRAVENOUS CONTINUOUS
Status: DISCONTINUED | OUTPATIENT
Start: 2024-04-26 | End: 2024-04-26

## 2024-04-26 RX ORDER — SODIUM CHLORIDE 9 MG/ML
INJECTION, SOLUTION INTRAVENOUS PRN
Status: DISCONTINUED | OUTPATIENT
Start: 2024-04-26 | End: 2024-04-28 | Stop reason: HOSPADM

## 2024-04-26 RX ORDER — SODIUM CHLORIDE 0.9 % (FLUSH) 0.9 %
5-40 SYRINGE (ML) INJECTION EVERY 12 HOURS SCHEDULED
Status: DISCONTINUED | OUTPATIENT
Start: 2024-04-26 | End: 2024-04-27 | Stop reason: SDUPTHER

## 2024-04-26 RX ORDER — ONDANSETRON 2 MG/ML
4 INJECTION INTRAMUSCULAR; INTRAVENOUS EVERY 6 HOURS PRN
Status: DISCONTINUED | OUTPATIENT
Start: 2024-04-26 | End: 2024-04-28 | Stop reason: HOSPADM

## 2024-04-26 RX ORDER — INSULIN LISPRO 100 [IU]/ML
0-4 INJECTION, SOLUTION INTRAVENOUS; SUBCUTANEOUS NIGHTLY
Status: DISCONTINUED | OUTPATIENT
Start: 2024-04-26 | End: 2024-04-28 | Stop reason: HOSPADM

## 2024-04-26 RX ORDER — ONDANSETRON 4 MG/1
4 TABLET, ORALLY DISINTEGRATING ORAL EVERY 8 HOURS PRN
Status: DISCONTINUED | OUTPATIENT
Start: 2024-04-26 | End: 2024-04-28 | Stop reason: HOSPADM

## 2024-04-26 RX ORDER — SODIUM CHLORIDE 9 MG/ML
INJECTION, SOLUTION INTRAVENOUS CONTINUOUS
Status: DISCONTINUED | OUTPATIENT
Start: 2024-04-26 | End: 2024-04-27

## 2024-04-26 RX ORDER — SODIUM CHLORIDE 0.9 % (FLUSH) 0.9 %
5-40 SYRINGE (ML) INJECTION EVERY 12 HOURS SCHEDULED
Status: DISCONTINUED | OUTPATIENT
Start: 2024-04-26 | End: 2024-04-28 | Stop reason: HOSPADM

## 2024-04-26 RX ORDER — MIDAZOLAM HYDROCHLORIDE 1 MG/ML
INJECTION INTRAMUSCULAR; INTRAVENOUS PRN
Status: DISCONTINUED | OUTPATIENT
Start: 2024-04-26 | End: 2024-04-26 | Stop reason: ALTCHOICE

## 2024-04-26 RX ORDER — DEXTROSE MONOHYDRATE 100 MG/ML
INJECTION, SOLUTION INTRAVENOUS CONTINUOUS PRN
Status: DISCONTINUED | OUTPATIENT
Start: 2024-04-26 | End: 2024-04-28 | Stop reason: HOSPADM

## 2024-04-26 RX ORDER — ONDANSETRON 2 MG/ML
4 INJECTION INTRAMUSCULAR; INTRAVENOUS EVERY 4 HOURS PRN
Status: DISCONTINUED | OUTPATIENT
Start: 2024-04-26 | End: 2024-04-26

## 2024-04-26 RX ORDER — LANOLIN ALCOHOL/MO/W.PET/CERES
3 CREAM (GRAM) TOPICAL NIGHTLY
Status: DISCONTINUED | OUTPATIENT
Start: 2024-04-26 | End: 2024-04-28 | Stop reason: HOSPADM

## 2024-04-26 RX ORDER — BISACODYL 10 MG
10 SUPPOSITORY, RECTAL RECTAL DAILY PRN
Status: DISCONTINUED | OUTPATIENT
Start: 2024-04-26 | End: 2024-04-28 | Stop reason: HOSPADM

## 2024-04-26 RX ORDER — SODIUM CHLORIDE 9 MG/ML
INJECTION, SOLUTION INTRAVENOUS PRN
Status: DISCONTINUED | OUTPATIENT
Start: 2024-04-26 | End: 2024-04-26

## 2024-04-26 RX ORDER — FENTANYL CITRATE 0.05 MG/ML
INJECTION, SOLUTION INTRAMUSCULAR; INTRAVENOUS PRN
Status: DISCONTINUED | OUTPATIENT
Start: 2024-04-26 | End: 2024-04-26 | Stop reason: ALTCHOICE

## 2024-04-26 RX ORDER — SODIUM CHLORIDE 0.9 % (FLUSH) 0.9 %
5-40 SYRINGE (ML) INJECTION PRN
Status: DISCONTINUED | OUTPATIENT
Start: 2024-04-26 | End: 2024-04-28 | Stop reason: HOSPADM

## 2024-04-26 RX ORDER — GLUCAGON 1 MG/ML
1 KIT INJECTION PRN
Status: DISCONTINUED | OUTPATIENT
Start: 2024-04-26 | End: 2024-04-28 | Stop reason: HOSPADM

## 2024-04-26 RX ADMIN — IOPAMIDOL 100 ML: 755 INJECTION, SOLUTION INTRAVENOUS at 12:52

## 2024-04-26 RX ADMIN — SODIUM CHLORIDE: 9 INJECTION, SOLUTION INTRAVENOUS at 16:36

## 2024-04-26 RX ADMIN — SODIUM CHLORIDE: 9 INJECTION, SOLUTION INTRAVENOUS at 17:55

## 2024-04-26 RX ADMIN — SODIUM CHLORIDE, PRESERVATIVE FREE 10 ML: 5 INJECTION INTRAVENOUS at 20:36

## 2024-04-26 RX ADMIN — ONDANSETRON 4 MG: 2 INJECTION INTRAMUSCULAR; INTRAVENOUS at 20:14

## 2024-04-26 RX ADMIN — TRANEXAMIC ACID 1000 MG: 100 INJECTION, SOLUTION INTRAVENOUS at 10:40

## 2024-04-26 RX ADMIN — TRANEXAMIC ACID 10 ML: 100 INJECTION, SOLUTION INTRAVENOUS at 10:29

## 2024-04-26 RX ADMIN — ONDANSETRON HYDROCHLORIDE: 2 INJECTION, SOLUTION INTRAMUSCULAR; INTRAVENOUS at 10:10

## 2024-04-26 RX ADMIN — SODIUM CHLORIDE, PRESERVATIVE FREE 10 ML: 5 INJECTION INTRAVENOUS at 20:35

## 2024-04-26 RX ADMIN — SODIUM CHLORIDE: 9 INJECTION, SOLUTION INTRAVENOUS at 17:56

## 2024-04-26 RX ADMIN — PANTOPRAZOLE SODIUM 40 MG: 40 INJECTION, POWDER, LYOPHILIZED, FOR SOLUTION INTRAVENOUS at 10:09

## 2024-04-26 RX ADMIN — PANTOPRAZOLE SODIUM 40 MG: 40 INJECTION, POWDER, LYOPHILIZED, FOR SOLUTION INTRAVENOUS at 21:30

## 2024-04-26 ASSESSMENT — PAIN SCALES - GENERAL
PAINLEVEL_OUTOF10: 0
PAINLEVEL_OUTOF10: 10
PAINLEVEL_OUTOF10: 0

## 2024-04-26 ASSESSMENT — PAIN - FUNCTIONAL ASSESSMENT
PAIN_FUNCTIONAL_ASSESSMENT: 0-10
PAIN_FUNCTIONAL_ASSESSMENT: 0-10

## 2024-04-26 ASSESSMENT — PAIN DESCRIPTION - LOCATION: LOCATION: ABDOMEN;CHEST

## 2024-04-26 ASSESSMENT — PAIN DESCRIPTION - ORIENTATION: ORIENTATION: MID

## 2024-04-26 NOTE — ED NOTES
Pt presents to ER from home via EMS w/ complaints of vomiting blood, starting this morning at 0915. HX of throat cancer, Gtube, Hernia repair on Thurs - reports that stools have been dark since then. Reports last chemo treatment as Wed. Sugar 210 w/ EMS

## 2024-04-26 NOTE — PROGRESS NOTES
Patient arrived to unit from ED. Alert and oriented x4. No pain or discomfort noted. S/P EGD. NPO. Patient and family requested Gtube feeding resumed. MD notified. Educated on safety. Patient is now resting in bed.

## 2024-04-26 NOTE — H&P
See consult note from today. No interval changes.   
dual feeding tube pump, backpack, syringes and gauze/tape.  Patient taking differently: 4 cartons by Per J Tube route daily Nutren 2.0 @ 105ml/hr x 12 hours. Give 150ml water flushes q3 hour while feeds are running. Flush additional 100ml 4 times during daytime.   Will need dual feeding tube pump, backpack, syringes and gauze/tape. 23   Elinor Bourne APRN - NP   lidocaine-prilocaine (EMLA) 2.5-2.5 % cream Apply topically as needed to port site 30min-1hour prior to chemotherapy appointments and cover with saran wrap 23   Daryl Wang MD       Objective:   VITALS:    Patient Vitals for the past 24 hrs:   BP Temp Temp src Pulse Resp SpO2 Height Weight   24 1700 109/70 -- -- 82 18 -- -- --   24 1640 122/63 -- -- 86 19 -- -- --   24 1635 133/64 -- -- 91 13 -- -- --   24 1630 (!) 115/44 -- -- 90 17 -- -- --   24 1628 (!) 117/55 -- -- 91 18 -- -- --   24 1625 (!) 160/81 -- -- (!) 118 25 -- -- --   24 1623 (!) 137/97 -- -- (!) 114 17 -- -- --   24 1622 (!) 137/97 -- -- 89 20 -- -- --   24 1620 117/66 -- -- 85 21 -- -- --   24 1618 (!) 145/71 -- -- 88 28 -- -- --   24 1615 (!) 154/85 -- -- 91 24 98 % -- --   24 1613 (!) 140/81 -- -- 88 21 97 % -- --   24 1612 (!) 151/87 -- -- 90 15 98 % 1.778 m (5' 10\") 98.9 kg (218 lb)   24 1430 (!) 142/78 -- -- 83 16 97 % -- --   24 1348 (!) 144/80 -- -- 82 19 98 % -- --   24 1233 (!) 157/89 -- -- 84 17 99 % -- --   24 1157 (!) 150/87 -- -- 81 19 99 % -- --   24 1113 138/81 -- -- 84 19 96 % -- --   24 1100 (!) 151/86 -- -- 77 19 99 % -- --   24 1048 (!) 154/81 -- -- 83 20 98 % -- --   24 1033 (!) 168/89 -- -- 78 18 97 % -- --   24 1003 (!) 168/91 97.5 °F (36.4 °C) Oral 92 20 96 % -- --       Temp (24hrs), Av.5 °F (36.4 °C), Min:97.5 °F (36.4 °C), Max:97.5 °F (36.4 °C)        O2 Device: Nasal cannula    Wt Readings from Last 12 Encounters:

## 2024-04-26 NOTE — PROGRESS NOTES
1900 Bedside and Verbal shift change report given to Wolf RN (oncoming nurse) by Saúl RN (offgoing nurse). Report included the following information Nurse Handoff Report, MAR, Recent Results, and Cardiac Rhythm NSR .     1940 Seen by PANFILO Isaacs With verbal orders of to change IV fluid to Dextrose 10% if RBS is <70, Tube feed to hold till morning since no bleeding is noted and may clean around the area and blood collection.    2000 Cleaned area round gtube and gauze applied as per pt request.    2100 H&H ordered seen, collected and sent to lab    End of Shift Note    Bedside shift change report given to RN (oncoming nurse) by Wolf Singh RN (offgoing nurse).  Report included the following information SBAR, MAR, Recent Results, and Cardiac Rhythm NSR    Shift worked:  7p-7a     Shift summary and any significant changes:    Kept NPO as ordered.    Morning bloods collected and sent to lab    G tube dressing done 2x as per pt request    See above   Concerns for physician to address:        Zone phone for oncoming shift:           Activity:     Number times ambulated in hallways past shift: 0  Number of times OOB to chair past shift: 0    Cardiac:   Cardiac Monitoring: Yes           Access:  Current line(s): PIV     Genitourinary:   Urinary status: voiding    GI:     Current diet:  Diet NPO  Passing flatus: YES  Tolerating current diet: YES       Pain Management:   Patient states pain is manageable on current regimen: YES    Skin:     Interventions: specialty bed, float heels, increase time out of bed, foam dressing, PT/OT consult, limit briefs, internal/external urinary devices, and nutritional support    Patient Safety:  Fall Score:    Interventions: bed/chair alarm, assistive device (walker, cane. etc), gripper socks, pt to call before getting OOB, stay with me (per policy), and gait belt       Length of Stay:  Expected LOS: 3  Actual LOS: 0      Wolf Singh RN

## 2024-04-26 NOTE — CONSULTS
I reviewed the patient's medical history, the findings on physical examination, the patient's diagnoses, and treatment plan as documented in the note.  I concur with the treatment plan as documented.  Additional suggestions noted.    EGD today- see separate notes. Tumor related bleeding which is not amenable to endoscopic therapy jose.     NATHALIA Napier D.O.  [facility]      GI CONSULTATION NOTE  Kita Shelby, NP  [phone] NP in-hospital cell phone M-F until 4:30  After 5pm or on weekends, please call  for physician on call    NAME: Cyril Gregg   :  1960   MRN:  [id number]   Attending: Dr. Hatfield  Primary GI: Dr. Napier  Date/Time:  2024 11:47 AM  Assessment:   Hematemesis  Hx of metastatic GEJ carcinoma  Acute onset hematemesis this am  Hgb 12.0 on admission; VSS  PET scan 3/19/24: New efrem mets: Right supraclavicular and mediastinum. 2. Resolved left supraclavicular efrem mets. 3. Increased size of GE junction tumor with esophageal obstruction/distention and possible RLL aspiration. 4. Decreased abdominal/retroperitoneal ferem mets. 4. Resolved solitary skeletal metastasis.  EGD 2024: Known malignant stricture at GE junction at 40 cm distal to the incisors. This still could not be traversed with a standard EGD scope. In order to get more information in case an esophageal stent is pursued in the future I switched to a pediatric EGD scope which could traverse lesion. Sliding hiatal hernia from 40 cm to 44 cm distal to the incisors. Normal stomach, including retroflexion. Normal duodenal bulb and 2nd portion of the duodenum    Plan:   Will attempt EGD evaluation with conscious sedation at the bedside if patient remains stable  Continue to control vomiting as able with antiemetics  PPI BID  Keep NPO  Supportive measures per primary team. Monitor for further S&S of GI bleed  Serial H&H as clinically indicated. Goal hemoglobin >7  Avoid 
99% 98%               PHYSICAL EXAM:  GENERAL: alert, cooperative, no distress, appears stated age, pale   EYE: conjunctivae/corneas clear  LYMPHATIC: Cervical, supraclavicular, and axillary nodes normal.   THROAT & NECK: normal and no erythema or exudates noted.   ABDOMEN: soft, non-tender, non-distended.   EXTREMITIES:  extremities normal, atraumatic, no cyanosis or edema  SKIN: erythematous rash on arms and trunk  NEUROLOGIC: AOx3. Gait normal. No gross motor/sensory deficit.    The above physical exam was reviewed and updated as needed on 04/26/24.    CT Result (most recent):  CTA CHEST W WO CONTRAST 04/26/2024    Narrative  EXAM: CTA CHEST W WO CONTRAST    DATE: 4/26/2024 12:51 PM    INDICATION: upper GI bleed from distal esophageal cancer, eval active hemorrhage    COMPARISON: Chest CT from 2/13/2024    TECHNIQUE: Helical thin section chest CT following uneventful intravenous  administration of nonionic contrast 100 mL of isovue 370. Coronal and sagittal  reformats were performed. 3D post processing was performed.  CT dose reduction  was achieved through the use of a standardized protocol tailored for this  examination and automatic exposure control for dose modulation.    FINDINGS:  CHEST WALL: No axillary or supraclavicular lymphadenopathy.  THYROID: No nodule.  MEDIASTINUM: No mass or lymphadenopathy.  SONIYA: No mass or lymphadenopathy.  THORACIC AORTA: No aneurysm.  HEART: Normal in size.  ESOPHAGUS: Marked distention of the thoracic esophagus with high density  material extending into the gastric cardia. Known lower esophageal mass is  obscured.  TRACHEA/BRONCHI: Patent.  PLEURA: No effusion or pneumothorax.  LUNGS: No nodule, mass, or airspace disease.  UPPER ABDOMEN: Gastrostomy tube in position.  BONES: No aggressive bone lesion or fracture.    ADDITIONAL COMMENTS: No aortic aneurysm or dissection. The visualized great  vessels are patent without stenosis. No central pulmonary embolus.    Impression  1.

## 2024-04-26 NOTE — PROGRESS NOTES
1605- Endoscopy arrived to ER room 20 for conscious sedation bedside case with MD Quyen. Patient A&O x4, on room air, denies pain and denies any nausea with no observed hematemesis at this time. Consents obtained, procedure explained and patient prepped at this time. Patient and patient's family given opportunity to ask questions.    The risks and benefits of the bite block have been explained to patient.  Patient verbalizes understanding.      Patient's VS changed to cycle Q2.5 min, oxygen via nasal cannula applied at this time.     1617- Dr. Napier at bedside, conscious sedation medication beginning at this time per verbal instruction from Dr. Napier- see MAR for medication administration details.     1640- Patient's procedure completed and patient recovered at this time. Patients VSS, on room air, patient drowsy but alert and denies pain or nausea.    Endoscope was pre-cleaned at bedside immediately following procedure by [employer].    Dr. Napier speaking directly with patient's family at this time.     Arcelia, ED RN, given bedside report.

## 2024-04-26 NOTE — ED PROVIDER NOTES
7 5 - 15 mmol/L    Glucose 224 (H) 65 - 100 mg/dL    BUN 17 6 - 20 MG/DL    Creatinine 0.64 (L) 0.70 - 1.30 MG/DL    Bun/Cre Ratio 27 (H) 12 - 20      Est, Glom Filt Rate >90 >60 ml/min/1.73m2    Calcium 9.1 8.5 - 10.1 MG/DL    Total Bilirubin 0.5 0.2 - 1.0 MG/DL    ALT 29 12 - 78 U/L    AST 19 15 - 37 U/L    Alk Phosphatase 123 (H) 45 - 117 U/L    Total Protein 6.7 6.4 - 8.2 g/dL    Albumin 3.0 (L) 3.5 - 5.0 g/dL    Globulin 3.7 2.0 - 4.0 g/dL    Albumin/Globulin Ratio 0.8 (L) 1.1 - 2.2     Protime-INR    Collection Time: 04/26/24 10:17 AM   Result Value Ref Range    INR 1.0 0.9 - 1.1      Protime 10.2 9.0 - 11.1 sec   TYPE AND SCREEN    Collection Time: 04/26/24 10:17 AM   Result Value Ref Range    Crossmatch expiration date 04/29/2024,2359     ABO/Rh B POSITIVE     Antibody Screen NEG     Unit Number [id number]     Product Code Blood Bank  LR     Unit Divison 00     Dispense Status Blood Bank ALLOCATED     Crossmatch Result Compatible     Unit Number [id number]     Product Code Blood Bank  LR,1     Unit Divison 00     Dispense Status Blood Bank ALLOCATED     Crossmatch Result Compatible    PREPARE RBC (CROSSMATCH), 2 Units    Collection Time: 04/26/24 10:30 AM   Result Value Ref Range    History Check Historical check performed    Hemoglobin and Hematocrit    Collection Time: 04/26/24  1:03 PM   Result Value Ref Range    Hemoglobin 10.4 (L) 12.1 - 17.0 g/dL    Hematocrit 30.2 (L) 36.6 - 50.3 %       EKG: If performed, independent interpretation documented below in the MDM section     RADIOLOGY:  Non-plain film images such as CT, Ultrasound and MRI are read by the radiologist. Plain radiographic images are visualized and preliminarily interpreted by the ED Provider with the findings documented in the MDM section.     Interpretation per the Radiologist below, if available at the time of this note:     CTA CHEST W WO CONTRAST   Final Result   1.  Large volume high density material within the thoracic

## 2024-04-26 NOTE — ED NOTES
Repeat H&H sent, pt has stopped vomiting blood at this time. RN to hold transfusion depending on H&H result per MD

## 2024-04-26 NOTE — ED NOTES
Verbal shift change report given to NE Watson (oncoming nurse) by NE Paniagua (offgoing nurse). Report included the following information Nurse Handoff Report, Index, ED Encounter Summary, ED SBAR, Adult Overview, MAR, Recent Results, Med Rec Status, and Neuro Assessment.       Endo at bedside, will transport pt after procedure

## 2024-04-26 NOTE — OP NOTE
LORIE Napier D.O..  [facility]               Esophagogastroduodenoscopy (EGD) Procedure Note    NAME: Cyril Gregg  :  1960  MRN:  [id number]    Indications:  hematemesis, in the setting of known GEJ tumor      : Farzad Napier DO    Referring Provider:  Osei Winchester MD    Staff: Circulator: Cynthia Mtz RN; Victoria Todd RN  Endoscopy Technician: Jeramy Sepulveda; Jovanny Rod    Prosthetic devices, grafts, tissues, transplant, or devices implanted: none    Medicine:  Versed 5 mg IV and Fentanyl 125 mcg IV      Procedure Details:  After informed consent was obtained with all risks and benefits of the procedure explained and preprocedure exam completed, the patient was placed in the left lateral decubitus position.  Universal protocol for patient identification was performed and documented in the nursing notes.  Throughout the procedure, the patient's blood pressure was monitored at least every five minutes; pulse, and oxygen saturations were monitored continuously.  All vital signs were documented in the nursing notes.  The endoscope was inserted into the mouth and advanced under direct vision to esophagus.  A careful inspection was made as the gastroscope was withdrawn, including a retroflexed view of the proximal stomach; findings and interventions are described below.      Findings:   Esophagus:significant clot material throughout the esophagus. Unable to advance scope beyond the esophagus into stomach. Unable to visualize any targets for therapy.   Stomach: not examine  Duodenum/jejunum:  not examined    Interventions:    none    Specimens:   * No specimens in log *     EBL: None          Complications:     No immediate complications        Impression:  -Significant blood & clots seen in the esophagus. Unable to advance scope beyond the esophagus. This is not amenable to endoscopic evaluation. He appears nearly completely

## 2024-04-26 NOTE — CONSENT
Informed Consent for Blood Component Transfusion Note    I have discussed with the patient the rationale for blood component transfusion; its benefits in treating or preventing fatigue, organ damage, or death; and its risk which includes mild transfusion reactions, rare risk of blood borne infection, or more serious but rare reactions. I have discussed the alternatives to transfusion, including the risk and consequences of not receiving transfusion. The patient had an opportunity to ask questions and had agreed to proceed with transfusion of blood components.    Electronically signed by Blanco Hatfield MD on 4/26/24 at 10:18 AM EDT

## 2024-04-27 LAB
ALBUMIN SERPL-MCNC: 2.8 G/DL (ref 3.5–5)
ALBUMIN/GLOB SERPL: 0.8 (ref 1.1–2.2)
ALP SERPL-CCNC: 110 U/L (ref 45–117)
ALT SERPL-CCNC: 23 U/L (ref 12–78)
ANION GAP SERPL CALC-SCNC: 5 MMOL/L (ref 5–15)
AST SERPL-CCNC: 15 U/L (ref 15–37)
BASOPHILS # BLD: 0 K/UL (ref 0–0.1)
BASOPHILS NFR BLD: 1 % (ref 0–1)
BILIRUB SERPL-MCNC: 0.7 MG/DL (ref 0.2–1)
BUN SERPL-MCNC: 16 MG/DL (ref 6–20)
BUN/CREAT SERPL: 28 (ref 12–20)
CALCIUM SERPL-MCNC: 8.9 MG/DL (ref 8.5–10.1)
CHLORIDE SERPL-SCNC: 110 MMOL/L (ref 97–108)
CO2 SERPL-SCNC: 24 MMOL/L (ref 21–32)
CREAT SERPL-MCNC: 0.57 MG/DL (ref 0.7–1.3)
DIFFERENTIAL METHOD BLD: ABNORMAL
EOSINOPHIL # BLD: 0.1 K/UL (ref 0–0.4)
EOSINOPHIL NFR BLD: 2 % (ref 0–7)
ERYTHROCYTE [DISTWIDTH] IN BLOOD BY AUTOMATED COUNT: 12.9 % (ref 11.5–14.5)
EST. AVERAGE GLUCOSE BLD GHB EST-MCNC: 163 MG/DL
GLOBULIN SER CALC-MCNC: 3.7 G/DL (ref 2–4)
GLUCOSE BLD STRIP.AUTO-MCNC: 123 MG/DL (ref 65–117)
GLUCOSE BLD STRIP.AUTO-MCNC: 136 MG/DL (ref 65–117)
GLUCOSE BLD STRIP.AUTO-MCNC: 138 MG/DL (ref 65–117)
GLUCOSE BLD STRIP.AUTO-MCNC: 171 MG/DL (ref 65–117)
GLUCOSE SERPL-MCNC: 127 MG/DL (ref 65–100)
HBA1C MFR BLD: 7.3 % (ref 4–5.6)
HCT VFR BLD AUTO: 31.2 % (ref 36.6–50.3)
HCT VFR BLD AUTO: 32.2 % (ref 36.6–50.3)
HGB BLD-MCNC: 10.5 G/DL (ref 12.1–17)
HGB BLD-MCNC: 11 G/DL (ref 12.1–17)
IMM GRANULOCYTES # BLD AUTO: 0 K/UL (ref 0–0.04)
IMM GRANULOCYTES NFR BLD AUTO: 1 % (ref 0–0.5)
LYMPHOCYTES # BLD: 1.6 K/UL (ref 0.8–3.5)
LYMPHOCYTES NFR BLD: 28 % (ref 12–49)
MCH RBC QN AUTO: 32.7 PG (ref 26–34)
MCHC RBC AUTO-ENTMCNC: 34.2 G/DL (ref 30–36.5)
MCV RBC AUTO: 95.8 FL (ref 80–99)
MONOCYTES # BLD: 0.3 K/UL (ref 0–1)
MONOCYTES NFR BLD: 6 % (ref 5–13)
NEUTS SEG # BLD: 3.6 K/UL (ref 1.8–8)
NEUTS SEG NFR BLD: 62 % (ref 32–75)
NRBC # BLD: 0 K/UL (ref 0–0.01)
NRBC BLD-RTO: 0 PER 100 WBC
PLATELET # BLD AUTO: 215 K/UL (ref 150–400)
PMV BLD AUTO: 9 FL (ref 8.9–12.9)
POTASSIUM SERPL-SCNC: 3.7 MMOL/L (ref 3.5–5.1)
PROT SERPL-MCNC: 6.5 G/DL (ref 6.4–8.2)
RBC # BLD AUTO: 3.36 M/UL (ref 4.1–5.7)
SERVICE CMNT-IMP: ABNORMAL
SODIUM SERPL-SCNC: 139 MMOL/L (ref 136–145)
WBC # BLD AUTO: 5.7 K/UL (ref 4.1–11.1)

## 2024-04-27 PROCEDURE — 82962 GLUCOSE BLOOD TEST: CPT

## 2024-04-27 PROCEDURE — C9113 INJ PANTOPRAZOLE SODIUM, VIA: HCPCS | Performed by: INTERNAL MEDICINE

## 2024-04-27 PROCEDURE — A4216 STERILE WATER/SALINE, 10 ML: HCPCS | Performed by: INTERNAL MEDICINE

## 2024-04-27 PROCEDURE — 2580000003 HC RX 258: Performed by: STUDENT IN AN ORGANIZED HEALTH CARE EDUCATION/TRAINING PROGRAM

## 2024-04-27 PROCEDURE — 80053 COMPREHEN METABOLIC PANEL: CPT

## 2024-04-27 PROCEDURE — 6360000002 HC RX W HCPCS: Performed by: INTERNAL MEDICINE

## 2024-04-27 PROCEDURE — 36415 COLL VENOUS BLD VENIPUNCTURE: CPT

## 2024-04-27 PROCEDURE — 83036 HEMOGLOBIN GLYCOSYLATED A1C: CPT

## 2024-04-27 PROCEDURE — 85018 HEMOGLOBIN: CPT

## 2024-04-27 PROCEDURE — 2580000003 HC RX 258: Performed by: INTERNAL MEDICINE

## 2024-04-27 PROCEDURE — 85025 COMPLETE CBC W/AUTO DIFF WBC: CPT

## 2024-04-27 PROCEDURE — 2060000000 HC ICU INTERMEDIATE R&B

## 2024-04-27 PROCEDURE — 85014 HEMATOCRIT: CPT

## 2024-04-27 RX ADMIN — SODIUM CHLORIDE: 9 INJECTION, SOLUTION INTRAVENOUS at 06:51

## 2024-04-27 RX ADMIN — ONDANSETRON 4 MG: 2 INJECTION INTRAMUSCULAR; INTRAVENOUS at 03:32

## 2024-04-27 RX ADMIN — SODIUM CHLORIDE, PRESERVATIVE FREE 10 ML: 5 INJECTION INTRAVENOUS at 10:02

## 2024-04-27 RX ADMIN — SODIUM CHLORIDE, PRESERVATIVE FREE 10 ML: 5 INJECTION INTRAVENOUS at 10:01

## 2024-04-27 RX ADMIN — PANTOPRAZOLE SODIUM 40 MG: 40 INJECTION, POWDER, LYOPHILIZED, FOR SOLUTION INTRAVENOUS at 20:09

## 2024-04-27 RX ADMIN — SODIUM CHLORIDE, PRESERVATIVE FREE 10 ML: 5 INJECTION INTRAVENOUS at 20:11

## 2024-04-27 RX ADMIN — ONDANSETRON 4 MG: 2 INJECTION INTRAMUSCULAR; INTRAVENOUS at 09:53

## 2024-04-27 RX ADMIN — ONDANSETRON 4 MG: 2 INJECTION INTRAMUSCULAR; INTRAVENOUS at 18:46

## 2024-04-27 RX ADMIN — PANTOPRAZOLE SODIUM 40 MG: 40 INJECTION, POWDER, LYOPHILIZED, FOR SOLUTION INTRAVENOUS at 09:53

## 2024-04-27 ASSESSMENT — PAIN SCALES - GENERAL: PAINLEVEL_OUTOF10: 0

## 2024-04-27 NOTE — CARE COORDINATION
Care Management Initial Assessment       RUR:13%  Readmission? No  1st IM letter given? N/a  1st  letter given: No     04/27/24 0852   Service Assessment   Patient Orientation Alert and Oriented   Cognition Alert   History Provided By Spouse   Primary Caregiver Self   Support Systems Spouse/Significant Other   Patient's Healthcare Decision Maker is: Legal Next of Kin   PCP Verified by CM Yes   Last Visit to PCP Within last 3 months   Prior Functional Level Independent in ADLs/IADLs   Current Functional Level Independent in ADLs/IADLs   Can patient return to prior living arrangement Yes   Ability to make needs known: Good   Family able to assist with home care needs: Yes   Would you like for me to discuss the discharge plan with any other family members/significant others, and if so, who? Yes  (Raisa Whitaker)   Financial Resources None   Community Resources None   Social/Functional History   Lives With Spouse   Type of Home House   Home Equipment None   Receives Help From Family   ADL Assistance Independent   Homemaking Assistance Independent   Homemaking Responsibilities Yes   Ambulation Assistance Independent   Transfer Assistance Independent   Active  Yes   Mode of Transportation Car   Occupation Full time employment   Discharge Planning   Type of Residence House   Living Arrangements Spouse/Significant Other   Current Services Prior To Admission Other (Comment)  (G-tube supplies from [facility])   Potential Assistance Needed Other (Comment)  (G-tube through [facility])   DME Ordered? Other (comment)  (G-tube through [facility])   Potential Assistance Purchasing Medications No   Type of Home Care Services None   Patient expects to be discharged to: House   History of falls? 0   Services At/After Discharge   Transition of Care Consult (CM Consult) N/A   Services At/After Discharge None   [place] Resource Information Provided? No   Mode of Transport at Discharge Other (see comment)  (wife)

## 2024-04-27 NOTE — PROGRESS NOTES
Comprehensive Nutrition Assessment    Type and Reason for Visit:  Initial, Consult    Nutrition Recommendations/Plan:   Resume home TF regimen - 240 mL Jevity 1.5 + 240 mL Twocal (total 480 mL) via gravity feed 3x/day + 60 mL water flushes before and after each feeding (2490 kcals, 105g protein, 309g CHO, 1398 mL water)     Malnutrition Assessment:  Malnutrition Status:  At risk for malnutrition (Comment) (04/27/24 1240)      Nutrition Assessment:    Patient medically noted for hematemesis, anemia, adenocarcinoma of GE junction, G-tube, DM, and HTN. Consult received for TF orders and management. Patient and family report they are waiting for clearance from GI to resume feedings today. Patient does gravity feedings at home; he did not tolerate Twocal by itself but has been doing a combination of Jevity 1.5 and Twocal and tolerating well. Feedings are 3x/day and patient reports desired weight gain recently. He does take PO liquids (water, gatorade, diet pepsi). Follows with OP RD. Will monitor TF tolerance and provide further recommendations as needed.     Nutrition Related Findings:    [phone]   BM 4/26   Humalog, Protonix         Current Nutrition Intake & Therapies:    Average Meal Intake: NPO     Diet NPO    Anthropometric Measures:  Height: 177.8 cm (5' 10\")  Ideal Body Weight (IBW): 166 lbs (75 kg)       Current Body Weight: 96.9 kg (213 lb 10 oz),   IBW.    Current BMI (kg/m2): 30.7                          BMI Categories: Obese Class 1 (BMI 30.0-34.9)    Estimated Daily Nutrient Needs:  Energy Requirements Based On: Formula  Weight Used for Energy Requirements: Current  Energy (kcal/day): 2289 kcals (BMR x 1.3AF)  Weight Used for Protein Requirements: Current  Protein (g/day): 97-116g (1.0-1.2 g/kg bw)  Method Used for Fluid Requirements: Other (Comment)  Fluid (ml/day): per MD    Nutrition Diagnosis:   Inadequate oral intake related to altered GI structure, altered GI function as evidenced by NPO

## 2024-04-27 NOTE — PLAN OF CARE
Problem: Safety - Adult  Goal: Free from fall injury  4/27/2024 1041 by Sara Alonzo RN  Outcome: Progressing  Flowsheets (Taken 4/27/2024 0852)  Free From Fall Injury: Instruct family/caregiver on patient safety  4/26/2024 2233 by Wolf Singh RN  Outcome: Progressing     Problem: Pain  Goal: Verbalizes/displays adequate comfort level or baseline comfort level  4/27/2024 1041 by Sara Alonzo RN  Outcome: Progressing  Flowsheets (Taken 4/27/2024 0719)  Verbalizes/displays adequate comfort level or baseline comfort level:   Encourage patient to monitor pain and request assistance   Assess pain using appropriate pain scale  4/26/2024 2233 by Wolf Singh RN  Outcome: Progressing     Problem: ABCDS Injury Assessment  Goal: Absence of physical injury  4/27/2024 1041 by Sara Alonzo RN  Outcome: Progressing  4/26/2024 2233 by Wolf Singh RN  Outcome: Progressing

## 2024-04-27 NOTE — PROGRESS NOTES
1900 Bedside and Verbal shift change report given to Wolf RN (oncoming nurse) by Sonal/Sara RN (offgoing nurse). Report included the following information Nurse Handoff Report, MAR, Recent Results, and Cardiac Rhythm NSR .     End of Shift Note    Bedside shift change report given to Sonal OLIVIA (oncoming nurse) by Wolf Singh RN (offgoing nurse).  Report included the following information SBAR, MAR, Recent Results, and Cardiac Rhythm NSR    Shift worked:  7p-7a     Shift summary and any significant changes:    Kept comfortable and needs attended       Concerns for physician to address:        Zone phone for oncoming shift:                                   .

## 2024-04-27 NOTE — PROGRESS NOTES
Physical Therapy  Consult received and chart reviewed. Patient reports complete independence with all mobility and states he has no PT needs.  Will sign off.  Thank you,  Deisi Paris, PT

## 2024-04-28 VITALS
OXYGEN SATURATION: 98 % | RESPIRATION RATE: 20 BRPM | TEMPERATURE: 97.7 F | DIASTOLIC BLOOD PRESSURE: 87 MMHG | HEART RATE: 79 BPM | SYSTOLIC BLOOD PRESSURE: 145 MMHG | BODY MASS INDEX: 30.39 KG/M2 | WEIGHT: 212.3 LBS | HEIGHT: 70 IN

## 2024-04-28 LAB
ANION GAP SERPL CALC-SCNC: 3 MMOL/L (ref 5–15)
BASOPHILS # BLD: 0 K/UL (ref 0–0.1)
BASOPHILS NFR BLD: 1 % (ref 0–1)
BUN SERPL-MCNC: 20 MG/DL (ref 6–20)
BUN/CREAT SERPL: 29 (ref 12–20)
CALCIUM SERPL-MCNC: 8.8 MG/DL (ref 8.5–10.1)
CHLORIDE SERPL-SCNC: 109 MMOL/L (ref 97–108)
CO2 SERPL-SCNC: 26 MMOL/L (ref 21–32)
CREAT SERPL-MCNC: 0.68 MG/DL (ref 0.7–1.3)
DIFFERENTIAL METHOD BLD: ABNORMAL
EOSINOPHIL # BLD: 0.1 K/UL (ref 0–0.4)
EOSINOPHIL NFR BLD: 2 % (ref 0–7)
ERYTHROCYTE [DISTWIDTH] IN BLOOD BY AUTOMATED COUNT: 12.6 % (ref 11.5–14.5)
GLUCOSE BLD STRIP.AUTO-MCNC: 184 MG/DL (ref 65–117)
GLUCOSE BLD STRIP.AUTO-MCNC: 211 MG/DL (ref 65–117)
GLUCOSE SERPL-MCNC: 254 MG/DL (ref 65–100)
HCT VFR BLD AUTO: 30.2 % (ref 36.6–50.3)
HGB BLD-MCNC: 10.5 G/DL (ref 12.1–17)
IMM GRANULOCYTES # BLD AUTO: 0 K/UL (ref 0–0.04)
IMM GRANULOCYTES NFR BLD AUTO: 1 % (ref 0–0.5)
LYMPHOCYTES # BLD: 0.8 K/UL (ref 0.8–3.5)
LYMPHOCYTES NFR BLD: 18 % (ref 12–49)
MCH RBC QN AUTO: 32.9 PG (ref 26–34)
MCHC RBC AUTO-ENTMCNC: 34.8 G/DL (ref 30–36.5)
MCV RBC AUTO: 94.7 FL (ref 80–99)
MONOCYTES # BLD: 0.2 K/UL (ref 0–1)
MONOCYTES NFR BLD: 5 % (ref 5–13)
NEUTS SEG # BLD: 3.2 K/UL (ref 1.8–8)
NEUTS SEG NFR BLD: 73 % (ref 32–75)
NRBC # BLD: 0 K/UL (ref 0–0.01)
NRBC BLD-RTO: 0 PER 100 WBC
PLATELET # BLD AUTO: 213 K/UL (ref 150–400)
PMV BLD AUTO: 9 FL (ref 8.9–12.9)
POTASSIUM SERPL-SCNC: 3.9 MMOL/L (ref 3.5–5.1)
RBC # BLD AUTO: 3.19 M/UL (ref 4.1–5.7)
SERVICE CMNT-IMP: ABNORMAL
SERVICE CMNT-IMP: ABNORMAL
SODIUM SERPL-SCNC: 138 MMOL/L (ref 136–145)
WBC # BLD AUTO: 4.3 K/UL (ref 4.1–11.1)

## 2024-04-28 PROCEDURE — 82962 GLUCOSE BLOOD TEST: CPT

## 2024-04-28 PROCEDURE — 36415 COLL VENOUS BLD VENIPUNCTURE: CPT

## 2024-04-28 PROCEDURE — 6360000002 HC RX W HCPCS: Performed by: INTERNAL MEDICINE

## 2024-04-28 PROCEDURE — 2580000003 HC RX 258: Performed by: INTERNAL MEDICINE

## 2024-04-28 PROCEDURE — C9113 INJ PANTOPRAZOLE SODIUM, VIA: HCPCS | Performed by: INTERNAL MEDICINE

## 2024-04-28 PROCEDURE — 85025 COMPLETE CBC W/AUTO DIFF WBC: CPT

## 2024-04-28 PROCEDURE — 80048 BASIC METABOLIC PNL TOTAL CA: CPT

## 2024-04-28 PROCEDURE — A4216 STERILE WATER/SALINE, 10 ML: HCPCS | Performed by: INTERNAL MEDICINE

## 2024-04-28 RX ORDER — NUTRITIONAL SUPPLEMENT 0.04G-1/ML
4 LIQUID (ML) ORAL DAILY
Qty: 100 EACH | Refills: 0 | Status: SHIPPED
Start: 2024-04-28

## 2024-04-28 RX ADMIN — PANTOPRAZOLE SODIUM 40 MG: 40 INJECTION, POWDER, LYOPHILIZED, FOR SOLUTION INTRAVENOUS at 11:03

## 2024-04-28 RX ADMIN — SODIUM CHLORIDE, PRESERVATIVE FREE 10 ML: 5 INJECTION INTRAVENOUS at 08:33

## 2024-04-28 NOTE — CARE COORDINATION
04/28/24 1258   Services At/After Discharge   Transition of Care Consult (CM Consult) N/A   Services At/After Discharge None   Mode of Transport at Discharge Other (see comment)   Confirm Follow Up Transport Family   Condition of Participation: Discharge Planning   The Plan for Transition of Care is related to the following treatment goals: PCP and specialist     No further needs from CM  Spouse to transport home. Spoke with wife on phone and she did think there are any DC needs.    English Tl OLIVIA CM   [phone]

## 2024-04-28 NOTE — PLAN OF CARE
Problem: Discharge Planning  Goal: Discharge to home or other facility with appropriate resources  4/28/2024 0831 by Sonal Su RN  Outcome: Progressing  Flowsheets (Taken 4/28/2024 0715)  Discharge to home or other facility with appropriate resources:   Identify barriers to discharge with patient and caregiver   Arrange for needed discharge resources and transportation as appropriate  4/27/2024 2123 by Wolf Singh RN  Outcome: Progressing     Problem: Safety - Adult  Goal: Free from fall injury  4/28/2024 0831 by Sonal Su RN  Outcome: Progressing  4/27/2024 2123 by Wolf Singh RN  Outcome: Progressing     Problem: Pain  Goal: Verbalizes/displays adequate comfort level or baseline comfort level  4/28/2024 0831 by Sonal Su RN  Outcome: Progressing  4/27/2024 2123 by Wolf Singh RN  Outcome: Progressing     Problem: ABCDS Injury Assessment  Goal: Absence of physical injury  4/28/2024 0831 by Sonal Su RN  Outcome: Progressing  4/27/2024 2123 by Wolf Singh RN  Outcome: Progressing

## 2024-04-28 NOTE — PROGRESS NOTES
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 Wang              Has undergone several rounds of chemotherapy last was 1 week ago              Recent PET scan last month with increased esophageal mass and lymphadenopathy              Planning to start radiation therapy in the near future  Able to take liquids by mouth              Otherwise has a G-tube in place for feedings  Recent hernia mesh repair surgery 2 weeks ago, using Motrin postop              Now stopped  Morning of admission with sudden onset nausea vomiting              Initially dark red blood but then increasingly bright red              Multiple episodes              No abdominal pain or melena or bright red blood per rectum  Does not take any anticoagulants  Came to emergency room actively vomiting blood  Given tranexamic acid --> bleeding resolved in ED  Seen by GI  EGD in ED with extensive blood clots in the esophagus              Could not see enough to find a bleeding spot, not treated locally   They were not able to enter the stomach  No bleeding since the emergency room, no further nausea or vomiting   Did have a dark bowel movement without alesia blood  GI signed off already  Tolerating clear liquid diet and resume tube feeds  po PPI BID  Check in a.m. hemoglobin, if stable will plan discharge to home     Diabetes mellitus type 2 on glipizide POA A1c 7.3  Hold oral agent, will resume at discharge  Sliding scale insulin  If develops hypoglycemia < 80, will add D5

## 2024-04-28 NOTE — PLAN OF CARE
Problem: Discharge Planning  Goal: Discharge to home or other facility with appropriate resources  4/27/2024 2123 by Wolf Singh RN  Outcome: Progressing  4/27/2024 1041 by Sara Alonzo RN  Outcome: Progressing  Flowsheets (Taken 4/27/2024 0719)  Discharge to home or other facility with appropriate resources:   Arrange for needed discharge resources and transportation as appropriate   Identify barriers to discharge with patient and caregiver     Problem: Safety - Adult  Goal: Free from fall injury  4/27/2024 2123 by Wolf Singh RN  Outcome: Progressing  4/27/2024 1041 by Sara Alonzo RN  Outcome: Progressing  Flowsheets (Taken 4/27/2024 0852)  Free From Fall Injury: Instruct family/caregiver on patient safety     Problem: Pain  Goal: Verbalizes/displays adequate comfort level or baseline comfort level  4/27/2024 2123 by Wolf Singh RN  Outcome: Progressing  4/27/2024 1041 by Sara Alonzo RN  Outcome: Progressing  Flowsheets (Taken 4/27/2024 0719)  Verbalizes/displays adequate comfort level or baseline comfort level:   Encourage patient to monitor pain and request assistance   Assess pain using appropriate pain scale     Problem: ABCDS Injury Assessment  Goal: Absence of physical injury  4/27/2024 2123 by Wolf Singh RN  Outcome: Progressing  4/27/2024 1041 by Sara Alonzo RN  Outcome: Progressing     Problem: Chronic Conditions and Co-morbidities  Goal: Patient's chronic conditions and co-morbidity symptoms are monitored and maintained or improved  Outcome: Progressing

## 2024-04-28 NOTE — DISCHARGE INSTRUCTIONS
Take the lansoprazole twice a day for 2 weeks then resume daily    Avoid NSAIDs like motrin/ibuprofen, aleve/naprosyn    Return to the ER with any recurrent bleeding    Resume tube feeds and diet as before

## 2024-04-28 NOTE — DISCHARGE SUMMARY
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    Code Status: full code      Discharge Medications:  Current Discharge Medication List        CONTINUE these medications which have CHANGED    Details   !! Nutritional Supplements (NUTREN 2.0) LIQD 4 cartons by Per J Tube route daily Nutren 2.0 @ 105ml/hr x 12 hours. Give 150ml water flushes q3 hour while feeds are running. Flush additional 100ml 4 times during daytime.   Will need dual feeding tube pump, backpack, syringes and gauze/tape.  Qty: 100 each, Refills: 0    Associated Diagnoses: GE junction carcinoma (HCC)      lansoprazole (PREVACID) 3 mg/mL oral suspension Take 10 mLs by mouth in the morning and 10 mLs in the evening.  Qty: 300 mL, Refills: 5    Associated Diagnoses: GE junction carcinoma (HCC)       !! - Potential duplicate medications found. Please discuss with provider.        CONTINUE these medications which have NOT CHANGED    Details   glipiZIDE (GLUCOTROL) 5 MG tablet Take 2 tabs before breakfast, 1 tab before lunch, 1 tab before dinner (take 20 minutes before meal)  Qty: 120 tablet, Refills: 3      ondansetron (ZOFRAN) 4 MG/5ML solution 10 mLs by Per G Tube route every 8 hours as needed for Nausea or Vomiting  Qty: 90 mL, Refills: 1    Associated Diagnoses: GE junction carcinoma (HCC)      !! Nutritional Supplements (TWOCAL HN 2.0) LIQD 5.5 Cans by Per G Tube route daily Give 2 cartons in gravity bag twice daily and 1.5 can once daily. Flush with 180ml before and after each

## 2024-04-28 NOTE — PROGRESS NOTES
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 Wang              Has undergone several rounds of chemotherapy last was 1 week ago              Recent PET scan last month with increased esophageal mass and lymphadenopathy              Planning to start radiation therapy in the near future  Able to take liquids by mouth              Otherwise has a G-tube in place for feedings  Recent hernia mesh repair surgery 2 weeks ago, using Motrin postop              Now stopped  Morning of admission with sudden onset nausea vomiting              Initially dark red blood but then increasingly bright red              Multiple episodes              No abdominal pain or melena or bright red blood per rectum  Does not take any anticoagulants  Came to emergency room actively vomiting blood  Given tranexamic acid --> bleeding resolved in ED  Seen by GI  EGD in ED with extensive blood clots in the esophagus              Could not see enough to find a bleeding spot, not treated locally   They were not able to enter the stomach  No further bleeding overnight  Start clear liquid diet and resume tube feeds  Monitor for 24 more hours to see if any recurrence   If no further bleeding and hemoglobin stable then discharged home on twice daily PPI  IV PPI BID     Diabetes mellitus type 2 on glipizide POA A1c 7.3  Hold oral agent, will resume at discharge  Sliding scale insulin  If develops hypoglycemia < 80, will add D5 to IV fluids  , 150, 152     Essential

## 2024-04-29 ENCOUNTER — TELEMEDICINE (OUTPATIENT)
Age: 64
End: 2024-04-29
Payer: COMMERCIAL

## 2024-04-29 DIAGNOSIS — C77.2 MALIGNANT NEOPLASM METASTATIC TO INTRA-ABDOMINAL LYMPH NODE (HCC): ICD-10-CM

## 2024-04-29 DIAGNOSIS — K92.2 ACUTE GI BLEEDING: ICD-10-CM

## 2024-04-29 DIAGNOSIS — C16.0 GE JUNCTION CARCINOMA (HCC): Primary | ICD-10-CM

## 2024-04-29 LAB
ABO + RH BLD: NORMAL
BLD PROD TYP BPU: NORMAL
BLD PROD TYP BPU: NORMAL
BLOOD BANK DISPENSE STATUS: NORMAL
BLOOD BANK DISPENSE STATUS: NORMAL
BLOOD GROUP ANTIBODIES SERPL: NORMAL
BPU ID: NORMAL
BPU ID: NORMAL
CROSSMATCH RESULT: NORMAL
CROSSMATCH RESULT: NORMAL
SPECIMEN EXP DATE BLD: NORMAL
UNIT DIVISION: 0
UNIT DIVISION: 0

## 2024-04-29 PROCEDURE — 1123F ACP DISCUSS/DSCN MKR DOCD: CPT | Performed by: INTERNAL MEDICINE

## 2024-04-29 PROCEDURE — 99215 OFFICE O/P EST HI 40 MIN: CPT | Performed by: INTERNAL MEDICINE

## 2024-04-29 NOTE — PROGRESS NOTES
[facility] [place]  [facility]  [address], [address] [facility], [address]  [place] [address]  [phone]       Hematology Oncology Progress Note      Patient: Cyril Gregg MRN: [id number]  SSN: [id number]    YOB: 1960  Age: 63 y.o.  Sex: male               Reason for Visit:   Cyril Gregg 63 y.o. male White (non-)     who is seen by synchronous (real-time) audio-video technology for follow up of    Diagnosis Orders   1. GE junction carcinoma (HCC)        2. 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 Acute GI bleeding                Diagnosis:     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     Treatment:     Palliative therapy  mFOLFOX + Nivolumab, Ox stopped with cycle 8  5-FU/Nivo, d/c'd due to progression  Cape/Nivolumab, 1 cycle - stopped due to dysphagia    Cyramza/Taxol, 1 cycle    Subjective:      Cyril Gregg is a 63 y.o. male who I am seeing in consultation for a new diagnosis of metastatic GEJ carcinoma. He has been experiencing some difficulty in swallowing for a few months. He has lost over 40 lbs and he is fatigued. An EGD revealed GEJ mass which was biopsied and came back as GEJ carcinoma. CT and a subsequent PET shows enlarged left supraclavicular and RP efrem disease. He has seen Khloe De La Torre at [facility]. He decided to receive systemic therapy with us.     Interval History:     Mr. Gregg received mFOLFOX + Nivolumab for some time. I stopped Ox after 7th tt. I switched to Cape and he was unable to swallow the medicine. He is back on 5-FU + Nivolumab. He experienced disease progression and I have switched his treatment to Cyramza/Taxol. He has seen Palliative and symptoms are well controlled. He is doing well on current treatment. No major side effects. Had inguinal hernia repair. Pain in the RLQ is better. He is able to swallow some

## 2024-04-29 NOTE — PROGRESS NOTES
Pt requested an appointment with  to follow up on plan of care and ER visit/admission 4/26-4/28/24.  1. Have you been to the ER, urgent care clinic since your last visit?  Hospitalized since your last visit?Yes [facility] ER HEMATEMESIS 4/26-4/28/24    2. Have you seen or consulted any other health care providers outside of the [facility] since your last visit?  Include any pap smears or colon screening. No

## 2024-04-30 NOTE — PROGRESS NOTES
Concerned about some discomfort after feeds.  No more leakage.  No nausea or vomiting.    The G-tube site looks good.  No drainage.    Assessment plan: No evidence of blockage.  Recommended doing frequent smaller feedings.  Told to call with any concerns.

## 2024-05-01 ENCOUNTER — HOSPITAL ENCOUNTER (OUTPATIENT)
Facility: HOSPITAL | Age: 64
Setting detail: INFUSION SERIES
Discharge: HOME | End: 2024-05-01
Payer: COMMERCIAL

## 2024-05-01 VITALS
DIASTOLIC BLOOD PRESSURE: 75 MMHG | OXYGEN SATURATION: 97 % | RESPIRATION RATE: 16 BRPM | HEIGHT: 70 IN | TEMPERATURE: 97.8 F | BODY MASS INDEX: 29.92 KG/M2 | SYSTOLIC BLOOD PRESSURE: 149 MMHG | WEIGHT: 209 LBS | HEART RATE: 81 BPM

## 2024-05-01 DIAGNOSIS — C16.0 GE JUNCTION CARCINOMA (HCC): Primary | ICD-10-CM

## 2024-05-01 LAB
ALBUMIN SERPL-MCNC: 3 G/DL (ref 3.5–5)
ALBUMIN/GLOB SERPL: 0.7 (ref 1.1–2.2)
ALP SERPL-CCNC: 123 U/L (ref 45–117)
ALT SERPL-CCNC: 20 U/L (ref 12–78)
ANION GAP SERPL CALC-SCNC: 4 MMOL/L (ref 5–15)
AST SERPL-CCNC: 15 U/L (ref 15–37)
BASO+EOS+MONOS # BLD AUTO: 0.7 K/UL (ref 0.2–1.2)
BASO+EOS+MONOS NFR BLD AUTO: 11 % (ref 3.2–16.9)
BILIRUB SERPL-MCNC: 0.4 MG/DL (ref 0.2–1)
BUN SERPL-MCNC: 15 MG/DL (ref 6–20)
BUN/CREAT SERPL: 21 (ref 12–20)
CALCIUM SERPL-MCNC: 8.9 MG/DL (ref 8.5–10.1)
CHLORIDE SERPL-SCNC: 107 MMOL/L (ref 97–108)
CO2 SERPL-SCNC: 25 MMOL/L (ref 21–32)
CREAT SERPL-MCNC: 0.72 MG/DL (ref 0.7–1.3)
DIFFERENTIAL METHOD BLD: ABNORMAL
ERYTHROCYTE [DISTWIDTH] IN BLOOD BY AUTOMATED COUNT: 13.2 % (ref 11.8–15.8)
GLOBULIN SER CALC-MCNC: 4.1 G/DL (ref 2–4)
GLUCOSE SERPL-MCNC: 203 MG/DL (ref 65–100)
HCT VFR BLD AUTO: 31.3 % (ref 36.6–50.3)
HGB BLD-MCNC: 11.1 G/DL (ref 12.1–17)
LYMPHOCYTES # BLD: 1.5 K/UL (ref 0.8–3.5)
LYMPHOCYTES NFR BLD: 23 % (ref 12–49)
MCH RBC QN AUTO: 33.5 PG (ref 26–34)
MCHC RBC AUTO-ENTMCNC: 35.5 G/DL (ref 30–36.5)
MCV RBC AUTO: 94.6 FL (ref 80–99)
NEUTS SEG # BLD: 4.3 K/UL (ref 1.8–8)
NEUTS SEG NFR BLD: 66 % (ref 32–75)
PLATELET # BLD AUTO: 322 K/UL (ref 150–400)
POTASSIUM SERPL-SCNC: 3.6 MMOL/L (ref 3.5–5.1)
PROT SERPL-MCNC: 7.1 G/DL (ref 6.4–8.2)
RBC # BLD AUTO: 3.31 M/UL (ref 4.1–5.7)
SODIUM SERPL-SCNC: 136 MMOL/L (ref 136–145)
WBC # BLD AUTO: 6.5 K/UL (ref 4.1–11.1)

## 2024-05-01 PROCEDURE — 96375 TX/PRO/DX INJ NEW DRUG ADDON: CPT

## 2024-05-01 PROCEDURE — 96413 CHEMO IV INFUSION 1 HR: CPT

## 2024-05-01 PROCEDURE — 2500000003 HC RX 250 WO HCPCS: Performed by: INTERNAL MEDICINE

## 2024-05-01 PROCEDURE — 80053 COMPREHEN METABOLIC PANEL: CPT

## 2024-05-01 PROCEDURE — 36415 COLL VENOUS BLD VENIPUNCTURE: CPT

## 2024-05-01 PROCEDURE — 6360000002 HC RX W HCPCS: Performed by: INTERNAL MEDICINE

## 2024-05-01 PROCEDURE — 2580000003 HC RX 258: Performed by: INTERNAL MEDICINE

## 2024-05-01 PROCEDURE — 85025 COMPLETE CBC W/AUTO DIFF WBC: CPT

## 2024-05-01 PROCEDURE — 96361 HYDRATE IV INFUSION ADD-ON: CPT

## 2024-05-01 RX ORDER — ONDANSETRON 2 MG/ML
8 INJECTION INTRAMUSCULAR; INTRAVENOUS ONCE
Status: COMPLETED | OUTPATIENT
Start: 2024-05-01 | End: 2024-05-01

## 2024-05-01 RX ORDER — DIPHENHYDRAMINE HYDROCHLORIDE 50 MG/ML
25 INJECTION INTRAMUSCULAR; INTRAVENOUS ONCE
Status: COMPLETED | OUTPATIENT
Start: 2024-05-01 | End: 2024-05-01

## 2024-05-01 RX ORDER — DEXAMETHASONE SODIUM PHOSPHATE 10 MG/ML
10 INJECTION, SOLUTION INTRAMUSCULAR; INTRAVENOUS ONCE
Status: COMPLETED | OUTPATIENT
Start: 2024-05-01 | End: 2024-05-01

## 2024-05-01 RX ORDER — 0.9 % SODIUM CHLORIDE 0.9 %
1000 INTRAVENOUS SOLUTION INTRAVENOUS ONCE
Status: COMPLETED | OUTPATIENT
Start: 2024-05-01 | End: 2024-05-01

## 2024-05-01 RX ORDER — SODIUM CHLORIDE 0.9 % (FLUSH) 0.9 %
5-40 SYRINGE (ML) INJECTION PRN
Status: DISCONTINUED | OUTPATIENT
Start: 2024-05-01 | End: 2024-05-02 | Stop reason: HOSPADM

## 2024-05-01 RX ORDER — SODIUM CHLORIDE 9 MG/ML
5-250 INJECTION, SOLUTION INTRAVENOUS PRN
Status: DISCONTINUED | OUTPATIENT
Start: 2024-05-01 | End: 2024-05-02 | Stop reason: HOSPADM

## 2024-05-01 RX ADMIN — SODIUM CHLORIDE, PRESERVATIVE FREE 20 MG: 5 INJECTION INTRAVENOUS at 13:53

## 2024-05-01 RX ADMIN — SODIUM CHLORIDE, PRESERVATIVE FREE 10 ML: 5 INJECTION INTRAVENOUS at 13:10

## 2024-05-01 RX ADMIN — ONDANSETRON 8 MG: 2 INJECTION INTRAMUSCULAR; INTRAVENOUS at 13:52

## 2024-05-01 RX ADMIN — SODIUM CHLORIDE 25 ML/HR: 9 INJECTION, SOLUTION INTRAVENOUS at 13:51

## 2024-05-01 RX ADMIN — SODIUM CHLORIDE, PRESERVATIVE FREE 10 ML: 5 INJECTION INTRAVENOUS at 15:54

## 2024-05-01 RX ADMIN — SODIUM CHLORIDE 1000 ML: 9 INJECTION, SOLUTION INTRAVENOUS at 14:16

## 2024-05-01 RX ADMIN — DIPHENHYDRAMINE HYDROCHLORIDE 25 MG: 50 INJECTION INTRAMUSCULAR; INTRAVENOUS at 13:55

## 2024-05-01 RX ADMIN — PACLITAXEL 168 MG: 6 INJECTION, SOLUTION, CONCENTRATE INTRAVENOUS at 14:52

## 2024-05-01 RX ADMIN — SODIUM CHLORIDE, PRESERVATIVE FREE 10 ML: 5 INJECTION INTRAVENOUS at 13:15

## 2024-05-01 RX ADMIN — DEXAMETHASONE SODIUM PHOSPHATE 10 MG: 10 INJECTION INTRAMUSCULAR; INTRAVENOUS at 13:58

## 2024-05-01 NOTE — PROGRESS NOTES
[facility] VISIT NOTE    1300  Pt arrived at [facility] ambulatory and in no distress for C2D8 Taxol.  Assessment completed, pt c/o fatigue more this week than last.  He was in the hospital 4/26-28 for esophageal bleeding.  Dr. Wang's note on 4/29/24 says to resume Taxol.  He is scheduled for radiation tomorrow.    Blood pressure (!) 149/75, pulse 81, temperature 97.8 °F (36.6 °C), temperature source Temporal, resp. rate 16, height 1.778 m (5' 10\"), weight 94.8 kg (209 lb), SpO2 97 %.    Right chest port accessed with 0.75 in [place] no difficulty. Positive blood return noted and labs drawn. Lab results are within treatment parameters.    Recent Results (from the past 12 hour(s))   Comprehensive metabolic panel    Collection Time: 05/01/24  1:20 PM   Result Value Ref Range    Sodium 136 136 - 145 mmol/L    Potassium 3.6 3.5 - 5.1 mmol/L    Chloride 107 97 - 108 mmol/L    CO2 25 21 - 32 mmol/L    Anion Gap 4 (L) 5 - 15 mmol/L    Glucose 203 (H) 65 - 100 mg/dL    BUN 15 6 - 20 MG/DL    Creatinine 0.72 0.70 - 1.30 MG/DL    Bun/Cre Ratio 21 (H) 12 - 20      Est, Glom Filt Rate >90 >60 ml/min/1.73m2    Calcium 8.9 8.5 - 10.1 MG/DL    Total Bilirubin 0.4 0.2 - 1.0 MG/DL    ALT 20 12 - 78 U/L    AST 15 15 - 37 U/L    Alk Phosphatase 123 (H) 45 - 117 U/L    Total Protein 7.1 6.4 - 8.2 g/dL    Albumin 3.0 (L) 3.5 - 5.0 g/dL    Globulin 4.1 (H) 2.0 - 4.0 g/dL    Albumin/Globulin Ratio 0.7 (L) 1.1 - 2.2     CBC with Partial Differential    Collection Time: 05/01/24  1:21 PM   Result Value Ref Range    WBC 6.5 4.1 - 11.1 K/uL    RBC 3.31 (L) 4.10 - 5.70 M/uL    Hemoglobin 11.1 (L) 12.1 - 17.0 g/dL    Hematocrit 31.3 (L) 36.6 - 50.3 %    MCV 94.6 80.0 - 99.0 FL    MCH 33.5 26.0 - 34.0 PG    MCHC 35.5 30.0 - 36.5 g/dL    RDW 13.2 11.8 - 15.8 %    Platelets 322 150 - 400 K/uL    Neutrophils % 66 32 - 75 %    Mixed Cells 11 3.2 - 16.9 %    Lymphocytes % 23 12 - 49 %    Neutrophils Absolute 4.3 1.8 - 8.0 K/UL    ABSOLUTE MIXED CELLS  0.7 0.2 - 1.2 K/uL    Lymphocytes Absolute 1.5 0.8 - 3.5 K/UL    Differential Type AUTOMATED       Medications received:  Medications Administered         0.9 % sodium chloride infusion Admin Date  05/01/2024 Action  New Bag Dose  25 mL/hr Rate  25 mL/hr Route  IntraVENous Administered By  Tom Peña RN        dexAMETHasone (PF) (DECADRON) injection 10 mg Admin Date  05/01/2024 Action  Given Dose  10 mg Rate   Route  IntraVENous Administered By  Tom Peña RN        diphenhydrAMINE (BENADRYL) injection 25 mg Admin Date  05/01/2024 Action  Given Dose  25 mg Rate   Route  IntraVENous Administered By  Tom Peña RN        famotidine (PEPCID) 20 mg in sodium chloride (PF) 0.9 % 10 mL injection Admin Date  05/01/2024 Action  Given Dose  20 mg Rate   Route  IntraVENous Administered By  Tom Peña RN        ondansetron (ZOFRAN) injection 8 mg Admin Date  05/01/2024 Action  Given Dose  8 mg Rate   Route  IntraVENous Administered By  Tom Peña RN        PACLitaxel (TAXOL) 168 mg in sodium chloride 0.9 % 250 mL chemo infusion Admin Date  05/01/2024 Action  New Bag Dose  168 mg Rate  303 mL/hr Route  IntraVENous Administered By  Tom Peña RN        sodium chloride 0.9 % bolus 1,000 mL Admin Date  05/01/2024 Action  New Bag Dose  1,000 mL Rate  983.6 mL/hr Route  IntraVENous Administered By  Tom Peña RN        sodium chloride flush 0.9 % injection 5-40 mL Admin Date  05/01/2024 Action  Given Dose  10 mL Rate   Route  IntraVENous Administered By  Tom Peña RN        sodium chloride flush 0.9 % injection 5-40 mL Admin Date  05/01/2024 Action  Given Dose  10 mL Rate   Route  IntraVENous Administered By  Tom Peña RN        sodium chloride flush 0.9 % injection 5-40 mL Admin Date  05/01/2024 Action  Given Dose  10 mL Rate   Route  IntraVENous Administered By  Tom Peña RN          Two nurses verified prior to administering:  Drug name  Drug dose  Infusion

## 2024-05-02 ENCOUNTER — TELEPHONE (OUTPATIENT)
Age: 64
End: 2024-05-02

## 2024-05-02 NOTE — TELEPHONE ENCOUNTER
[facility] [place] at [facility]   [address] [place] [address]   W: [phone]  F: [phone]    Medical Nutrition Therapy  Nutrition Encounter:     Wife called and left message.  Called wife back.  He was in the hospital due to vomiting blood.      Came home on Sunday evening.  He has been having diarrhea after each feed.    He has been using the gravity bags with 1 carton of TwoCal HN. Flushing with water before and after.      Diagnosis: Metastatic GEJ carcinoma   Diabetic   Barium swallow esophogram on 5/12/23 showed:  There is narrowing of the distal esophagus just above the hernia which demonstrates relatively smooth margins and may represent a benign stricture. 12 mm barium tablet was retained in this region.  PET scan showed GE junction mass.   Treatment course: 5FU + nivolumab     Ht Readings from Last 1 Encounters:   05/01/24 1.778 m (5' 10\")       Wt Readings from Last 5 Encounters:   05/01/24 94.8 kg (209 lb)   04/28/24 96.3 kg (212 lb 4.9 oz)   04/25/24 99.2 kg (218 lb 12.8 oz)   04/24/24 97.9 kg (215 lb 12.8 oz)   04/24/24 97.9 kg (215 lb 13.3 oz)       Estimated Nutrition Needs:   Calorie Range: 2400-2875kcal/day      Protein Range: 95-114g/day     Fluid Needs: 2000ml      Plan:  - Add 4 oz of water to gravity bag and slow feeds down.  - Try imodium 1 tablet (7.5ml) before each feed x 3 feeds and then see if diarrhea persists.     Signed By: EDWIGE BUNCH RD

## 2024-05-03 ENCOUNTER — TELEPHONE (OUTPATIENT)
Age: 64
End: 2024-05-03

## 2024-05-03 DIAGNOSIS — C16.0 GE JUNCTION CARCINOMA (HCC): ICD-10-CM

## 2024-05-03 RX ORDER — 0.9 % SODIUM CHLORIDE 0.9 %
1000 INTRAVENOUS SOLUTION INTRAVENOUS ONCE
Status: CANCELLED | OUTPATIENT
Start: 2024-05-03 | End: 2024-05-03

## 2024-05-03 NOTE — TELEPHONE ENCOUNTER
Pt called because he is getting radiation and they want him to take Lansoprazole twice a day, instead of the prescribed 1 time a day. Pt stated that he did speak with pharm they advised if a new RX is sent today they will be refill it because he is down to his last pill. Pt asked for rx to be sent to [facility] [phone]. Pt also asked for a [employer] message letting him know it was completed.

## 2024-05-03 NOTE — TELEPHONE ENCOUNTER
Responded to via [employer].    Approved per VORB from     Will increase dose to 600mL with 2 refills since he is taking it twice a day now.    Requested Prescriptions     Pending Prescriptions Disp Refills    lansoprazole (PREVACID) 3 mg/mL oral suspension 600 mL 2     Sig: Take 10 mLs by mouth in the morning and 10 mLs in the evening.

## 2024-05-06 NOTE — PROGRESS NOTES
Cyril Gregg is a 63 y.o. male here for treatment for GE Junction cancer.  -10 lbs since last office visit.  Having a hard time getting his feedings right now.  Felt terrible after feeding yesterday morning and then started with diarrhea. Taking imodium last 4 days which has helped some.  He feels drained and extremely fatigued.   Has received 2 radiation treatment and then machine broke and missed last two.    1. Have you been to the ER, urgent care clinic since your last visit?  Hospitalized since your last visit?  4/26/24 - 4/28/24 GI bleed    2. Have you seen or consulted any other health care providers outside of the [facility] since your last visit?  Include any pap smears or colon screening. Dr Jamil

## 2024-05-08 ENCOUNTER — HOSPITAL ENCOUNTER (OUTPATIENT)
Facility: HOSPITAL | Age: 64
Setting detail: INFUSION SERIES
Discharge: HOME | End: 2024-05-08
Payer: COMMERCIAL

## 2024-05-08 ENCOUNTER — OFFICE VISIT (OUTPATIENT)
Age: 64
End: 2024-05-08
Payer: COMMERCIAL

## 2024-05-08 ENCOUNTER — TELEPHONE (OUTPATIENT)
Age: 64
End: 2024-05-08

## 2024-05-08 VITALS
SYSTOLIC BLOOD PRESSURE: 138 MMHG | TEMPERATURE: 98 F | OXYGEN SATURATION: 98 % | BODY MASS INDEX: 29.23 KG/M2 | WEIGHT: 204.15 LBS | HEART RATE: 88 BPM | DIASTOLIC BLOOD PRESSURE: 77 MMHG | HEIGHT: 70 IN

## 2024-05-08 VITALS
BODY MASS INDEX: 29.22 KG/M2 | DIASTOLIC BLOOD PRESSURE: 81 MMHG | TEMPERATURE: 98 F | RESPIRATION RATE: 16 BRPM | HEIGHT: 70 IN | WEIGHT: 204.1 LBS | HEART RATE: 71 BPM | SYSTOLIC BLOOD PRESSURE: 150 MMHG | OXYGEN SATURATION: 98 %

## 2024-05-08 DIAGNOSIS — C77.2 MALIGNANT NEOPLASM METASTATIC TO INTRA-ABDOMINAL LYMPH NODE (HCC): ICD-10-CM

## 2024-05-08 DIAGNOSIS — C16.0 GE JUNCTION CARCINOMA (HCC): Primary | ICD-10-CM

## 2024-05-08 DIAGNOSIS — R19.7 DIARRHEA OF PRESUMED INFECTIOUS ORIGIN: ICD-10-CM

## 2024-05-08 DIAGNOSIS — Z51.11 ENCOUNTER FOR ANTINEOPLASTIC CHEMOTHERAPY: ICD-10-CM

## 2024-05-08 LAB
ALBUMIN SERPL-MCNC: 3 G/DL (ref 3.5–5)
ALBUMIN/GLOB SERPL: 0.8 (ref 1.1–2.2)
ALP SERPL-CCNC: 119 U/L (ref 45–117)
ALT SERPL-CCNC: 16 U/L (ref 12–78)
ANION GAP SERPL CALC-SCNC: 6 MMOL/L (ref 5–15)
APPEARANCE UR: CLEAR
AST SERPL-CCNC: 10 U/L (ref 15–37)
BACTERIA URNS QL MICRO: NEGATIVE /HPF
BASO+EOS+MONOS # BLD AUTO: 0.5 K/UL (ref 0.2–1.2)
BASO+EOS+MONOS NFR BLD AUTO: 12 % (ref 3.2–16.9)
BILIRUB SERPL-MCNC: 0.4 MG/DL (ref 0.2–1)
BILIRUB UR QL: NEGATIVE
BUN SERPL-MCNC: 13 MG/DL (ref 6–20)
BUN/CREAT SERPL: 17 (ref 12–20)
CALCIUM SERPL-MCNC: 9.3 MG/DL (ref 8.5–10.1)
CHLORIDE SERPL-SCNC: 107 MMOL/L (ref 97–108)
CO2 SERPL-SCNC: 24 MMOL/L (ref 21–32)
COLOR UR: ABNORMAL
CREAT SERPL-MCNC: 0.75 MG/DL (ref 0.7–1.3)
DIFFERENTIAL METHOD BLD: ABNORMAL
EPITH CASTS URNS QL MICRO: ABNORMAL /LPF
ERYTHROCYTE [DISTWIDTH] IN BLOOD BY AUTOMATED COUNT: 13.8 % (ref 11.8–15.8)
GLOBULIN SER CALC-MCNC: 4 G/DL (ref 2–4)
GLUCOSE SERPL-MCNC: 159 MG/DL (ref 65–100)
GLUCOSE UR STRIP.AUTO-MCNC: NEGATIVE MG/DL
HCT VFR BLD AUTO: 32.2 % (ref 36.6–50.3)
HGB BLD-MCNC: 11.4 G/DL (ref 12.1–17)
HGB UR QL STRIP: NEGATIVE
HYALINE CASTS URNS QL MICRO: ABNORMAL /LPF (ref 0–2)
KETONES UR QL STRIP.AUTO: NEGATIVE MG/DL
LEUKOCYTE ESTERASE UR QL STRIP.AUTO: NEGATIVE
LYMPHOCYTES # BLD: 0.7 K/UL (ref 0.8–3.5)
LYMPHOCYTES NFR BLD: 19 % (ref 12–49)
MCH RBC QN AUTO: 33.5 PG (ref 26–34)
MCHC RBC AUTO-ENTMCNC: 35.4 G/DL (ref 30–36.5)
MCV RBC AUTO: 94.7 FL (ref 80–99)
NEUTS SEG # BLD: 2.6 K/UL (ref 1.8–8)
NEUTS SEG NFR BLD: 68 % (ref 32–75)
NITRITE UR QL STRIP.AUTO: NEGATIVE
PH UR STRIP: 6 (ref 5–8)
PLATELET # BLD AUTO: 362 K/UL (ref 150–400)
POTASSIUM SERPL-SCNC: 3.3 MMOL/L (ref 3.5–5.1)
PROT SERPL-MCNC: 7 G/DL (ref 6.4–8.2)
PROT UR STRIP-MCNC: ABNORMAL MG/DL
RBC # BLD AUTO: 3.4 M/UL (ref 4.1–5.7)
RBC #/AREA URNS HPF: ABNORMAL /HPF (ref 0–5)
SODIUM SERPL-SCNC: 137 MMOL/L (ref 136–145)
SP GR UR REFRACTOMETRY: 1.02
UROBILINOGEN UR QL STRIP.AUTO: 0.2 EU/DL (ref 0.2–1)
WBC # BLD AUTO: 3.8 K/UL (ref 4.1–11.1)
WBC URNS QL MICRO: ABNORMAL /HPF (ref 0–4)

## 2024-05-08 PROCEDURE — 1123F ACP DISCUSS/DSCN MKR DOCD: CPT | Performed by: INTERNAL MEDICINE

## 2024-05-08 PROCEDURE — 2580000003 HC RX 258: Performed by: INTERNAL MEDICINE

## 2024-05-08 PROCEDURE — 3078F DIAST BP <80 MM HG: CPT | Performed by: INTERNAL MEDICINE

## 2024-05-08 PROCEDURE — 3075F SYST BP GE 130 - 139MM HG: CPT | Performed by: INTERNAL MEDICINE

## 2024-05-08 PROCEDURE — 2500000003 HC RX 250 WO HCPCS: Performed by: INTERNAL MEDICINE

## 2024-05-08 PROCEDURE — 96413 CHEMO IV INFUSION 1 HR: CPT

## 2024-05-08 PROCEDURE — 6360000002 HC RX W HCPCS: Performed by: INTERNAL MEDICINE

## 2024-05-08 PROCEDURE — 99215 OFFICE O/P EST HI 40 MIN: CPT | Performed by: INTERNAL MEDICINE

## 2024-05-08 PROCEDURE — 80053 COMPREHEN METABOLIC PANEL: CPT

## 2024-05-08 PROCEDURE — 2580000003 HC RX 258: Performed by: NURSE PRACTITIONER

## 2024-05-08 PROCEDURE — 96375 TX/PRO/DX INJ NEW DRUG ADDON: CPT

## 2024-05-08 PROCEDURE — 96361 HYDRATE IV INFUSION ADD-ON: CPT

## 2024-05-08 PROCEDURE — 36415 COLL VENOUS BLD VENIPUNCTURE: CPT

## 2024-05-08 PROCEDURE — 85025 COMPLETE CBC W/AUTO DIFF WBC: CPT

## 2024-05-08 PROCEDURE — A4216 STERILE WATER/SALINE, 10 ML: HCPCS | Performed by: INTERNAL MEDICINE

## 2024-05-08 PROCEDURE — 81001 URINALYSIS AUTO W/SCOPE: CPT

## 2024-05-08 RX ORDER — ACETAMINOPHEN 325 MG/1
650 TABLET ORAL
Status: CANCELLED | OUTPATIENT
Start: 2024-05-08

## 2024-05-08 RX ORDER — SODIUM CHLORIDE 0.9 % (FLUSH) 0.9 %
5-40 SYRINGE (ML) INJECTION PRN
Status: DISCONTINUED | OUTPATIENT
Start: 2024-05-08 | End: 2024-05-09 | Stop reason: HOSPADM

## 2024-05-08 RX ORDER — ONDANSETRON 2 MG/ML
8 INJECTION INTRAMUSCULAR; INTRAVENOUS ONCE
Status: COMPLETED | OUTPATIENT
Start: 2024-05-08 | End: 2024-05-08

## 2024-05-08 RX ORDER — 0.9 % SODIUM CHLORIDE 0.9 %
1000 INTRAVENOUS SOLUTION INTRAVENOUS ONCE
Status: COMPLETED | OUTPATIENT
Start: 2024-05-08 | End: 2024-05-08

## 2024-05-08 RX ORDER — SODIUM CHLORIDE 9 MG/ML
5-250 INJECTION, SOLUTION INTRAVENOUS PRN
Status: DISCONTINUED | OUTPATIENT
Start: 2024-05-08 | End: 2024-05-09 | Stop reason: HOSPADM

## 2024-05-08 RX ORDER — SODIUM CHLORIDE 9 MG/ML
INJECTION, SOLUTION INTRAVENOUS CONTINUOUS
Status: CANCELLED | OUTPATIENT
Start: 2024-05-08

## 2024-05-08 RX ORDER — 0.9 % SODIUM CHLORIDE 0.9 %
1000 INTRAVENOUS SOLUTION INTRAVENOUS ONCE
Status: CANCELLED | OUTPATIENT
Start: 2024-05-08 | End: 2024-05-08

## 2024-05-08 RX ORDER — DIPHENHYDRAMINE HYDROCHLORIDE 50 MG/ML
25 INJECTION INTRAMUSCULAR; INTRAVENOUS ONCE
Status: COMPLETED | OUTPATIENT
Start: 2024-05-08 | End: 2024-05-08

## 2024-05-08 RX ORDER — SODIUM CHLORIDE 0.9 % (FLUSH) 0.9 %
5-40 SYRINGE (ML) INJECTION PRN
Status: CANCELLED | OUTPATIENT
Start: 2024-05-08

## 2024-05-08 RX ORDER — SODIUM CHLORIDE 9 MG/ML
5-250 INJECTION, SOLUTION INTRAVENOUS PRN
Status: CANCELLED | OUTPATIENT
Start: 2024-05-08

## 2024-05-08 RX ORDER — ONDANSETRON 2 MG/ML
8 INJECTION INTRAMUSCULAR; INTRAVENOUS
Status: CANCELLED | OUTPATIENT
Start: 2024-05-08

## 2024-05-08 RX ORDER — ALBUTEROL SULFATE 90 UG/1
4 AEROSOL, METERED RESPIRATORY (INHALATION) PRN
Status: CANCELLED | OUTPATIENT
Start: 2024-05-08

## 2024-05-08 RX ORDER — HEPARIN 100 UNIT/ML
500 SYRINGE INTRAVENOUS PRN
Status: CANCELLED | OUTPATIENT
Start: 2024-05-08

## 2024-05-08 RX ORDER — DIPHENHYDRAMINE HYDROCHLORIDE 50 MG/ML
50 INJECTION INTRAMUSCULAR; INTRAVENOUS
Status: CANCELLED | OUTPATIENT
Start: 2024-05-08

## 2024-05-08 RX ORDER — DEXAMETHASONE SODIUM PHOSPHATE 10 MG/ML
10 INJECTION, SOLUTION INTRAMUSCULAR; INTRAVENOUS ONCE
Status: COMPLETED | OUTPATIENT
Start: 2024-05-08 | End: 2024-05-08

## 2024-05-08 RX ORDER — EPINEPHRINE 1 MG/ML
0.3 INJECTION, SOLUTION INTRAMUSCULAR; SUBCUTANEOUS PRN
Status: CANCELLED | OUTPATIENT
Start: 2024-05-08

## 2024-05-08 RX ADMIN — SODIUM CHLORIDE 168 MG: 9 INJECTION, SOLUTION INTRAVENOUS at 11:25

## 2024-05-08 RX ADMIN — ONDANSETRON 8 MG: 2 INJECTION INTRAMUSCULAR; INTRAVENOUS at 10:44

## 2024-05-08 RX ADMIN — DIPHENHYDRAMINE HYDROCHLORIDE 25 MG: 50 INJECTION, SOLUTION INTRAMUSCULAR; INTRAVENOUS at 10:49

## 2024-05-08 RX ADMIN — FAMOTIDINE 20 MG: 10 INJECTION, SOLUTION INTRAVENOUS at 10:46

## 2024-05-08 RX ADMIN — SODIUM CHLORIDE, PRESERVATIVE FREE 10 ML: 5 INJECTION INTRAVENOUS at 12:32

## 2024-05-08 RX ADMIN — SODIUM CHLORIDE 1000 ML: 9 INJECTION, SOLUTION INTRAVENOUS at 10:12

## 2024-05-08 RX ADMIN — DEXAMETHASONE SODIUM PHOSPHATE 10 MG: 10 INJECTION, SOLUTION INTRAMUSCULAR; INTRAVENOUS at 10:47

## 2024-05-08 RX ADMIN — SODIUM CHLORIDE, PRESERVATIVE FREE 10 ML: 5 INJECTION INTRAVENOUS at 09:00

## 2024-05-08 NOTE — TELEPHONE ENCOUNTER
[facility] [place] at [facility]   [address] [place] [address]   W: [phone]  F: [phone]    Medical Nutrition Therapy  Nutrition Encounter:     Wife called.  Patient was in the office today and given ongoing diarrhea MD discussed feeding pump.    Wife reports since talking last week the diarrhea has persisted, despite slowing down the gravity feeds, diluting feeds and adding imodium. She reports [facility] and often run slowly then will dump the remaining contents. She indicates the bags from the hospital work better.    Discussed the feeding pump option, he has been reluctant to try in the past. He already has difficulty sleeping so he would not like the feeds to run overnight.       Discussed trial of gravity bags from office (brought to patient to try).      He has also been able to eat a little yogurt and gatorade by mouth.      Diagnosis: Metastatic GEJ carcinoma   Diabetic   Barium swallow esophogram on 5/12/23 showed:  There is narrowing of the distal esophagus just above the hernia which demonstrates relatively smooth margins and may represent a benign stricture. 12 mm barium tablet was retained in this region.  PET scan showed GE junction mass.   Treatment course: 5FU + nivolumab     Ht Readings from Last 1 Encounters:   05/08/24 1.778 m (5' 10\")       Wt Readings from Last 5 Encounters:   05/08/24 92.6 kg (204 lb 2.3 oz)   05/08/24 92.6 kg (204 lb 1.6 oz)   05/01/24 94.8 kg (209 lb)   04/28/24 96.3 kg (212 lb 4.9 oz)   04/25/24 99.2 kg (218 lb 12.8 oz)       Estimated Nutrition Needs:   Calorie Range: 2400-2875kcal/day      Protein Range: 95-114g/day     Fluid Needs: 2000ml      Plan:  - Add lomotil  - Check for cdiff  - Different gravity bags provided as a trial.   - Continue imodium.     Signed By: EDWIGE BUNCH RD

## 2024-05-08 NOTE — PROGRESS NOTES
[facility] Visit Note    Pt arrived to [facility] ambulatory in no acute distress for C2D15 Cyramza/Taxol + Hydration.  Assessment unremarkable except fatigue, nausea, and diarrhea.  R chest port accessed without issue and positive blood return noted. Patient to MD office for appt.      Holding Cyramza per Dr. Wang's office until patient is done with radiation.    Labs obtained - CBC w/ diff, CMP, UA  Recent Results (from the past 12 hour(s))   Urinalysis    Collection Time: 05/08/24  8:57 AM   Result Value Ref Range    Color, UA YELLOW/STRAW      Appearance CLEAR CLEAR      Specific Gravity, UA 1.018      pH, Urine 6.0 5.0 - 8.0      Protein, UA TRACE (A) NEG mg/dL    Glucose, Ur Negative NEG mg/dL    Ketones, Urine Negative NEG mg/dL    Bilirubin, Urine Negative NEG      Blood, Urine Negative NEG      Urobilinogen, Urine 0.2 0.2 - 1.0 EU/dL    Nitrite, Urine Negative NEG      Leukocyte Esterase, Urine Negative NEG      WBC, UA 0-4 0 - 4 /hpf    RBC, UA 0-5 0 - 5 /hpf    Epithelial Cells UA FEW FEW /lpf    BACTERIA, URINE Negative NEG /hpf    Hyaline Casts, UA 0-2 0 - 2 /lpf   Comprehensive metabolic panel    Collection Time: 05/08/24  8:57 AM   Result Value Ref Range    Sodium 137 136 - 145 mmol/L    Potassium 3.3 (L) 3.5 - 5.1 mmol/L    Chloride 107 97 - 108 mmol/L    CO2 24 21 - 32 mmol/L    Anion Gap 6 5 - 15 mmol/L    Glucose 159 (H) 65 - 100 mg/dL    BUN 13 6 - 20 MG/DL    Creatinine 0.75 0.70 - 1.30 MG/DL    Bun/Cre Ratio 17 12 - 20      Est, Glom Filt Rate >90 >60 ml/min/1.73m2    Calcium 9.3 8.5 - 10.1 MG/DL    Total Bilirubin 0.4 0.2 - 1.0 MG/DL    ALT 16 12 - 78 U/L    AST 10 (L) 15 - 37 U/L    Alk Phosphatase 119 (H) 45 - 117 U/L    Total Protein 7.0 6.4 - 8.2 g/dL    Albumin 3.0 (L) 3.5 - 5.0 g/dL    Globulin 4.0 2.0 - 4.0 g/dL    Albumin/Globulin Ratio 0.8 (L) 1.1 - 2.2     CBC with Partial Differential    Collection Time: 05/08/24  8:58 AM   Result Value Ref Range    WBC  Route  IntraVENous Administered By  Yu Walden, RN        sodium chloride flush 0.9 % injection 5-40 mL Admin Date  05/08/2024 Action  Given Dose  10 mL Rate   Route  IntraVENous Administered By  Yu Walden, RN        sodium chloride flush 0.9 % injection 5-40 mL Admin Date  05/08/2024 Action  Given Dose  10 mL Rate   Route  IntraVENous Administered By  Yu Walden, RN          Patient Vitals for the past 24 hrs:   BP Temp Pulse Resp SpO2 Height Weight   05/08/24 1227 (!) 150/81 -- 71 16 -- -- --   05/08/24 0845 127/77 98 °F (36.7 °C) 80 16 98 % 1.778 m (5' 10\") 92.6 kg (204 lb 1.6 oz)     Pt tolerated treatment well. Port flushed per policy and de-accessed, 2x2 and tape placed.  Pt discharged ambulatory in no acute distress, accompanied by self. Next appointment 5/15/24.

## 2024-05-08 NOTE — PROGRESS NOTES
[facility] [place]  at [facility]  [address], [address]  [place] [address]  [phone]       Hematology Oncology Progress Note      Patient: Cyril Gregg MRN: [id number]  SSN: [id number]    YOB: 1960  Age: 63 y.o.  Sex: male         Diagnosis:     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     Treatment:     Palliative therapy  mFOLFOX + Nivolumab, Ox stopped with cycle 8  5-FU/Nivo, d/c'd due to progression  Cape/Nivolumab, 1 cycle - stopped due to dysphagia    Cyramza/Taxol, cycle 2 day 15    Subjective:      Cyril Gregg is a 63 y.o. male who I am seeing in consultation for a new diagnosis of metastatic GEJ carcinoma. He has been experiencing some difficulty in swallowing for a few months. He has lost over 40 lbs and he is fatigued. An EGD revealed GEJ mass which was biopsied and came back as GEJ carcinoma. CT and a subsequent PET shows enlarged left supraclavicular and RP efrem disease. He has seen Khloe De La Torre at [facility]. He decided to receive systemic therapy with us.     Interval History:     Mr. Gregg received mFOLFOX + Nivolumab for some time. I stopped Ox after 7th tt. I switched to Cape and he was unable to swallow the medicine. He is back on 5-FU + Nivolumab. He experienced disease progression and I have switched his treatment to Cyramza/Taxol. He has seen Palliative and symptoms are well controlled. He is doing well on current treatment. No major side effects. Had inguinal hernia repair. Pain in the RLQ is better. He is able to swallow some liquid.     He was admitted to the hospital with hematemesis. Bleeding subsided on its own. He is receiving systemic therapy. He has lost about 10 lbs of weight. He is also experiencing diarrhea for last few days. Tube Feed is making him bloated and nauseous.       Review of Systems:  Constitutional: negative  Eyes:

## 2024-05-09 DIAGNOSIS — K52.1 DIARRHEA DUE TO DRUG: Primary | ICD-10-CM

## 2024-05-09 DIAGNOSIS — R19.7 DIARRHEA OF PRESUMED INFECTIOUS ORIGIN: ICD-10-CM

## 2024-05-09 RX ORDER — DIPHENOXYLATE HYDROCHLORIDE AND ATROPINE SULFATE 2.5; .025 MG/1; MG/1
1 TABLET ORAL 4 TIMES DAILY PRN
Qty: 30 TABLET | Refills: 0 | Status: SHIPPED | OUTPATIENT
Start: 2024-05-09 | End: 2024-05-19

## 2024-05-10 ENCOUNTER — APPOINTMENT (OUTPATIENT)
Facility: HOSPITAL | Age: 64
End: 2024-05-10
Payer: COMMERCIAL

## 2024-05-10 ENCOUNTER — TELEPHONE (OUTPATIENT)
Age: 64
End: 2024-05-10

## 2024-05-10 LAB
C DIFF GDH STL QL: NEGATIVE
C DIFF TOX A+B STL QL IA: NEGATIVE
C DIFF TOXIN INTERPRETATION: NORMAL
CRYPTOSP AG STL QL: NEGATIVE
G LAMBLIA AG STL QL: NEGATIVE

## 2024-05-10 NOTE — TELEPHONE ENCOUNTER
[facility] [place] at [facility]   [address] [place] [address]   W: [phone]  F: [phone]    Medical Nutrition Therapy  Nutrition Encounter:     Wife called.    They attempted to crush lomotil, felt like most of it was in . Then tried it with yogurt.   This morning they dissolved it in the water this morning.      The gravity bags provided by office are working better, more controlled.  He is averaging about 2 cartons of TwoCal HN daily, but losing a lot from diarrhea.        He has also been able to eat a little yogurt and gatorade by mouth.      Diagnosis: Metastatic GEJ carcinoma   Diabetic   Barium swallow esophogram on 5/12/23 showed:  There is narrowing of the distal esophagus just above the hernia which demonstrates relatively smooth margins and may represent a benign stricture. 12 mm barium tablet was retained in this region.  PET scan showed GE junction mass.   Treatment course: 5FU + nivolumab     Ht Readings from Last 1 Encounters:   05/08/24 1.778 m (5' 10\")       Wt Readings from Last 5 Encounters:   05/08/24 92.6 kg (204 lb 1.6 oz)   05/08/24 92.6 kg (204 lb 2.3 oz)   05/01/24 94.8 kg (209 lb)   04/28/24 96.3 kg (212 lb 4.9 oz)   04/25/24 99.2 kg (218 lb 12.8 oz)       Estimated Nutrition Needs:   Calorie Range: 2400-2875kcal/day      Protein Range: 95-114g/day     Fluid Needs: 2000ml      Plan:  -Cdiff pending  - Take lomotil 4 times daily.  Take 2 doses imodium after 1st loose stool, then 1 dose after each up to 8 daily.   - Case of alternative formula to be dropped off as a trial - Cynvec 1.4 Peptide   - Hydration scheduled for Sunday at 10am at [facility]     Signed By: EDWIGE BUNCH RD

## 2024-05-12 ENCOUNTER — HOSPITAL ENCOUNTER (OUTPATIENT)
Facility: HOSPITAL | Age: 64
Setting detail: INFUSION SERIES
Discharge: HOME | End: 2024-05-12
Payer: COMMERCIAL

## 2024-05-12 VITALS
OXYGEN SATURATION: 97 % | HEART RATE: 91 BPM | TEMPERATURE: 98.4 F | SYSTOLIC BLOOD PRESSURE: 122 MMHG | DIASTOLIC BLOOD PRESSURE: 69 MMHG | RESPIRATION RATE: 18 BRPM

## 2024-05-12 DIAGNOSIS — C16.0 GE JUNCTION CARCINOMA (HCC): Primary | ICD-10-CM

## 2024-05-12 PROCEDURE — 2580000003 HC RX 258: Performed by: NURSE PRACTITIONER

## 2024-05-12 PROCEDURE — 96360 HYDRATION IV INFUSION INIT: CPT

## 2024-05-12 RX ORDER — 0.9 % SODIUM CHLORIDE 0.9 %
1000 INTRAVENOUS SOLUTION INTRAVENOUS ONCE
Status: CANCELLED | OUTPATIENT
Start: 2024-05-12 | End: 2024-05-12

## 2024-05-12 RX ORDER — ALBUTEROL SULFATE 90 UG/1
4 AEROSOL, METERED RESPIRATORY (INHALATION) PRN
Status: CANCELLED | OUTPATIENT
Start: 2024-05-12

## 2024-05-12 RX ORDER — 0.9 % SODIUM CHLORIDE 0.9 %
1000 INTRAVENOUS SOLUTION INTRAVENOUS ONCE
Status: COMPLETED | OUTPATIENT
Start: 2024-05-12 | End: 2024-05-12

## 2024-05-12 RX ORDER — SODIUM CHLORIDE 0.9 % (FLUSH) 0.9 %
5-40 SYRINGE (ML) INJECTION PRN
Status: CANCELLED | OUTPATIENT
Start: 2024-05-12

## 2024-05-12 RX ORDER — EPINEPHRINE 1 MG/ML
0.3 INJECTION, SOLUTION INTRAMUSCULAR; SUBCUTANEOUS PRN
Status: CANCELLED | OUTPATIENT
Start: 2024-05-12

## 2024-05-12 RX ORDER — HEPARIN 100 UNIT/ML
500 SYRINGE INTRAVENOUS PRN
Status: CANCELLED | OUTPATIENT
Start: 2024-05-12

## 2024-05-12 RX ORDER — ACETAMINOPHEN 325 MG/1
650 TABLET ORAL
Status: CANCELLED | OUTPATIENT
Start: 2024-05-12

## 2024-05-12 RX ORDER — ONDANSETRON 2 MG/ML
8 INJECTION INTRAMUSCULAR; INTRAVENOUS
Status: CANCELLED | OUTPATIENT
Start: 2024-05-12

## 2024-05-12 RX ORDER — SODIUM CHLORIDE 9 MG/ML
5-250 INJECTION, SOLUTION INTRAVENOUS PRN
Status: CANCELLED | OUTPATIENT
Start: 2024-05-12

## 2024-05-12 RX ORDER — SODIUM CHLORIDE 9 MG/ML
INJECTION, SOLUTION INTRAVENOUS CONTINUOUS
Status: CANCELLED | OUTPATIENT
Start: 2024-05-12

## 2024-05-12 RX ORDER — SODIUM CHLORIDE 0.9 % (FLUSH) 0.9 %
5-40 SYRINGE (ML) INJECTION PRN
Status: DISCONTINUED | OUTPATIENT
Start: 2024-05-12 | End: 2024-05-13 | Stop reason: HOSPADM

## 2024-05-12 RX ORDER — DIPHENHYDRAMINE HYDROCHLORIDE 50 MG/ML
50 INJECTION INTRAMUSCULAR; INTRAVENOUS
Status: CANCELLED | OUTPATIENT
Start: 2024-05-12

## 2024-05-12 RX ADMIN — SODIUM CHLORIDE 1000 ML: 9 INJECTION, SOLUTION INTRAVENOUS at 10:01

## 2024-05-12 RX ADMIN — Medication 20 ML: at 11:10

## 2024-05-12 ASSESSMENT — PAIN SCALES - GENERAL: PAINLEVEL_OUTOF10: 0

## 2024-05-12 NOTE — PROGRESS NOTES
[facility] Progress Note    Date: May 12, 2024        0950: Mr. Gregg arrived to [facility] ambulatory and in stable condition for Hydration. Assessment completed, no acute issues or new complaints at this time. Right chest wall port accessed without difficulty, positive blood return noted.      Mr. Gregg's vitals were reviewed.  Patient Vitals for the past 12 hrs:   Temp Pulse Resp BP SpO2   05/12/24 1110 -- -- -- 122/69 97 %   05/12/24 0953 98.4 °F (36.9 °C) 91 18 131/81 90 %         Medications given:  Medications Administered         sodium chloride 0.9 % bolus 1,000 mL Admin Date  05/12/2024 Action  New Bag Dose  1,000 mL Rate  983.6 mL/hr Route  IntraVENous Administered By  Ana Luisa Dumont, RN        sodium chloride flush 0.9 % injection 5-40 mL Admin Date  05/12/2024 Action  Given Dose  20 mL Rate   Route  IntraVENous Administered By  Ana Luisa Dumont, RN              1115: Patient was discharged in stable condition. Port flushed and de-accessed, gauze and band-aid applied. Patient is aware of next scheduled [facility] appointment on 05/15/24.      Ana Luisa Dumont RN  May 12, 2024

## 2024-05-13 ENCOUNTER — OFFICE VISIT (OUTPATIENT)
Age: 64
End: 2024-05-13

## 2024-05-13 VITALS
TEMPERATURE: 97.7 F | SYSTOLIC BLOOD PRESSURE: 132 MMHG | HEART RATE: 89 BPM | HEIGHT: 70 IN | OXYGEN SATURATION: 97 % | BODY MASS INDEX: 29.49 KG/M2 | DIASTOLIC BLOOD PRESSURE: 82 MMHG | WEIGHT: 206 LBS

## 2024-05-13 DIAGNOSIS — C16.0 GE JUNCTION CARCINOMA (HCC): Primary | ICD-10-CM

## 2024-05-13 DIAGNOSIS — C77.2 MALIGNANT NEOPLASM METASTATIC TO INTRA-ABDOMINAL LYMPH NODE (HCC): ICD-10-CM

## 2024-05-13 DIAGNOSIS — Z48.89 POSTOPERATIVE VISIT: Primary | ICD-10-CM

## 2024-05-13 PROCEDURE — 99024 POSTOP FOLLOW-UP VISIT: CPT | Performed by: SURGERY

## 2024-05-13 RX ORDER — ONDANSETRON 4 MG/1
4 TABLET, ORALLY DISINTEGRATING ORAL
COMMUNITY
Start: 2024-05-03

## 2024-05-13 ASSESSMENT — PATIENT HEALTH QUESTIONNAIRE - PHQ9
SUM OF ALL RESPONSES TO PHQ9 QUESTIONS 1 & 2: 0
SUM OF ALL RESPONSES TO PHQ QUESTIONS 1-9: 0
SUM OF ALL RESPONSES TO PHQ QUESTIONS 1-9: 0
1. LITTLE INTEREST OR PLEASURE IN DOING THINGS: NOT AT ALL
2. FEELING DOWN, DEPRESSED OR HOPELESS: NOT AT ALL
SUM OF ALL RESPONSES TO PHQ QUESTIONS 1-9: 0
SUM OF ALL RESPONSES TO PHQ QUESTIONS 1-9: 0

## 2024-05-13 NOTE — TELEPHONE ENCOUNTER
Called, spoke to Sondra (Pet scan Radiology)  Two pt identifiers confirmed.     Sondra states the wrong Pet Scan was order for the patient ([facility]).  Sondra states the patient needs a regular Pet Scan ordered and faxed over.    Kimberly Castro LPN

## 2024-05-13 NOTE — TELEPHONE ENCOUNTER
Juan Kelsey scheduling department needs clarity on a PET Scan please update order and fax over to [phone] or electronic

## 2024-05-13 NOTE — PROGRESS NOTES
Identified pt with two pt identifiers (name and ). Reviewed chart in preparation for visit and have obtained necessary documentation.    Cyril Gregg is a 63 y.o. male  Chief Complaint   Patient presents with    Post-Op Check     Right inguinal hernia repair with mesh      /82 (Site: Right Upper Arm, Position: Sitting, Cuff Size: Large Adult)   Pulse 89   Temp 97.7 °F (36.5 °C) (Temporal)   Ht 1.778 m (5' 10\")   Wt 93.4 kg (206 lb)   SpO2 97%   BMI 29.56 kg/m²     1. Have you been to the ER, urgent care clinic since your last visit?  Hospitalized since your last visit?yes - [facility] ER VOMIT BLOOD     2. Have you seen or consulted any other health care providers outside of the [facility] since your last visit?  Include any pap smears or colon screening. no

## 2024-05-14 ENCOUNTER — TELEPHONE (OUTPATIENT)
Age: 64
End: 2024-05-14

## 2024-05-14 DIAGNOSIS — K52.1 DIARRHEA DUE TO DRUG: ICD-10-CM

## 2024-05-14 RX ORDER — DIPHENOXYLATE HYDROCHLORIDE AND ATROPINE SULFATE 2.5; .025 MG/1; MG/1
2 TABLET ORAL 4 TIMES DAILY PRN
Qty: 30 TABLET | Refills: 1 | Status: SHIPPED | OUTPATIENT
Start: 2024-05-14 | End: 2024-05-24

## 2024-05-14 NOTE — TELEPHONE ENCOUNTER
[facility] [place] at [facility]   [address] [place] [address]   W: [phone]  F: [phone]    Medical Nutrition Therapy  Nutrition Encounter:     Spoke with wife.  Continues to struggle with diarrhea, reports it's slightly better. Reports can make it a little longer after feeding. He is doing the imodium and lomotil. He did try a new formula, wst[employer]cn Peptide 1.5 which he thinks is helping some.  He is only doing 2 cartons daily. Which is providing 910kcal/day.    Cdiff negative.      He has also been able to eat a little yogurt and gatorade by mouth.      Diagnosis: Metastatic GEJ carcinoma   Diabetic   Barium swallow esophogram on 5/12/23 showed:  There is narrowing of the distal esophagus just above the hernia which demonstrates relatively smooth margins and may represent a benign stricture. 12 mm barium tablet was retained in this region.  PET scan showed GE junction mass.   Treatment course: 5FU + nivolumab     Ht Readings from Last 1 Encounters:   05/13/24 1.778 m (5' 10\")       Wt Readings from Last 5 Encounters:   05/13/24 93.4 kg (206 lb)   05/08/24 92.6 kg (204 lb 1.6 oz)   05/08/24 92.6 kg (204 lb 2.3 oz)   05/01/24 94.8 kg (209 lb)   04/28/24 96.3 kg (212 lb 4.9 oz)       Estimated Nutrition Needs:   Calorie Range: 2400-2875kcal/day      Protein Range: 95-114g/day     Fluid Needs: 2000ml      Plan:  - Take lomotil 4 times daily- increase to 5mg dose 4 times daily.  -  Take 2 doses imodium after 1st loose stool, then 1 dose after each up to 8 daily.   - Add samples given to patient- Let[employer] 1.4 Peptide       Signed By: EDWIGE BUNCH RD

## 2024-05-15 ENCOUNTER — HOSPITAL ENCOUNTER (OUTPATIENT)
Facility: HOSPITAL | Age: 64
Setting detail: INFUSION SERIES
Discharge: HOME | End: 2024-05-15
Payer: COMMERCIAL

## 2024-05-15 VITALS
HEART RATE: 82 BPM | DIASTOLIC BLOOD PRESSURE: 76 MMHG | OXYGEN SATURATION: 98 % | RESPIRATION RATE: 16 BRPM | SYSTOLIC BLOOD PRESSURE: 119 MMHG | TEMPERATURE: 97.3 F

## 2024-05-15 DIAGNOSIS — C16.0 GE JUNCTION CARCINOMA (HCC): Primary | ICD-10-CM

## 2024-05-15 PROCEDURE — 96360 HYDRATION IV INFUSION INIT: CPT

## 2024-05-15 PROCEDURE — 2580000003 HC RX 258: Performed by: NURSE PRACTITIONER

## 2024-05-15 RX ORDER — SODIUM CHLORIDE 9 MG/ML
5-250 INJECTION, SOLUTION INTRAVENOUS PRN
Status: CANCELLED | OUTPATIENT
Start: 2024-05-15

## 2024-05-15 RX ORDER — SODIUM CHLORIDE 0.9 % (FLUSH) 0.9 %
5-40 SYRINGE (ML) INJECTION PRN
Status: CANCELLED | OUTPATIENT
Start: 2024-05-15

## 2024-05-15 RX ORDER — ALBUTEROL SULFATE 90 UG/1
4 AEROSOL, METERED RESPIRATORY (INHALATION) PRN
Status: CANCELLED | OUTPATIENT
Start: 2024-05-15

## 2024-05-15 RX ORDER — DIPHENHYDRAMINE HYDROCHLORIDE 50 MG/ML
50 INJECTION INTRAMUSCULAR; INTRAVENOUS
Status: CANCELLED | OUTPATIENT
Start: 2024-05-15

## 2024-05-15 RX ORDER — 0.9 % SODIUM CHLORIDE 0.9 %
1000 INTRAVENOUS SOLUTION INTRAVENOUS ONCE
Status: COMPLETED | OUTPATIENT
Start: 2024-05-15 | End: 2024-05-15

## 2024-05-15 RX ORDER — HEPARIN 100 UNIT/ML
500 SYRINGE INTRAVENOUS PRN
Status: CANCELLED | OUTPATIENT
Start: 2024-05-15

## 2024-05-15 RX ORDER — SODIUM CHLORIDE 9 MG/ML
INJECTION, SOLUTION INTRAVENOUS CONTINUOUS
Status: CANCELLED | OUTPATIENT
Start: 2024-05-15

## 2024-05-15 RX ORDER — EPINEPHRINE 1 MG/ML
0.3 INJECTION, SOLUTION INTRAMUSCULAR; SUBCUTANEOUS PRN
Status: CANCELLED | OUTPATIENT
Start: 2024-05-15

## 2024-05-15 RX ORDER — ACETAMINOPHEN 325 MG/1
650 TABLET ORAL
Status: CANCELLED | OUTPATIENT
Start: 2024-05-15

## 2024-05-15 RX ORDER — 0.9 % SODIUM CHLORIDE 0.9 %
1000 INTRAVENOUS SOLUTION INTRAVENOUS ONCE
Status: CANCELLED | OUTPATIENT
Start: 2024-05-15 | End: 2024-05-15

## 2024-05-15 RX ORDER — ONDANSETRON 2 MG/ML
8 INJECTION INTRAMUSCULAR; INTRAVENOUS
Status: CANCELLED | OUTPATIENT
Start: 2024-05-15

## 2024-05-15 RX ADMIN — SODIUM CHLORIDE 1000 ML: 9 INJECTION, SOLUTION INTRAVENOUS at 08:45

## 2024-05-15 NOTE — PROGRESS NOTES
Pt arrived at \[facility]\"" ambulatory and in no acute distress for Hydration of 1L NS bolus. Assessment completed, patient voiced new complaint of having diarrhea and mouth sores. Since being sick and having to be hospitalized in late April, patient still has not been able to get his feedings right. Right chest port accessed with a 0.75 inch needle, + BR noted, patient's NS infusing without issue. No labs or MD appointment today.     Patient Vitals for the past 12 hrs:   Temp Pulse Resp BP SpO2   05/15/24 0949 -- 82 -- 119/76 --   05/15/24 0838 97.3 °F (36.3 °C) 90 16 132/83 98 %         Medications Administered         sodium chloride 0.9 % bolus 1,000 mL Admin Date  05/15/2024 Action  New Bag Dose  1,000 mL Rate  983.6 mL/hr Route  IntraVENous Administered By  Tanika Paul, NE            Pt tolerated hydration well, no adverse reaction noted. Port flushed and removed per protocol. Patient D/Cd from \[facility]\"" ambulatory and in no acute distress. He is aware of future appointment.     Future Appointments   Date Time Provider Department Center   5/22/2024  9:00 AM [facility] CHEMO CHAIR 11 Count includes the [facility]   5/22/2024  9:15 AM Daryl Wang MD [facility] BS AMB   5/29/2024  1:00 PM [facility] INF CHAIR 3 Count includes the [facility]   6/5/2024  9:00 AM [facility] CHEMO CHAIR 11 Count includes the [facility]   6/12/2024  9:30 AM [facility] CHAIR 6 Count includes the [facility]   6/19/2024  9:00 AM [facility] CHEMO CHAIR 11 Count includes the [facility]   6/26/2024  1:00 PM [facility] CHAIR 1 Count includes the [facility]   7/2/2024  9:00 AM [facility] PET DOSE 1 [facility]   7/2/2024 10:00 AM [facility] PET 1 [facility]   7/3/2024  9:00 AM [facility] CHAIR 3 Count includes the [facility]   7/10/2024  9:30 AM [facility] CHEMO CHAIR 5 Count includes the [facility]   7/17/2024  9:00 AM [facility] CHEMO CHAIR 2 Count includes the [facility]   7/24/2024  1:00 PM [facility] CHEMO CHAIR 4 Count includes the [facility]   7/31/2024  9:00 AM [facility] CHEMO CHAIR 2 Count includes the [facility]   8/7/2024  9:30 AM [facility] CHEMO CHAIR 5 Count includes the [facility]   8/14/2024  9:00 AM [facility] MED INF CHAIR 3 Count includes the [facility]   8/21/2024  1:00 PM [facility] CHEMO CHAIR 2 Count includes the [facility]   8/28/2024

## 2024-05-15 NOTE — PROGRESS NOTES
Status post robotic right inguinal hernia repair with mesh 4/18/2024.  From that regard he is doing quite well.  Unfortunately he did get admitted to the hospital last week with hematemesis related to his esophageal carcinoma.  He is now begun radiation therapy.  He says he feels great today.    On exam, he looks great.  His repair is intact and the incisions are well-healed.    Assessment plan: Appropriate recovery after inguinal hernia repair.  His G-tube is functioning properly.  Told to call with any concerns.

## 2024-05-22 ENCOUNTER — HOSPITAL ENCOUNTER (OUTPATIENT)
Facility: HOSPITAL | Age: 64
Setting detail: INFUSION SERIES
Discharge: HOME | End: 2024-05-22
Payer: COMMERCIAL

## 2024-05-22 ENCOUNTER — OFFICE VISIT (OUTPATIENT)
Age: 64
End: 2024-05-22
Payer: COMMERCIAL

## 2024-05-22 VITALS
SYSTOLIC BLOOD PRESSURE: 146 MMHG | RESPIRATION RATE: 16 BRPM | TEMPERATURE: 98.6 F | DIASTOLIC BLOOD PRESSURE: 85 MMHG | HEIGHT: 70 IN | WEIGHT: 198 LBS | HEART RATE: 76 BPM | BODY MASS INDEX: 28.35 KG/M2 | OXYGEN SATURATION: 97 %

## 2024-05-22 VITALS
DIASTOLIC BLOOD PRESSURE: 86 MMHG | TEMPERATURE: 98.6 F | HEIGHT: 70 IN | WEIGHT: 197.97 LBS | BODY MASS INDEX: 28.34 KG/M2 | SYSTOLIC BLOOD PRESSURE: 144 MMHG

## 2024-05-22 DIAGNOSIS — C16.0 GE JUNCTION CARCINOMA (HCC): Primary | ICD-10-CM

## 2024-05-22 DIAGNOSIS — C77.2 MALIGNANT NEOPLASM METASTATIC TO INTRA-ABDOMINAL LYMPH NODE (HCC): ICD-10-CM

## 2024-05-22 DIAGNOSIS — E44.0 PROTEIN-CALORIE MALNUTRITION, MODERATE (HCC): ICD-10-CM

## 2024-05-22 LAB
ALBUMIN SERPL-MCNC: 2.8 G/DL (ref 3.5–5)
ALBUMIN/GLOB SERPL: 0.7 (ref 1.1–2.2)
ALP SERPL-CCNC: 123 U/L (ref 45–117)
ALT SERPL-CCNC: 20 U/L (ref 12–78)
ANION GAP SERPL CALC-SCNC: 5 MMOL/L (ref 5–15)
AST SERPL-CCNC: 20 U/L (ref 15–37)
BASOPHILS # BLD: 0.1 K/UL (ref 0–0.1)
BASOPHILS NFR BLD: 1 % (ref 0–1)
BILIRUB SERPL-MCNC: 0.5 MG/DL (ref 0.2–1)
BUN SERPL-MCNC: 25 MG/DL (ref 6–20)
BUN/CREAT SERPL: 37 (ref 12–20)
CALCIUM SERPL-MCNC: 8.9 MG/DL (ref 8.5–10.1)
CHLORIDE SERPL-SCNC: 107 MMOL/L (ref 97–108)
CO2 SERPL-SCNC: 26 MMOL/L (ref 21–32)
CREAT SERPL-MCNC: 0.68 MG/DL (ref 0.7–1.3)
DIFFERENTIAL METHOD BLD: ABNORMAL
EOSINOPHIL # BLD: 0.1 K/UL (ref 0–0.4)
EOSINOPHIL NFR BLD: 1 % (ref 0–7)
ERYTHROCYTE [DISTWIDTH] IN BLOOD BY AUTOMATED COUNT: 13.7 % (ref 11.5–14.5)
GLOBULIN SER CALC-MCNC: 3.9 G/DL (ref 2–4)
GLUCOSE SERPL-MCNC: 210 MG/DL (ref 65–100)
HCT VFR BLD AUTO: 38.8 % (ref 36.6–50.3)
HGB BLD-MCNC: 12.9 G/DL (ref 12.1–17)
IMM GRANULOCYTES # BLD AUTO: 0.1 K/UL (ref 0–0.04)
IMM GRANULOCYTES NFR BLD AUTO: 1 % (ref 0–0.5)
LYMPHOCYTES # BLD: 0.6 K/UL (ref 0.8–3.5)
LYMPHOCYTES NFR BLD: 9 % (ref 12–49)
MCH RBC QN AUTO: 31.5 PG (ref 26–34)
MCHC RBC AUTO-ENTMCNC: 33.2 G/DL (ref 30–36.5)
MCV RBC AUTO: 94.9 FL (ref 80–99)
MONOCYTES # BLD: 1.1 K/UL (ref 0–1)
MONOCYTES NFR BLD: 18 % (ref 5–13)
NEUTS SEG # BLD: 4.3 K/UL (ref 1.8–8)
NEUTS SEG NFR BLD: 70 % (ref 32–75)
NRBC # BLD: 0 K/UL (ref 0–0.01)
NRBC BLD-RTO: 0 PER 100 WBC
PLATELET # BLD AUTO: 251 K/UL (ref 150–400)
PMV BLD AUTO: 8.8 FL (ref 8.9–12.9)
POTASSIUM SERPL-SCNC: 3.4 MMOL/L (ref 3.5–5.1)
PROT SERPL-MCNC: 6.7 G/DL (ref 6.4–8.2)
RBC # BLD AUTO: 4.09 M/UL (ref 4.1–5.7)
RBC MORPH BLD: ABNORMAL
SODIUM SERPL-SCNC: 138 MMOL/L (ref 136–145)
TSH SERPL DL<=0.05 MIU/L-ACNC: 2.09 UIU/ML (ref 0.36–3.74)
WBC # BLD AUTO: 6.3 K/UL (ref 4.1–11.1)

## 2024-05-22 PROCEDURE — 2580000003 HC RX 258: Performed by: NURSE PRACTITIONER

## 2024-05-22 PROCEDURE — 2500000003 HC RX 250 WO HCPCS: Performed by: INTERNAL MEDICINE

## 2024-05-22 PROCEDURE — 99215 OFFICE O/P EST HI 40 MIN: CPT | Performed by: INTERNAL MEDICINE

## 2024-05-22 PROCEDURE — 6360000002 HC RX W HCPCS: Performed by: INTERNAL MEDICINE

## 2024-05-22 PROCEDURE — A4216 STERILE WATER/SALINE, 10 ML: HCPCS | Performed by: INTERNAL MEDICINE

## 2024-05-22 PROCEDURE — 84443 ASSAY THYROID STIM HORMONE: CPT

## 2024-05-22 PROCEDURE — 80053 COMPREHEN METABOLIC PANEL: CPT

## 2024-05-22 PROCEDURE — 3077F SYST BP >= 140 MM HG: CPT | Performed by: INTERNAL MEDICINE

## 2024-05-22 PROCEDURE — 96375 TX/PRO/DX INJ NEW DRUG ADDON: CPT

## 2024-05-22 PROCEDURE — 6360000002 HC RX W HCPCS: Performed by: NURSE PRACTITIONER

## 2024-05-22 PROCEDURE — 1123F ACP DISCUSS/DSCN MKR DOCD: CPT | Performed by: INTERNAL MEDICINE

## 2024-05-22 PROCEDURE — 96360 HYDRATION IV INFUSION INIT: CPT

## 2024-05-22 PROCEDURE — 2580000003 HC RX 258: Performed by: INTERNAL MEDICINE

## 2024-05-22 PROCEDURE — 96374 THER/PROPH/DIAG INJ IV PUSH: CPT

## 2024-05-22 PROCEDURE — 96361 HYDRATE IV INFUSION ADD-ON: CPT

## 2024-05-22 PROCEDURE — 85025 COMPLETE CBC W/AUTO DIFF WBC: CPT

## 2024-05-22 PROCEDURE — 36415 COLL VENOUS BLD VENIPUNCTURE: CPT

## 2024-05-22 PROCEDURE — 3079F DIAST BP 80-89 MM HG: CPT | Performed by: INTERNAL MEDICINE

## 2024-05-22 RX ORDER — DIPHENHYDRAMINE HYDROCHLORIDE 50 MG/ML
50 INJECTION INTRAMUSCULAR; INTRAVENOUS
Status: CANCELLED | OUTPATIENT
Start: 2024-05-22

## 2024-05-22 RX ORDER — SODIUM CHLORIDE 9 MG/ML
5-250 INJECTION, SOLUTION INTRAVENOUS PRN
OUTPATIENT
Start: 2024-05-29

## 2024-05-22 RX ORDER — ONDANSETRON 2 MG/ML
8 INJECTION INTRAMUSCULAR; INTRAVENOUS
OUTPATIENT
Start: 2024-05-29

## 2024-05-22 RX ORDER — ONDANSETRON 2 MG/ML
8 INJECTION INTRAMUSCULAR; INTRAVENOUS
OUTPATIENT
Start: 2024-05-22

## 2024-05-22 RX ORDER — ONDANSETRON 2 MG/ML
8 INJECTION INTRAMUSCULAR; INTRAVENOUS
Status: CANCELLED | OUTPATIENT
Start: 2024-05-22

## 2024-05-22 RX ORDER — PROCHLORPERAZINE EDISYLATE 5 MG/ML
10 INJECTION INTRAMUSCULAR; INTRAVENOUS EVERY 6 HOURS PRN
Status: DISCONTINUED | OUTPATIENT
Start: 2024-05-22 | End: 2024-05-23 | Stop reason: HOSPADM

## 2024-05-22 RX ORDER — DIPHENHYDRAMINE HYDROCHLORIDE 50 MG/ML
50 INJECTION INTRAMUSCULAR; INTRAVENOUS
OUTPATIENT
Start: 2024-06-05

## 2024-05-22 RX ORDER — DIPHENHYDRAMINE HYDROCHLORIDE 50 MG/ML
25 INJECTION INTRAMUSCULAR; INTRAVENOUS ONCE
OUTPATIENT
Start: 2024-05-22 | End: 2024-05-22

## 2024-05-22 RX ORDER — ALBUTEROL SULFATE 90 UG/1
4 AEROSOL, METERED RESPIRATORY (INHALATION) PRN
OUTPATIENT
Start: 2024-06-05

## 2024-05-22 RX ORDER — SODIUM CHLORIDE 9 MG/ML
5-250 INJECTION, SOLUTION INTRAVENOUS PRN
OUTPATIENT
Start: 2024-05-22

## 2024-05-22 RX ORDER — DEXAMETHASONE SODIUM PHOSPHATE 10 MG/ML
10 INJECTION, SOLUTION INTRAMUSCULAR; INTRAVENOUS ONCE
OUTPATIENT
Start: 2024-05-29 | End: 2024-05-29

## 2024-05-22 RX ORDER — EPINEPHRINE 1 MG/ML
0.3 INJECTION, SOLUTION, CONCENTRATE INTRAVENOUS PRN
OUTPATIENT
Start: 2024-05-29

## 2024-05-22 RX ORDER — ONDANSETRON 2 MG/ML
8 INJECTION INTRAMUSCULAR; INTRAVENOUS ONCE
Status: CANCELLED | OUTPATIENT
Start: 2024-05-22 | End: 2024-05-22

## 2024-05-22 RX ORDER — HEPARIN 100 UNIT/ML
500 SYRINGE INTRAVENOUS PRN
OUTPATIENT
Start: 2024-05-22

## 2024-05-22 RX ORDER — DIPHENHYDRAMINE HYDROCHLORIDE 50 MG/ML
50 INJECTION INTRAMUSCULAR; INTRAVENOUS
OUTPATIENT
Start: 2024-05-22

## 2024-05-22 RX ORDER — DIPHENHYDRAMINE HYDROCHLORIDE 50 MG/ML
50 INJECTION INTRAMUSCULAR; INTRAVENOUS
OUTPATIENT
Start: 2024-05-29

## 2024-05-22 RX ORDER — DIPHENHYDRAMINE HYDROCHLORIDE 50 MG/ML
25 INJECTION INTRAMUSCULAR; INTRAVENOUS ONCE
OUTPATIENT
Start: 2024-05-29 | End: 2024-05-29

## 2024-05-22 RX ORDER — ONDANSETRON 2 MG/ML
8 INJECTION INTRAMUSCULAR; INTRAVENOUS
OUTPATIENT
Start: 2024-06-05

## 2024-05-22 RX ORDER — 0.9 % SODIUM CHLORIDE 0.9 %
1000 INTRAVENOUS SOLUTION INTRAVENOUS ONCE
Status: COMPLETED | OUTPATIENT
Start: 2024-05-22 | End: 2024-05-22

## 2024-05-22 RX ORDER — HEPARIN 100 UNIT/ML
500 SYRINGE INTRAVENOUS PRN
Status: CANCELLED | OUTPATIENT
Start: 2024-05-22

## 2024-05-22 RX ORDER — ACETAMINOPHEN 325 MG/1
650 TABLET ORAL
OUTPATIENT
Start: 2024-06-05

## 2024-05-22 RX ORDER — SODIUM CHLORIDE 9 MG/ML
INJECTION, SOLUTION INTRAVENOUS CONTINUOUS
Status: CANCELLED | OUTPATIENT
Start: 2024-05-22

## 2024-05-22 RX ORDER — ONDANSETRON 2 MG/ML
8 INJECTION INTRAMUSCULAR; INTRAVENOUS ONCE
OUTPATIENT
Start: 2024-05-29 | End: 2024-05-29

## 2024-05-22 RX ORDER — ALBUTEROL SULFATE 90 UG/1
4 AEROSOL, METERED RESPIRATORY (INHALATION) PRN
Status: CANCELLED | OUTPATIENT
Start: 2024-05-22

## 2024-05-22 RX ORDER — DEXAMETHASONE SODIUM PHOSPHATE 10 MG/ML
10 INJECTION, SOLUTION INTRAMUSCULAR; INTRAVENOUS ONCE
OUTPATIENT
Start: 2024-06-05 | End: 2024-06-05

## 2024-05-22 RX ORDER — SODIUM CHLORIDE 0.9 % (FLUSH) 0.9 %
5-40 SYRINGE (ML) INJECTION PRN
OUTPATIENT
Start: 2024-05-22

## 2024-05-22 RX ORDER — ONDANSETRON 2 MG/ML
8 INJECTION INTRAMUSCULAR; INTRAVENOUS ONCE
OUTPATIENT
Start: 2024-06-05 | End: 2024-06-05

## 2024-05-22 RX ORDER — ALBUTEROL SULFATE 90 UG/1
4 AEROSOL, METERED RESPIRATORY (INHALATION) PRN
OUTPATIENT
Start: 2024-05-29

## 2024-05-22 RX ORDER — SODIUM CHLORIDE 9 MG/ML
5-250 INJECTION, SOLUTION INTRAVENOUS PRN
OUTPATIENT
Start: 2024-06-05

## 2024-05-22 RX ORDER — SODIUM CHLORIDE 9 MG/ML
INJECTION, SOLUTION INTRAVENOUS CONTINUOUS
OUTPATIENT
Start: 2024-05-29

## 2024-05-22 RX ORDER — ALBUTEROL SULFATE 90 UG/1
4 AEROSOL, METERED RESPIRATORY (INHALATION) PRN
OUTPATIENT
Start: 2024-05-22

## 2024-05-22 RX ORDER — SODIUM CHLORIDE 9 MG/ML
INJECTION, SOLUTION INTRAVENOUS CONTINUOUS
OUTPATIENT
Start: 2024-05-22

## 2024-05-22 RX ORDER — HEPARIN 100 UNIT/ML
500 SYRINGE INTRAVENOUS PRN
OUTPATIENT
Start: 2024-05-29

## 2024-05-22 RX ORDER — ACETAMINOPHEN 325 MG/1
650 TABLET ORAL
Status: CANCELLED | OUTPATIENT
Start: 2024-05-22

## 2024-05-22 RX ORDER — 0.9 % SODIUM CHLORIDE 0.9 %
1000 INTRAVENOUS SOLUTION INTRAVENOUS ONCE
Status: CANCELLED | OUTPATIENT
Start: 2024-05-22 | End: 2024-05-22

## 2024-05-22 RX ORDER — SODIUM CHLORIDE 9 MG/ML
INJECTION, SOLUTION INTRAVENOUS CONTINUOUS
OUTPATIENT
Start: 2024-06-05

## 2024-05-22 RX ORDER — DIPHENHYDRAMINE HYDROCHLORIDE 50 MG/ML
25 INJECTION INTRAMUSCULAR; INTRAVENOUS ONCE
OUTPATIENT
Start: 2024-06-05 | End: 2024-06-05

## 2024-05-22 RX ORDER — EPINEPHRINE 1 MG/ML
0.3 INJECTION, SOLUTION, CONCENTRATE INTRAVENOUS PRN
OUTPATIENT
Start: 2024-05-22

## 2024-05-22 RX ORDER — EPINEPHRINE 1 MG/ML
0.3 INJECTION, SOLUTION INTRAMUSCULAR; SUBCUTANEOUS PRN
Status: CANCELLED | OUTPATIENT
Start: 2024-05-22

## 2024-05-22 RX ORDER — SODIUM CHLORIDE 9 MG/ML
5-250 INJECTION, SOLUTION INTRAVENOUS PRN
Status: CANCELLED | OUTPATIENT
Start: 2024-05-22

## 2024-05-22 RX ORDER — PROCHLORPERAZINE EDISYLATE 5 MG/ML
10 INJECTION INTRAMUSCULAR; INTRAVENOUS EVERY 6 HOURS PRN
Status: CANCELLED
Start: 2024-05-22

## 2024-05-22 RX ORDER — HEPARIN 100 UNIT/ML
500 SYRINGE INTRAVENOUS PRN
OUTPATIENT
Start: 2024-06-05

## 2024-05-22 RX ORDER — ACETAMINOPHEN 325 MG/1
650 TABLET ORAL
OUTPATIENT
Start: 2024-05-22

## 2024-05-22 RX ORDER — ACETAMINOPHEN 325 MG/1
650 TABLET ORAL
OUTPATIENT
Start: 2024-05-29

## 2024-05-22 RX ORDER — SODIUM CHLORIDE 0.9 % (FLUSH) 0.9 %
5-40 SYRINGE (ML) INJECTION PRN
OUTPATIENT
Start: 2024-06-05

## 2024-05-22 RX ORDER — DEXAMETHASONE SODIUM PHOSPHATE 10 MG/ML
10 INJECTION, SOLUTION INTRAMUSCULAR; INTRAVENOUS ONCE
OUTPATIENT
Start: 2024-05-22 | End: 2024-05-22

## 2024-05-22 RX ORDER — SODIUM CHLORIDE 0.9 % (FLUSH) 0.9 %
5-40 SYRINGE (ML) INJECTION PRN
OUTPATIENT
Start: 2024-05-29

## 2024-05-22 RX ORDER — SODIUM CHLORIDE 0.9 % (FLUSH) 0.9 %
5-40 SYRINGE (ML) INJECTION PRN
Status: CANCELLED | OUTPATIENT
Start: 2024-05-22

## 2024-05-22 RX ORDER — EPINEPHRINE 1 MG/ML
0.3 INJECTION, SOLUTION, CONCENTRATE INTRAVENOUS PRN
OUTPATIENT
Start: 2024-06-05

## 2024-05-22 RX ORDER — ONDANSETRON 2 MG/ML
8 INJECTION INTRAMUSCULAR; INTRAVENOUS ONCE
Status: COMPLETED | OUTPATIENT
Start: 2024-05-22 | End: 2024-05-22

## 2024-05-22 RX ADMIN — PROCHLORPERAZINE EDISYLATE 10 MG: 5 INJECTION INTRAMUSCULAR; INTRAVENOUS at 10:27

## 2024-05-22 RX ADMIN — FAMOTIDINE 20 MG: 10 INJECTION, SOLUTION INTRAVENOUS at 10:25

## 2024-05-22 RX ADMIN — SODIUM CHLORIDE 1000 ML: 9 INJECTION, SOLUTION INTRAVENOUS at 10:20

## 2024-05-22 RX ADMIN — ONDANSETRON 8 MG: 2 INJECTION INTRAMUSCULAR; INTRAVENOUS at 10:23

## 2024-05-22 NOTE — PROGRESS NOTES
Cyril Gregg is a 63 y.o. male here for treatment for GE Junction cancer.  -6 lbs since last visit.  Finished radiation 5/20/24. States radiation kicked his butt.  Feeling bad since Friday.   Weak and miserable since radiation ended.  Needed help getting up from chair in lobby.  Feels nauseous a lot but has not vomited.  Wants fluids on Friday to help through the weekend.  Wants to hold off on Opdivo.   Using vitals from [facility] because patient not feeling up for it.    1. Have you been to the ER, urgent care clinic since your last visit?  Hospitalized since your last visit? no    2. Have you seen or consulted any other health care providers outside of the [facility] since your last visit?  Include any pap smears or colon screening. [facility] radiation  
  lansoprazole (PREVACID) 3 mg/mL oral suspension Take 10 mLs by mouth in the morning and 10 mLs in the evening. 5/3/24  Yes Daryl Wang MD   Nutritional Supplements (NUTREN 2.0) LIQD 4 cartons by Per J Tube route daily Nutren 2.0 @ 105ml/hr x 12 hours. Give 150ml water flushes q3 hour while feeds are running. Flush additional 100ml 4 times during daytime.   Will need dual feeding tube pump, backpack, syringes and gauze/tape. 4/28/24  Yes Blanco Sanchez Jr., MD   glipiZIDE (GLUCOTROL) 5 MG tablet Take 2 tabs before breakfast, 1 tab before lunch, 1 tab before dinner (take 20 minutes before meal) 4/3/24  Yes Forrest Kuhn MD   ondansetron (ZOFRAN) 4 MG/5ML solution 10 mLs by Per G Tube route every 8 hours as needed for Nausea or Vomiting 3/26/24  Yes India Loco APRN - NP   Nutritional Supplements (TWOCAL HN 2.0) LIQD 5.5 Cans by Per G Tube route daily Give 2 cartons in gravity bag twice daily and 1.5 can once daily. Flush with 180ml before and after each feed. 3/26/24  Yes Daryl Wang MD   ACCU-CHEK DALE PLUS strip USE TO CHECK BLOOD SUGAR ONCE DAILY 9/26/23  Yes ProviderKelly MD   lidocaine-prilocaine (EMLA) 2.5-2.5 % cream Apply topically as needed to port site 30min-1hour prior to chemotherapy appointments and cover with saran wrap 7/21/23  Yes Daryl Wang MD   prochlorperazine (COMPAZINE) 10 MG tablet Take 1 tablet by mouth every 6 hours as needed (nausea) 7/21/23  Yes Daryl Wang MD            Allergies   Allergen Reactions    Bee Venom Anaphylaxis     Other reaction(s): Unknown (comments)      wasp  also  sensitive   to        Penicillins Other (See Comments)     Pt states was told as a child he was allergic     Hydromorphone Nausea Only     Pt. Developed Nausea, Flushing after administration.            Objective:     Vitals:    05/22/24 0935   BP: (!) 144/86   Temp: 98.6 °F (37 °C)       PHYSICAL EXAM:  GENERAL: alert, cooperative, no distress, appears stated age  EYE:

## 2024-05-22 NOTE — PROGRESS NOTES
[facility] Visit Note    Pt arrived to [facility] ambulatory in no acute distress for Opdivo/Taxol C3D1 (treatment HELD today); pt did receive 1L NS bolus and anti-emetics today.  Assessment completed; pt feeling unwell - frequent nausea, diarrhea, gagging and fatigue.  Pt states that radiation was \"really rough but had last one on Monday.\" R chest port accessed without issue and positive blood return noted.  Labs obtained: CBC, CMP and TSH.    Recent Results (from the past 12 hour(s))   CBC with Auto Differential    Collection Time: 05/22/24  9:12 AM   Result Value Ref Range    WBC 6.3 4.1 - 11.1 K/uL    RBC 4.09 (L) 4.10 - 5.70 M/uL    Hemoglobin 12.9 12.1 - 17.0 g/dL    Hematocrit 38.8 36.6 - 50.3 %    MCV 94.9 80.0 - 99.0 FL    MCH 31.5 26.0 - 34.0 PG    MCHC 33.2 30.0 - 36.5 g/dL    RDW 13.7 11.5 - 14.5 %    Platelets 251 150 - 400 K/uL    MPV 8.8 (L) 8.9 - 12.9 FL    Nucleated RBCs 0.0 0  WBC    nRBC 0.00 0.00 - 0.01 K/uL    Neutrophils % 70 32 - 75 %    Lymphocytes % 9 (L) 12 - 49 %    Monocytes % 18 (H) 5 - 13 %    Eosinophils % 1 0 - 7 %    Basophils % 1 0 - 1 %    Immature Granulocytes % 1 (H) 0.0 - 0.5 %    Neutrophils Absolute 4.3 1.8 - 8.0 K/UL    Lymphocytes Absolute 0.6 (L) 0.8 - 3.5 K/UL    Monocytes Absolute 1.1 (H) 0.0 - 1.0 K/UL    Eosinophils Absolute 0.1 0.0 - 0.4 K/UL    Basophils Absolute 0.1 0.0 - 0.1 K/UL    Immature Granulocytes Absolute 0.1 (H) 0.00 - 0.04 K/UL    Differential Type SMEAR SCANNED      RBC Comment NORMOCYTIC, NORMOCHROMIC     Comprehensive Metabolic Panel    Collection Time: 05/22/24  9:12 AM   Result Value Ref Range    Sodium 138 136 - 145 mmol/L    Potassium 3.4 (L) 3.5 - 5.1 mmol/L    Chloride 107 97 - 108 mmol/L    CO2 26 21 - 32 mmol/L    Anion Gap 5 5 - 15 mmol/L    Glucose 210 (H) 65 - 100 mg/dL    BUN 25 (H) 6 - 20 MG/DL    Creatinine 0.68 (L) 0.70 - 1.30 MG/DL    BUN/Creatinine Ratio 37 (H) 12 - 20      Est, Glom Filt Rate >90 >60

## 2024-05-23 ENCOUNTER — TELEPHONE (OUTPATIENT)
Age: 64
End: 2024-05-23

## 2024-05-23 NOTE — TELEPHONE ENCOUNTER
[facility] [place] at [facility]   [address] [place] [address]   W: [phone]  F: [phone]    Medical Nutrition Therapy  Nutrition Encounter:     Spoke with wife.  He has been feeling poorly.  Diarrhea slightly improved. Down to taking 1 lomotil 4 times daily.    He did try a new formula, Leti [employer] Peptide 1.5 which he thinks is helping some.  He is only doing 2 cartons daily. Which is providing 910kcal/day.         He has also been able to eat a little yogurt and gatorade by mouth.      Diagnosis: Metastatic GEJ carcinoma   Diabetic   Barium swallow esophogram on 5/12/23 showed:  There is narrowing of the distal esophagus just above the hernia which demonstrates relatively smooth margins and may represent a benign stricture. 12 mm barium tablet was retained in this region.  PET scan showed GE junction mass.   Treatment course: 5FU + nivolumab     Ht Readings from Last 1 Encounters:   05/22/24 1.778 m (5' 10\")       Wt Readings from Last 5 Encounters:   05/22/24 89.8 kg (198 lb)   05/22/24 89.8 kg (197 lb 15.6 oz)   05/13/24 93.4 kg (206 lb)   05/08/24 92.6 kg (204 lb 1.6 oz)   05/08/24 92.6 kg (204 lb 2.3 oz)       Estimated Nutrition Needs:   Calorie Range: 2400-2875kcal/day      Protein Range: 95-114g/day     Fluid Needs: 2000ml      Plan:  - Take lomotil 4 times daily- increase to 5mg dose 4 times daily as needed.   -  Take 2 doses imodium after 1st loose stool, then 1 dose after each up to 8 daily.   - Samples ordered to patient- [employer] 1.4 Peptide       Signed By: EDWIGE BUNCH RD

## 2024-05-24 ENCOUNTER — HOSPITAL ENCOUNTER (OUTPATIENT)
Facility: HOSPITAL | Age: 64
Setting detail: INFUSION SERIES
Discharge: HOME | End: 2024-05-24
Payer: COMMERCIAL

## 2024-05-24 VITALS
TEMPERATURE: 97.9 F | DIASTOLIC BLOOD PRESSURE: 78 MMHG | RESPIRATION RATE: 16 BRPM | HEART RATE: 96 BPM | SYSTOLIC BLOOD PRESSURE: 137 MMHG | OXYGEN SATURATION: 96 %

## 2024-05-24 DIAGNOSIS — C16.0 GE JUNCTION CARCINOMA (HCC): Primary | ICD-10-CM

## 2024-05-24 PROCEDURE — 6360000002 HC RX W HCPCS: Performed by: NURSE PRACTITIONER

## 2024-05-24 PROCEDURE — 96360 HYDRATION IV INFUSION INIT: CPT

## 2024-05-24 PROCEDURE — 96375 TX/PRO/DX INJ NEW DRUG ADDON: CPT

## 2024-05-24 PROCEDURE — 2580000003 HC RX 258: Performed by: NURSE PRACTITIONER

## 2024-05-24 PROCEDURE — 96361 HYDRATE IV INFUSION ADD-ON: CPT

## 2024-05-24 PROCEDURE — 96374 THER/PROPH/DIAG INJ IV PUSH: CPT

## 2024-05-24 RX ORDER — ALBUTEROL SULFATE 90 UG/1
4 AEROSOL, METERED RESPIRATORY (INHALATION) PRN
Status: CANCELLED | OUTPATIENT
Start: 2024-05-24

## 2024-05-24 RX ORDER — 0.9 % SODIUM CHLORIDE 0.9 %
1000 INTRAVENOUS SOLUTION INTRAVENOUS ONCE
Status: COMPLETED | OUTPATIENT
Start: 2024-05-24 | End: 2024-05-24

## 2024-05-24 RX ORDER — SODIUM CHLORIDE 9 MG/ML
INJECTION, SOLUTION INTRAVENOUS CONTINUOUS
Status: CANCELLED | OUTPATIENT
Start: 2024-05-24

## 2024-05-24 RX ORDER — ACETAMINOPHEN 325 MG/1
650 TABLET ORAL
Status: CANCELLED | OUTPATIENT
Start: 2024-05-24

## 2024-05-24 RX ORDER — ONDANSETRON 2 MG/ML
8 INJECTION INTRAMUSCULAR; INTRAVENOUS
Status: CANCELLED | OUTPATIENT
Start: 2024-05-24

## 2024-05-24 RX ORDER — HEPARIN 100 UNIT/ML
500 SYRINGE INTRAVENOUS PRN
Status: CANCELLED | OUTPATIENT
Start: 2024-05-24

## 2024-05-24 RX ORDER — 0.9 % SODIUM CHLORIDE 0.9 %
1000 INTRAVENOUS SOLUTION INTRAVENOUS ONCE
Status: CANCELLED | OUTPATIENT
Start: 2024-05-24 | End: 2024-05-24

## 2024-05-24 RX ORDER — PROCHLORPERAZINE EDISYLATE 5 MG/ML
10 INJECTION INTRAMUSCULAR; INTRAVENOUS EVERY 6 HOURS PRN
Status: CANCELLED
Start: 2024-05-24

## 2024-05-24 RX ORDER — EPINEPHRINE 1 MG/ML
0.3 INJECTION, SOLUTION INTRAMUSCULAR; SUBCUTANEOUS PRN
Status: CANCELLED | OUTPATIENT
Start: 2024-05-24

## 2024-05-24 RX ORDER — PROCHLORPERAZINE EDISYLATE 5 MG/ML
10 INJECTION INTRAMUSCULAR; INTRAVENOUS EVERY 6 HOURS PRN
Status: DISCONTINUED | OUTPATIENT
Start: 2024-05-24 | End: 2024-05-25 | Stop reason: HOSPADM

## 2024-05-24 RX ORDER — DIPHENHYDRAMINE HYDROCHLORIDE 50 MG/ML
50 INJECTION INTRAMUSCULAR; INTRAVENOUS
Status: CANCELLED | OUTPATIENT
Start: 2024-05-24

## 2024-05-24 RX ORDER — SODIUM CHLORIDE 0.9 % (FLUSH) 0.9 %
5-40 SYRINGE (ML) INJECTION PRN
Status: DISCONTINUED | OUTPATIENT
Start: 2024-05-24 | End: 2024-05-25 | Stop reason: HOSPADM

## 2024-05-24 RX ORDER — SODIUM CHLORIDE 9 MG/ML
5-250 INJECTION, SOLUTION INTRAVENOUS PRN
Status: CANCELLED | OUTPATIENT
Start: 2024-05-24

## 2024-05-24 RX ORDER — SODIUM CHLORIDE 0.9 % (FLUSH) 0.9 %
5-40 SYRINGE (ML) INJECTION PRN
Status: CANCELLED | OUTPATIENT
Start: 2024-05-24

## 2024-05-24 RX ADMIN — SODIUM CHLORIDE 1000 ML: 9 INJECTION, SOLUTION INTRAVENOUS at 15:29

## 2024-05-24 RX ADMIN — PROCHLORPERAZINE EDISYLATE 10 MG: 5 INJECTION INTRAMUSCULAR; INTRAVENOUS at 16:27

## 2024-05-24 RX ADMIN — SODIUM CHLORIDE, PRESERVATIVE FREE 10 ML: 5 INJECTION INTRAVENOUS at 16:35

## 2024-05-24 RX ADMIN — SODIUM CHLORIDE, PRESERVATIVE FREE 10 ML: 5 INJECTION INTRAVENOUS at 15:29

## 2024-05-24 ASSESSMENT — PAIN DESCRIPTION - LOCATION: LOCATION: ABDOMEN

## 2024-05-24 ASSESSMENT — PAIN SCALES - GENERAL: PAINLEVEL_OUTOF10: 4

## 2024-05-24 NOTE — PROGRESS NOTES
[facility] Visit Note    Pt arrived to [facility] ambulatory in no acute distress for Hydration.  Assessment unchanged. R chest port accessed without issue and positive blood return noted.      The following medications administered:  Medications Administered         prochlorperazine (COMPAZINE) injection 10 mg Admin Date  05/24/2024 Action  Given Dose  10 mg Rate   Route  IntraVENous Administered By  Yu Walden, RN        sodium chloride 0.9 % bolus 1,000 mL Admin Date  05/24/2024 Action  New Bag Dose  1,000 mL Rate  983.6 mL/hr Route  IntraVENous Administered By  Yu Walden, RN        sodium chloride flush 0.9 % injection 5-40 mL Admin Date  05/24/2024 Action  Given Dose  10 mL Rate   Route  IntraVENous Administered By  uY Walden, RN        sodium chloride flush 0.9 % injection 5-40 mL Admin Date  05/24/2024 Action  Given Dose  10 mL Rate   Route  IntraVENous Administered By  Yu Walden, RN          Patient Vitals for the past 24 hrs:   BP Temp Pulse Resp SpO2   05/24/24 1636 137/78 -- 96 -- --   05/24/24 1515 136/89 97.9 °F (36.6 °C) (!) 104 16 96 %     Pt tolerated treatment well. Port flushed per policy and de-accessed, 2x2 and tape placed.  Pt discharged ambulatory in no acute distress, accompanied by spouse.  Next appointment 5/26/24.

## 2024-05-26 ENCOUNTER — HOSPITAL ENCOUNTER (OUTPATIENT)
Facility: HOSPITAL | Age: 64
Setting detail: INFUSION SERIES
Discharge: HOME | End: 2024-05-26
Payer: COMMERCIAL

## 2024-05-26 VITALS
DIASTOLIC BLOOD PRESSURE: 86 MMHG | SYSTOLIC BLOOD PRESSURE: 152 MMHG | TEMPERATURE: 97.9 F | RESPIRATION RATE: 18 BRPM | HEART RATE: 93 BPM

## 2024-05-26 DIAGNOSIS — C16.0 GE JUNCTION CARCINOMA (HCC): Primary | ICD-10-CM

## 2024-05-26 PROCEDURE — 96360 HYDRATION IV INFUSION INIT: CPT

## 2024-05-26 PROCEDURE — 96374 THER/PROPH/DIAG INJ IV PUSH: CPT

## 2024-05-26 PROCEDURE — 96375 TX/PRO/DX INJ NEW DRUG ADDON: CPT

## 2024-05-26 PROCEDURE — 6360000002 HC RX W HCPCS: Performed by: NURSE PRACTITIONER

## 2024-05-26 PROCEDURE — 2580000003 HC RX 258: Performed by: NURSE PRACTITIONER

## 2024-05-26 PROCEDURE — 96361 HYDRATE IV INFUSION ADD-ON: CPT

## 2024-05-26 RX ORDER — SODIUM CHLORIDE 9 MG/ML
INJECTION, SOLUTION INTRAVENOUS CONTINUOUS
Status: CANCELLED | OUTPATIENT
Start: 2024-05-26

## 2024-05-26 RX ORDER — ONDANSETRON 2 MG/ML
8 INJECTION INTRAMUSCULAR; INTRAVENOUS
Status: CANCELLED | OUTPATIENT
Start: 2024-05-26

## 2024-05-26 RX ORDER — ALBUTEROL SULFATE 90 UG/1
4 AEROSOL, METERED RESPIRATORY (INHALATION) PRN
Status: CANCELLED | OUTPATIENT
Start: 2024-05-26

## 2024-05-26 RX ORDER — HEPARIN 100 UNIT/ML
500 SYRINGE INTRAVENOUS PRN
Status: CANCELLED | OUTPATIENT
Start: 2024-05-26

## 2024-05-26 RX ORDER — EPINEPHRINE 1 MG/ML
0.3 INJECTION, SOLUTION INTRAMUSCULAR; SUBCUTANEOUS PRN
Status: CANCELLED | OUTPATIENT
Start: 2024-05-26

## 2024-05-26 RX ORDER — 0.9 % SODIUM CHLORIDE 0.9 %
1000 INTRAVENOUS SOLUTION INTRAVENOUS ONCE
Status: CANCELLED | OUTPATIENT
Start: 2024-05-26 | End: 2024-05-26

## 2024-05-26 RX ORDER — SODIUM CHLORIDE 9 MG/ML
5-250 INJECTION, SOLUTION INTRAVENOUS PRN
Status: CANCELLED | OUTPATIENT
Start: 2024-05-26

## 2024-05-26 RX ORDER — PROCHLORPERAZINE EDISYLATE 5 MG/ML
10 INJECTION INTRAMUSCULAR; INTRAVENOUS EVERY 6 HOURS PRN
Status: DISCONTINUED | OUTPATIENT
Start: 2024-05-26 | End: 2024-05-27 | Stop reason: HOSPADM

## 2024-05-26 RX ORDER — SODIUM CHLORIDE 0.9 % (FLUSH) 0.9 %
5-40 SYRINGE (ML) INJECTION PRN
Status: CANCELLED | OUTPATIENT
Start: 2024-05-26

## 2024-05-26 RX ORDER — ACETAMINOPHEN 325 MG/1
650 TABLET ORAL
Status: CANCELLED | OUTPATIENT
Start: 2024-05-26

## 2024-05-26 RX ORDER — 0.9 % SODIUM CHLORIDE 0.9 %
1000 INTRAVENOUS SOLUTION INTRAVENOUS ONCE
Status: COMPLETED | OUTPATIENT
Start: 2024-05-26 | End: 2024-05-26

## 2024-05-26 RX ORDER — PROCHLORPERAZINE EDISYLATE 5 MG/ML
10 INJECTION INTRAMUSCULAR; INTRAVENOUS EVERY 6 HOURS PRN
Status: CANCELLED
Start: 2024-05-26

## 2024-05-26 RX ORDER — DIPHENHYDRAMINE HYDROCHLORIDE 50 MG/ML
50 INJECTION INTRAMUSCULAR; INTRAVENOUS
Status: CANCELLED | OUTPATIENT
Start: 2024-05-26

## 2024-05-26 RX ADMIN — SODIUM CHLORIDE 1000 ML: 9 INJECTION, SOLUTION INTRAVENOUS at 08:40

## 2024-05-26 RX ADMIN — PROCHLORPERAZINE EDISYLATE 10 MG: 5 INJECTION INTRAMUSCULAR; INTRAVENOUS at 08:40

## 2024-05-26 ASSESSMENT — PAIN SCALES - GENERAL: PAINLEVEL_OUTOF10: 0

## 2024-05-26 NOTE — PROGRESS NOTES
[facility] VISIT NOTE    0830  Pt arrived at [facility] ambulatory and in no distress for Hydration.  Assessment completed, pt c/o nausea.    RCW chest port accessed with .75 in [place] with no difficulty. Positive blood return noted and flushes easily.    Vitals:    05/26/24 0834   BP: (!) 140/91   Pulse: (!) 108   Resp: 18   Temp: 97.9 °F (36.6 °C)   TempSrc: Temporal     Medications received:  Medications Administered         prochlorperazine (COMPAZINE) injection 10 mg Admin Date  05/26/2024 Action  Given Dose  10 mg Rate   Route  IntraVENous Administered By  Lavonne Alicea, RN        sodium chloride 0.9 % bolus 1,000 mL Admin Date  05/26/2024 Action  New Bag Dose  1,000 mL Rate  983.6 mL/hr Route  IntraVENous Administered By  Lavonne Alicea, NE          Tolerated treatment well, no adverse reaction noted. Port de-accessed and flushed per protocol. Positive blood return noted.    1000  D/C'd from [facility] ambulatory and in no distress accompanied by family member. Next appointment is 5/29/24.

## 2024-05-28 ENCOUNTER — TELEPHONE (OUTPATIENT)
Age: 64
End: 2024-05-28

## 2024-05-28 NOTE — TELEPHONE ENCOUNTER
The patient's wife is calling to speak with someone. He is going through some things and she needs guidance. CB # [phone] press 0 (work) or [phone] (cell

## 2024-05-29 ENCOUNTER — HOSPITAL ENCOUNTER (OUTPATIENT)
Facility: HOSPITAL | Age: 64
Setting detail: INFUSION SERIES
Discharge: HOME | End: 2024-05-29
Payer: COMMERCIAL

## 2024-05-29 ENCOUNTER — TELEPHONE (OUTPATIENT)
Age: 64
End: 2024-05-29

## 2024-05-29 VITALS
OXYGEN SATURATION: 96 % | TEMPERATURE: 97.2 F | RESPIRATION RATE: 18 BRPM | HEART RATE: 115 BPM | HEIGHT: 70 IN | SYSTOLIC BLOOD PRESSURE: 112 MMHG | WEIGHT: 192.6 LBS | BODY MASS INDEX: 27.57 KG/M2 | DIASTOLIC BLOOD PRESSURE: 73 MMHG

## 2024-05-29 DIAGNOSIS — C16.0 GE JUNCTION CARCINOMA (HCC): Primary | ICD-10-CM

## 2024-05-29 DIAGNOSIS — K20.80 RADIATION ESOPHAGITIS: Primary | ICD-10-CM

## 2024-05-29 DIAGNOSIS — T66.XXXA RADIATION ESOPHAGITIS: Primary | ICD-10-CM

## 2024-05-29 PROCEDURE — 6360000002 HC RX W HCPCS: Performed by: NURSE PRACTITIONER

## 2024-05-29 PROCEDURE — A4216 STERILE WATER/SALINE, 10 ML: HCPCS | Performed by: NURSE PRACTITIONER

## 2024-05-29 PROCEDURE — 96360 HYDRATION IV INFUSION INIT: CPT

## 2024-05-29 PROCEDURE — 2580000003 HC RX 258: Performed by: NURSE PRACTITIONER

## 2024-05-29 PROCEDURE — 96361 HYDRATE IV INFUSION ADD-ON: CPT

## 2024-05-29 PROCEDURE — 96374 THER/PROPH/DIAG INJ IV PUSH: CPT

## 2024-05-29 PROCEDURE — 2500000003 HC RX 250 WO HCPCS: Performed by: NURSE PRACTITIONER

## 2024-05-29 PROCEDURE — 96375 TX/PRO/DX INJ NEW DRUG ADDON: CPT

## 2024-05-29 RX ORDER — 0.9 % SODIUM CHLORIDE 0.9 %
1000 INTRAVENOUS SOLUTION INTRAVENOUS ONCE
Status: CANCELLED | OUTPATIENT
Start: 2024-05-29 | End: 2024-05-29

## 2024-05-29 RX ORDER — OLANZAPINE 10 MG/1
5 TABLET, ORALLY DISINTEGRATING ORAL NIGHTLY
Qty: 30 TABLET | Refills: 3 | Status: SHIPPED | OUTPATIENT
Start: 2024-05-29

## 2024-05-29 RX ORDER — 0.9 % SODIUM CHLORIDE 0.9 %
1000 INTRAVENOUS SOLUTION INTRAVENOUS ONCE
Status: COMPLETED | OUTPATIENT
Start: 2024-05-29 | End: 2024-05-29

## 2024-05-29 RX ORDER — SODIUM CHLORIDE 0.9 % (FLUSH) 0.9 %
5-40 SYRINGE (ML) INJECTION PRN
Status: CANCELLED | OUTPATIENT
Start: 2024-05-29

## 2024-05-29 RX ORDER — SODIUM CHLORIDE 9 MG/ML
INJECTION, SOLUTION INTRAVENOUS CONTINUOUS
Status: CANCELLED | OUTPATIENT
Start: 2024-05-29

## 2024-05-29 RX ORDER — ONDANSETRON 2 MG/ML
8 INJECTION INTRAMUSCULAR; INTRAVENOUS
Status: CANCELLED | OUTPATIENT
Start: 2024-05-29

## 2024-05-29 RX ORDER — SODIUM CHLORIDE 9 MG/ML
5-250 INJECTION, SOLUTION INTRAVENOUS PRN
Status: CANCELLED | OUTPATIENT
Start: 2024-05-29

## 2024-05-29 RX ORDER — ONDANSETRON 2 MG/ML
4 INJECTION INTRAMUSCULAR; INTRAVENOUS EVERY 6 HOURS PRN
Status: CANCELLED
Start: 2024-05-29

## 2024-05-29 RX ORDER — EPINEPHRINE 1 MG/ML
0.3 INJECTION, SOLUTION INTRAMUSCULAR; SUBCUTANEOUS PRN
Status: CANCELLED | OUTPATIENT
Start: 2024-05-29

## 2024-05-29 RX ORDER — HEPARIN 100 UNIT/ML
500 SYRINGE INTRAVENOUS PRN
Status: CANCELLED | OUTPATIENT
Start: 2024-05-29

## 2024-05-29 RX ORDER — ALBUTEROL SULFATE 90 UG/1
4 AEROSOL, METERED RESPIRATORY (INHALATION) PRN
Status: CANCELLED | OUTPATIENT
Start: 2024-05-29

## 2024-05-29 RX ORDER — ACETAMINOPHEN 325 MG/1
650 TABLET ORAL
Status: CANCELLED | OUTPATIENT
Start: 2024-05-29

## 2024-05-29 RX ORDER — PROCHLORPERAZINE EDISYLATE 5 MG/ML
10 INJECTION INTRAMUSCULAR; INTRAVENOUS EVERY 6 HOURS PRN
Status: CANCELLED
Start: 2024-05-29

## 2024-05-29 RX ORDER — PROCHLORPERAZINE EDISYLATE 5 MG/ML
10 INJECTION INTRAMUSCULAR; INTRAVENOUS EVERY 6 HOURS PRN
Status: DISCONTINUED | OUTPATIENT
Start: 2024-05-29 | End: 2024-05-30 | Stop reason: HOSPADM

## 2024-05-29 RX ORDER — ONDANSETRON 2 MG/ML
4 INJECTION INTRAMUSCULAR; INTRAVENOUS EVERY 6 HOURS PRN
Status: DISCONTINUED | OUTPATIENT
Start: 2024-05-29 | End: 2024-05-30 | Stop reason: HOSPADM

## 2024-05-29 RX ORDER — DIPHENHYDRAMINE HYDROCHLORIDE 50 MG/ML
50 INJECTION INTRAMUSCULAR; INTRAVENOUS
Status: CANCELLED | OUTPATIENT
Start: 2024-05-29

## 2024-05-29 RX ADMIN — PROCHLORPERAZINE EDISYLATE 10 MG: 5 INJECTION INTRAMUSCULAR; INTRAVENOUS at 14:03

## 2024-05-29 RX ADMIN — FAMOTIDINE 20 MG: 10 INJECTION, SOLUTION INTRAVENOUS at 14:00

## 2024-05-29 RX ADMIN — SODIUM CHLORIDE 1000 ML: 9 INJECTION, SOLUTION INTRAVENOUS at 13:31

## 2024-05-29 ASSESSMENT — PAIN SCALES - GENERAL: PAINLEVEL_OUTOF10: 0

## 2024-05-29 NOTE — PROGRESS NOTES
Pt arrived at [facility] via wheelchair and in no acute distress for Hydration. Assessment complete. Pt very weak and fatigued; reports frequent nausea/vomiting. Right chest port accessed without difficulty; brisk blood return.     Patient Vitals for the past 12 hrs:   Temp Pulse Resp BP SpO2   05/29/24 1315 97.2 °F (36.2 °C) (!) 115 18 112/73 96 %       Medications Administered         famotidine (PEPCID) 20 mg in sodium chloride (PF) 0.9 % 10 mL injection Admin Date  05/29/2024 Action  Given Dose  20 mg Rate   Route  IntraVENous Administered By  Shakila Robles RN        prochlorperazine (COMPAZINE) injection 10 mg Admin Date  05/29/2024 Action  Given Dose  10 mg Rate   Route  IntraVENous Administered By  Shakila Robles RN        sodium chloride 0.9 % bolus 1,000 mL Admin Date  05/29/2024 Action  New Bag Dose  1,000 mL Rate  983.6 mL/hr Route  IntraVENous Administered By  Shakila Robles RN            Pt tolerated infusion well, no adverse reaction noted. D/Cd from [facility] ambulatory and in no acute distress. Pt aware of next appointment.     Future Appointments   Date Time Provider Department Center   5/31/2024 11:30 AM Johanne Sánchez MD [facility]   5/31/2024  3:00 PM [facility] LAB CHAIR 1 [facility]   6/5/2024  9:00 AM [facility] CHEMO CHAIR 11 [facility]   6/7/2024  9:00 AM [facility] FASTTRACK 2 [facility]   6/12/2024  9:30 AM [facility] INF CHAIR 6 [facility]   6/19/2024  9:00 AM [facility] CHEMO CHAIR 11 [facility]   6/26/2024  1:00 PM [facility] INF CHAIR 1 [facility]   7/2/2024  9:00 AM [facility] PET DOSE 1 [facility]   7/2/2024 10:00 AM [facility] PET 1 [facility]   7/3/2024  9:00 AM [facility]F CHAIR 3 [facility]   7/10/2024  9:30 AM [facility] CHEMO CHAIR 5 [facility]   7/17/2024  9:00 AM [facility] CHEMO CHAIR 2 [facility]   7/24/2024  1:00 PM [facility] CHEMO CHAIR 4 [facility]   7/31/2024  9:00 AM [facility] CHEMO CHAIR 2 [facility]   8/7/2024  9:30 AM [facility] CHEMO CHAIR 5 [facility]   8/14/2024  9:00 AM [facility] MED INF CHAIR 3

## 2024-05-29 NOTE — TELEPHONE ENCOUNTER
The patient's wife Raisa would like to speak with Dr. Sánchez/nurse. She is going through some things and needs guidance.  # [phone]

## 2024-05-29 NOTE — PROGRESS NOTES
Telephone encounter    Discussed with wife, patient sensitively with severe radiation esophagitis, uncontrolled nausea and vomiting with some episodes of diarrhea.  Plan for hide a hydration today in [facility] with 4 mg IV Zofran and 20 mg IV Pepcid.  Will repeat hydration on Friday and keep it for twice a week until he recovers.    Zydis 5 mg oral dissolving tablets sent to pharmacy for patient to try tonight for nausea as well as to help with sleep.    Follow-up with me virtually on Friday

## 2024-05-31 ENCOUNTER — HOSPITAL ENCOUNTER (OUTPATIENT)
Facility: HOSPITAL | Age: 64
Setting detail: INFUSION SERIES
Discharge: HOME | End: 2024-05-31
Payer: COMMERCIAL

## 2024-05-31 ENCOUNTER — TELEPHONE (OUTPATIENT)
Age: 64
End: 2024-05-31

## 2024-05-31 ENCOUNTER — TELEMEDICINE (OUTPATIENT)
Age: 64
End: 2024-05-31
Payer: COMMERCIAL

## 2024-05-31 VITALS
TEMPERATURE: 98 F | DIASTOLIC BLOOD PRESSURE: 80 MMHG | RESPIRATION RATE: 16 BRPM | SYSTOLIC BLOOD PRESSURE: 126 MMHG | HEART RATE: 102 BPM | OXYGEN SATURATION: 97 %

## 2024-05-31 DIAGNOSIS — C16.0 GE JUNCTION CARCINOMA (HCC): ICD-10-CM

## 2024-05-31 DIAGNOSIS — R11.2 INTRACTABLE NAUSEA AND VOMITING: Primary | ICD-10-CM

## 2024-05-31 DIAGNOSIS — K21.00 GASTROESOPHAGEAL REFLUX DISEASE WITH ESOPHAGITIS WITHOUT HEMORRHAGE: ICD-10-CM

## 2024-05-31 DIAGNOSIS — R53.0 NEOPLASTIC (MALIGNANT) RELATED FATIGUE: ICD-10-CM

## 2024-05-31 DIAGNOSIS — C16.0 GE JUNCTION CARCINOMA (HCC): Primary | ICD-10-CM

## 2024-05-31 PROCEDURE — A4216 STERILE WATER/SALINE, 10 ML: HCPCS | Performed by: NURSE PRACTITIONER

## 2024-05-31 PROCEDURE — 96361 HYDRATE IV INFUSION ADD-ON: CPT

## 2024-05-31 PROCEDURE — 1123F ACP DISCUSS/DSCN MKR DOCD: CPT | Performed by: INTERNAL MEDICINE

## 2024-05-31 PROCEDURE — 2500000003 HC RX 250 WO HCPCS: Performed by: NURSE PRACTITIONER

## 2024-05-31 PROCEDURE — 6360000002 HC RX W HCPCS: Performed by: NURSE PRACTITIONER

## 2024-05-31 PROCEDURE — 96374 THER/PROPH/DIAG INJ IV PUSH: CPT

## 2024-05-31 PROCEDURE — 99215 OFFICE O/P EST HI 40 MIN: CPT | Performed by: INTERNAL MEDICINE

## 2024-05-31 PROCEDURE — 96360 HYDRATION IV INFUSION INIT: CPT

## 2024-05-31 PROCEDURE — 2580000003 HC RX 258: Performed by: NURSE PRACTITIONER

## 2024-05-31 PROCEDURE — 96375 TX/PRO/DX INJ NEW DRUG ADDON: CPT

## 2024-05-31 RX ORDER — ONDANSETRON 4 MG/1
4 TABLET, ORALLY DISINTEGRATING ORAL EVERY 8 HOURS PRN
Qty: 90 TABLET | Refills: 1 | Status: SHIPPED | OUTPATIENT
Start: 2024-05-31

## 2024-05-31 RX ORDER — SODIUM CHLORIDE 9 MG/ML
INJECTION, SOLUTION INTRAVENOUS CONTINUOUS
OUTPATIENT
Start: 2024-05-31

## 2024-05-31 RX ORDER — ACETAMINOPHEN 325 MG/1
650 TABLET ORAL
OUTPATIENT
Start: 2024-05-31

## 2024-05-31 RX ORDER — PROCHLORPERAZINE EDISYLATE 5 MG/ML
10 INJECTION INTRAMUSCULAR; INTRAVENOUS EVERY 6 HOURS PRN
Status: CANCELLED
Start: 2024-05-31

## 2024-05-31 RX ORDER — ONDANSETRON 2 MG/ML
4 INJECTION INTRAMUSCULAR; INTRAVENOUS EVERY 6 HOURS PRN
Status: CANCELLED
Start: 2024-05-31

## 2024-05-31 RX ORDER — HEPARIN 100 UNIT/ML
500 SYRINGE INTRAVENOUS PRN
OUTPATIENT
Start: 2024-05-31

## 2024-05-31 RX ORDER — EPINEPHRINE 1 MG/ML
0.3 INJECTION, SOLUTION INTRAMUSCULAR; SUBCUTANEOUS PRN
OUTPATIENT
Start: 2024-05-31

## 2024-05-31 RX ORDER — DIPHENHYDRAMINE HYDROCHLORIDE 50 MG/ML
50 INJECTION INTRAMUSCULAR; INTRAVENOUS
OUTPATIENT
Start: 2024-05-31

## 2024-05-31 RX ORDER — NUTRITIONAL SUPPLEMENT/FIBER 0.06 G-1.4
1 LIQUID (ML) ORAL 2 TIMES DAILY
COMMUNITY

## 2024-05-31 RX ORDER — 0.9 % SODIUM CHLORIDE 0.9 %
1000 INTRAVENOUS SOLUTION INTRAVENOUS ONCE
Status: CANCELLED | OUTPATIENT
Start: 2024-05-31 | End: 2024-05-31

## 2024-05-31 RX ORDER — PROCHLORPERAZINE EDISYLATE 5 MG/ML
10 INJECTION INTRAMUSCULAR; INTRAVENOUS EVERY 6 HOURS PRN
Status: DISCONTINUED | OUTPATIENT
Start: 2024-05-31 | End: 2024-06-01 | Stop reason: HOSPADM

## 2024-05-31 RX ORDER — LORAZEPAM 2 MG/ML
0.25 CONCENTRATE ORAL EVERY 8 HOURS PRN
Qty: 15 ML | Refills: 0 | Status: SHIPPED | OUTPATIENT
Start: 2024-05-31 | End: 2024-07-08

## 2024-05-31 RX ORDER — ONDANSETRON 2 MG/ML
8 INJECTION INTRAMUSCULAR; INTRAVENOUS
OUTPATIENT
Start: 2024-05-31

## 2024-05-31 RX ORDER — SODIUM CHLORIDE 9 MG/ML
5-250 INJECTION, SOLUTION INTRAVENOUS PRN
Status: CANCELLED | OUTPATIENT
Start: 2024-05-31

## 2024-05-31 RX ORDER — ALBUTEROL SULFATE 90 UG/1
4 AEROSOL, METERED RESPIRATORY (INHALATION) PRN
OUTPATIENT
Start: 2024-05-31

## 2024-05-31 RX ORDER — LORAZEPAM 0.5 MG/1
0.25 TABLET ORAL EVERY 8 HOURS PRN
Qty: 15 TABLET | Refills: 0 | Status: SHIPPED | OUTPATIENT
Start: 2024-05-31 | End: 2024-06-30

## 2024-05-31 RX ORDER — SODIUM CHLORIDE 0.9 % (FLUSH) 0.9 %
5-40 SYRINGE (ML) INJECTION PRN
Status: CANCELLED | OUTPATIENT
Start: 2024-05-31

## 2024-05-31 RX ORDER — 0.9 % SODIUM CHLORIDE 0.9 %
1000 INTRAVENOUS SOLUTION INTRAVENOUS ONCE
Status: COMPLETED | OUTPATIENT
Start: 2024-05-31 | End: 2024-05-31

## 2024-05-31 RX ADMIN — SODIUM CHLORIDE 1000 ML: 9 INJECTION, SOLUTION INTRAVENOUS at 15:17

## 2024-05-31 RX ADMIN — FAMOTIDINE 20 MG: 10 INJECTION, SOLUTION INTRAVENOUS at 15:17

## 2024-05-31 RX ADMIN — PROCHLORPERAZINE EDISYLATE 10 MG: 5 INJECTION INTRAMUSCULAR; INTRAVENOUS at 15:17

## 2024-05-31 NOTE — PATIENT INSTRUCTIONS
Dear Cyril Gregg ,    It was a pleasure seeing you today    We will see you again in 1 week    If labs or imaging tests have been ordered for you today, please call the office  at [phone] 48 hours after completion to obtain the results.        This is the plan we talked about:    Severe dehydration and intractable nausea  -Continue Zofran 4 mg ODT every 6 hours  -Start Ativan 1.25 mg in liquid every 8 hours to help with sleep and nausea  -IV hydration along with Zofran and Pepcid in infusion center today, repeat twice a week going forward    Goals of care  -You are feeling very poorly, this is the worst you have felt since the diagnosis of cancer.  You are 10 days out of radiation and feeling quite miserable with nausea, inability to take anything by mouth, being fed gravity feeds twice a day.  He had severe bleeding episode a few weeks ago and have been quite fatigued since then.  You received fluids on Wednesday and this helped some.  We talked about the low threshold to being admitted for IV hydration and supportive care to see how you recuperate postradiation.  What you are feeling now is expected and it is okay to wait a few days/weeks to see if your body can recuperate from the aggressive treatment you have received so far.  You are agreeable with this.  Let us see how you do with fluids today, if you still not feeling better with Ativan for the nausea, I recommend hospitalization for supportive care.    Severe reflux, inability to swallow even saliva, feeding difficulty due to the same  -We talked about all of his symptoms in detail today and it is more than likely that you have an upper esophageal narrowing because of the mediastinal lymphadenopathy in addition to the GE junction tumor that is causing narrowing of his esophagus.  This makes sense in terms of not being able to even swallow your saliva.  -Our hope is that treatment will help shrink the lymph nodes within your treatment.  -In the

## 2024-05-31 NOTE — PROGRESS NOTES
Palliative Medicine Office Visit  Palliative Medicine Nurse Check In  [phone]    Patient Name: Cyril Gregg  YOB: 1960      Date of Office Visit: 5/31/24    Patient states: \" F/U \"    From Check In Sheet (scanned in Media):  Has a medical provider talked with you about cardiopulmonary resuscitation (CPR)?   [] Yes   [x] No   [] Unable to obtain    Nurse reminder to complete or update ACP FlowSheet:    Is ACP on the Problem List?    [] Yes    [] No  IF ACP Document is ON FILE; Nurse to place ACP on Problem List     Is there an ACP Note in Chart Review/Note?    [] Yes    [] No   If NO: ALERT PROVIDER         5/31/2024    11:30 AM   Demographics   Marital Status        Is there anything that we should know about you as a person in order to provide you the best care possible?     Have you been to the ER, urgent care clinic since your last visit?   [] Yes   [x] No   [] Unable to obtain    Have you been hospitalized since your last visit?   [] Yes   [x] No   [] Unable to obtain    Have you seen or consulted any other health care providers outside of the [facility] since your last visit?   [] Yes   [x] No   [] Unable to obtain    Functional status (describe):   Needing rollator around house      Last BM: Diarrhea - Today     accessed (date):     Bottle review (for opioid pain medication):  Medication 1:   Date filled:   Directions:   # filled:   # left:   # pills taking per day:  Last dose taken:                      
by mouth nightly    Loperamide HCl (IMODIUM PO) Take by mouth    lansoprazole (PREVACID) 3 mg/mL oral suspension Take 10 mLs by mouth in the morning and 10 mLs in the evening.    glipiZIDE (GLUCOTROL) 5 MG tablet Take 2 tabs before breakfast, 1 tab before lunch, 1 tab before dinner (take 20 minutes before meal)    ondansetron (ZOFRAN) 4 MG/5ML solution 10 mLs by Per G Tube route every 8 hours as needed for Nausea or Vomiting    ACCU-CHEK DALE PLUS strip USE TO CHECK BLOOD SUGAR ONCE DAILY    lidocaine-prilocaine (EMLA) 2.5-2.5 % cream Apply topically as needed to port site 30min-1hour prior to chemotherapy appointments and cover with saran wrap    prochlorperazine (COMPAZINE) 10 MG tablet Take 1 tablet by mouth every 6 hours as needed (nausea) (Patient not taking: Reported on 5/31/2024)     No current facility-administered medications for this visit.          LAB and other DATA REVIEWED:     Lab Results   Component Value Date/Time    WBC 6.3 05/22/2024 09:12 AM    HGB 12.9 05/22/2024 09:12 AM     05/22/2024 09:12 AM     Lab Results   Component Value Date/Time     05/22/2024 09:12 AM    K 3.4 05/22/2024 09:12 AM     05/22/2024 09:12 AM    CO2 26 05/22/2024 09:12 AM    BUN 25 05/22/2024 09:12 AM      Lab Results   Component Value Date/Time    GLOB 3.9 05/22/2024 09:12 AM     Lab Results   Component Value Date/Time    INR 1.0 04/26/2024 10:17 AM      No results found for: \"IRON\", \"TIBC\"     Detail chart reviewed. Other specialists and referral provider notes reviewed.      PET/ CT 3/19   IMPRESSION:  1. New efrem mets: Right supraclavicular and mediastinum.  2. Resolved left supraclavicular efrem mets.  3. Increased size of GE junction tumor with esophageal obstruction/distention  and possible RLL aspiration.  4. Decreased abdominal/retroperitoneal efrem mets.  4. Resolved solitary skeletal metastasis.        CONTROLLED SUBSTANCES SAFETY ASSESSMENT (IF ON CONTROLLED SUBSTANCES):     Reviewed opioid

## 2024-05-31 NOTE — PROGRESS NOTES
Pt arrived at [facility] via wheelchair accompanied by spouse and in no acute distress for Hydration. Assessment unchanged. Right chest port accessed with no issue with a 0.75 inch needle,+BR noted. No labs or MD appointment today.     Patient Vitals for the past 12 hrs:   Temp Pulse Resp BP SpO2   05/31/24 1629 98 °F (36.7 °C) (!) 102 16 126/80 97 %   05/31/24 1508 97.9 °F (36.6 °C) (!) 108 16 129/86 96 %     No results found for this or any previous visit (from the past 12 hour(s)).    Medications Administered         famotidine (PEPCID) 20 mg in sodium chloride (PF) 0.9 % 10 mL injection Admin Date  05/31/2024 Action  Given Dose  20 mg Rate   Route  IntraVENous Administered By  Tanika Paul, NE        prochlorperazine (COMPAZINE) injection 10 mg Admin Date  05/31/2024 Action  Given Dose  10 mg Rate   Route  IntraVENous Administered By  Tanika Paul, RN        sodium chloride 0.9 % bolus 1,000 mL Admin Date  05/31/2024 Action  New Bag Dose  1,000 mL Rate  983.6 mL/hr Route  IntraVENous Administered By  Tanika Paul, NE            Pt tolerated Hydration well, no adverse reaction noted. Port flushed and de-accessed per protocol. Patient D/Cd from [facility] and in no acute distress.     Future Appointments   Date Time Provider Department Center   6/3/2024  3:00 PM [facility] LAB CHAIR 1 [facility]   6/5/2024  9:00 AM [facility] CHEMO CHAIR 11 [facility]   6/7/2024  9:00 AM [facility] FASTTRACK 2 [facility]   6/12/2024  9:30 AM [facility] MED INF CHAIR 6 [facility]   6/14/2024  1:00 PM [facility] FASTTRACK 2 [facility]   6/19/2024  9:00 AM [facility] CHEMO CHAIR 11 [facility]   6/21/2024 10:30 AM [facility] MED INF CHAIR 6 [facility]   6/26/2024  1:00 PM [facility] MED INF CHAIR 1 [facility]   6/28/2024 11:30 AM [facility] FASTTRACK 2 [facility]   7/2/2024  9:00 AM [facility] PET DOSE 1 [facility]   7/2/2024 10:00 AM JAKUB PET 1 [facility]   7/3/2024  9:00 AM [facility] MED INF CHAIR 3 [facility]   7/5/2024  4:00 PM [facility] MED INF CHAIR 6 [facility]   7/10/2024   9:30 [facility]   7/12/2024  2:00 PM [facility]   7/17/2024  9:00 AM [facility]   7/19/2024  2:00 PM [facility]   7/24/2024  1:00 PM [facility]   7/26/2024 11:30 AM [facility]   7/31/2024  9:00 AM [facility] 2 [facility]   8/2/2024  2:00 PM [facility]   8/7/2024  9:30 AM [facility]   8/14/2024  9:00 AM [facility] MED [facility]AIR 3 [facility]   8/21/2024  1:00 PM [facility]   8/28/2024  9:00 AM [facility] MED INF CHAIR 3 [facility]

## 2024-05-31 NOTE — TELEPHONE ENCOUNTER
[facility] [place] [facility]   [address] [place] [address]   W: [phone]  F: [phone]    Medical Nutrition Therapy  Nutrition Encounter:     Spoke with wife.  He has been feeling poorly, had a virtual w/ Dr. Sánchez.     He has been doing 2 cartons of Partly 1.5 peptide daily.  Using gravity bags, takes about 2 hours to get 11oz carton down.  He is averaging about 910 calories daily.      She wonders about using liquid IV via Gtube.  Advised I'm ok with it, if she mixes it beforehand and uses a syringe. Do not put in gravity bag.    She can though put pedialyte, gatorade, propel in the gravity bag.        Diagnosis: Metastatic GEJ carcinoma   Diabetic   Barium swallow esophogram on 5/12/23 showed:  There is narrowing of the distal esophagus just above the hernia which demonstrates relatively smooth margins and may represent a benign stricture. 12 mm barium tablet was retained in this region.  PET scan showed GE junction mass.   Treatment course: 5FU + nivolumab     Ht Readings from Last 1 Encounters:   05/29/24 1.778 m (5' 10\")       Wt Readings from Last 5 Encounters:   05/29/24 87.4 kg (192 lb 9.6 oz)   05/22/24 89.8 kg (198 lb)   05/22/24 89.8 kg (197 lb 15.6 oz)   05/13/24 93.4 kg (206 lb)   05/08/24 92.6 kg (204 lb 1.6 oz)       Estimated Nutrition Needs:   Calorie Range: 2400-2875kcal/day      Protein Range: 95-114g/day     Fluid Needs: 2000ml      Plan:  - Take lomotil 4 times daily- increase to 5mg dose 4 times daily as needed.   -  Take 2 doses imodium after 1st loose stool, then 1 dose after each up to 8 daily.   - Samples ordered to patient- [employer] 1.4 Peptide   - Attempted to schedule fluids for Monday but [facility] is booked.  May need to consider different location or dispatch health.        Signed By: EDWIGE BUNCH RD

## 2024-06-03 ENCOUNTER — HOSPITAL ENCOUNTER (OUTPATIENT)
Facility: HOSPITAL | Age: 64
Setting detail: INFUSION SERIES
Discharge: HOME | End: 2024-06-03
Payer: COMMERCIAL

## 2024-06-03 ENCOUNTER — CLINICAL DOCUMENTATION (OUTPATIENT)
Age: 64
End: 2024-06-03

## 2024-06-03 ENCOUNTER — TELEPHONE (OUTPATIENT)
Age: 64
End: 2024-06-03

## 2024-06-03 ENCOUNTER — HOSPITAL ENCOUNTER (EMERGENCY)
Facility: HOSPITAL | Age: 64
Discharge: LEFT BEFORE DISCHARGE | End: 2024-06-03

## 2024-06-03 VITALS
SYSTOLIC BLOOD PRESSURE: 124 MMHG | RESPIRATION RATE: 16 BRPM | DIASTOLIC BLOOD PRESSURE: 68 MMHG | TEMPERATURE: 98.4 F | HEART RATE: 107 BPM | OXYGEN SATURATION: 98 %

## 2024-06-03 DIAGNOSIS — C16.0 GE JUNCTION CARCINOMA (HCC): Primary | ICD-10-CM

## 2024-06-03 LAB
ANION GAP BLD CALC-SCNC: 9.5 MMOL/L (ref 10–20)
CA-I BLD-MCNC: 1.14 MMOL/L (ref 1.12–1.32)
CHLORIDE BLD-SCNC: 101 MMOL/L (ref 98–107)
CO2 BLD-SCNC: 24.5 MMOL/L (ref 21–32)
CREAT BLD-MCNC: 0.49 MG/DL (ref 0.6–1.3)
GLUCOSE BLD-MCNC: 150 MG/DL (ref 70–110)
POTASSIUM BLD-SCNC: 4.4 MMOL/L (ref 3.5–5.1)
SERVICE CMNT-IMP: ABNORMAL
SODIUM BLD-SCNC: 135 MMOL/L (ref 136–145)

## 2024-06-03 PROCEDURE — 96374 THER/PROPH/DIAG INJ IV PUSH: CPT

## 2024-06-03 PROCEDURE — 80047 BASIC METABLC PNL IONIZED CA: CPT

## 2024-06-03 PROCEDURE — 2500000003 HC RX 250 WO HCPCS: Performed by: NURSE PRACTITIONER

## 2024-06-03 PROCEDURE — 96360 HYDRATION IV INFUSION INIT: CPT

## 2024-06-03 PROCEDURE — 96375 TX/PRO/DX INJ NEW DRUG ADDON: CPT

## 2024-06-03 PROCEDURE — A4216 STERILE WATER/SALINE, 10 ML: HCPCS | Performed by: NURSE PRACTITIONER

## 2024-06-03 PROCEDURE — 6360000002 HC RX W HCPCS: Performed by: NURSE PRACTITIONER

## 2024-06-03 PROCEDURE — 2580000003 HC RX 258: Performed by: NURSE PRACTITIONER

## 2024-06-03 RX ORDER — ALBUTEROL SULFATE 90 UG/1
4 AEROSOL, METERED RESPIRATORY (INHALATION) PRN
OUTPATIENT
Start: 2024-06-03

## 2024-06-03 RX ORDER — SODIUM CHLORIDE 0.9 % (FLUSH) 0.9 %
5-40 SYRINGE (ML) INJECTION PRN
OUTPATIENT
Start: 2024-06-03

## 2024-06-03 RX ORDER — DIPHENHYDRAMINE HYDROCHLORIDE 50 MG/ML
50 INJECTION INTRAMUSCULAR; INTRAVENOUS
OUTPATIENT
Start: 2024-06-03

## 2024-06-03 RX ORDER — ONDANSETRON 2 MG/ML
4 INJECTION INTRAMUSCULAR; INTRAVENOUS EVERY 6 HOURS PRN
Status: DISCONTINUED | OUTPATIENT
Start: 2024-06-03 | End: 2024-06-04 | Stop reason: HOSPADM

## 2024-06-03 RX ORDER — 0.9 % SODIUM CHLORIDE 0.9 %
1000 INTRAVENOUS SOLUTION INTRAVENOUS ONCE
OUTPATIENT
Start: 2024-06-03 | End: 2024-06-03

## 2024-06-03 RX ORDER — ONDANSETRON 2 MG/ML
4 INJECTION INTRAMUSCULAR; INTRAVENOUS EVERY 6 HOURS PRN
Start: 2024-06-03

## 2024-06-03 RX ORDER — PROCHLORPERAZINE EDISYLATE 5 MG/ML
10 INJECTION INTRAMUSCULAR; INTRAVENOUS EVERY 6 HOURS PRN
Start: 2024-06-03

## 2024-06-03 RX ORDER — ONDANSETRON 2 MG/ML
8 INJECTION INTRAMUSCULAR; INTRAVENOUS
OUTPATIENT
Start: 2024-06-03

## 2024-06-03 RX ORDER — SODIUM CHLORIDE 9 MG/ML
INJECTION, SOLUTION INTRAVENOUS CONTINUOUS
OUTPATIENT
Start: 2024-06-03

## 2024-06-03 RX ORDER — SODIUM CHLORIDE 9 MG/ML
5-250 INJECTION, SOLUTION INTRAVENOUS PRN
OUTPATIENT
Start: 2024-06-03

## 2024-06-03 RX ORDER — SODIUM CHLORIDE 0.9 % (FLUSH) 0.9 %
5-40 SYRINGE (ML) INJECTION PRN
Status: DISCONTINUED | OUTPATIENT
Start: 2024-06-03 | End: 2024-06-04 | Stop reason: HOSPADM

## 2024-06-03 RX ORDER — HEPARIN 100 UNIT/ML
500 SYRINGE INTRAVENOUS PRN
OUTPATIENT
Start: 2024-06-03

## 2024-06-03 RX ORDER — EPINEPHRINE 1 MG/ML
0.3 INJECTION, SOLUTION INTRAMUSCULAR; SUBCUTANEOUS PRN
OUTPATIENT
Start: 2024-06-03

## 2024-06-03 RX ORDER — 0.9 % SODIUM CHLORIDE 0.9 %
1000 INTRAVENOUS SOLUTION INTRAVENOUS ONCE
Status: COMPLETED | OUTPATIENT
Start: 2024-06-03 | End: 2024-06-03

## 2024-06-03 RX ORDER — PROCHLORPERAZINE EDISYLATE 5 MG/ML
10 INJECTION INTRAMUSCULAR; INTRAVENOUS EVERY 6 HOURS PRN
Status: DISCONTINUED | OUTPATIENT
Start: 2024-06-03 | End: 2024-06-04 | Stop reason: HOSPADM

## 2024-06-03 RX ORDER — SODIUM CHLORIDE 9 MG/ML
5-250 INJECTION, SOLUTION INTRAVENOUS PRN
Status: DISCONTINUED | OUTPATIENT
Start: 2024-06-03 | End: 2024-06-04 | Stop reason: HOSPADM

## 2024-06-03 RX ORDER — ACETAMINOPHEN 325 MG/1
650 TABLET ORAL
OUTPATIENT
Start: 2024-06-03

## 2024-06-03 RX ORDER — OXYCODONE HYDROCHLORIDE 5 MG/1
5 TABLET ORAL EVERY 4 HOURS PRN
Qty: 60 TABLET | Refills: 0 | Status: SHIPPED | OUTPATIENT
Start: 2024-06-03 | End: 2024-06-18

## 2024-06-03 RX ADMIN — SODIUM CHLORIDE 1000 ML: 9 INJECTION, SOLUTION INTRAVENOUS at 15:11

## 2024-06-03 RX ADMIN — FAMOTIDINE 20 MG: 10 INJECTION, SOLUTION INTRAVENOUS at 15:17

## 2024-06-03 RX ADMIN — PROCHLORPERAZINE EDISYLATE 10 MG: 5 INJECTION INTRAMUSCULAR; INTRAVENOUS at 15:12

## 2024-06-03 ASSESSMENT — PAIN DESCRIPTION - ORIENTATION: ORIENTATION: MID

## 2024-06-03 ASSESSMENT — PAIN SCALES - GENERAL: PAINLEVEL_OUTOF10: 5

## 2024-06-03 NOTE — TELEPHONE ENCOUNTER
Reviewed pt chart.  Returned call to pt wife.  She was able to get pt in the infusion center but he did not want to come.  She was trying to explain his breathing pattern, he will sound like he is trying to catch his breath. I explained how this sounds like agonal breathing. She also reports he is having pain. I reached out to palliative care,  but she was not available. Reached out to Lisa OLIVIA who will reach out to the pt wife I asked if she can also reach out to Lisa Padilla will reach out to the pt.

## 2024-06-03 NOTE — TELEPHONE ENCOUNTER
Palliative Medicine  Nursing Note  [phone]  Fax [phone]      Telephone Call  Patient Name: Cyril Gregg  YOB: 1960    6/3/2024    Call returned to Mrs. Gregg and left voice mail message to return call at her convenience.       Lisa Dougherty RN  Palliative Medicine

## 2024-06-03 NOTE — TELEPHONE ENCOUNTER
Palliative Medicine  Nursing Note  [phone]  Fax [phone]      Telephone Call  Patient Name: Cyril Gregg  YOB: 1960    6/3/2024    Call placed to Mr. Gregg and left voice mail message inquiring how he is doing and requested that he call palliative at his convenience.        Lisa Dougherty RN  Palliative Medicine

## 2024-06-03 NOTE — TELEPHONE ENCOUNTER
Pt's spouse stated he is to weak wanted to schedule an appt for Wed if possible she said he is very very ill. Please call pt

## 2024-06-03 NOTE — TELEPHONE ENCOUNTER
Palliative Medicine  Nursing Note  [phone]  Fax [phone]      Telephone Call  Patient Name: Cyril Gregg  YOB: 1960    6/3/2024    Incoming call from Mrs. Gregg who stated that her  is doing worse since the VV with Dr. Sánchez on Friday.  She has been able to get in the 2 [employer] j-tube feedings and the 2 additional 400 ml gatorade hydration's each day.  His urine output is about the same she said.     She has been giving him Ativan which is helping him rest during the day and has reduced his nausea, but he is still up every 3-4 hours at night.     She states that he remains pale and is weaker and almost fell this morning when getting up from the toilet.  He has remained in the bedroom all weekend and hasn't requested to leave which is unlike him, she said.     He stated that he is too weak to go to \[facility]\"" for hydration today.  Discussed [facility] and if they could come to his home for an evaluation, but she would prefer that a provider who knows him and has seen him recently could evaluate him.  She was going to call Dr. Grace office to see if he could see him today.  Offered a virtual visit with Dr. Gallegos if Dr. Wang couldn't see him which she was in agreement with.    Dr. Sánchez is aware.        Lisa Dougherty, RN  Palliative Medicine

## 2024-06-03 NOTE — PROGRESS NOTES
[facility] Visit Note    Pt arrived to [facility] via wheelchair in no acute distress at 1445 for Hydration and nausea meds.  Assessment completed; pt looks much worse than he did on Friday. His breathing is more labored and voice is very hoarse. He states his diarrhea has improved over the weekend but he is more weak and fatigued. R chest port accessed without issue and positive blood return noted.  Labs obtained- chem8.    Patient denied having any symptoms of COVID-19, i.e. SOB, coughing, fever, or generally not feeling well.      /68   Pulse (!) 107   Temp 98.4 °F (36.9 °C) (Temporal)   Resp 16   SpO2 98%     Recent Results (from the past 12 hour(s))   POC CHEM 8    Collection Time: 06/03/24  3:22 PM   Result Value Ref Range    POC Ionized Calcium 1.14 1.12 - 1.32 mmol/L    POC Sodium 135 (L) 136 - 145 mmol/L    POC Potassium 4.4 3.5 - 5.1 mmol/L    POC Chloride 101 98 - 107 mmol/L    POC TCO2 24.5 21 - 32 mmol/L    Anion Gap, POC 9.5 (L) 10 - 20 mmol/L    POC Glucose 150 (H) 70 - 110 mg/dL    POC Creatinine 0.49 (L) 0.6 - 1.3 mg/dL    eGFR, POC >90 >60 ml/min/1.73m2    UA Comment Comment Not Indicated.         The following medications administered:  Medications Administered         famotidine (PEPCID) 20 mg in sodium chloride (PF) 0.9 % 10 mL injection Admin Date  06/03/2024 Action  Given Dose  20 mg Rate   Route  IntraVENous Administered By  Kristan Florence RN        prochlorperazine (COMPAZINE) injection 10 mg Admin Date  06/03/2024 Action  Given Dose  10 mg Rate   Route  IntraVENous Administered By  Kristan Florence RN        sodium chloride 0.9 % bolus 1,000 mL Admin Date  06/03/2024 Action  New Bag Dose  1,000 mL Rate  983.6 mL/hr Route  IntraVENous Administered By  Kristan Florence RN Nicola Joseph, NP at chairside to meet with patient and his wife. Per NP request, stopped fluids and wheeled patient downstairs to the ED for evaluation. Called and spoke with

## 2024-06-03 NOTE — ED NOTES
Patient LWBS prior to being triaged. Infusion center RN reports that family has decided to receive hospice care at home instead of in hospital.

## 2024-06-03 NOTE — PROGRESS NOTES
Telephone encounter    Multiple calls with patient and wife. Patient declining significantly, now with pain all over the body, abdominal pain, ongoing nausea with vomiting despite Ativan 1- 2 mg every 6-8 hours. Also short of breath. Received fluids today. No significant improvement. Patient refusing hospitalization due to not wanting to wait in the ER for hours.    Long conversation with patient and wife. Highly recommend ER evaluation with labs, CT chest, etc to see if there is any reversible cause of his decline. He completed RT to the esophagus 2 weeks ago. If CT shows worsening disease which may not be surprising given his very aggressive disease, then hospice is the next step which they are agreeable to. But given the precipitous decline over the past week, would like an eval with labs and CT first.     Our NP has already informed [facility] ER. Patient may get to the ER tonight or in the morning.    Sending Oycodone 5- 10 mg every 4 hours prn for now

## 2024-06-04 ENCOUNTER — TELEPHONE (OUTPATIENT)
Age: 64
End: 2024-06-04

## 2024-06-04 ENCOUNTER — HOME CARE VISIT (OUTPATIENT)
Age: 64
End: 2024-06-04
Payer: COMMERCIAL

## 2024-06-04 ENCOUNTER — HOSPITAL ENCOUNTER (EMERGENCY)
Facility: HOSPITAL | Age: 64
Discharge: HOME | End: 2024-06-04
Attending: STUDENT IN AN ORGANIZED HEALTH CARE EDUCATION/TRAINING PROGRAM
Payer: COMMERCIAL

## 2024-06-04 ENCOUNTER — APPOINTMENT (OUTPATIENT)
Facility: HOSPITAL | Age: 64
End: 2024-06-04
Payer: COMMERCIAL

## 2024-06-04 VITALS
TEMPERATURE: 97.8 F | RESPIRATION RATE: 18 BRPM | DIASTOLIC BLOOD PRESSURE: 68 MMHG | SYSTOLIC BLOOD PRESSURE: 122 MMHG | HEART RATE: 84 BPM | OXYGEN SATURATION: 97 %

## 2024-06-04 VITALS
WEIGHT: 189 LBS | TEMPERATURE: 98 F | HEART RATE: 93 BPM | OXYGEN SATURATION: 95 % | RESPIRATION RATE: 21 BRPM | BODY MASS INDEX: 27.06 KG/M2 | SYSTOLIC BLOOD PRESSURE: 133 MMHG | HEIGHT: 70 IN | DIASTOLIC BLOOD PRESSURE: 84 MMHG

## 2024-06-04 DIAGNOSIS — E86.0 DEHYDRATION: Primary | ICD-10-CM

## 2024-06-04 DIAGNOSIS — C16.0 GE JUNCTION CARCINOMA (HCC): Primary | ICD-10-CM

## 2024-06-04 LAB
ALBUMIN SERPL-MCNC: 2.9 G/DL (ref 3.5–5)
ALBUMIN/GLOB SERPL: 0.7 (ref 1.1–2.2)
ALP SERPL-CCNC: 123 U/L (ref 45–117)
ALT SERPL-CCNC: 254 U/L (ref 12–78)
ANION GAP SERPL CALC-SCNC: 6 MMOL/L (ref 5–15)
AST SERPL-CCNC: 177 U/L (ref 15–37)
BASOPHILS # BLD: 0.1 K/UL (ref 0–0.1)
BASOPHILS NFR BLD: 1 % (ref 0–1)
BILIRUB SERPL-MCNC: 0.8 MG/DL (ref 0.2–1)
BUN SERPL-MCNC: 22 MG/DL (ref 6–20)
BUN/CREAT SERPL: 32 (ref 12–20)
CALCIUM SERPL-MCNC: 9.2 MG/DL (ref 8.5–10.1)
CHLORIDE SERPL-SCNC: 103 MMOL/L (ref 97–108)
CO2 SERPL-SCNC: 23 MMOL/L (ref 21–32)
CREAT SERPL-MCNC: 0.69 MG/DL (ref 0.7–1.3)
DIFFERENTIAL METHOD BLD: ABNORMAL
EOSINOPHIL # BLD: 0.2 K/UL (ref 0–0.4)
EOSINOPHIL NFR BLD: 3 % (ref 0–7)
ERYTHROCYTE [DISTWIDTH] IN BLOOD BY AUTOMATED COUNT: 13.2 % (ref 11.5–14.5)
GLOBULIN SER CALC-MCNC: 3.9 G/DL (ref 2–4)
GLUCOSE SERPL-MCNC: 179 MG/DL (ref 65–100)
HCT VFR BLD AUTO: 37.3 % (ref 36.6–50.3)
HGB BLD-MCNC: 12.9 G/DL (ref 12.1–17)
IMM GRANULOCYTES # BLD AUTO: 0 K/UL (ref 0–0.04)
IMM GRANULOCYTES NFR BLD AUTO: 1 % (ref 0–0.5)
LYMPHOCYTES # BLD: 0.8 K/UL (ref 0.8–3.5)
LYMPHOCYTES NFR BLD: 10 % (ref 12–49)
MAGNESIUM SERPL-MCNC: 2 MG/DL (ref 1.6–2.4)
MCH RBC QN AUTO: 31.4 PG (ref 26–34)
MCHC RBC AUTO-ENTMCNC: 34.6 G/DL (ref 30–36.5)
MCV RBC AUTO: 90.8 FL (ref 80–99)
MONOCYTES # BLD: 1.3 K/UL (ref 0–1)
MONOCYTES NFR BLD: 17 % (ref 5–13)
NEUTS SEG # BLD: 5.2 K/UL (ref 1.8–8)
NEUTS SEG NFR BLD: 69 % (ref 32–75)
NRBC # BLD: 0 K/UL (ref 0–0.01)
NRBC BLD-RTO: 0 PER 100 WBC
PLATELET # BLD AUTO: 212 K/UL (ref 150–400)
PMV BLD AUTO: 9.1 FL (ref 8.9–12.9)
POTASSIUM SERPL-SCNC: 4.2 MMOL/L (ref 3.5–5.1)
PROT SERPL-MCNC: 6.8 G/DL (ref 6.4–8.2)
RBC # BLD AUTO: 4.11 M/UL (ref 4.1–5.7)
SODIUM SERPL-SCNC: 132 MMOL/L (ref 136–145)
WBC # BLD AUTO: 7.5 K/UL (ref 4.1–11.1)

## 2024-06-04 PROCEDURE — 6370000000 HC RX 637 (ALT 250 FOR IP): Performed by: STUDENT IN AN ORGANIZED HEALTH CARE EDUCATION/TRAINING PROGRAM

## 2024-06-04 PROCEDURE — 36415 COLL VENOUS BLD VENIPUNCTURE: CPT

## 2024-06-04 PROCEDURE — 74177 CT ABD & PELVIS W/CONTRAST: CPT

## 2024-06-04 PROCEDURE — 96375 TX/PRO/DX INJ NEW DRUG ADDON: CPT

## 2024-06-04 PROCEDURE — 96361 HYDRATE IV INFUSION ADD-ON: CPT

## 2024-06-04 PROCEDURE — 71275 CT ANGIOGRAPHY CHEST: CPT

## 2024-06-04 PROCEDURE — 85025 COMPLETE CBC W/AUTO DIFF WBC: CPT

## 2024-06-04 PROCEDURE — 6360000004 HC RX CONTRAST MEDICATION: Performed by: STUDENT IN AN ORGANIZED HEALTH CARE EDUCATION/TRAINING PROGRAM

## 2024-06-04 PROCEDURE — 80053 COMPREHEN METABOLIC PANEL: CPT

## 2024-06-04 PROCEDURE — 99285 EMERGENCY DEPT VISIT HI MDM: CPT

## 2024-06-04 PROCEDURE — 96374 THER/PROPH/DIAG INJ IV PUSH: CPT

## 2024-06-04 PROCEDURE — G0299 HHS/HOSPICE OF RN EA 15 MIN: HCPCS

## 2024-06-04 PROCEDURE — 2580000003 HC RX 258: Performed by: STUDENT IN AN ORGANIZED HEALTH CARE EDUCATION/TRAINING PROGRAM

## 2024-06-04 PROCEDURE — 6360000002 HC RX W HCPCS: Performed by: STUDENT IN AN ORGANIZED HEALTH CARE EDUCATION/TRAINING PROGRAM

## 2024-06-04 PROCEDURE — 83735 ASSAY OF MAGNESIUM: CPT

## 2024-06-04 PROCEDURE — A4216 STERILE WATER/SALINE, 10 ML: HCPCS | Performed by: STUDENT IN AN ORGANIZED HEALTH CARE EDUCATION/TRAINING PROGRAM

## 2024-06-04 PROCEDURE — C9113 INJ PANTOPRAZOLE SODIUM, VIA: HCPCS | Performed by: STUDENT IN AN ORGANIZED HEALTH CARE EDUCATION/TRAINING PROGRAM

## 2024-06-04 RX ORDER — 0.9 % SODIUM CHLORIDE 0.9 %
1000 INTRAVENOUS SOLUTION INTRAVENOUS ONCE
Status: COMPLETED | OUTPATIENT
Start: 2024-06-04 | End: 2024-06-04

## 2024-06-04 RX ORDER — ONDANSETRON 2 MG/ML
4 INJECTION INTRAMUSCULAR; INTRAVENOUS ONCE
Status: COMPLETED | OUTPATIENT
Start: 2024-06-04 | End: 2024-06-04

## 2024-06-04 RX ORDER — MORPHINE SULFATE 4 MG/ML
4 INJECTION, SOLUTION INTRAMUSCULAR; INTRAVENOUS
Status: COMPLETED | OUTPATIENT
Start: 2024-06-04 | End: 2024-06-04

## 2024-06-04 RX ORDER — MORPHINE SULFATE 10 MG/5ML
4 SOLUTION ORAL
Status: DISCONTINUED | OUTPATIENT
Start: 2024-06-04 | End: 2024-06-04

## 2024-06-04 RX ORDER — MORPHINE SULFATE 10 MG/5ML
10 SOLUTION ORAL
Status: COMPLETED | OUTPATIENT
Start: 2024-06-04 | End: 2024-06-04

## 2024-06-04 RX ORDER — MORPHINE SULFATE 10 MG/5ML
10 SOLUTION ORAL
Status: DISCONTINUED | OUTPATIENT
Start: 2024-06-04 | End: 2024-06-04 | Stop reason: HOSPADM

## 2024-06-04 RX ADMIN — PANTOPRAZOLE SODIUM 40 MG: 40 INJECTION, POWDER, FOR SOLUTION INTRAVENOUS at 09:22

## 2024-06-04 RX ADMIN — IOPAMIDOL 100 ML: 755 INJECTION, SOLUTION INTRAVENOUS at 08:48

## 2024-06-04 RX ADMIN — MORPHINE SULFATE 4 MG: 4 INJECTION INTRAVENOUS at 07:38

## 2024-06-04 RX ADMIN — ONDANSETRON 4 MG: 2 INJECTION INTRAMUSCULAR; INTRAVENOUS at 07:38

## 2024-06-04 RX ADMIN — MORPHINE SULFATE 10 MG: 10 SOLUTION ORAL at 12:02

## 2024-06-04 RX ADMIN — SODIUM CHLORIDE 1000 ML: 9 INJECTION, SOLUTION INTRAVENOUS at 07:35

## 2024-06-04 RX ADMIN — SODIUM CHLORIDE 1000 ML: 9 INJECTION, SOLUTION INTRAVENOUS at 11:29

## 2024-06-04 ASSESSMENT — ENCOUNTER SYMPTOMS
ABDOMINAL PAIN: 1
NAUSEA: 1
TROUBLE SWALLOWING: 1
DYSPNEA ACTIVITY LEVEL: AFTER AMBULATING 10 - 20 FT
PAIN LOCATION - PAIN QUALITY: TENDERNESS

## 2024-06-04 ASSESSMENT — PAIN SCALES - GENERAL
PAINLEVEL_OUTOF10: 3
PAINLEVEL_OUTOF10: 3
PAINLEVEL_OUTOF10: 5

## 2024-06-04 ASSESSMENT — PAIN DESCRIPTION - LOCATION
LOCATION: ABDOMEN

## 2024-06-04 ASSESSMENT — LIFESTYLE VARIABLES
HOW MANY STANDARD DRINKS CONTAINING ALCOHOL DO YOU HAVE ON A TYPICAL DAY: PATIENT DOES NOT DRINK
HOW OFTEN DO YOU HAVE A DRINK CONTAINING ALCOHOL: NEVER

## 2024-06-04 NOTE — PROGRESS NOTES
Hospice RN Note: consult received, chart reviewed. Met with spouse and daughter at bedside and reviewed clinical course since April 26th. Pt has had significant decline overall to include functional status, fatigue and has developed pain d/t hernia with c/o of SOB intermittently. Wife reports pt takes SL Ativan at home which has helped with N/V but has not required any pain management over the course of his diagnosis until recent hernia. Pt attempted to come to ED last night but it was too full so was sent home with oxycodone of which he took 2 doses crushed in his G-Tube; wife reports he was able to sleep so did have some relief from that.     Wife is waiting on results of CT scan to determine if there is progression of his disease before making any decisions but is leaning towards hospice. We briefly touched on hospice conversation; that we focus on symptom management and comfort to allow the body to rest and progress naturally as she did ask about IV hydration on hospice which is not something we typically do. Pt was asleep during my visit as he had been medicated with IV Morphine so did not participate in conversations.     Spoke with Dr. Medina who will review findings of CT with pt and spouse and we will continue hospice discussion afterwards.     1200: I had spoken with CM as initially we were at capacity in this zip code but a pt that was scheduled to admit today will not be so we do have 1 opening for Mr. Gregg. They would like to proceed with hospice at home with [facility]  and have notified [facility] they will be going with us. We have an admission nurse available at 2PM today, wife will call me when they are home and I will let my team know to head over to do the admission.       Thank you for the opportunity to be of service to this patient and family.

## 2024-06-04 NOTE — HOSPICE
y.o. male who I am seeing in consultation for a new diagnosis of metastatic GEJ carcinoma. He has been experiencing some difficulty in swallowing for a few months. He has lost over 40 lbs and he is fatigued. An EGD revealed GEJ mass which was biopsied and came back as GEJ carcinoma. CT and a subsequent PET shows enlarged left supraclavicular and RP efrem disease. He has seen Khloe De La Torre at [facility]. He decided to receive systemic therapy with us.      Interval History:      Mr. Gregg received mFOLFOX + Nivolumab for some time. I stopped Ox after 7th tt. I switched to Cape and he was unable to swallow the medicine. He is back on 5-FU + Nivolumab. He experienced disease progression and I have switched his treatment to Cyramza/Taxol. He has seen Palliative and symptoms are well controlled. He is doing well on current treatment. No major side effects. Had inguinal hernia repair. Pain in the RLQ is better. He is able to swallow some liquid.      He was admitted to the hospital with hematemesis. Bleeding subsided on its own. He is receiving systemic therapy. He continues to loose weight. He finished radiation to the GEJ. NO more hematemesis. He is feeling poorly. Nausea and retching, dysphagia.      Use LCD Guidelines and list features:   ________  A. Disease with distant metastases at presentation OR    ____x____  B. Progression from an earlier stage of disease to metastatic disease with either:                          ____x____  1. continued decline in spite of therapy                          ____x____  2. patient declines further disease directed therapy          SPIRITUAL/Social/Emotional/psych:     Dr. Carlson contacted, agrees to serve as attending provider for hospice and provided verbal certification of terminal illness with prognosis of 6 months or less life expectancy. Orders for hospice admission, medications and plan of treatment received. Medication reconciliation completed.        Currently this

## 2024-06-04 NOTE — TELEPHONE ENCOUNTER
Maria A from [facility] called stated that the order for Hospice has [facility] info and not [facility]. Maria A is requesting the correct forms to be sent back out for them to see the patient. # [phone]

## 2024-06-04 NOTE — ED PROVIDER NOTES
[facility] EMERGENCY DEPT  EMERGENCY DEPARTMENT ENCOUNTER       Pt Name: Cyril Gregg  MRN: [id number]  Birthdate 1960  Date of evaluation: 6/4/2024  Provider: Nadir Medina MD   PCP: Osei Winchester MD  Note Started: 12:31 PM EDT 6/4/24     CHIEF COMPLAINT       Chief Complaint   Patient presents with    Fever        HISTORY OF PRESENT ILLNESS: 1 or more elements      History From: Patient  HPI Limitations: None     Cyril Gregg is a 63 y.o. male who presents for dehydration, generalized weakness, nausea, diarrhea.  He reports he has esophageal cancer and is currently on hospice.  He states he has been through chemo and radiation both.  He was told by his oncologist to come in for blood work and CT imaging.  He states that his chemo was difficult and he will not do any more chemo. He is here today to see if the tumor has grown so that his oncologist can make decisions for his treatment. The pt just started on pain medication last night for control of his symptoms. He uses his PEG tube for feeds and medications.  Patient and his wife both expressed that he does not want to be admitted to the hospital and would like to be treated here in the ER so that he can go home and be comfortable in his own space.         Nursing Notes were all reviewed and agreed with or any disagreements were addressed in the HPI.     REVIEW OF SYSTEMS      Review of Systems     Positives and Pertinent negatives as per HPI.    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

## 2024-06-04 NOTE — TELEPHONE ENCOUNTER
[facility] decided to leave ED and go home with Hospice instead.  This RN called Lisa with palliative who said she will put in a referral for Hospice but it is afterhours and will likely not go out to them this evening.

## 2024-06-04 NOTE — ED TRIAGE NOTES
Patient arrives to ED from home via EMS for c/o \"tumor fever\". Patient presents with segun cheeks, back pain, chest pain, nausea, shortness of breath as well as bone and joint pain. Patient diagnosed with stage 4 esophageal cancer which he has stopped treatments for 2 weeks ago. Patient currently under palliative care. Patient states provider sent patient in for labs and CT scan.

## 2024-06-05 ENCOUNTER — HOME CARE VISIT (OUTPATIENT)
Age: 64
End: 2024-06-05
Payer: COMMERCIAL

## 2024-06-05 VITALS
OXYGEN SATURATION: 96 % | SYSTOLIC BLOOD PRESSURE: 138 MMHG | DIASTOLIC BLOOD PRESSURE: 88 MMHG | RESPIRATION RATE: 20 BRPM | HEART RATE: 105 BPM | TEMPERATURE: 98.1 F

## 2024-06-05 PROCEDURE — G0299 HHS/HOSPICE OF RN EA 15 MIN: HCPCS

## 2024-06-05 NOTE — HOSPICE
Follow up visit after admission yesterday.  Per spouse patient has a very difficult night with nausea and multiple episodes of vomiting.  Vitals are WNL with HR increased.  Phone call placed to Elieser Garcia NP to discuss current symptoms, new order received to utilize Haldol for nausea. New order reviewed with patient and spouse and encouraged to alternate between Zofran and Haldol to relive symptom and to keep on a consistent schedule.  Also advised to hold tube feedings if not feeling well or feeling too full.  Per patient Diazepam helped with sleep and did not cause anxiety like the Lorazepam.  Encouraged to call hospice with any concerns or change with patient's condition.

## 2024-06-06 ENCOUNTER — HOME CARE VISIT (OUTPATIENT)
Facility: HOME HEALTH | Age: 64
End: 2024-06-06
Payer: COMMERCIAL

## 2024-06-06 PROCEDURE — G0155 HHCP-SVS OF CSW,EA 15 MIN: HCPCS

## 2024-06-06 NOTE — HOSPICE
LMSW and a shadowing MSW arrived at the patient's home to complete An Initial Psychosocial Assessment visit for [facility]. The LMSW and MSW was greeted at the front door by the patient's wife Raisa. The home was clean and clutter free. The patient is a 64 y/o  male with a [facility] Dx. Adenocarcinoma of gastroesophageal junction. The patient was received sitting on a chair in the living room upon arrival. The patient was alert and oriented times four. [facility] and his wife Raisa provided information for this assessment. The patient stated that his appetite is poor, and he is a peg tube feeder. The patient's sleep is interrupted due to pain and discomfort. Raisa stated that the patient's mood has been mellow, but the patient is generally positive. The patient stated that he was a [employer]'s Department and has worked there for 40 years. The patient has been  to Raisa for 21 years. The patient has two estranged daughters and Raisa has two daughters. Both DTRS lives in [place]. Patient has a brother Ward who lives in [place] and a sister Melanie who lives in [place]. Patient belongs to [facility] and receive good support. LMSW provided community resources, emotional support, and supportive counseling related to EOL care.

## 2024-06-07 ENCOUNTER — HOME CARE VISIT (OUTPATIENT)
Facility: HOME HEALTH | Age: 64
End: 2024-06-07
Payer: COMMERCIAL

## 2024-06-07 PROCEDURE — G0299 HHS/HOSPICE OF RN EA 15 MIN: HCPCS

## 2024-06-08 VITALS
RESPIRATION RATE: 16 BRPM | SYSTOLIC BLOOD PRESSURE: 116 MMHG | DIASTOLIC BLOOD PRESSURE: 80 MMHG | TEMPERATURE: 97.5 F | OXYGEN SATURATION: 96 % | HEART RATE: 102 BPM

## 2024-06-08 ASSESSMENT — ENCOUNTER SYMPTOMS: PAIN LOCATION - PAIN QUALITY: ACHE

## 2024-06-08 NOTE — HOSPICE
Patient was sitting up on the couch with his wife watching TV.He was alert and Ox4. He answered all questions appropriately. Wife, Raisa, c/o patient not sleeping all night. She stated he was anxious and would only sleep 10-15 minutes before waking up anxious again. They had tried lorazepam a couple nights ago but that make the anxiety worse. He was switched to diazepam, but that did not help last night. She asked if there was anything else that could help him sleep. Dr. DALLAS Conroy called. She increased his oxycodone suspension to 10mg every 4 hrs PRN. New orders reviewed with Raisa and syringe marked at 0.5ml with Sharpie pen. Raisa gave him the increased dose then due to him c/o upper back pain. He has an enlarged lymph node in his right upper back that is very painful to touch. Patient's nausea and vomiting have subsided due to rotating Zofran and Haldol administration. Patient has had to decrease his feeding intake the last few days due to nausea and vomiting. He only had gatorade this morning. Raisa said he only had 1/2-1 dose of feeding during the last couple days. His last BM was 2 days ago. He has no edema or open areas of skin. V.S. 116/80; pulse 102; resp 16; O2 sat 96% RA; temp 97.5.  Feeding bags, Biotene spray and emesis bags ordered

## 2024-06-11 ENCOUNTER — HOME CARE VISIT (OUTPATIENT)
Facility: HOME HEALTH | Age: 64
End: 2024-06-11
Payer: COMMERCIAL

## 2024-06-11 VITALS
TEMPERATURE: 98 F | OXYGEN SATURATION: 95 % | DIASTOLIC BLOOD PRESSURE: 74 MMHG | HEART RATE: 106 BPM | SYSTOLIC BLOOD PRESSURE: 108 MMHG | RESPIRATION RATE: 16 BRPM

## 2024-06-11 PROCEDURE — G0299 HHS/HOSPICE OF RN EA 15 MIN: HCPCS

## 2024-06-11 ASSESSMENT — ENCOUNTER SYMPTOMS
CONSTIPATION: 1
PAIN LOCATION - PAIN QUALITY: ACHE
NAUSEA: 1

## 2024-06-11 NOTE — HOSPICE
Patient sitting up on couch with his wife. He was finishing a tube feeding of 200ml. Wife, Raisa, states it's the first feeding he's had for several days. He's been just taking Gatorade 2-3 times a day to get his nausea under control. They were alternating Zofran and Haldol for nausea but are now just using Haldol every 4 hrs and his nausea is much better. After the tube feeding finished, he c/o a little nausea. He states his pain is much better controled and he is sleeping all night since his oxycodone was increased to 10mg every 4 hrs PRN. He is taking it 3-4 times a day and feels much better. He says he now has bone and joint pain when he stands and walks, but the oxycodone is helping. His main complaint is weakness. His lungs were clear except for the RLL which was very diminished. He has not had a BM since 6/6. He refused a Bisacodyl suppository. Dr. DALLAS Conroy notified and patient started on Senna liquid at 5ml per tube daily PRN. Senna ordered from [facility]. V.S. 108/74; pulse 106; O2 sat 95% RA; resp 16; temp 98.0.

## 2024-06-14 ENCOUNTER — HOME CARE VISIT (OUTPATIENT)
Facility: HOME HEALTH | Age: 64
End: 2024-06-14
Payer: COMMERCIAL

## 2024-06-14 VITALS
SYSTOLIC BLOOD PRESSURE: 110 MMHG | TEMPERATURE: 97.7 F | OXYGEN SATURATION: 95 % | HEART RATE: 106 BPM | RESPIRATION RATE: 18 BRPM | DIASTOLIC BLOOD PRESSURE: 78 MMHG

## 2024-06-14 PROCEDURE — G0299 HHS/HOSPICE OF RN EA 15 MIN: HCPCS

## 2024-06-14 ASSESSMENT — ENCOUNTER SYMPTOMS
PAIN LOCATION - PAIN QUALITY: ACHE
SKIN LESIONS: 1

## 2024-06-14 NOTE — HOSPICE
Patient was sitting up on the couch with Raisa, his wife. He was alert and Ox4. He answered all questions appropriately. Raisa stated his last tube feeding of formula was on Tuesday. He was very sick for 2 days after that last feeding. It caused nausea, vomiting, that was difficult to stop, and abdominal pain. He has decieded not to take any more formula feedings. He has 2-3 Gatorades a day via G-tube. He c/o still having some chest pain, but believes that may be from the vomiting and dry heaves. He rated his pain 4/10. Patient's last BM was 6 days ago. He started on liquid Senna yesterday. Patient c/o some pain on his buttocks. On assessment, buttocks were red and skin looked fragile. Encouraged Raisa to put zinc cream on area.  Patient has a small sty on his left upper eyelid. Raisa has been putting moist compresses on it. Encouraged her to continue. Patient and wife both see patient is getting weaker. Copy of patient's Advanced Directives, that include DNR orders, given to this nurse. V.S. 110/78; pulse 106; resp 18; O2 sat 95% RA; temp 97.7  Haldol, oxycodone, Morphine and Levsin ordered through [facility] for next day delivery.

## 2024-06-17 NOTE — HOSPICE
Wife called into triage due to patient coughing all night. At visit, patient was sitting on the couch. He was alert and Ox4. He had an occasional moist, productive cough. He also c/o sinus congestion and a runny nose. His nose was red. Symptoms started about 5 days ago. Temp 98.9. He has not been running a high fever. Wife, Raisa, asked about antibiotics but told it may be a virus in which antibiotics will not help. Patient denied feeling short of breath. His left lung fields were clear but his right lung had slight rhonchi in all lobes. He was able to produce sputum when he coughed. He said it was yellow. Patient has an excoriated area on his coccyx. Raisa is putting zinc cream on it. He is sitting on small pillows to relieve the pressure. He is still only getting gatorade in his G-tube. He did have a B.M. today. It was loose. Raisa and patient encouraged to call hospice if any symptoms get severe. Will plan to recheck patient on Wednesday.  V.S. 108/72; pulse 116; O2 sat 93% RA; resp 18.

## 2024-06-19 NOTE — HOSPICE
Patient was sitting in the recliner, awake but resting with eyes closed. Wife, Raisa, stated he was very restless all night and often confused. Raisa stated he was talking to himself and people who were not there and picking at things that were not there. She said he often got irritable with her when she tried to help him. Patient was calm and answered all questions appropriately. He rated his pain 6/10. He had chest pain from coughing and back pain. He had received PRN pain meds 2 hours ago. Patient's lungs were clear bilat but diminished in the bases. Elieser Garcia NP informed of patient's decline and increase in pain. New orders received to increase PRN oxycodone to every hour PRN. Raisa told to try giving hyoscamine to reduce the secretions. Raisa gave a dose of oxycodone and hyoscamine down the tube. Raisa said the patient stopped taking glipizide and is refusing finger sticks. She was told to discontinue any finger sticks. Patient has refused any gatorade or drink down the tube except flushes after medications. EOL symptoms discussed with Raisa. Raisa told visits will be daily unless he suddenly gets better, which is not expected. Raisa was grateful for the daily visits. V.S. 108/78; pulse 110 irreg.; resp 16; O2 sat 91% RA; temp 97.8.

## 2024-06-20 NOTE — HOSPICE
Patient sitting in recliner. His eyes were closed but he aroused easily. He would say a couple words or answer a question and close his eyes again. He sometimes would mumble, start talking to no one. He still has a moist, productive cough. His left lung was clear but his right lung had expiratory rhonchi in it. His O2 sat was 85% RA. His fingers were pale but very warm with cap refill. He denied feeling short of breath but he appeared dyspneic and had irregular respirations. Wife states he did not sleep much last night and was very restless. Patient was was slightly restless and very confused during the visit. Last dose of oxycodone was 3 hours ago. Encouraged to wife to give the patient oxycodone more often for restlessness, especially at night. She was told she could give a dose of morphine if the oxycodone was not calming him down or he appeared short of breath. She is still using the Haldol every 4 hours for agitation. Wife told to call hospice if she had any questions or difficulty. V.S. 98/72; pulse 112; resp 18; O2  temp 98.2  O2 ordered stat for patient by triage.

## 2024-06-21 ENCOUNTER — APPOINTMENT (OUTPATIENT)
Facility: HOSPITAL | Age: 64
End: 2024-06-21
Payer: COMMERCIAL

## 2024-06-21 NOTE — HOSPICE
Wife stated patient had not slept most of the night. He was restless and coughing. She managed to get him out of the recliner, to the bathroom and then to bed. He was lying semi-fowlers in bed. He opened his eyes when we came into the room. His speech is very weak and usually can not be understood. He has a moist, productive cough. He has rhonchi in both upper lung fields. Lower lung fields are diminished. Wife stated he tries to drink the ice chips that are next to him and chokes/aspirates. Patient moans occasionally and after he coughs. He was very restless and fidgeting with his sheets or anything within reach. The wife had given him two doses of morphine during the night, but she said it did not help his restlessness or moaning. Elieser Garcia NP called and orders received to increase morphine to 10mg every hr PRN.  One dose of 10mg morphine given. Wife to give Haldol in about 15min. Patient is wearing a scopolamine patch. Hyoscamine was stopped. Biotene being used regularly. Wife states patient is now wearing pull-ups. His urine is very dark.

## 2024-06-22 NOTE — HOSPICE
visit with Vinny, spouse Raisa and 3 children present. Vinny is on 0-7 list, at EOL. Pt in bed, no acute distress. Color pale/grey, some cyanosis to nailbeds. Oxygenation poor, irregular breathing pattern though respirations are not \"wet\" sounding. Scopolamine patch on. Placed oxygen on for comfort after hygiene and turning was completed. PPS 10. PCG is medicating per MAR orders per his signs of discomfort. She is using morphine and haldol with good effect. Adequate supply of meds on hand. Pt is mouth care only, no intake via G tube in some time. Pt found to have hard distended bladder. With consent of Raisa, placed cabrera cath for comfort. 1800cc dark mirta urine returned. Educated on how to empty the bag. Discussed EOL signs. Family is supportive of each other and accepting of this phase. Raisa tearful but appropriated. Reminded family to call with any needs. Pt is palliated at this time.

## 2024-06-22 NOTE — HOSPICE
Arrived for pronouncement of death. Time of death 1615, abscence of vital signs established. Family at bedside and grieving appropriately. Raisa reports death as peaceful. Family was singing a hymn and he  just after that. Post mortem care, med disposal completed with family. Call placed to [facility] - necessary information provided. Defer arrival until family and friends have had time to spend with Vinny. Friends and family dressed Vinny in his uniform for transport.     Team updated via email.

## 2024-06-23 ENCOUNTER — HOME CARE VISIT (OUTPATIENT)
Age: 64
End: 2024-06-23
Payer: COMMERCIAL

## 2024-06-26 ENCOUNTER — HOME CARE VISIT (OUTPATIENT)
Age: 64
End: 2024-06-26
Payer: COMMERCIAL

## 2025-02-10 NOTE — ANESTHESIA POSTPROCEDURE EVALUATION
----- Message from Christopher D'Amico sent at 2/10/2025  9:37 AM EST -----  Kidney blood work looks completely normal   Department of Anesthesiology  Postprocedure Note    Patient: Cyril Gregg  MRN: [id number]  YOB: 1960  Date of evaluation: 1/19/2024    Procedure Summary       Date: 01/19/24 Room / Location: [facility] ENDO 02 / [facility] ENDOSCOPY    Anesthesia Start: 1127 Anesthesia Stop: 1148    Procedure: EGD BIOPSY (Upper GI Region) Diagnosis:       Dysphagia, unspecified type      Malignant neoplasm of esophagus, unspecified location (HCC)      (Dysphagia, unspecified type [R13.10])      (Malignant neoplasm of esophagus, unspecified location (HCC) [C15.9])    Surgeons: Figueroa Dawson MD Responsible Provider: CHARLES Villalobos MD    Anesthesia Type: MAC ASA Status: 3            Anesthesia Type: MAC    Mt Phase I: Mt Score: 10    Mt Phase II: Mt Score: 10    Anesthesia Post Evaluation    Patient location during evaluation: bedside  Patient participation: complete - patient participated  Level of consciousness: responsive to verbal stimuli and awake and alert  Pain score: 2  Nausea & Vomiting: no nausea  Cardiovascular status: blood pressure returned to baseline  Respiratory status: acceptable  Hydration status: euvolemic  Multimodal analgesia pain management approach  Pain management: adequate    No notable events documented.   no

## (undated) DEVICE — SCREW EXT FIX L14MM FOR DISTRCTN

## (undated) DEVICE — SUTURE MONOCRYL SZ 4-0 L27IN ABSRB UD L19MM PS-2 1/2 CIR PRIM Y426H

## (undated) DEVICE — ARM DRAPE

## (undated) DEVICE — BLADE CLIPPER GEN PURP NS

## (undated) DEVICE — BAG,DRAINAGE,ANTI-REFLUX TOWER,2000ML: Brand: MEDLINE

## (undated) DEVICE — SUTURE VICRYL + SZ 3-0 L27IN ABSRB UD L26MM SH 1/2 CIR VCP416H

## (undated) DEVICE — BONE WAX WHITE: Brand: BONE WAX WHITE

## (undated) DEVICE — Z INACTIVE NO USAGE TURNOVER KIT RM CLEANOP

## (undated) DEVICE — CATHETER IV 22GA L1IN OD0.8382-0.9144MM ID0.6096-0.6858MM 382523

## (undated) DEVICE — TOWEL SURG W17XL27IN STD BLU COT NONFENESTRATED PREWASHED

## (undated) DEVICE — TELFA ADHESIVE ISLAND DRESSING: Brand: TELFA

## (undated) DEVICE — TROCAR LAP L100MM DIA5MM BLDELSS W/ STBL SL ENDOPATH XCEL

## (undated) DEVICE — SUTURE SZ 0 27IN 5/8 CIR UR-6  TAPER PT VIOLET ABSRB VICRYL J603H

## (undated) DEVICE — SUTURE ETHLN SZ 2-0 L18IN NONABSORBABLE BLK L19MM PS-2 PRIM 593H

## (undated) DEVICE — SOLUTION IRRIG 1000ML STRL H2O USP PLAS POUR BTL

## (undated) DEVICE — TROCAR: Brand: KII® SLEEVE

## (undated) DEVICE — STERILE POLYISOPRENE POWDER-FREE SURGICAL GLOVES WITH EMOLLIENT COATING: Brand: PROTEXIS

## (undated) DEVICE — DECANTER BAG 9": Brand: MEDLINE INDUSTRIES, INC.

## (undated) DEVICE — CATHETER IV 20GA L1.16IN OD1.0414-1.1176MM ID0.762-0.8382MM

## (undated) DEVICE — CANNULA SEAL

## (undated) DEVICE — REM POLYHESIVE ADULT PATIENT RETURN ELECTRODE: Brand: VALLEYLAB

## (undated) DEVICE — SET GRAV CK VLV NEEDLESS ST 3 GANGED 4WAY STPCOCK HI FLO 10

## (undated) DEVICE — KENDALL SCD EXPRESS SLEEVES, KNEE LENGTH, MEDIUM: Brand: KENDALL SCD

## (undated) DEVICE — DEVON™ KNEE AND BODY STRAP 60" X 3" (1.5 M X 7.6 CM): Brand: DEVON

## (undated) DEVICE — DRAPE,LAPAROTOMY,PED,STERILE: Brand: MEDLINE

## (undated) DEVICE — LAPAROSCOPIC TROCAR SLEEVE/SINGLE USE: Brand: KII® OPTICAL ACCESS SYSTEM

## (undated) DEVICE — PREP SKN PREVAIL 40ML APPL --

## (undated) DEVICE — NEEDLE SPNL 20GA L3.5IN YEL HUB S STL REG WALL FIT STYL W/

## (undated) DEVICE — SUTURE MCRYL SZ 4-0 L27IN ABSRB UD L19MM PS-2 1/2 CIR PRIM Y426H

## (undated) DEVICE — TIP COVER ACCESSORY

## (undated) DEVICE — SUTURE V-LOC 90 3-0 L9IN ABSRB VLT L26MM V-20 1/2 CIR TAPR VLOCM0644

## (undated) DEVICE — KIT,1200CC CANISTER,3/16"X6' TUBING: Brand: MEDLINE INDUSTRIES, INC.

## (undated) DEVICE — SOLUTION ANTIFOG VIS SYS CLEARIFY LAPSCP

## (undated) DEVICE — SYSTEM REPROC CBL 3 LD DISPOSABLE

## (undated) DEVICE — SEAL

## (undated) DEVICE — SYRINGE MED 20ML STD CLR PLAS LUERLOCK TIP N CTRL DISP

## (undated) DEVICE — DERMABOND SKIN ADH 0.7ML -- DERMABOND ADVANCED 12/BX

## (undated) DEVICE — HYPODERMIC SAFETY NEEDLE: Brand: MAGELLAN

## (undated) DEVICE — GLOVE SURG SZ 85 CRM LTX FREE POLYISOPRENE POLYMER BEAD ANTI

## (undated) DEVICE — DRAPE SURG W41XL74IN CLR FULL SZ C ARM 3 ADH POLY STRP E

## (undated) DEVICE — IV START KIT: Brand: MEDLINE

## (undated) DEVICE — COLUMN DRAPE

## (undated) DEVICE — LIQUIBAND RAPID ADHESIVE 36/CS 0.8ML: Brand: MEDLINE

## (undated) DEVICE — SUTURE VCRL SZ 2-0 L18IN ABSRB UD L26MM CP-2 1/2 CIR REV J762D

## (undated) DEVICE — SOLUTION IV 500ML 0.9% SOD CHL PH 5 INJ USP VIAFLX PLAS

## (undated) DEVICE — GLOVE SURG SZ 8 CRM LTX FREE POLYISOPRENE POLYMER BEAD ANTI

## (undated) DEVICE — BLADELESS OBTURATOR: Brand: WECK VISTA

## (undated) DEVICE — Device

## (undated) DEVICE — MEDI-VAC NON-CONDUCTIVE SUCTION TUBING: Brand: CARDINAL HEALTH

## (undated) DEVICE — 4-PORT MANIFOLD: Brand: NEPTUNE 2

## (undated) DEVICE — GOWN,SIRUS,NONRNF,SETINSLV,XL,20/CS: Brand: MEDLINE

## (undated) DEVICE — DRAPE MICSCP W46XL120IN POLY DRAWSTRAP W STEREO OBS TB AND

## (undated) DEVICE — INTENDED FOR TISSUE SEPARATION, AND OTHER PROCEDURES THAT REQUIRE A SHARP SURGICAL BLADE TO PUNCTURE OR CUT.: Brand: BARD-PARKER ® CARBON RIB-BACK BLADES

## (undated) DEVICE — CATHETER IV 18GA L1.16IN OD1.27-1.3462MM ID0.9398-1.016MM

## (undated) DEVICE — 3M™ IOBAN™ 2 ANTIMICROBIAL INCISE DRAPE 6650EZ: Brand: IOBAN™ 2

## (undated) DEVICE — SOLUTION IV 250ML 0.9% SOD CHL CLR INJ FLX BG CONT PRT CLSR

## (undated) DEVICE — SUTURE VICRYL SZ 2-0 L27IN ABSRB UD L26MM SH 1/2 CIR J417H

## (undated) DEVICE — SOLUTION IV 1000ML 0.9% SOD CHL

## (undated) DEVICE — SOLUTION SURG PREP 26 CC PURPREP

## (undated) DEVICE — DRAIN KT WND 10FR RND 400ML --

## (undated) DEVICE — SOLUTION IV 1000ML 0.9% SOD CHL PH 5 INJ USP VIAFLX PLAS

## (undated) DEVICE — CATHETER IV 24GA L0.75IN OD0.6604-0.7366MM

## (undated) DEVICE — SUTURE V-LOC 180 SZ 0 L9IN ABSRB GRN GS-21 L37MM 1/2 CIR VLOCL0346

## (undated) DEVICE — TOOL 14MH30 LEGEND 14CM 3MM: Brand: MIDAS REX ™

## (undated) DEVICE — SYRINGE MED 10ML LUERLOCK TIP W/O SFTY DISP

## (undated) DEVICE — COVER,TABLE,HEAVY DUTY,60"X90",STRL: Brand: MEDLINE

## (undated) DEVICE — GENERAL LAPAROSCOPY-MRMC: Brand: MEDLINE INDUSTRIES, INC.

## (undated) DEVICE — LAMINECTOMY RICHMOND-LF: Brand: MEDLINE INDUSTRIES, INC.

## (undated) DEVICE — CODMAN® INTEGRATED BIPOLAR CORD AND TUBING SET FLYING LEADS, GRAVITY FEED: Brand: CODMAN®

## (undated) DEVICE — SPONGE: SPECIALTY PEANUT XR 100/CS: Brand: MEDICAL ACTION INDUSTRIES

## (undated) DEVICE — COVER,MAYO STAND,STERILE: Brand: MEDLINE

## (undated) DEVICE — BIT DRL L14MM DIA2.2MM G FLAT CHK FOR ANT CERV PLT SYS

## (undated) DEVICE — INSULATED BLADE ELECTRODE: Brand: EDGE

## (undated) DEVICE — FLOSEAL MATRIX IS INDICATED IN SURGICAL PROCEDURES (OTHER THAN IN OPHTHALMIC) AS AN ADJUNCT TO HEMOSTASIS WHEN CONTROL OF BLEEDING BY LIGATURE OR CONVENTIONALPROCEDURES IS INEFFECTIVE OR IMPRACTICAL.: Brand: FLOSEAL HEMOSTATIC MATRIX

## (undated) DEVICE — COLLAR CERV L H3.25X23IN M DENS FOAM COT STOCK CVR LO

## (undated) DEVICE — PAD PT POS 36 IN SURGYPAD DISP

## (undated) DEVICE — CATHETER IV 14GA L1.25IN TEF STR HUB INTROCAN SFTY

## (undated) DEVICE — SPONGE,DRAIN,NONWVN,4"X4",6PLY,STRL,LF: Brand: MEDLINE